# Patient Record
Sex: MALE | Race: WHITE | Employment: OTHER | URBAN - METROPOLITAN AREA
[De-identification: names, ages, dates, MRNs, and addresses within clinical notes are randomized per-mention and may not be internally consistent; named-entity substitution may affect disease eponyms.]

---

## 2017-01-16 ENCOUNTER — MED REC SCAN ONLY (OUTPATIENT)
Dept: INTERNAL MEDICINE CLINIC | Facility: CLINIC | Age: 72
End: 2017-01-16

## 2017-03-14 ENCOUNTER — MED REC SCAN ONLY (OUTPATIENT)
Dept: INTERNAL MEDICINE CLINIC | Facility: CLINIC | Age: 72
End: 2017-03-14

## 2017-03-14 ENCOUNTER — OFFICE VISIT (OUTPATIENT)
Dept: AUDIOLOGY | Facility: CLINIC | Age: 72
End: 2017-03-14

## 2017-03-14 DIAGNOSIS — H90.3 SENSORINEURAL HEARING LOSS, BILATERAL: Primary | ICD-10-CM

## 2017-03-14 PROCEDURE — 92593 HEARING AID CHECK, BOTH EARS: CPT | Performed by: AUDIOLOGIST

## 2017-03-14 NOTE — PROGRESS NOTES
HEARING AID FOLLOW-UP    Thalia Reich  8/11/1945  [id number]    Left cShell was broken - faceplate off shell and wire was loose. Sent both aids for warranty check/repair. Programmed clinic loaner aids. Call when repaired aids return.   Complete heari

## 2017-03-23 ENCOUNTER — TELEPHONE (OUTPATIENT)
Dept: AUDIOLOGY | Facility: CLINIC | Age: 72
End: 2017-03-23

## 2017-03-23 NOTE — TELEPHONE ENCOUNTER
Pt wanted to say thank you for offering appt on 3/24 but unfortunately he cannot come in tomorrow. Thank you.

## 2017-03-28 ENCOUNTER — OFFICE VISIT (OUTPATIENT)
Dept: AUDIOLOGY | Facility: CLINIC | Age: 72
End: 2017-03-28

## 2017-03-28 ENCOUNTER — OFFICE VISIT (OUTPATIENT)
Dept: OTOLARYNGOLOGY | Facility: CLINIC | Age: 72
End: 2017-03-28

## 2017-03-28 VITALS
HEIGHT: 69 IN | TEMPERATURE: 98 F | WEIGHT: 204 LBS | BODY MASS INDEX: 30.21 KG/M2 | SYSTOLIC BLOOD PRESSURE: 127 MMHG | DIASTOLIC BLOOD PRESSURE: 77 MMHG

## 2017-03-28 DIAGNOSIS — H90.3 SENSORINEURAL HEARING LOSS, BILATERAL: Primary | ICD-10-CM

## 2017-03-28 DIAGNOSIS — H61.23 BILATERAL IMPACTED CERUMEN: Primary | ICD-10-CM

## 2017-03-28 PROCEDURE — 92593 HEARING AID CHECK, BOTH EARS: CPT | Performed by: AUDIOLOGIST

## 2017-03-28 PROCEDURE — 69210 REMOVE IMPACTED EAR WAX UNI: CPT | Performed by: OTOLARYNGOLOGY

## 2017-03-28 PROCEDURE — 99212 OFFICE O/P EST SF 10 MIN: CPT | Performed by: OTOLARYNGOLOGY

## 2017-03-28 RX ORDER — CARVEDILOL 25 MG/1
50 TABLET ORAL 2 TIMES DAILY WITH MEALS
Status: ON HOLD | COMMUNITY
End: 2018-12-01

## 2017-03-28 NOTE — PROGRESS NOTES
Arvin Castellon is a 80year old male.   Patient presents with:  Ear Wax      HISTORY OF PRESENT ILLNESS    Patient presents for cerumen removal. No other complaints or concerns at this time    Social History    Marital Status:              Spouse Nam TM examined via otomicroscopy. Left TM examined via otomicroscopy. PROCEDURE:    Removal of cerumen impaction   The cerumen impaction was completely removed using microscopy. Removal was completed by using acurette and/or suction.    Comments: Ret

## 2017-03-28 NOTE — PROGRESS NOTES
HEARING AID FOLLOW-UP    Nacho Luna  8/11/1945  [id number]    Pt's hearing aids are back from in-warranty repair.    replaced the faceplates, completed a remake on each cShell, changed the receivers , tubes, electronics, and housin

## 2017-04-06 ENCOUNTER — OFFICE VISIT (OUTPATIENT)
Dept: AUDIOLOGY | Facility: CLINIC | Age: 72
End: 2017-04-06

## 2017-04-06 DIAGNOSIS — H90.3 SENSORINEURAL HEARING LOSS, BILATERAL: Primary | ICD-10-CM

## 2017-04-06 PROCEDURE — 92593 HEARING AID CHECK, BOTH EARS: CPT | Performed by: AUDIOLOGIST

## 2017-04-06 NOTE — PROGRESS NOTES
HEARING AID FOLLOW-UP    Martha Scotty  8/11/1945  [id number]    At the last visit, reprogramming could not be accomplished due to computer problems. Today, aids were reprogrammed to 100% target gain from 90%.   Real ear verification confirmed adequate

## 2017-04-24 ENCOUNTER — TELEPHONE (OUTPATIENT)
Dept: INTERNAL MEDICINE CLINIC | Facility: CLINIC | Age: 72
End: 2017-04-24

## 2017-04-24 RX ORDER — LANCETS
EACH MISCELLANEOUS
Refills: 6 | COMMUNITY
Start: 2017-02-24 | End: 2018-06-07

## 2017-04-24 RX ORDER — HYDRALAZINE HYDROCHLORIDE 50 MG/1
TABLET, FILM COATED ORAL
Refills: 3 | COMMUNITY
Start: 2017-04-03 | End: 2017-08-02 | Stop reason: ALTCHOICE

## 2017-04-25 DIAGNOSIS — E11.9 TYPE 2 DIABETES MELLITUS WITHOUT COMPLICATION, WITHOUT LONG-TERM CURRENT USE OF INSULIN (HCC): Primary | ICD-10-CM

## 2017-04-28 ENCOUNTER — TELEPHONE (OUTPATIENT)
Dept: INTERNAL MEDICINE CLINIC | Facility: CLINIC | Age: 72
End: 2017-04-28

## 2017-04-28 DIAGNOSIS — N18.30 CHRONIC KIDNEY DISEASE (CKD), STAGE III (MODERATE) (HCC): Primary | ICD-10-CM

## 2017-05-01 ENCOUNTER — TELEPHONE (OUTPATIENT)
Dept: INTERNAL MEDICINE CLINIC | Facility: CLINIC | Age: 72
End: 2017-05-01

## 2017-05-01 NOTE — TELEPHONE ENCOUNTER
CAlled and left message that the renal panel order is in [facility] and patient can have the labs drawn here.  Patient has appointment tomorrow 5/2/2017

## 2017-05-02 ENCOUNTER — OFFICE VISIT (OUTPATIENT)
Dept: INTERNAL MEDICINE CLINIC | Facility: CLINIC | Age: 72
End: 2017-05-02

## 2017-05-02 VITALS
OXYGEN SATURATION: 99 % | WEIGHT: 198.38 LBS | TEMPERATURE: 100 F | HEART RATE: 71 BPM | HEIGHT: 66.42 IN | DIASTOLIC BLOOD PRESSURE: 80 MMHG | SYSTOLIC BLOOD PRESSURE: 126 MMHG | BODY MASS INDEX: 31.5 KG/M2

## 2017-05-02 DIAGNOSIS — C90.01 MULTIPLE MYELOMA IN REMISSION (HCC): ICD-10-CM

## 2017-05-02 DIAGNOSIS — E11.9 TYPE 2 DIABETES MELLITUS WITHOUT COMPLICATION, WITHOUT LONG-TERM CURRENT USE OF INSULIN (HCC): ICD-10-CM

## 2017-05-02 DIAGNOSIS — E08.22 DIABETES MELLITUS DUE TO UNDERLYING CONDITION WITH STAGE 3 CHRONIC KIDNEY DISEASE, UNSPECIFIED LONG TERM INSULIN USE STATUS: ICD-10-CM

## 2017-05-02 DIAGNOSIS — I10 ESSENTIAL HYPERTENSION: Primary | ICD-10-CM

## 2017-05-02 DIAGNOSIS — N18.3 CKD (CHRONIC KIDNEY DISEASE), STAGE 3 (MODERATE): ICD-10-CM

## 2017-05-02 DIAGNOSIS — N18.3 DIABETES MELLITUS DUE TO UNDERLYING CONDITION WITH STAGE 3 CHRONIC KIDNEY DISEASE, UNSPECIFIED LONG TERM INSULIN USE STATUS: ICD-10-CM

## 2017-05-02 PROCEDURE — 82043 UR ALBUMIN QUANTITATIVE: CPT | Performed by: INTERNAL MEDICINE

## 2017-05-02 PROCEDURE — 82570 ASSAY OF URINE CREATININE: CPT | Performed by: INTERNAL MEDICINE

## 2017-05-02 PROCEDURE — 99214 OFFICE O/P EST MOD 30 MIN: CPT | Performed by: INTERNAL MEDICINE

## 2017-05-02 PROCEDURE — 80069 RENAL FUNCTION PANEL: CPT | Performed by: INTERNAL MEDICINE

## 2017-05-02 RX ORDER — CLOBETASOL PROPIONATE 0.5 MG/G
CREAM TOPICAL
Qty: 60 G | Refills: 1 | Status: ON HOLD | OUTPATIENT
Start: 2017-05-02 | End: 2018-11-28

## 2017-05-02 RX ORDER — LOSARTAN POTASSIUM 25 MG/1
25 TABLET ORAL DAILY
COMMUNITY
End: 2017-08-02 | Stop reason: DRUGHIGH

## 2017-05-02 NOTE — PROGRESS NOTES
HPI:    Patient ID: Hanna Canales is a 80year old male. HPIreestablish as a pt . Review of Systems         Current Outpatient Prescriptions:  Acetylcysteine 600 MG Oral Tab CR Take 600 mg by mouth 3 (three) times daily.  Disp:  Rfl:    Losartan Kristin oncologist at [address] [place] [address] Dr BANSAL  Ckd (chronic kidney disease), stage 3 (moderate) refer for a 2 opinion at [facility].   Diabetes mellitus due to underlying condition with stage 3 chronic kidney disease, unspecified long term insulin use status (hcc)  Start Januvia 100mg daily

## 2017-05-03 ENCOUNTER — TELEPHONE (OUTPATIENT)
Dept: INTERNAL MEDICINE CLINIC | Facility: CLINIC | Age: 72
End: 2017-05-03

## 2017-05-03 NOTE — TELEPHONE ENCOUNTER
Pt's  verified  Called Dr. Aquino [facility]'s office to ask for fax number was given [phone]- faxed lab results per Estefany West and Pt's request to Dr. Kip Calderon, Dr. Risa Parham, Dr. Karon Call, Dr. Amol Holley.

## 2017-05-09 ENCOUNTER — MED REC SCAN ONLY (OUTPATIENT)
Dept: INTERNAL MEDICINE CLINIC | Facility: CLINIC | Age: 72
End: 2017-05-09

## 2017-05-23 ENCOUNTER — OFFICE VISIT (OUTPATIENT)
Dept: INTERNAL MEDICINE CLINIC | Facility: CLINIC | Age: 72
End: 2017-05-23

## 2017-05-23 VITALS
HEART RATE: 69 BPM | OXYGEN SATURATION: 98 % | BODY MASS INDEX: 31 KG/M2 | TEMPERATURE: 98 F | DIASTOLIC BLOOD PRESSURE: 80 MMHG | WEIGHT: 197 LBS | SYSTOLIC BLOOD PRESSURE: 130 MMHG

## 2017-05-23 DIAGNOSIS — N18.30 CKD (CHRONIC KIDNEY DISEASE) STAGE 3, GFR 30-59 ML/MIN (HCC): ICD-10-CM

## 2017-05-23 DIAGNOSIS — E11.9 DIABETES MELLITUS TYPE 2 IN NONOBESE (HCC): ICD-10-CM

## 2017-05-23 DIAGNOSIS — I10 ESSENTIAL HYPERTENSION: ICD-10-CM

## 2017-05-23 DIAGNOSIS — C90.01 MULTIPLE MYELOMA IN REMISSION (HCC): Primary | ICD-10-CM

## 2017-05-23 PROBLEM — C90.00 MULTIPLE MYELOMA (HCC): Status: ACTIVE | Noted: 2017-05-23

## 2017-05-23 PROCEDURE — 99214 OFFICE O/P EST MOD 30 MIN: CPT | Performed by: INTERNAL MEDICINE

## 2017-05-23 NOTE — PROGRESS NOTES
HPI:    Patient ID: Debbi Charles is a 80year old male. HPIpt here for general fu on recent initiation of therapy on diabetes mellitus type 2 with Januvia.   Has been borderline or many years and diet controlled - worsening AIChave prompted to start [facility] 400 mg. Disp:  Rfl: 11     Allergies:No Known Allergies   PHYSICAL EXAM:   Physical Exam   Constitutional: He is oriented to person, place, and time. He appears well-developed and well-nourished.    Minimal cushingoid residual facies   HENT:   Head: [place]oce

## 2017-05-24 ENCOUNTER — TELEPHONE (OUTPATIENT)
Dept: INTERNAL MEDICINE CLINIC | Facility: CLINIC | Age: 72
End: 2017-05-24

## 2017-05-24 DIAGNOSIS — E11.9 DIABETES MELLITUS TYPE 2 IN NONOBESE (HCC): Primary | ICD-10-CM

## 2017-07-07 ENCOUNTER — TELEPHONE (OUTPATIENT)
Dept: INTERNAL MEDICINE CLINIC | Facility: CLINIC | Age: 72
End: 2017-07-07

## 2017-07-07 RX ORDER — HYDRALAZINE HYDROCHLORIDE 25 MG/1
TABLET, FILM COATED ORAL
COMMUNITY
Start: 2017-05-08 | End: 2017-08-02 | Stop reason: ALTCHOICE

## 2017-07-07 RX ORDER — CLOBETASOL PROPIONATE 0.5 MG/G
CREAM TOPICAL
COMMUNITY
Start: 2017-05-03 | End: 2018-01-17

## 2017-07-12 LAB — AMB EXT HGBA1C: 12.2 %

## 2017-07-14 ENCOUNTER — MED REC SCAN ONLY (OUTPATIENT)
Dept: INTERNAL MEDICINE CLINIC | Facility: CLINIC | Age: 72
End: 2017-07-14

## 2017-08-01 ENCOUNTER — TELEPHONE (OUTPATIENT)
Dept: INTERNAL MEDICINE CLINIC | Facility: CLINIC | Age: 72
End: 2017-08-01

## 2017-08-01 NOTE — TELEPHONE ENCOUNTER
Pt's  verified  Pt calling re: Januvia med changes recommended by Dr. Keely Lorenz- requesting a call back from Dr. Bishop Kaur

## 2017-08-02 ENCOUNTER — OFFICE VISIT (OUTPATIENT)
Dept: INTERNAL MEDICINE CLINIC | Facility: CLINIC | Age: 72
End: 2017-08-02

## 2017-08-02 ENCOUNTER — TELEPHONE (OUTPATIENT)
Dept: INTERNAL MEDICINE CLINIC | Facility: CLINIC | Age: 72
End: 2017-08-02

## 2017-08-02 VITALS
OXYGEN SATURATION: 97 % | RESPIRATION RATE: 18 BRPM | DIASTOLIC BLOOD PRESSURE: 76 MMHG | TEMPERATURE: 99 F | BODY MASS INDEX: 31.13 KG/M2 | WEIGHT: 196 LBS | HEART RATE: 82 BPM | HEIGHT: 66.42 IN | SYSTOLIC BLOOD PRESSURE: 124 MMHG

## 2017-08-02 DIAGNOSIS — IMO0001 TYPE 2 DM WITH CKD AND HYPERTENSION: Primary | ICD-10-CM

## 2017-08-02 PROCEDURE — 99213 OFFICE O/P EST LOW 20 MIN: CPT | Performed by: INTERNAL MEDICINE

## 2017-08-02 RX ORDER — LOSARTAN POTASSIUM 50 MG/1
50 TABLET ORAL DAILY
COMMUNITY
Start: 2017-07-19 | End: 2018-10-02

## 2017-08-02 NOTE — TELEPHONE ENCOUNTER
Pt's  verified  Called Pt per Dr. Raza Marian re: instructions on administering insulin through injection- requesting for Pt to come in at 2 today-Pt states he will be able to come today

## 2017-08-02 NOTE — PROGRESS NOTES
HPI:    Patient ID: Nacho Luna is a 80year old male. HPIpt here for fu on dmtype 2/ needs to learn use of insulin pen. Review of Systems   Endocrine:        Type 2   controlled   Psychiatric/Behavioral: Negative.                Current Outpatient Prescriptions Disp Refills    Insulin Pen Needle (COMFORT EZ PEN NEEDLES) 31G X 6 MM Does not apply Misc 100 each 6      Sig: To use daily with insulin pen           Imaging & Referrals:  None       QN#[id number]

## 2017-08-17 ENCOUNTER — TELEPHONE (OUTPATIENT)
Dept: INTERNAL MEDICINE CLINIC | Facility: CLINIC | Age: 72
End: 2017-08-17

## 2017-08-17 NOTE — TELEPHONE ENCOUNTER
Pt came in to ask if the Dr has made a decision on which inject he should be using. Pt was given:  Lantus Solostar 100 unit / Ins pharmacy said he can use:  Basaglar or Levemir. Angelique Freedman 150 will cover these.   Pt is running out of Curvesar by 8/26/2017

## 2017-08-17 NOTE — TELEPHONE ENCOUNTER
Per Dr. Prasanna Larios She would like Pt to use Basaglar same dosing units if Lantus is not covered

## 2017-08-23 ENCOUNTER — TELEPHONE (OUTPATIENT)
Dept: INTERNAL MEDICINE CLINIC | Facility: CLINIC | Age: 72
End: 2017-08-23

## 2017-08-23 NOTE — TELEPHONE ENCOUNTER
Basaglar pens were sent to the pharmacy on 8/17/2017 verified the pharmacy with the patient, they will call the pharmacy and     They were concerned that the less expensive insulin brand had not been prescribed

## 2017-08-24 ENCOUNTER — TELEPHONE (OUTPATIENT)
Dept: INTERNAL MEDICINE CLINIC | Facility: CLINIC | Age: 72
End: 2017-08-24

## 2017-08-24 NOTE — TELEPHONE ENCOUNTER
Pt's  verified  Called Pt back and n/a l/m informing him that Dr. Prasanna Larios authorized change and Dr. Sharlot Bernheim sent new order on  we received a request again today and Rx was sent again and received pharmacy confirmation- can call back with questions or co

## 2017-09-29 ENCOUNTER — OFFICE VISIT (OUTPATIENT)
Dept: INTERNAL MEDICINE CLINIC | Facility: CLINIC | Age: 72
End: 2017-09-29

## 2017-09-29 VITALS
WEIGHT: 198 LBS | OXYGEN SATURATION: 98 % | SYSTOLIC BLOOD PRESSURE: 122 MMHG | DIASTOLIC BLOOD PRESSURE: 78 MMHG | HEIGHT: 66.42 IN | BODY MASS INDEX: 31.44 KG/M2 | HEART RATE: 81 BPM | RESPIRATION RATE: 18 BRPM

## 2017-09-29 DIAGNOSIS — E11.9 DIABETES MELLITUS TYPE 2 IN NONOBESE (HCC): Primary | ICD-10-CM

## 2017-09-29 DIAGNOSIS — N18.30 STAGE 3 CHRONIC KIDNEY DISEASE (HCC): ICD-10-CM

## 2017-09-29 DIAGNOSIS — I10 ESSENTIAL HYPERTENSION: ICD-10-CM

## 2017-09-29 DIAGNOSIS — C90.01 MULTIPLE MYELOMA IN REMISSION (HCC): ICD-10-CM

## 2017-09-29 PROCEDURE — 99214 OFFICE O/P EST MOD 30 MIN: CPT | Performed by: INTERNAL MEDICINE

## 2017-09-29 PROCEDURE — 90653 IIV ADJUVANT VACCINE IM: CPT | Performed by: INTERNAL MEDICINE

## 2017-09-29 PROCEDURE — 90670 PCV13 VACCINE IM: CPT | Performed by: INTERNAL MEDICINE

## 2017-09-29 PROCEDURE — G0008 ADMIN INFLUENZA VIRUS VAC: HCPCS | Performed by: INTERNAL MEDICINE

## 2017-09-29 PROCEDURE — G0009 ADMIN PNEUMOCOCCAL VACCINE: HCPCS | Performed by: INTERNAL MEDICINE

## 2017-09-29 RX ORDER — PEN NEEDLE, DIABETIC 31 GX5/16"
NEEDLE, DISPOSABLE MISCELLANEOUS
COMMUNITY
Start: 2017-08-02 | End: 2020-11-03

## 2017-09-29 NOTE — PROGRESS NOTES
HPI:    Patient ID: Eileen Mendoza is a 71year old male. Pt here for a fu on Dm- has started on some basal insulin dosing. Is seeing his specialists. Has been seeing BS readings. Has no problems with insulin administration.   Has been excercising lincoln r Test twice daily Dx Diabetes E11.9 Disp: 100 each Rfl: 6   ONETOUCH ULTRASOFT LANCETS Does not apply Misc  Disp:  Rfl: 6   carvedilol 25 MG Oral Tab Take 25 mg by mouth 2 (two) times daily with meals.  Disp:  Rfl:    latanoprost (XALATAN) 0.005 % Ophthalmic IM  FLU VACCINE ADJUVANT IM         This is a 20 minute visit and greater than 50% of the time was spent counseling the patient and/or coordinating care.     Denver Lyman MD  9/29/2017

## 2017-10-16 RX ORDER — INSULIN GLARGINE 100 [IU]/ML
10 INJECTION, SOLUTION SUBCUTANEOUS NIGHTLY
Qty: 6 ML | Refills: 0 | Status: SHIPPED | OUTPATIENT
Start: 2017-10-16 | End: 2017-11-29

## 2017-11-02 ENCOUNTER — OFFICE VISIT (OUTPATIENT)
Dept: AUDIOLOGY | Facility: CLINIC | Age: 72
End: 2017-11-02

## 2017-11-02 ENCOUNTER — OFFICE VISIT (OUTPATIENT)
Dept: OTOLARYNGOLOGY | Facility: CLINIC | Age: 72
End: 2017-11-02

## 2017-11-02 VITALS
TEMPERATURE: 98 F | BODY MASS INDEX: 29.36 KG/M2 | SYSTOLIC BLOOD PRESSURE: 120 MMHG | WEIGHT: 196 LBS | HEIGHT: 68.5 IN | DIASTOLIC BLOOD PRESSURE: 86 MMHG

## 2017-11-02 DIAGNOSIS — H90.3 SENSORINEURAL HEARING LOSS, BILATERAL: Primary | ICD-10-CM

## 2017-11-02 DIAGNOSIS — H61.23 BILATERAL IMPACTED CERUMEN: Primary | ICD-10-CM

## 2017-11-02 PROCEDURE — 99212 OFFICE O/P EST SF 10 MIN: CPT | Performed by: OTOLARYNGOLOGY

## 2017-11-02 PROCEDURE — V5266 BATTERY FOR HEARING DEVICE: HCPCS | Performed by: AUDIOLOGIST

## 2017-11-02 PROCEDURE — 69210 REMOVE IMPACTED EAR WAX UNI: CPT | Performed by: OTOLARYNGOLOGY

## 2017-11-02 PROCEDURE — V5267 HEARING AID SUP/ACCESS/DEV: HCPCS | Performed by: AUDIOLOGIST

## 2017-11-02 PROCEDURE — 92593 HEARING AID CHECK, BOTH EARS: CPT | Performed by: AUDIOLOGIST

## 2017-11-02 NOTE — PROGRESS NOTES
Tobi Agarwal is a 71year old male.   Patient presents with:  Ear Wax: both ears      HISTORY OF PRESENT ILLNESS    Patient presents for cerumen removal. No other complaints or concerns at this time    Social History    Marital status:  reveals cerumen impaction,     Tympanic Membranes:  Right: Normal tympanic membrane. Left: Normal tympanic membrane. TM Visualized Method:   Right TM examined via otomicroscopy. Left TM examined via otomicroscopy.       PROCEDURE:    Removal of cer daily with meals. , Disp: , Rfl:   •  latanoprost (XALATAN) 0.005 % Ophthalmic Solution, , Disp: , Rfl: 6  •  FOLBIC 2.5-25-2 MG Oral Tab, , Disp: , Rfl: 10  •  acyclovir (ZOVIRAX) 400 MG Oral Tab, 400 mg., Disp: , Rfl: 11  ASSESSMENT AND PLAN    1.  Bilater

## 2017-11-02 NOTE — PROGRESS NOTES
HEARING AID FOLLOW-UP    Venkat Layton  8/11/1945  [id number]    Otoscopic Exam:  Build-up of cerumen in each ear. Saw Dr Jose A Aguilar as an add-on for an earcleaning. Pt was seen for a six months follow-up. Cleaned and suctioned aids and cShells.   Judith Morales

## 2017-11-02 NOTE — PROGRESS NOTES
HPI:    Patient ID: Tobi Agarwal is a 71year old male.     HPI    Review of Systems         Current Outpatient Prescriptions:  BASAGLAR KWIKPEN 100 UNIT/ML Subcutaneous Solution Pen-injector inject 10 units into the skin nightly Disp: 6 mL Rfl: 0   BD PE encounter    Imaging & Referrals:  None       #[id number]

## 2017-11-29 RX ORDER — INSULIN GLARGINE 100 [IU]/ML
10 INJECTION, SOLUTION SUBCUTANEOUS NIGHTLY
Qty: 6 ML | Refills: 0 | Status: SHIPPED | OUTPATIENT
Start: 2017-11-29 | End: 2018-01-15

## 2017-11-30 ENCOUNTER — TELEPHONE (OUTPATIENT)
Dept: INTERNAL MEDICINE CLINIC | Facility: CLINIC | Age: 72
End: 2017-11-30

## 2017-11-30 NOTE — TELEPHONE ENCOUNTER
Sandie from  [facility] office called wants recent labs from this month to be faxed to [phone]

## 2017-12-15 ENCOUNTER — TELEPHONE (OUTPATIENT)
Dept: INTERNAL MEDICINE CLINIC | Facility: CLINIC | Age: 72
End: 2017-12-15

## 2017-12-15 NOTE — TELEPHONE ENCOUNTER
Faxed results received from [facility] in 12/13/17 to Dr. Janelle Kimbrough office  Called Pt at both numbers on filed n/a lmtcb office to schedule a follow up for lab results [place] [facility] [place]. Avinash Sheppard (covering for Dr. Johnathan Lim) Also informed that t

## 2018-01-15 NOTE — TELEPHONE ENCOUNTER
From: Krishna Romero  Sent: 1/15/2018 4:58 PM CST  Subject: Medication Renewal Request    Krishna Romero would like a refill of the following medications:     BASAGLAR KWIKPEN 100 UNIT/ML Subcutaneous Solution Pen-injector Rigoberto Matute MD]    Preferred

## 2018-01-17 ENCOUNTER — OFFICE VISIT (OUTPATIENT)
Dept: INTERNAL MEDICINE CLINIC | Facility: CLINIC | Age: 73
End: 2018-01-17

## 2018-01-17 VITALS
HEART RATE: 77 BPM | TEMPERATURE: 98 F | WEIGHT: 204 LBS | HEIGHT: 66 IN | DIASTOLIC BLOOD PRESSURE: 88 MMHG | OXYGEN SATURATION: 99 % | SYSTOLIC BLOOD PRESSURE: 142 MMHG | BODY MASS INDEX: 32.78 KG/M2

## 2018-01-17 DIAGNOSIS — D69.6 THROMBOCYTOPENIA (HCC): ICD-10-CM

## 2018-01-17 DIAGNOSIS — G62.9 NEUROPATHY: ICD-10-CM

## 2018-01-17 DIAGNOSIS — I10 ESSENTIAL HYPERTENSION: ICD-10-CM

## 2018-01-17 DIAGNOSIS — C90.01 MULTIPLE MYELOMA IN REMISSION (HCC): ICD-10-CM

## 2018-01-17 DIAGNOSIS — D64.9 ANEMIA, UNSPECIFIED TYPE: ICD-10-CM

## 2018-01-17 DIAGNOSIS — N18.30 CKD (CHRONIC KIDNEY DISEASE) STAGE 3, GFR 30-59 ML/MIN (HCC): ICD-10-CM

## 2018-01-17 DIAGNOSIS — E11.9 DIABETES MELLITUS TYPE 2 IN NONOBESE (HCC): Primary | ICD-10-CM

## 2018-01-17 DIAGNOSIS — I42.7 CARDIOMYOPATHY SECONDARY TO DRUG (HCC): ICD-10-CM

## 2018-01-17 PROCEDURE — 99204 OFFICE O/P NEW MOD 45 MIN: CPT | Performed by: INTERNAL MEDICINE

## 2018-01-17 PROCEDURE — G0463 HOSPITAL OUTPT CLINIC VISIT: HCPCS | Performed by: INTERNAL MEDICINE
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
## 2018-01-18 ENCOUNTER — TELEPHONE (OUTPATIENT)
Dept: INTERNAL MEDICINE CLINIC | Facility: CLINIC | Age: 73
End: 2018-01-18

## 2018-01-28 PROBLEM — G62.9 NEUROPATHY: Status: ACTIVE | Noted: 2018-01-28

## 2018-01-28 PROBLEM — I42.7 CARDIOMYOPATHY SECONDARY TO DRUG (HCC): Status: ACTIVE | Noted: 2018-01-28

## 2018-01-28 PROBLEM — I10 ESSENTIAL HYPERTENSION: Status: ACTIVE | Noted: 2017-05-23

## 2018-01-28 PROBLEM — D64.9 ANEMIA: Status: ACTIVE | Noted: 2018-01-28

## 2018-01-28 PROBLEM — C90.01 MULTIPLE MYELOMA IN REMISSION (HCC): Status: ACTIVE | Noted: 2017-05-23

## 2018-01-28 PROBLEM — D69.6 THROMBOCYTOPENIA: Status: ACTIVE | Noted: 2018-01-28

## 2018-01-28 PROBLEM — D69.6 THROMBOCYTOPENIA (HCC): Status: ACTIVE | Noted: 2018-01-28

## 2018-01-28 PROBLEM — N18.30 CKD (CHRONIC KIDNEY DISEASE) STAGE 3, GFR 30-59 ML/MIN (HCC): Status: ACTIVE | Noted: 2018-01-28

## 2018-01-30 ENCOUNTER — TELEPHONE (OUTPATIENT)
Dept: INTERNAL MEDICINE CLINIC | Facility: CLINIC | Age: 73
End: 2018-01-30

## 2018-01-30 NOTE — TELEPHONE ENCOUNTER
F: [phone]    Willis Vicente from Dr. Felipe Flower office (hematology & oncology) needs matching Dx codes for lipid panel, and PSA, so Medicare will cover the tests.     Tasked to nursing

## 2018-01-31 NOTE — TELEPHONE ENCOUNTER
Left VM for Alexy Michelle relaying the message. Encouraged her to call back with any additional questions or concerns.

## 2018-01-31 NOTE — TELEPHONE ENCOUNTER
Dx should be for screening for prostate cancer for the PSA and screening for cardiovascular condition

## 2018-02-14 ENCOUNTER — OFFICE VISIT (OUTPATIENT)
Dept: INTERNAL MEDICINE CLINIC | Facility: CLINIC | Age: 73
End: 2018-02-14

## 2018-02-14 VITALS
TEMPERATURE: 98 F | WEIGHT: 206 LBS | HEART RATE: 77 BPM | OXYGEN SATURATION: 98 % | HEIGHT: 68 IN | DIASTOLIC BLOOD PRESSURE: 92 MMHG | SYSTOLIC BLOOD PRESSURE: 150 MMHG | BODY MASS INDEX: 31.22 KG/M2

## 2018-02-14 DIAGNOSIS — N18.30 CKD (CHRONIC KIDNEY DISEASE) STAGE 3, GFR 30-59 ML/MIN (HCC): ICD-10-CM

## 2018-02-14 DIAGNOSIS — E78.5 DYSLIPIDEMIA: ICD-10-CM

## 2018-02-14 DIAGNOSIS — C90.01 MULTIPLE MYELOMA IN REMISSION (HCC): ICD-10-CM

## 2018-02-14 DIAGNOSIS — I10 ESSENTIAL HYPERTENSION: ICD-10-CM

## 2018-02-14 DIAGNOSIS — E11.9 DIABETES MELLITUS TYPE 2 IN NONOBESE (HCC): Primary | ICD-10-CM

## 2018-02-14 PROCEDURE — G0463 HOSPITAL OUTPT CLINIC VISIT: HCPCS | Performed by: INTERNAL MEDICINE

## 2018-02-14 PROCEDURE — 99214 OFFICE O/P EST MOD 30 MIN: CPT | Performed by: INTERNAL MEDICINE

## 2018-02-14 RX ORDER — MULTIVITAMIN,THER AND MINERALS
TABLET ORAL
Status: ON HOLD | COMMUNITY
End: 2018-11-28

## 2018-02-14 NOTE — PROGRESS NOTES
Vi Hager is a 71year old malePatient presents with:   Follow - Up: 1 month visit - collins on multiple myeloma       HPI:     Vi Hager is a 71year old male who presents for a complete physical exam.   Prior PCP was Dr. Rachana Pedraza 210, , HDL 38, , A1c 6.5%, PSA 1.8          Wt Readings from Last 6 Encounters:  02/14/18 : 206 lb (93.4 kg)  01/17/18 : 204 lb (92.5 kg)  11/02/17 : 196 lb (88.9 kg)  09/29/17 : 198 lb (89.8 kg)  08/02/17 : 196 lb (88.9 kg)  05/23/17 : 197 lb Heart Disorder Brother      passed away with heart attack       Social History:  Smoking status: Never Smoker                                                              Smokeless tobacco: Never Used                      Alcohol use: Yes           0.0 oz/ 2   Last a1c 7.4%>6.5% recently from last 2/2018; lantus 10u daily  Has no hypoglycemic episodes, but discussed that he needs to watch this carefully as he is trying to lose weight and exercise more, so he can call me with low blood sugars and we can adjus

## 2018-02-16 RX ORDER — ATORVASTATIN CALCIUM 20 MG/1
20 TABLET, FILM COATED ORAL NIGHTLY
Qty: 90 TABLET | Refills: 1 | Status: SHIPPED | OUTPATIENT
Start: 2018-02-16 | End: 2018-08-02

## 2018-05-01 ENCOUNTER — TELEPHONE (OUTPATIENT)
Dept: INTERNAL MEDICINE CLINIC | Facility: CLINIC | Age: 73
End: 2018-05-01

## 2018-05-01 ENCOUNTER — OFFICE VISIT (OUTPATIENT)
Dept: INTERNAL MEDICINE CLINIC | Facility: CLINIC | Age: 73
End: 2018-05-01

## 2018-05-01 ENCOUNTER — HOSPITAL ENCOUNTER (OUTPATIENT)
Dept: GENERAL RADIOLOGY | Age: 73
Discharge: HOME | End: 2018-05-01
Attending: INTERNAL MEDICINE | Admitting: INTERNAL MEDICINE
Payer: MEDICARE

## 2018-05-01 VITALS
BODY MASS INDEX: 31.07 KG/M2 | WEIGHT: 205 LBS | OXYGEN SATURATION: 98 % | TEMPERATURE: 98 F | HEART RATE: 85 BPM | DIASTOLIC BLOOD PRESSURE: 90 MMHG | HEIGHT: 68 IN | SYSTOLIC BLOOD PRESSURE: 148 MMHG

## 2018-05-01 DIAGNOSIS — S49.91XA INJURY OF RIGHT SHOULDER, INITIAL ENCOUNTER: Primary | ICD-10-CM

## 2018-05-01 DIAGNOSIS — S49.91XA INJURY OF RIGHT SHOULDER, INITIAL ENCOUNTER: ICD-10-CM

## 2018-05-01 PROCEDURE — G0463 HOSPITAL OUTPT CLINIC VISIT: HCPCS | Performed by: INTERNAL MEDICINE

## 2018-05-01 PROCEDURE — 99213 OFFICE O/P EST LOW 20 MIN: CPT | Performed by: INTERNAL MEDICINE

## 2018-05-01 PROCEDURE — 73030 X-RAY EXAM OF SHOULDER: CPT | Performed by: INTERNAL MEDICINE

## 2018-05-01 RX ORDER — TRAMADOL HYDROCHLORIDE 50 MG/1
50 TABLET ORAL EVERY 6 HOURS PRN
Qty: 30 TABLET | Refills: 1 | Status: ON HOLD | OUTPATIENT
Start: 2018-05-01 | End: 2018-11-28

## 2018-05-01 RX ORDER — LIDOCAINE 50 MG/G
1 PATCH TOPICAL EVERY 24 HOURS
Qty: 30 PATCH | Refills: 0 | Status: ON HOLD | OUTPATIENT
Start: 2018-05-01 | End: 2018-11-28

## 2018-05-01 RX ORDER — VITS A,C,E/LUTEIN/MINERALS 300MCG-200
TABLET ORAL
COMMUNITY
Start: 2016-08-04

## 2018-05-01 NOTE — TELEPHONE ENCOUNTER
Notified patient that xray did not show fracture. Patient will try ROM exercises at home, but if he is not feeling better in 5-6 days, he is advised to start physical therapy (PT order placed) and to see Dr. Jesus Parikh.
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
## 2018-05-10 ENCOUNTER — PATIENT MESSAGE (OUTPATIENT)
Dept: INTERNAL MEDICINE CLINIC | Facility: CLINIC | Age: 73
End: 2018-05-10

## 2018-05-10 NOTE — TELEPHONE ENCOUNTER
From: Rossy Lopez  To: Radha Reyes DO  Sent: 5/10/2018 1:59 PM CDT  Subject: Test Results Question    Dr. Ilan Tan,    Did you receive the results of my most recent tests from my April visit with Dr. Patricia Ye.  I will be meeting with her  for blood tests and

## 2018-05-11 NOTE — TELEPHONE ENCOUNTER
Pt. Is calling he needs diagnostic codes for labs (Lipid, A1C, Cholesterol, & Liver) any other labs also that he will have done [place]. Richard Espinosa [place] [facility] [place] Has an appt. On Monday 05/14  Please advise Balwinder's Ph. # [phone].    Routed high to clini

## 2018-05-16 ENCOUNTER — PATIENT MESSAGE (OUTPATIENT)
Dept: INTERNAL MEDICINE CLINIC | Facility: CLINIC | Age: 73
End: 2018-05-16

## 2018-05-16 DIAGNOSIS — Z00.00 ANNUAL PHYSICAL EXAM: Primary | ICD-10-CM

## 2018-05-17 NOTE — TELEPHONE ENCOUNTER
From: Roseline Stroud  To: Eric Hargrove DO  Sent: 5/16/2018 10:35 PM CDT  Subject: Visit Follow-up Question    Dr Ailyn De Santiago,    I would prefer an order to do the cholesterol test at [place] to complete the process.  Do you do the blood draw  at your office and d

## 2018-05-17 NOTE — TELEPHONE ENCOUNTER
This is  f/u response to Samanta Shoes message sent 5/10/18. To DR. ROPER patient would prefer we enter in order for cholesterol to have done here. Order pended. Please advise.

## 2018-05-21 ENCOUNTER — APPOINTMENT (OUTPATIENT)
Dept: LAB | Age: 73
End: 2018-05-21
Attending: INTERNAL MEDICINE
Payer: MEDICARE

## 2018-05-21 DIAGNOSIS — Z00.00 ANNUAL PHYSICAL EXAM: ICD-10-CM

## 2018-05-21 PROCEDURE — 80061 LIPID PANEL: CPT

## 2018-05-21 PROCEDURE — 36415 COLL VENOUS BLD VENIPUNCTURE: CPT

## 2018-06-04 ENCOUNTER — OFFICE VISIT (OUTPATIENT)
Dept: PHYSICAL THERAPY | Age: 73
End: 2018-06-04
Attending: INTERNAL MEDICINE
Payer: MEDICARE

## 2018-06-04 DIAGNOSIS — S49.91XA INJURY OF RIGHT SHOULDER, INITIAL ENCOUNTER: ICD-10-CM

## 2018-06-04 PROCEDURE — 97112 NEUROMUSCULAR REEDUCATION: CPT | Performed by: PHYSICAL THERAPIST

## 2018-06-04 PROCEDURE — 97530 THERAPEUTIC ACTIVITIES: CPT | Performed by: PHYSICAL THERAPIST

## 2018-06-04 PROCEDURE — 97162 PT EVAL MOD COMPLEX 30 MIN: CPT | Performed by: PHYSICAL THERAPIST
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
## 2018-06-07 ENCOUNTER — OFFICE VISIT (OUTPATIENT)
Dept: OTOLARYNGOLOGY | Facility: CLINIC | Age: 73
End: 2018-06-07

## 2018-06-07 ENCOUNTER — OFFICE VISIT (OUTPATIENT)
Dept: AUDIOLOGY | Facility: CLINIC | Age: 73
End: 2018-06-07

## 2018-06-07 VITALS
WEIGHT: 195 LBS | BODY MASS INDEX: 29.55 KG/M2 | DIASTOLIC BLOOD PRESSURE: 98 MMHG | TEMPERATURE: 98 F | HEIGHT: 68 IN | SYSTOLIC BLOOD PRESSURE: 170 MMHG

## 2018-06-07 DIAGNOSIS — H61.23 BILATERAL IMPACTED CERUMEN: Primary | ICD-10-CM

## 2018-06-07 DIAGNOSIS — H90.3 SENSORINEURAL HEARING LOSS, BILATERAL: Primary | ICD-10-CM

## 2018-06-07 PROCEDURE — 69210 REMOVE IMPACTED EAR WAX UNI: CPT | Performed by: OTOLARYNGOLOGY

## 2018-06-07 PROCEDURE — 92593 HEARING AID CHECK, BOTH EARS: CPT | Performed by: AUDIOLOGIST

## 2018-06-07 PROCEDURE — 99213 OFFICE O/P EST LOW 20 MIN: CPT | Performed by: OTOLARYNGOLOGY

## 2018-06-07 RX ORDER — ATORVASTATIN CALCIUM 20 MG/1
TABLET, FILM COATED ORAL
COMMUNITY
Start: 2018-02-19 | End: 2018-06-07

## 2018-06-07 RX ORDER — LATANOPROST 50 UG/ML
SOLUTION/ DROPS OPHTHALMIC
COMMUNITY
Start: 2014-10-29 | End: 2018-06-07

## 2018-06-07 RX ORDER — LOSARTAN POTASSIUM 50 MG/1
TABLET ORAL
COMMUNITY
Start: 2018-04-30 | End: 2018-06-07

## 2018-06-07 NOTE — PROGRESS NOTES
Marika Burr is a 71year old male.   Patient presents with:  Ear Wax: bilateral ear cleaning       HISTORY OF PRESENT ILLNESS  11/2/17  Patient presents for cerumen removal. No other complaints or concerns at this time  6/7/2018  Has a lot of problems wi Cuff Size: adult)   Temp 97.7 °F (36.5 °C) (Oral)   Ht 5' 8\" (1.727 m)   Wt 195 lb (88.5 kg)   BMI 29.65 kg/m²        Constitutional Normal Overall appearance - Normal.        Neck Exam Normal Inspection - Normal. Palpation - Normal. Parotid gland - Nielson head island twice daily Dx Diabetes E11.9, Disp: 1 kit, Rfl: 0  •  ONETOUCH LANCETS Does not apply Misc, Test twice daily Dx Diabetes E11.9, Disp: 100 each, Rfl: 6  •  Glucose Blood (ONETOUCH TEST) In Vitro Strip, Test twice daily Dx Diabetes E11.9, Disp: 100 each, Rf

## 2018-06-08 ENCOUNTER — PATIENT MESSAGE (OUTPATIENT)
Dept: INTERNAL MEDICINE CLINIC | Facility: CLINIC | Age: 73
End: 2018-06-08

## 2018-06-08 NOTE — TELEPHONE ENCOUNTER
From: Victor Manuel Adair  To: Isaiah Jackson DO  Sent: 6/8/2018 1:58 PM CDT  Subject: Test Results Question    Dr. Suleiman Madrigal    I will be meeting with my cardiologist Dr Nuno Jackson on Monday morning the 11th at [facility] to review my cardiac status.

## 2018-06-11 NOTE — PROGRESS NOTES
HEARING AID FOLLOW-UP    [place]issa Rousseaukat  8/11/1945  [id number]    Patient is here for follow-up. Hearing test showed stable results compared to audio of 3/28/17. Aids were sent for in-warranty repair. Warranty expires on 10/6/18.   Dispensed [facility]

## 2018-06-12 ENCOUNTER — OFFICE VISIT (OUTPATIENT)
Dept: PHYSICAL THERAPY | Age: 73
End: 2018-06-12
Attending: INTERNAL MEDICINE
Payer: MEDICARE

## 2018-06-12 DIAGNOSIS — S49.91XA INJURY OF RIGHT SHOULDER, INITIAL ENCOUNTER: ICD-10-CM

## 2018-06-12 PROCEDURE — 97140 MANUAL THERAPY 1/> REGIONS: CPT | Performed by: PHYSICAL THERAPIST

## 2018-06-12 PROCEDURE — 97112 NEUROMUSCULAR REEDUCATION: CPT | Performed by: PHYSICAL THERAPIST

## 2018-06-12 PROCEDURE — 97110 THERAPEUTIC EXERCISES: CPT | Performed by: PHYSICAL THERAPIST

## 2018-06-12 NOTE — PROGRESS NOTES
Name: Krishna Romero  Date: 6/12/2018  Dx:  Injury of right shoulder, initial encounter (N90.25UG)           Authorized # of Visits:  10         Next MD visit: none scheduled  Fall Risk: standard         Precautions: n/a           Medication Changes since l care.  Plan: Continue interventions to restore joint and soft tissue mechanics, increase functional mobility and return patient to prior level of function.       Charges:  1 Man, 1 TE, 1 Neuro      Total Timed Treatment: 40 min  Total Treatment Time: 40 min

## 2018-06-13 ENCOUNTER — TELEPHONE (OUTPATIENT)
Dept: AUDIOLOGY | Facility: CLINIC | Age: 73
End: 2018-06-13

## 2018-06-18 ENCOUNTER — OFFICE VISIT (OUTPATIENT)
Dept: PHYSICAL THERAPY | Age: 73
End: 2018-06-18
Attending: INTERNAL MEDICINE
Payer: MEDICARE

## 2018-06-18 DIAGNOSIS — S49.91XA INJURY OF RIGHT SHOULDER, INITIAL ENCOUNTER: ICD-10-CM

## 2018-06-18 PROCEDURE — 97110 THERAPEUTIC EXERCISES: CPT | Performed by: PHYSICAL THERAPIST

## 2018-06-18 PROCEDURE — 97140 MANUAL THERAPY 1/> REGIONS: CPT | Performed by: PHYSICAL THERAPIST

## 2018-06-18 PROCEDURE — 97112 NEUROMUSCULAR REEDUCATION: CPT | Performed by: PHYSICAL THERAPIST

## 2018-06-18 NOTE — PROGRESS NOTES
Name: Nikhil West  Date: 6/12/2018  Dx:  Injury of right shoulder, initial encounter (U98.11HX)           Authorized # of Visits:  10         Next MD visit: none scheduled  Fall Risk: standard         Precautions: n/a           Medication Changes since l demonstrate increased shoulder ROM by 10-15 degrees in all deficient areas for ease with dressing, reaching to cabinets and driving. 5. Patient to improve FOTO outcomes score to less than or equal to 25% impairment, for functional improvement in ADLs.  Cur

## 2018-06-19 ENCOUNTER — PATIENT MESSAGE (OUTPATIENT)
Dept: INTERNAL MEDICINE CLINIC | Facility: CLINIC | Age: 73
End: 2018-06-19

## 2018-06-19 DIAGNOSIS — E11.9 TYPE 2 DIABETES MELLITUS WITHOUT COMPLICATION, WITHOUT LONG-TERM CURRENT USE OF INSULIN (HCC): ICD-10-CM

## 2018-06-19 NOTE — TELEPHONE ENCOUNTER
From: Selina Granados  To: Porter Ellis DO  Sent: 6/19/2018 9:27 AM CDT  Subject: Prescription Question    Dr Malinda Falk    I need a refill of the lamcets so I can test my blood sugar.  The prescription was formerly approved By Dr. Liss Schmidt, but she is no longer my

## 2018-06-21 ENCOUNTER — OFFICE VISIT (OUTPATIENT)
Dept: PHYSICAL THERAPY | Age: 73
End: 2018-06-21
Attending: INTERNAL MEDICINE
Payer: MEDICARE

## 2018-06-21 DIAGNOSIS — S49.91XA INJURY OF RIGHT SHOULDER, INITIAL ENCOUNTER: ICD-10-CM

## 2018-06-21 PROCEDURE — 97110 THERAPEUTIC EXERCISES: CPT | Performed by: PHYSICAL THERAPIST

## 2018-06-21 PROCEDURE — 97140 MANUAL THERAPY 1/> REGIONS: CPT | Performed by: PHYSICAL THERAPIST

## 2018-06-21 NOTE — PROGRESS NOTES
Name: Fara Delgado  Date: 6/18/2018  Dx:  Injury of right shoulder, initial encounter (Y12.77CT)           Authorized # of Visits:  10         Next MD visit: none scheduled  Fall Risk: standard         Precautions: n/a           Medication Changes since l Goals Timeframe (6 weeks, 10 visits over 90 days)  1.  Patient to be independent with progressive HEP during and upon discharge to aide with symptom management and return to previous level of function.    2. Patient will demonstrate appropriate posture and

## 2018-06-26 ENCOUNTER — OFFICE VISIT (OUTPATIENT)
Dept: PHYSICAL THERAPY | Age: 73
End: 2018-06-26
Attending: INTERNAL MEDICINE
Payer: MEDICARE

## 2018-06-26 DIAGNOSIS — S49.91XA INJURY OF RIGHT SHOULDER, INITIAL ENCOUNTER: ICD-10-CM

## 2018-06-26 PROCEDURE — 97140 MANUAL THERAPY 1/> REGIONS: CPT | Performed by: PHYSICAL THERAPIST

## 2018-06-26 PROCEDURE — 97112 NEUROMUSCULAR REEDUCATION: CPT | Performed by: PHYSICAL THERAPIST

## 2018-06-26 PROCEDURE — 97110 THERAPEUTIC EXERCISES: CPT | Performed by: PHYSICAL THERAPIST

## 2018-06-26 NOTE — PROGRESS NOTES
Name: Martha Fajardo  Date: 6/26/2018  Dx:  Injury of right shoulder, initial encounter (X48.86ZW)           Authorized # of Visits:  10         Next MD visit: none scheduled  Fall Risk: standard         Precautions: n/a           Medication Changes since l girdle in sidelying and followed with retraining to increase neuromuscular control. Added sidelying ER, noting moderate-marked difficulty for patient to achieve active ER beyond neutral (parallel to table).  Modified range to allow patient to successfully w

## 2018-06-29 ENCOUNTER — OFFICE VISIT (OUTPATIENT)
Dept: PHYSICAL THERAPY | Age: 73
End: 2018-06-29
Attending: INTERNAL MEDICINE
Payer: MEDICARE

## 2018-06-29 DIAGNOSIS — S49.91XA INJURY OF RIGHT SHOULDER, INITIAL ENCOUNTER: ICD-10-CM

## 2018-06-29 PROCEDURE — 97110 THERAPEUTIC EXERCISES: CPT | Performed by: PHYSICAL THERAPIST

## 2018-06-29 PROCEDURE — 97140 MANUAL THERAPY 1/> REGIONS: CPT | Performed by: PHYSICAL THERAPIST

## 2018-06-29 NOTE — PROGRESS NOTES
Name: Thalia Reich  Date: 6/29/2018  Dx:  Injury of right shoulder, initial encounter (M12.26HM)           Authorized # of Visits:  10         Next MD visit: none scheduled  Fall Risk: standard         Precautions: n/a           Medication Changes since l slides 2x10  - Posture training with exercises at wall - rhythmic stabilization for shoulder IR/ER in supine  - Posture training with exercises at wall and in standing  - Scapular clock at R with assist  - rhythmic stabilization for shoulder IR/ER in supin

## 2018-07-02 ENCOUNTER — OFFICE VISIT (OUTPATIENT)
Dept: PHYSICAL THERAPY | Age: 73
End: 2018-07-02
Attending: INTERNAL MEDICINE
Payer: MEDICARE

## 2018-07-02 DIAGNOSIS — S49.91XA INJURY OF RIGHT SHOULDER, INITIAL ENCOUNTER: ICD-10-CM

## 2018-07-02 PROCEDURE — 97140 MANUAL THERAPY 1/> REGIONS: CPT

## 2018-07-02 PROCEDURE — 97110 THERAPEUTIC EXERCISES: CPT

## 2018-07-02 NOTE — PROGRESS NOTES
Name: Fara Delgado  Date: 6/29/2018  Dx:  Injury of right shoulder, initial encounter (F53.32NM)           Authorized # of Visits:  10         Next MD visit: none scheduled  Fall Risk: standard         Precautions: n/a           Medication Changes since l pectorals - sidelying mobilization of shoulder girdle  - MFR pectorals at R -PROM R shoulder  -sidelying mobilization of shoulder girdle  -inferior glide of [address] joint     Neuro Re-ed - rhythmic stabilization for shoulder IR/ER in supine  - Posture training Continue interventions to restore joint and soft tissue mechanics, increase functional mobility and return patient to prior level of function.       Charges:  1 Man, 2 TE     Total Timed Treatment: 45 min  Total Treatment Time: 45 min

## 2018-07-03 ENCOUNTER — TELEPHONE (OUTPATIENT)
Dept: INTERNAL MEDICINE CLINIC | Facility: CLINIC | Age: 73
End: 2018-07-03

## 2018-07-03 DIAGNOSIS — E11.9 TYPE 2 DIABETES MELLITUS WITHOUT COMPLICATION, WITHOUT LONG-TERM CURRENT USE OF INSULIN (HCC): Primary | ICD-10-CM

## 2018-07-03 NOTE — TELEPHONE ENCOUNTER
Refill request is for a maintenance medication and has met the criteria specified in the Ambulatory Medication Refill Standing Order for eligibility, visits, laboratory, alerts and was sent to the requested pharmacy.   Spoke with pt - updated medication lis

## 2018-07-03 NOTE — TELEPHONE ENCOUNTER
M[facility]o calling for prescription refill of OneTouch test strips  Tests twice daily -   Please include ICD 10 & statement of insulin use if pt uses insulin

## 2018-07-06 ENCOUNTER — OFFICE VISIT (OUTPATIENT)
Dept: PHYSICAL THERAPY | Age: 73
End: 2018-07-06
Attending: INTERNAL MEDICINE
Payer: MEDICARE

## 2018-07-06 ENCOUNTER — PATIENT MESSAGE (OUTPATIENT)
Dept: INTERNAL MEDICINE CLINIC | Facility: CLINIC | Age: 73
End: 2018-07-06

## 2018-07-06 DIAGNOSIS — S49.91XA INJURY OF RIGHT SHOULDER, INITIAL ENCOUNTER: ICD-10-CM

## 2018-07-06 DIAGNOSIS — E11.9 TYPE 2 DIABETES MELLITUS WITHOUT COMPLICATION, WITHOUT LONG-TERM CURRENT USE OF INSULIN (HCC): ICD-10-CM

## 2018-07-06 PROCEDURE — 97110 THERAPEUTIC EXERCISES: CPT

## 2018-07-06 PROCEDURE — 97140 MANUAL THERAPY 1/> REGIONS: CPT

## 2018-07-06 NOTE — TELEPHONE ENCOUNTER
S/w Dr. Mel Siddiqui who gave verbal okay to refill patients glucose strips for the first time. Strips filled. Patient called and notified.

## 2018-07-06 NOTE — TELEPHONE ENCOUNTER
From: Brendan King  To: Bruce Augustin DO  Sent: 7/6/2018 6:52 AM CDT  Subject: Prescription Question    Dr Kwesi Slade    Early this week I tried to refill my tests strips for testing my blood sugar which had previously been filled by  my former GP Dr Fei Zapata

## 2018-07-06 NOTE — PROGRESS NOTES
Name: Arvin Castellon  Date: 7/6/2018  Dx:  Injury of right shoulder, initial encounter (R88.46EP)           Authorized # of Visits:  10         Next MD visit: none scheduled  Fall Risk: standard         Precautions: n/a           Medication Changes since la shoulder girdle     Neuro Re-ed - Scapular clock at R with assist  - rhythmic stabilization for shoulder IR/ER in supine - Scapular clock at R with assist  -scapular clock at R with assist  -rhythmic stabilization for IR/ER of shoulder in supine   Therapeu

## 2018-07-10 ENCOUNTER — OFFICE VISIT (OUTPATIENT)
Dept: PHYSICAL THERAPY | Age: 73
End: 2018-07-10
Attending: INTERNAL MEDICINE
Payer: MEDICARE

## 2018-07-10 PROCEDURE — 97140 MANUAL THERAPY 1/> REGIONS: CPT | Performed by: PHYSICAL THERAPIST

## 2018-07-10 PROCEDURE — 97110 THERAPEUTIC EXERCISES: CPT | Performed by: PHYSICAL THERAPIST

## 2018-07-10 NOTE — PROGRESS NOTES
Name: Floresita Love  Date: 7/6/2018  Dx:  Injury of right shoulder, initial encounter (T73.92PG)           Authorized # of Visits:  10         Next MD visit: none scheduled  Fall Risk: standard         Precautions: n/a           Medication Changes since la shoulder  -sidelying mobilization of shoulder girdle  -inferior glide of [address] joint   -PROM R shoulder  -Inferior glide at [address] joint  -sidelying mobilization of shoulder girdle   -PROM R shoulder  -sidelying mobilization of shoulder girdle   Neuro Re-ed - Sca Time: 45 min

## 2018-07-13 ENCOUNTER — OFFICE VISIT (OUTPATIENT)
Dept: PHYSICAL THERAPY | Age: 73
End: 2018-07-13
Attending: INTERNAL MEDICINE
Payer: MEDICARE

## 2018-07-13 PROCEDURE — 97110 THERAPEUTIC EXERCISES: CPT

## 2018-07-13 NOTE — PROGRESS NOTES
CERVICAL SPINE/UPPER EXTREMITY RE-EVALUATION/DISCHARGE REPORT  Name: Jeane Roberts  Date: 7/13/2018  Dx:  Injury of right shoulder, initial encounter (W41.49YW)           Authorized # of Visits:  10         Next MD visit: none scheduled  Fall Risk: standar ER with dowel   -reviewed HEP with patient   Therapeutic Exercise -sidelying ER from abdomen to neutral 2x10  -AAROM ER with dowel in supine  -ER walkouts w/ yellow TB x10  -IR walkouts w/ yellow TB x10 -AAROM flexion with dowel in supine x 10  -AAROM ER w patient's rehabilitation. Goals:   Long Term Goals Timeframe (6 weeks, 10 visits over 90 days)  1.  Patient to be independent with progressive HEP during and upon discharge to aide with symptom management and return to previous level of function. (MET)

## 2018-08-02 RX ORDER — ATORVASTATIN CALCIUM 20 MG/1
20 TABLET, FILM COATED ORAL NIGHTLY
Qty: 90 TABLET | Refills: 1 | Status: SHIPPED | OUTPATIENT
Start: 2018-08-02 | End: 2019-05-08

## 2018-08-09 RX ORDER — PEN NEEDLE, DIABETIC 31 GX5/16"
NEEDLE, DISPOSABLE MISCELLANEOUS
Qty: 90 EACH | Refills: 5 | Status: SHIPPED | OUTPATIENT
Start: 2018-08-09 | End: 2019-05-16

## 2018-08-10 RX ORDER — PEN NEEDLE, DIABETIC 31 GX5/16"
NEEDLE, DISPOSABLE MISCELLANEOUS
Refills: 0 | OUTPATIENT
Start: 2018-08-10

## 2018-08-11 ENCOUNTER — PATIENT MESSAGE (OUTPATIENT)
Dept: INTERNAL MEDICINE CLINIC | Facility: CLINIC | Age: 73
End: 2018-08-11

## 2018-08-11 NOTE — TELEPHONE ENCOUNTER
From: Ronen De Jesus  To: Alok Vann DO  Sent: 8/11/2018 1:49 PM CDT  Subject: Other    Dr. Macy Montoya    I am currently scheduled to see you at 9:am on 8/15/2018. I need to reschedule that appointment for the following week or as soon thereafter as possible.

## 2018-08-29 ENCOUNTER — OFFICE VISIT (OUTPATIENT)
Dept: INTERNAL MEDICINE CLINIC | Facility: CLINIC | Age: 73
End: 2018-08-29
Payer: MEDICARE

## 2018-08-29 VITALS
OXYGEN SATURATION: 99 % | BODY MASS INDEX: 30.77 KG/M2 | WEIGHT: 203 LBS | SYSTOLIC BLOOD PRESSURE: 140 MMHG | TEMPERATURE: 98 F | HEART RATE: 79 BPM | HEIGHT: 68 IN | DIASTOLIC BLOOD PRESSURE: 80 MMHG

## 2018-08-29 DIAGNOSIS — S49.91XD INJURY OF RIGHT SHOULDER, SUBSEQUENT ENCOUNTER: ICD-10-CM

## 2018-08-29 DIAGNOSIS — E78.5 DYSLIPIDEMIA: ICD-10-CM

## 2018-08-29 DIAGNOSIS — I10 ESSENTIAL HYPERTENSION: ICD-10-CM

## 2018-08-29 DIAGNOSIS — E11.9 TYPE 2 DIABETES MELLITUS WITHOUT COMPLICATION, WITHOUT LONG-TERM CURRENT USE OF INSULIN (HCC): Primary | ICD-10-CM

## 2018-08-29 DIAGNOSIS — N18.30 CKD (CHRONIC KIDNEY DISEASE) STAGE 3, GFR 30-59 ML/MIN (HCC): ICD-10-CM

## 2018-08-29 PROCEDURE — G0463 HOSPITAL OUTPT CLINIC VISIT: HCPCS | Performed by: INTERNAL MEDICINE

## 2018-08-29 PROCEDURE — 99214 OFFICE O/P EST MOD 30 MIN: CPT | Performed by: INTERNAL MEDICINE
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
## 2018-09-18 ENCOUNTER — TELEPHONE (OUTPATIENT)
Dept: INTERNAL MEDICINE CLINIC | Facility: CLINIC | Age: 73
End: 2018-09-18

## 2018-09-18 DIAGNOSIS — C90.00 MULTIPLE MYELOMA, REMISSION STATUS UNSPECIFIED (HCC): Primary | ICD-10-CM

## 2018-09-18 DIAGNOSIS — M89.9 DISORDER OF BONE: ICD-10-CM

## 2018-09-18 DIAGNOSIS — M85.80 DECREASED BONE DENSITY: ICD-10-CM

## 2018-09-18 NOTE — TELEPHONE ENCOUNTER
Regarding: Visit Follow-up Question  Contact: [phone]  ----- Message from Generic, Mychart sent at 9/18/2018  2:45 PM CDT -----    Dr Bellamy How    I met with my oncologist Dr. Susan Hurt for my monthly Multiple Myeloma maintenance treatment and blood tests.  Ole Barrett

## 2018-09-19 NOTE — TELEPHONE ENCOUNTER
Called patient , spoke with spouse and relayed DR. ROPER message - spouse states he was on numerous drugs and he doesn't need there is a need for DEXA . Patient will send a \[facility]\"" & HEALTH SERVICES message to DR. Armand Ch

## 2018-10-09 ENCOUNTER — HOSPITAL ENCOUNTER (OUTPATIENT)
Dept: MRI IMAGING | Facility: HOSPITAL | Age: 73
Discharge: HOME | End: 2018-10-09
Attending: ORTHOPAEDIC SURGERY
Payer: COMMERCIAL

## 2018-10-09 DIAGNOSIS — M75.101 ROTATOR CUFF TEAR, RIGHT: ICD-10-CM

## 2018-10-09 PROCEDURE — 73221 MRI JOINT UPR EXTREM W/O DYE: CPT | Performed by: ORTHOPAEDIC SURGERY

## 2018-10-16 ENCOUNTER — TELEPHONE (OUTPATIENT)
Dept: INTERNAL MEDICINE CLINIC | Facility: CLINIC | Age: 73
End: 2018-10-16

## 2018-10-29 ENCOUNTER — PATIENT MESSAGE (OUTPATIENT)
Dept: INTERNAL MEDICINE CLINIC | Facility: CLINIC | Age: 73
End: 2018-10-29

## 2018-10-29 NOTE — TELEPHONE ENCOUNTER
Sanam Dunlap requesting refill for:   Insulin Glargine Subcutaneous Solution Pen-Injector 100 unit/ML, Qty 9  Tasked to Connie Dumont

## 2018-10-29 NOTE — TELEPHONE ENCOUNTER
From: Jayleen Tinsley  To: Zonia Mendoza DO  Sent: 10/29/2018 12:59 PM CDT  Subject: Prescription Question    Dr Fermin Urena    I need a refill on my [employer] . My supply will run out in a couple of days.  My pharmacy should be contacting you, but it says

## 2018-10-31 ENCOUNTER — OFFICE VISIT (OUTPATIENT)
Dept: INTERNAL MEDICINE CLINIC | Facility: CLINIC | Age: 73
End: 2018-10-31
Payer: MEDICARE

## 2018-10-31 VITALS
WEIGHT: 210 LBS | OXYGEN SATURATION: 98 % | HEART RATE: 74 BPM | BODY MASS INDEX: 31.83 KG/M2 | HEIGHT: 68 IN | SYSTOLIC BLOOD PRESSURE: 154 MMHG | TEMPERATURE: 98 F | DIASTOLIC BLOOD PRESSURE: 86 MMHG

## 2018-10-31 DIAGNOSIS — C90.01 MULTIPLE MYELOMA IN REMISSION (HCC): ICD-10-CM

## 2018-10-31 DIAGNOSIS — I87.2 VENOUS INSUFFICIENCY: Primary | ICD-10-CM

## 2018-10-31 PROCEDURE — 99213 OFFICE O/P EST LOW 20 MIN: CPT | Performed by: INTERNAL MEDICINE

## 2018-10-31 PROCEDURE — G0463 HOSPITAL OUTPT CLINIC VISIT: HCPCS | Performed by: INTERNAL MEDICINE

## 2018-10-31 NOTE — PROGRESS NOTES
Jose Padilla is a 68year old male. Patient presents with:  Leg Swelling: His legs were swollen after change in medication. He had been doing 25 mg hydralazine TID and then 25 mg coreg BID. Hydralazine increased 10/15 to 50 mg TID.  Symptoms started abou Pain. Disp: 30 tablet Rfl: 1   lidocaine (LIDODERM) 5 % External Patch Place 1 patch onto the skin daily.  Disp: 30 patch Rfl: 0   Vitamins/Minerals Oral Tab 1 tab daily Disp:  Rfl:    Clobetasol Prop Emollient Base 0.05 % External Cream Topically bid Disp: (36.8 °C) (Oral)   Ht 5' 8\" (1.727 m)   Wt 210 lb (95.3 kg)   SpO2 98%   BMI 31.93 kg/m²     GENERAL: well developed, well nourished, in no apparent distress  HEENT: normal oropharynx without erythema or exudate, normal TM's, no sinus tenderness, nares pa

## 2018-11-12 ENCOUNTER — TELEPHONE (OUTPATIENT)
Dept: INTERNAL MEDICINE CLINIC | Facility: CLINIC | Age: 73
End: 2018-11-12

## 2018-11-28 ENCOUNTER — APPOINTMENT (OUTPATIENT)
Dept: GENERAL RADIOLOGY | Facility: HOSPITAL | Age: 73
DRG: 194 | End: 2018-11-28
Attending: EMERGENCY MEDICINE
Payer: MEDICARE

## 2018-11-28 ENCOUNTER — APPOINTMENT (OUTPATIENT)
Dept: CT IMAGING | Facility: HOSPITAL | Age: 73
DRG: 194 | End: 2018-11-28
Attending: EMERGENCY MEDICINE
Payer: MEDICARE

## 2018-11-28 ENCOUNTER — HOSPITAL ENCOUNTER (INPATIENT)
Facility: HOSPITAL | Age: 73
LOS: 3 days | Discharge: HOME | DRG: 194 | End: 2018-12-01
Attending: EMERGENCY MEDICINE | Admitting: INTERNAL MEDICINE
Payer: MEDICARE

## 2018-11-28 DIAGNOSIS — J18.9 COMMUNITY ACQUIRED PNEUMONIA, UNSPECIFIED LATERALITY: Primary | ICD-10-CM

## 2018-11-28 DIAGNOSIS — N18.9 CHRONIC RENAL IMPAIRMENT, UNSPECIFIED CKD STAGE: ICD-10-CM

## 2018-11-28 DIAGNOSIS — R47.81 SLURRED SPEECH: ICD-10-CM

## 2018-11-28 DIAGNOSIS — C90.00 MULTIPLE MYELOMA, REMISSION STATUS UNSPECIFIED (HCC): ICD-10-CM

## 2018-11-28 DIAGNOSIS — J98.01 BRONCHOSPASM: ICD-10-CM

## 2018-11-28 PROCEDURE — 70480 CT ORBIT/EAR/FOSSA W/O DYE: CPT | Performed by: EMERGENCY MEDICINE

## 2018-11-28 PROCEDURE — 71046 X-RAY EXAM CHEST 2 VIEWS: CPT | Performed by: EMERGENCY MEDICINE

## 2018-11-28 PROCEDURE — 70450 CT HEAD/BRAIN W/O DYE: CPT | Performed by: EMERGENCY MEDICINE

## 2018-11-28 RX ORDER — IPRATROPIUM BROMIDE AND ALBUTEROL SULFATE 2.5; .5 MG/3ML; MG/3ML
3 SOLUTION RESPIRATORY (INHALATION) ONCE
Status: COMPLETED | OUTPATIENT
Start: 2018-11-28 | End: 2018-11-28

## 2018-11-28 RX ORDER — DEXTROSE MONOHYDRATE 25 G/50ML
50 INJECTION, SOLUTION INTRAVENOUS AS NEEDED
Status: DISCONTINUED | OUTPATIENT
Start: 2018-11-28 | End: 2018-12-01

## 2018-11-28 RX ORDER — IPRATROPIUM BROMIDE AND ALBUTEROL SULFATE 2.5; .5 MG/3ML; MG/3ML
3 SOLUTION RESPIRATORY (INHALATION) EVERY 6 HOURS PRN
Status: DISCONTINUED | OUTPATIENT
Start: 2018-11-28 | End: 2018-12-01

## 2018-11-28 RX ORDER — SODIUM CHLORIDE 9 MG/ML
INJECTION, SOLUTION INTRAVENOUS CONTINUOUS
Status: DISCONTINUED | OUTPATIENT
Start: 2018-11-28 | End: 2018-11-29

## 2018-11-28 RX ORDER — HYDRALAZINE HYDROCHLORIDE 50 MG/1
50 TABLET, FILM COATED ORAL 3 TIMES DAILY
Status: DISCONTINUED | OUTPATIENT
Start: 2018-11-28 | End: 2018-11-30

## 2018-11-28 RX ORDER — ACYCLOVIR 400 MG/1
400 TABLET ORAL DAILY
Status: DISCONTINUED | OUTPATIENT
Start: 2018-11-28 | End: 2018-12-01

## 2018-11-28 RX ORDER — CARVEDILOL 25 MG/1
50 TABLET ORAL 2 TIMES DAILY WITH MEALS
Status: DISCONTINUED | OUTPATIENT
Start: 2018-11-28 | End: 2018-11-28

## 2018-11-28 RX ORDER — VITS A,C,E/LUTEIN/MINERALS 300MCG-200
1 TABLET ORAL
Status: DISCONTINUED | OUTPATIENT
Start: 2018-11-29 | End: 2018-12-01

## 2018-11-28 RX ORDER — CARVEDILOL 25 MG/1
25 TABLET ORAL 2 TIMES DAILY WITH MEALS
Status: DISCONTINUED | OUTPATIENT
Start: 2018-11-28 | End: 2018-12-01

## 2018-11-28 RX ORDER — ATORVASTATIN CALCIUM 10 MG/1
10 TABLET, FILM COATED ORAL NIGHTLY
Status: DISCONTINUED | OUTPATIENT
Start: 2018-11-28 | End: 2018-12-01

## 2018-11-28 NOTE — ED NOTES
Daughter states at approximately 4pm patient was on the phone and began to slur his speech then paused then was able to continue a normal conversation but again slurred and paused. Patient had a fall yesterday-noted bruising to left orbital area.

## 2018-11-28 NOTE — ED INITIAL ASSESSMENT (HPI)
Patient from home, was talking on the phone with his son, paused to gather his thoughts and son called EMS because he believed he was having a stroke.  Patient denies dizziness, pain, N/V.

## 2018-11-28 NOTE — ED PROVIDER NOTES
Patient Seen in: [facility] Emergency Department    History   Patient presents with:  TIA    Stated Complaint:     HPI    71-year-old male patient with previous history significant for multiple myeloma on chemotherapy presents by EMS for 2 episode Systems    Positive for stated complaint:   Other systems are as noted in HPI. Constitutional and vital signs reviewed. All other systems reviewed and negative except as noted above.     Physical Exam     ED Triage Vitals [11/28/18 5682]   BP (!) 213/ Abnormality         Status                     ---------                               -----------         ------                     CBC W/ DIFFERENTIAL[[id number]]          Abnormal            Final result                 Please view results for t unspecified ([facility] C90.00 11/28/2018     Slurred speech R47.81 11/28/2018

## 2018-11-29 ENCOUNTER — TELEPHONE (OUTPATIENT)
Dept: INTERNAL MEDICINE CLINIC | Facility: CLINIC | Age: 73
End: 2018-11-29

## 2018-11-29 ENCOUNTER — APPOINTMENT (OUTPATIENT)
Dept: MRI IMAGING | Facility: HOSPITAL | Age: 73
DRG: 194 | End: 2018-11-29
Attending: INTERNAL MEDICINE
Payer: MEDICARE

## 2018-11-29 ENCOUNTER — APPOINTMENT (OUTPATIENT)
Dept: ULTRASOUND IMAGING | Facility: HOSPITAL | Age: 73
DRG: 194 | End: 2018-11-29
Attending: INTERNAL MEDICINE
Payer: MEDICARE

## 2018-11-29 PROCEDURE — 70551 MRI BRAIN STEM W/O DYE: CPT | Performed by: INTERNAL MEDICINE

## 2018-11-29 PROCEDURE — 76770 US EXAM ABDO BACK WALL COMP: CPT | Performed by: INTERNAL MEDICINE

## 2018-11-29 PROCEDURE — 99222 1ST HOSP IP/OBS MODERATE 55: CPT | Performed by: INTERNAL MEDICINE

## 2018-11-29 RX ORDER — 0.9 % SODIUM CHLORIDE 0.9 %
VIAL (ML) INJECTION
Status: DISPENSED
Start: 2018-11-29 | End: 2018-11-30

## 2018-11-29 RX ORDER — GUAIFENESIN 100 MG/5ML
100 SOLUTION ORAL EVERY 4 HOURS PRN
Status: DISCONTINUED | OUTPATIENT
Start: 2018-11-29 | End: 2018-12-01

## 2018-11-29 RX ORDER — HEPARIN SODIUM 5000 [USP'U]/ML
5000 INJECTION, SOLUTION INTRAVENOUS; SUBCUTANEOUS EVERY 12 HOURS SCHEDULED
Status: DISCONTINUED | OUTPATIENT
Start: 2018-11-29 | End: 2018-12-01

## 2018-11-29 RX ORDER — 0.9 % SODIUM CHLORIDE 0.9 %
VIAL (ML) INJECTION
Status: COMPLETED
Start: 2018-11-29 | End: 2018-11-29

## 2018-11-29 NOTE — TELEPHONE ENCOUNTER
Nate Burger is calling she would like Dr. Casilda Koyanagi to update Dr. Deepthi Castle ph.  # [phone] at [facility]  on Balwinder's condition Noemi's ph. # [phone]   Routed to clinical normal...

## 2018-11-29 NOTE — PLAN OF CARE
RESPIRATORY - ADULT    • Achieves optimal ventilation and oxygenation Not Progressing          MUSCULOSKELETAL - ADULT    • Return mobility to safest level of function Progressing    • Maintain proper alignment of affected body part Progressing        PAIN

## 2018-11-29 NOTE — H&P
[facility]    History and Physical    Hamzah Vivienne Patient Status:  Inpatient    1945 MRN [id number]   Location [facility] 4W/SW/SE Attending Gilbert Huntley DO   Hosp Day # 1 PCP Addison Salas DO     Date:  2018  Date o check Ct for PE-but had elevated Cr and anemia. CT was negative for PE. Receives maintenance therapy with velcade and solumedrol, with 24 urine q3 months and labs q1 month.  Has noticed that he is dehydrated prior to his treatments and has been receiving IV Supports (MEDICAL COMPRESSION STOCKINGS) Does not apply Misc 2 Units by Does not apply route daily.  10-15mmHg knee high   BD PEN NEEDLE MINI U/F 31G X 5 MM Does not apply Misc USE 1 NEEDLE DAILY WITH INSULIN PEN   Glucose Blood (ONETOUCH TEST) In Vitro Str exhibits no distension. There is no tenderness. There is no rebound and no guarding. Lymphadenopathy:     He has no cervical adenopathy. Neurological: He is alert and oriented to person, place, and time.  No cranial nerve deficit, sensory deficit or mot evidence of acute orbital fracture. Possible mild periorbital/preseptal soft tissue swelling on the left. Otherwise, essentially unremarkable noncontrast CT appearance of the orbits.  2. Moderate to severe multifocal polypoid mucosal thickening throughout t was hospitalized for sob and was noted to have cardiomyopathy. Maintenance therapy q1 month with labs, velcade and solumedrol. Recent labs 1/2018 show elevated B2 Microglobulin, proteinuria. 1/2018 PET and MRI were negative for lesions.    Has post treatmen

## 2018-11-29 NOTE — CONSULTS
[facility]    Report of Consultation    [facility] Poster Patient Status:  Inpatient    1945 MRN [id number]   Location [facility] 4W/SW/SE Attending Eric Hargrove DO   Hosp Day # 1 PCP Donnell Fu DO     Date of Admission:   Units 5,000 Units Subcutaneous 2 times per day   guaiFENesin (ROBITUSSIN) 100 MG/5ML solution 100 mg 100 mg Oral Q4H PRN   benzocaine-menthol (CEPACOL (SUGAR-FREE)) 1 lozenge 1 lozenge Oral PRN   acyclovir (ZOVIRAX) tab 400 mg 400 mg Oral Daily   atorvasta daily Dx Diabetes E11.9 insulin dependent.    ONETOUCH LANCETS Does not apply Misc Test twice daily Dx Diabetes E11.9   BD PEN NEEDLE MINI U/F 31G X 5 MM Does not apply Misc    Blood Glucose Monitoring Suppl ([address] [place] [address]) w/Device Does not apply K 8, 2007     Dictated by (CST): Danuta Kirkpatrick MD on 11/28/2018 at 17:40     Approved by (CST): Danuta Kirkpatrick MD on 11/28/2018 at 17:43          Ct Brain Or Head (49036)    Result Date: 11/28/2018  CONCLUSION:  1. Age-related atrophy with mild chronic in the care of your patient.     Ceci Caballero  11/29/2018

## 2018-11-30 PROCEDURE — 99232 SBSQ HOSP IP/OBS MODERATE 35: CPT | Performed by: INTERNAL MEDICINE

## 2018-11-30 RX ORDER — HYDRALAZINE HYDROCHLORIDE 50 MG/1
50 TABLET, FILM COATED ORAL ONCE
Status: COMPLETED | OUTPATIENT
Start: 2018-11-30 | End: 2018-11-30

## 2018-11-30 RX ORDER — AMLODIPINE BESYLATE 5 MG/1
5 TABLET ORAL DAILY
Status: DISCONTINUED | OUTPATIENT
Start: 2018-11-30 | End: 2018-12-01

## 2018-11-30 RX ORDER — FUROSEMIDE 40 MG/1
40 TABLET ORAL DAILY
Status: DISCONTINUED | OUTPATIENT
Start: 2018-11-30 | End: 2018-12-01

## 2018-11-30 RX ORDER — PREDNISONE 20 MG/1
40 TABLET ORAL
Status: DISCONTINUED | OUTPATIENT
Start: 2018-11-30 | End: 2018-12-01

## 2018-11-30 RX ORDER — HYDRALAZINE HYDROCHLORIDE 100 MG/1
100 TABLET, FILM COATED ORAL 3 TIMES DAILY
Status: DISCONTINUED | OUTPATIENT
Start: 2018-11-30 | End: 2018-12-01

## 2018-11-30 NOTE — CM/SW NOTE
CTL rounds: Chang Garcia is BPCI eligible under . [employer] program including 90 day phone call follow up reviewed. Patient verbalizes understanding. [employer]ure provided.

## 2018-11-30 NOTE — PROGRESS NOTES
[facility]    Progress Note    Jose Padilla Patient Status:  Inpatient    1945 MRN [id number]   Location [facility] 4W/SW/SE Attending Zach Mtz DO   Hosp Day # 2 PCP Javier Chappell DO       Subjective:   Jose Padilla Pulmonary vascularity normal. 2. Patchy mixed interstitial alveolar opacities without large focal consolidation nor effusion. Findings consistent with pneumonia or pneumonitis. Followup studies are clear after treatment recommended.  Findings are new and co inflammation. There is also fluid within the right mastoid air cells. Correlate for right-sided otomastoiditis. 4.  There is a presumed cyst in the left parotid gland.   This is only partially included in the field of imaging and it has a maximal diameter

## 2018-11-30 NOTE — PROGRESS NOTES
[facility]    Progress Note    Jericho Vigil Patient Status:  Inpatient    1945 MRN [id number]   Location [facility] 4W/SW/SE Attending Nat Mccormick DO   Hosp Day # 2 PCP Estephania Navarrete DO       SUBJECTIVE:  States he is fe consolidation nor effusion. Findings consistent with pneumonia or pneumonitis. Followup studies are clear after treatment recommended.  Findings are new and compared to prior study of April 8, 2007     Dictated by (CST): Mikaela Perez MD on 11/28/2018 at otomastoiditis. 4.  There is a presumed cyst in the left parotid gland.   This is only partially included in the field of imaging and it has a maximal diameter of 7 mm on this exam.  Recommend nonemergent parotid ultrasound to determine if this is a simple weeks.     HTN  stopped losartan due to worsening renal function earlier this year; coreg 25mg bid; hydralazine increased to 100mg TID.        Dyslipidemia  lipitor 10mg qpm     Thrombocytopenia  Stable-Platelets usually 56-936Y     Chronic anemia  2/2 mul

## 2018-11-30 NOTE — PLAN OF CARE
Problem: Patient/Family Goals  Goal: Patient/Family Long Term Goal  Patient's Long Term Goal: heal from my pneumonia      Interventions:  - antibiotic  -rest  -walking  - See additional Care Plan goals for specific interventions   Outcome: Progressing appropriate  - Consider OT/PT consult to assist with strengthening/mobility  - Encourage toileting schedule  Outcome: Progressing      Problem: RESPIRATORY - ADULT  Goal: Achieves optimal ventilation and oxygenation  INTERVENTIONS:  - Assess for changes in

## 2018-12-01 VITALS
TEMPERATURE: 98 F | RESPIRATION RATE: 20 BRPM | DIASTOLIC BLOOD PRESSURE: 86 MMHG | HEART RATE: 80 BPM | WEIGHT: 205 LBS | OXYGEN SATURATION: 92 % | SYSTOLIC BLOOD PRESSURE: 165 MMHG | BODY MASS INDEX: 31.07 KG/M2 | HEIGHT: 68 IN

## 2018-12-01 PROCEDURE — 99239 HOSP IP/OBS DSCHRG MGMT >30: CPT | Performed by: INTERNAL MEDICINE

## 2018-12-01 RX ORDER — CARVEDILOL 25 MG/1
25 TABLET ORAL 2 TIMES DAILY WITH MEALS
Qty: 60 TABLET | Refills: 11 | Status: SHIPPED | OUTPATIENT
Start: 2018-12-01

## 2018-12-01 RX ORDER — PREDNISONE 10 MG/1
TABLET ORAL
Qty: 12 TABLET | Refills: 0 | Status: SHIPPED | OUTPATIENT
Start: 2018-12-01 | End: 2019-02-22

## 2018-12-01 RX ORDER — CEFUROXIME AXETIL 500 MG/1
500 TABLET ORAL EVERY EVENING
Qty: 4 TABLET | Refills: 0 | Status: SHIPPED | OUTPATIENT
Start: 2018-12-01 | End: 2018-12-05 | Stop reason: ALTCHOICE

## 2018-12-01 RX ORDER — FUROSEMIDE 40 MG/1
40 TABLET ORAL DAILY
Qty: 30 TABLET | Refills: 11 | Status: SHIPPED | OUTPATIENT
Start: 2018-12-02 | End: 2018-12-19

## 2018-12-01 RX ORDER — AMLODIPINE BESYLATE 5 MG/1
5 TABLET ORAL DAILY
Qty: 30 TABLET | Refills: 11 | Status: SHIPPED | OUTPATIENT
Start: 2018-12-02 | End: 2019-11-07 | Stop reason: DRUGHIGH

## 2018-12-01 RX ORDER — HYDRALAZINE HYDROCHLORIDE 100 MG/1
100 TABLET, FILM COATED ORAL 3 TIMES DAILY
Qty: 90 TABLET | Refills: 11 | Status: SHIPPED | OUTPATIENT
Start: 2018-12-01 | End: 2019-11-07

## 2018-12-01 RX ORDER — AZITHROMYCIN 250 MG/1
500 TABLET, FILM COATED ORAL
Status: DISCONTINUED | OUTPATIENT
Start: 2018-12-01 | End: 2018-12-01

## 2018-12-01 RX ORDER — AZITHROMYCIN 250 MG/1
250 TABLET, FILM COATED ORAL EVERY EVENING
Qty: 2 TABLET | Refills: 0 | Status: SHIPPED | OUTPATIENT
Start: 2018-12-01 | End: 2018-12-03

## 2018-12-01 NOTE — PROGRESS NOTES
[facility] Pharmacy Note: Route Optimization for Azithromycin [facility])    Patient is currently on Azithromycin (ZITHROMAX) 500 mg IV every 24 hours.    The patient meets the criteria to convert to the oral equivalent as established by the IV to Oral conversion

## 2018-12-01 NOTE — PROGRESS NOTES
[facility]    Progress Note    Latanya Emerson Patient Status:  Inpatient    1945 MRN [id number]   Location [facility] 4W/SW/SE Attending Anton Arias DO   Hosp Day # 3 PCP Mirtha John DO       Subjective:   Latanya Emerson acute or subacute infarct. 2. There are mild microvascular white matter ischemic changes, likely related to long-standing hypertension and/or diabetes. 3.  Chronic paranasal sinus inflammation. There is also fluid within the right mastoid air cells.   Yamilet Riding

## 2018-12-01 NOTE — PLAN OF CARE
Problem: Patient Centered Care  Goal: Patient preferences are identified and integrated in the patient's plan of care  Interventions:  - What would you like us to know as we care for you?  - Provide timely, complete, and accurate information to patient/fam ordered  - Implement neutropenic guidelines  Outcome: Progressing      Problem: SAFETY ADULT - FALL  Goal: Free from fall injury  INTERVENTIONS:  - Assess pt frequently for physical needs  - Identify cognitive and physical deficits and behaviors that affec patient/family  Outcome: Progressing    Goal: Maintain proper alignment of affected body part  INTERVENTIONS:  - Support and protect limb and body alignment per provider's orders  - Instruct and reinforce with patient and family use of appropriate assistiv

## 2018-12-01 NOTE — DISCHARGE SUMMARY
[facility]    Discharge Summary    Date of Admission:  11/28/2018  Date of Discharge: 12/1/2018    Discharge dx:  CAP 2/2 RSV  KIRTI on CKD  DM2, exacerbated by steroids  HTN  LE edema     History provided by:patient  HPI:   Patient present Receives maintenance therapy with velcade and solumedrol, with 24 urine q3 months and labs q1 month. Has noticed that he is dehydrated prior to his treatments and has been receiving IVF.  Per patient, most recent urine showed increased proteinuria (11g)    well developed, well nourished, in no apparent distress  LUNGS: clear to auscultation  CARDIO: RRR, normal S1S2, without murmur or gallop  GI: soft, NT, ND, NABS, no HSM  EXTREMITIES: 1-2+ BLE pitting edema    Data Review:     Labs:   Lab Results   Compone Sodium (Porcine) 5000 UNIT/ML injection 5,000 Units 5,000 Units Subcutaneous 2 times per day   guaiFENesin (ROBITUSSIN) 100 MG/5ML solution 100 mg 100 mg Oral Q4H PRN   benzocaine-menthol (CEPACOL (SUGAR-FREE)) 1 lozenge 1 lozenge Oral PRN   acyclovir (ZOV patient Cr 3.0 on admission, up to 3.29, now back down to 3.0.  biopsy 3/2017 showed chronic tubulointerstitial disease, focal glomerulosclerosis and hyalinosis     Multiple myeloma  S/p SC transplant in 2014.  Receives maintaince therapy with velcade and s

## 2018-12-03 ENCOUNTER — TELEPHONE (OUTPATIENT)
Dept: CARDIOLOGY UNIT | Facility: HOSPITAL | Age: 73
End: 2018-12-03

## 2018-12-03 ENCOUNTER — PATIENT OUTREACH (OUTPATIENT)
Dept: CASE MANAGEMENT | Age: 73
End: 2018-12-03

## 2018-12-03 DIAGNOSIS — Z02.9 ENCOUNTERS FOR ADMINISTRATIVE PURPOSE: ICD-10-CM

## 2018-12-04 NOTE — PROGRESS NOTES
Thomas Temple [phone] for post hospital follow up, [facility] contact information provided.

## 2018-12-05 ENCOUNTER — OFFICE VISIT (OUTPATIENT)
Dept: INTERNAL MEDICINE CLINIC | Facility: CLINIC | Age: 73
End: 2018-12-05
Payer: MEDICARE

## 2018-12-05 VITALS
SYSTOLIC BLOOD PRESSURE: 146 MMHG | WEIGHT: 219 LBS | BODY MASS INDEX: 33.19 KG/M2 | DIASTOLIC BLOOD PRESSURE: 70 MMHG | RESPIRATION RATE: 18 BRPM | HEART RATE: 86 BPM | HEIGHT: 68 IN | OXYGEN SATURATION: 98 % | TEMPERATURE: 98 F

## 2018-12-05 DIAGNOSIS — E11.9 TYPE 2 DIABETES MELLITUS WITHOUT COMPLICATION, WITHOUT LONG-TERM CURRENT USE OF INSULIN (HCC): ICD-10-CM

## 2018-12-05 DIAGNOSIS — N18.30 CKD (CHRONIC KIDNEY DISEASE) STAGE 3, GFR 30-59 ML/MIN (HCC): ICD-10-CM

## 2018-12-05 DIAGNOSIS — C90.01 MULTIPLE MYELOMA IN REMISSION (HCC): Primary | ICD-10-CM

## 2018-12-05 DIAGNOSIS — I10 ESSENTIAL HYPERTENSION: ICD-10-CM

## 2018-12-05 DIAGNOSIS — J40 BRONCHITIS: ICD-10-CM

## 2018-12-05 PROCEDURE — 1111F DSCHRG MED/CURRENT MED MERGE: CPT | Performed by: INTERNAL MEDICINE

## 2018-12-05 PROCEDURE — 99496 TRANSJ CARE MGMT HIGH F2F 7D: CPT | Performed by: INTERNAL MEDICINE

## 2018-12-05 NOTE — PROGRESS NOTES
Multiple attempts to reach pt and messages left with no return call. Pt went in for HFU appt today with PCP. Encounter closing.

## 2018-12-05 NOTE — PROGRESS NOTES
[address] Patient Status:  No patient class for patient encounter    1945 MRN [id number]   Location Marya Dez Canales Abdelrahman is a 68year old male who presents t 12/01/2018 05:40 AM    CO2 23 12/01/2018 05:40 AM     (H) 12/01/2018 05:40 AM    CA 8.0 (L) 12/01/2018 05:40 AM    ALB 2.4 (L) 11/30/2018 05:59 AM    INR 1.0 11/28/2018 04:39 PM    PTP 12.5 11/28/2018 04:39 PM    PHOS 3.9 11/30/2018 05:59 AM    PGLU Intracranial atherosclerosis. 5. Mild right mastoiditis.      Dictated by (CST): Eduardo Gamez MD on 11/28/2018 at 17:49     Approved by (CST): Eduardo Gamez MD on 11/28/2018 at 17:53           Ct Orbits (cpt=70480)     Result Date: 11/28/2018  CON[facility] [address]    HTN  -mildly elevated, likely due to steroids  -started on amlodipine 5 mg daily during hospitalization  -Continue coreg 25 mg bid, furosemide 40 mg qd   -Follow up with Cardiologist, Dr. Fady Schultz at [address]      Plan:  Continue current medications    U

## 2018-12-05 NOTE — PROGRESS NOTES
HPI:    Connor Ahuja is a 68year old male here today for hospital follow up.    He was discharged from Inpatient hospital, [facility]  to Home   Admission Date: 11/28/18   Discharge Date: 12/1/18  Hospital Discharge Diagnoses (since 11/5/2018) Fluticasone 50 MCG/BLIST Inhalation Aerosol Powder, Breath Activated Inhale 1 puff into the lungs 2 (two) times daily. furosemide 40 MG Oral Tab Take 1 tablet (40 mg total) by mouth daily.    predniSONE 10 MG Oral Tab 3 tabs/day for 2 days, then 2 tabs/ or double vision  HEENT: denies nasal congestion, sinus pain or ST  LUNGS: denies shortness of breath with exertion  CARDIOVASCULAR: denies chest pain on exertion or palpitations  GI: denies abdominal pain, denies heartburn, denies diarrhea  MUSCULOSKELETA week; keep track of blood pressures. 4. CKD  Discussed that I do feel his kidney function is overall worsening, would recommend closely following with his nephrologist  at [facility]    5. Bronchitis  Appears resolved.      No orders of the defined

## 2018-12-06 ENCOUNTER — OFFICE VISIT (OUTPATIENT)
Dept: CARDIOLOGY CLINIC | Facility: HOSPITAL | Age: 73
End: 2018-12-06
Attending: NURSE PRACTITIONER
Payer: MEDICARE

## 2018-12-06 ENCOUNTER — TELEPHONE (OUTPATIENT)
Dept: INTERNAL MEDICINE CLINIC | Facility: CLINIC | Age: 73
End: 2018-12-06

## 2018-12-06 VITALS
SYSTOLIC BLOOD PRESSURE: 141 MMHG | TEMPERATURE: 97 F | OXYGEN SATURATION: 97 % | HEART RATE: 82 BPM | WEIGHT: 218.63 LBS | DIASTOLIC BLOOD PRESSURE: 76 MMHG | BODY MASS INDEX: 33 KG/M2

## 2018-12-06 DIAGNOSIS — J18.9 PNA (PNEUMONIA): ICD-10-CM

## 2018-12-06 DIAGNOSIS — N18.30 CKD (CHRONIC KIDNEY DISEASE) STAGE 3, GFR 30-59 ML/MIN (HCC): ICD-10-CM

## 2018-12-06 DIAGNOSIS — I10 ESSENTIAL HYPERTENSION: ICD-10-CM

## 2018-12-06 DIAGNOSIS — J18.9 COMMUNITY ACQUIRED PNEUMONIA, UNSPECIFIED LATERALITY: Primary | ICD-10-CM

## 2018-12-06 DIAGNOSIS — N18.4 STAGE 4 CHRONIC KIDNEY DISEASE (HCC): Primary | ICD-10-CM

## 2018-12-06 PROCEDURE — 85025 COMPLETE CBC W/AUTO DIFF WBC: CPT | Performed by: NURSE PRACTITIONER

## 2018-12-06 PROCEDURE — 80048 BASIC METABOLIC PNL TOTAL CA: CPT | Performed by: NURSE PRACTITIONER

## 2018-12-06 PROCEDURE — 36415 COLL VENOUS BLD VENIPUNCTURE: CPT | Performed by: NURSE PRACTITIONER

## 2018-12-06 PROCEDURE — 99214 OFFICE O/P EST MOD 30 MIN: CPT | Performed by: NURSE PRACTITIONER

## 2018-12-06 PROCEDURE — 99212 OFFICE O/P EST SF 10 MIN: CPT | Performed by: NURSE PRACTITIONER

## 2018-12-06 NOTE — PATIENT INSTRUCTIONS
Continue current medications    Use your incentive spirometer 4 sets of 10 daily     Have pneumoccocal vaccine if appropriate    Follow up with Dr. Reid Melendrez' Nurse Practitioner  12/12/18     Continue all your same medications     Call if having any dizziness

## 2018-12-06 NOTE — TELEPHONE ENCOUNTER
Spouse called back hipaa - relayed DR. ROPER message - verbalized understanding and will relay message to her

## 2018-12-06 NOTE — TELEPHONE ENCOUNTER
Please notify patient that I reviewed his blood tests done. His Cr is up a little bit. I would like to recheck this on Monday. I placed an order for this.

## 2018-12-10 ENCOUNTER — APPOINTMENT (OUTPATIENT)
Dept: LAB | Age: 73
End: 2018-12-10
Attending: INTERNAL MEDICINE
Payer: MEDICARE

## 2018-12-10 DIAGNOSIS — N18.4 STAGE 4 CHRONIC KIDNEY DISEASE (HCC): ICD-10-CM

## 2018-12-10 PROCEDURE — 36415 COLL VENOUS BLD VENIPUNCTURE: CPT

## 2018-12-10 PROCEDURE — 80048 BASIC METABOLIC PNL TOTAL CA: CPT

## 2018-12-11 ENCOUNTER — PATIENT MESSAGE (OUTPATIENT)
Dept: INTERNAL MEDICINE CLINIC | Facility: CLINIC | Age: 73
End: 2018-12-11

## 2018-12-11 ENCOUNTER — TELEPHONE (OUTPATIENT)
Dept: INTERNAL MEDICINE CLINIC | Facility: CLINIC | Age: 73
End: 2018-12-11

## 2018-12-11 NOTE — TELEPHONE ENCOUNTER
Please notify patient that creatinine is still 3.2. Would continue the lasix for now. Please fax these results to nephrologist at [facility] Dr. Shona Chen and mention that patient needs follow up post hospitalization for his kidney disease.

## 2018-12-11 NOTE — TELEPHONE ENCOUNTER
To DR. Bellamy How - your message relayed to pt who verbalized understanding. Pt states he did see Dr. Magnolia MEEK yesterday - pt sent you [employer] message this AM with report.   12/6/18, 12/10/18 lab results faxed to Dr. Smiley Lovett: [phone], P) [phone]

## 2018-12-19 ENCOUNTER — PATIENT MESSAGE (OUTPATIENT)
Dept: INTERNAL MEDICINE CLINIC | Facility: CLINIC | Age: 73
End: 2018-12-19

## 2018-12-19 RX ORDER — FUROSEMIDE 40 MG/1
40 TABLET ORAL 2 TIMES DAILY
Qty: 60 TABLET | Refills: 3 | Status: SHIPPED | OUTPATIENT
Start: 2018-12-19 | End: 2019-07-19

## 2018-12-19 NOTE — TELEPHONE ENCOUNTER
From: Jose Padilla  To: Zach Mtz DO  Sent: 12/19/2018 4:27 PM CST  Subject: Prescription Question    Dr Amarilys Sheppard    When I was in hospital at [facility] for Viral Pneumonia in early December Dr Ligia Mccoy taking your place prescribed 40 mgs of La

## 2018-12-21 NOTE — PROGRESS NOTES
Appears Rx was sent 12/19/18:  Disp Refills Start End     furosemide 40 MG Oral Tab 60 tablet 3 12/19/2018     Sig - Route:  Take 1 tablet (40 mg total) by mouth 2 (two) times daily. - Oral    Sent to pharmacy as: Furosemide 40 MG Oral Tablet    E-Prescribi

## 2018-12-21 NOTE — TELEPHONE ENCOUNTER
Pt. Is calling to check status of Rx for Lasix pt. States he is out of meds his dosage was changed from 1 tablet a day to 2 tablets pt.  Needs a new Rx ph. # [address]  Routed

## 2018-12-28 RX ORDER — FLUTICASONE PROPIONATE 50 MCG
BLISTER, WITH INHALATION DEVICE INHALATION
Refills: 0 | OUTPATIENT
Start: 2018-12-28

## 2018-12-28 NOTE — TELEPHONE ENCOUNTER
Refill request has failed the Ambulatory Medication Refill Standing Order  Requested Prescriptions     Refused Prescriptions Disp Refills   • FLOVENT DISKUS 50 MCG/BLIST Inhalation Aerosol Powder, Breath Activated [Pharmacy Med Name: Flovent Diskus Inhalat

## 2019-01-08 ENCOUNTER — PATIENT MESSAGE (OUTPATIENT)
Dept: INTERNAL MEDICINE CLINIC | Facility: CLINIC | Age: 74
End: 2019-01-08

## 2019-01-08 NOTE — TELEPHONE ENCOUNTER
From: Beverely Fraction  To: Araceli Rincon DO  Sent: 1/8/2019 1:31 PM CST  Subject: Non-Urgent Medical Question    Dr. Frances Jiménez     An Update. I meet with Sue Dodd at [facility] for my monthly visit , blood draw and maintenance treatment on January 14th.  I wa

## 2019-02-22 ENCOUNTER — OFFICE VISIT (OUTPATIENT)
Dept: INTERNAL MEDICINE CLINIC | Facility: CLINIC | Age: 74
End: 2019-02-22
Payer: MEDICARE

## 2019-02-22 VITALS
BODY MASS INDEX: 30.98 KG/M2 | OXYGEN SATURATION: 99 % | DIASTOLIC BLOOD PRESSURE: 60 MMHG | RESPIRATION RATE: 18 BRPM | WEIGHT: 206.81 LBS | HEART RATE: 77 BPM | TEMPERATURE: 98 F | SYSTOLIC BLOOD PRESSURE: 122 MMHG | HEIGHT: 68.5 IN

## 2019-02-22 DIAGNOSIS — R05.9 COUGH: Primary | ICD-10-CM

## 2019-02-22 PROCEDURE — G0463 HOSPITAL OUTPT CLINIC VISIT: HCPCS | Performed by: INTERNAL MEDICINE

## 2019-02-22 PROCEDURE — 99213 OFFICE O/P EST LOW 20 MIN: CPT | Performed by: INTERNAL MEDICINE

## 2019-02-22 RX ORDER — MOMETASONE 50 UG/1
1 SPRAY, METERED NASAL DAILY
Qty: 1 BOTTLE | Refills: 1 | Status: SHIPPED | OUTPATIENT
Start: 2019-02-22

## 2019-02-22 NOTE — PROGRESS NOTES
Tyler Cabrales is a 68year old male. Patient presents with:  Cough: C/O productive cough x 1 week. Denies fever/SOB. States feels better now but admitted to [address] [place] [address] in [address] [place] [address] for PNA and worried about cough.        HPI:   Tyler Cabrales is a 68year old MG Oral Tab take 1 tablet (20 mg total) by mouth nightly (Patient taking differently: Take 10 mg by mouth nightly.  ) Disp: 90 tablet Rfl: 1   Glucose Blood (ONETOUCH TEST) In Vitro Strip Test twice daily Dx Diabetes E11.9 insulin dependent.  Disp: 200 each (36.5 °C) (Oral)   Resp 18   Ht 5' 8.5\" (1.74 m)   Wt 206 lb 12.8 oz (93.8 kg)   SpO2 99%   BMI 30.99 kg/m²     GENERAL: well developed, well nourished, in no apparent distress  HEENT: normal oropharynx without erythema or exudate, normal TM's, no sinus t

## 2019-05-02 ENCOUNTER — OFFICE VISIT (OUTPATIENT)
Dept: INTERNAL MEDICINE CLINIC | Facility: CLINIC | Age: 74
End: 2019-05-02
Payer: MEDICARE

## 2019-05-02 VITALS
OXYGEN SATURATION: 98 % | TEMPERATURE: 98 F | HEIGHT: 68.5 IN | HEART RATE: 69 BPM | WEIGHT: 208 LBS | DIASTOLIC BLOOD PRESSURE: 74 MMHG | BODY MASS INDEX: 31.16 KG/M2 | SYSTOLIC BLOOD PRESSURE: 112 MMHG

## 2019-05-02 DIAGNOSIS — I10 ESSENTIAL HYPERTENSION: ICD-10-CM

## 2019-05-02 DIAGNOSIS — D69.6 THROMBOCYTOPENIA (HCC): ICD-10-CM

## 2019-05-02 DIAGNOSIS — D64.9 ANEMIA, UNSPECIFIED TYPE: ICD-10-CM

## 2019-05-02 DIAGNOSIS — C90.01 MULTIPLE MYELOMA IN REMISSION (HCC): ICD-10-CM

## 2019-05-02 DIAGNOSIS — E78.5 DYSLIPIDEMIA: ICD-10-CM

## 2019-05-02 DIAGNOSIS — E11.9 TYPE 2 DIABETES MELLITUS WITHOUT COMPLICATION, WITHOUT LONG-TERM CURRENT USE OF INSULIN (HCC): Primary | ICD-10-CM

## 2019-05-02 DIAGNOSIS — N18.4 STAGE 4 CHRONIC KIDNEY DISEASE (HCC): ICD-10-CM

## 2019-05-02 PROBLEM — R47.81 SLURRED SPEECH: Status: RESOLVED | Noted: 2018-11-28 | Resolved: 2019-05-02

## 2019-05-02 PROCEDURE — 99214 OFFICE O/P EST MOD 30 MIN: CPT | Performed by: INTERNAL MEDICINE

## 2019-05-02 PROCEDURE — G0463 HOSPITAL OUTPT CLINIC VISIT: HCPCS | Performed by: INTERNAL MEDICINE

## 2019-05-02 NOTE — PROGRESS NOTES
Liza Benitez is a 68year old malePatient presents with:  Checkup: 6 month       HPI:     Liza Benitez is a 68year old male who presents for a 6 month follow up    Dr. Raul Weiss (oncologist)  Dr. Fitz Marte (ophthalmologist) Medications:  DM-guaiFENesin ER  MG Oral Tablet 12 Hr Take 1 tablet by mouth every 12 (twelve) hours. Disp:  Rfl:    Mometasone Furoate 50 MCG/ACT Nasal Suspension 1 spray by Nasal route daily.  Disp: 1 Bottle Rfl: 1   furosemide 40 MG Oral Tab Take 1 multiple myloma   • Diabetes ([facility] [address] 2016   • Essential hypertension 1986   • Multiple myeloma [facility])       Past Surgical History:   Procedure Laterality Date   • COLONOSCOPY  2004   • OTHER SURGICAL HISTORY  1994   • OTHER SURGICAL HISTORY      stem cell tra noted to have cardiomyopathy. Maintenance therapy q1 month with labs, velcade and solumedrol. Recent labs 1/2018 show elevated B2 Microglobulin, proteinuria. 1/2018 PET and MRI were negative for lesions. Has post treatment BLE neuropathy in feet only.

## 2019-05-08 ENCOUNTER — TELEPHONE (OUTPATIENT)
Dept: INTERNAL MEDICINE CLINIC | Facility: CLINIC | Age: 74
End: 2019-05-08

## 2019-05-08 RX ORDER — ATORVASTATIN CALCIUM 20 MG/1
10 TABLET, FILM COATED ORAL NIGHTLY
Qty: 45 TABLET | Refills: 3 | Status: SHIPPED | OUTPATIENT
Start: 2019-05-08 | End: 2020-08-28

## 2019-05-08 NOTE — TELEPHONE ENCOUNTER
Please advise on refill - patient did not have liver function test within 1 year thanks - to DR. Benedicto Miranda

## 2019-05-16 ENCOUNTER — TELEPHONE (OUTPATIENT)
Dept: INTERNAL MEDICINE CLINIC | Facility: CLINIC | Age: 74
End: 2019-05-16

## 2019-05-16 NOTE — TELEPHONE ENCOUNTER
Ryan Stoner requesting RX for:  5mm Droplet Pen Needles, box of 100  Form placed in blue folder  Tasked to [employer]

## 2019-05-16 NOTE — TELEPHONE ENCOUNTER
Form completed and faxed back to [place], conformation received.  New orders sent via [address] to [facility].

## 2019-06-24 ENCOUNTER — OFFICE VISIT (OUTPATIENT)
Dept: AUDIOLOGY | Facility: CLINIC | Age: 74
End: 2019-06-24
Payer: MEDICARE

## 2019-06-24 DIAGNOSIS — H90.3 SENSORINEURAL HEARING LOSS, BILATERAL: Primary | ICD-10-CM

## 2019-06-24 PROCEDURE — 92593 HEARING AID CHECK, BOTH EARS: CPT | Performed by: AUDIOLOGIST

## 2019-06-24 NOTE — PROGRESS NOTES
HEARING AID FOLLOW-UP    Latanya Emerson  8/11/1945  [id number]    Patient is here for annual hearing aid check.     Hearing Aid Information:   Brigitte Chakraborty [id number]  Right #[id number]  Left #[id number]  Coupled to custom c-shells  Dispensing date: 7-16-15

## 2019-07-12 RX ORDER — INSULIN GLARGINE 100 [IU]/ML
INJECTION, SOLUTION SUBCUTANEOUS
Qty: 9 ML | Refills: 3 | Status: SHIPPED | OUTPATIENT
Start: 2019-07-12 | End: 2020-03-31

## 2019-07-19 RX ORDER — FUROSEMIDE 40 MG/1
40 TABLET ORAL DAILY
Qty: 90 TABLET | Refills: 3 | Status: SHIPPED | OUTPATIENT
Start: 2019-07-19 | End: 2020-08-28

## 2019-07-19 NOTE — TELEPHONE ENCOUNTER
Winona called to check status of Rx pt. Told Winona he takes 1 tab by mouth daily for Furosemide ph.  # [phone]  Routed to Rx

## 2019-07-19 NOTE — TELEPHONE ENCOUNTER
Patient called back - he is taking 40 mg once daily   Refill request is for a maintenance medication and has met the criteria specified in the Ambulatory Medication Refill Standing Order for eligibility, visits, laboratory, alerts and was sent to the reque

## 2019-07-19 NOTE — TELEPHONE ENCOUNTER
[employer] message sent to patient clarifying the directions. How much Furosemide is he taking? \" L[facility] Pac Mr. Demarcus Hughes has sent us a refill request for your Furosemide (Lasix) and messae indicates that the directions have changed since it was last r

## 2019-07-27 ENCOUNTER — PATIENT MESSAGE (OUTPATIENT)
Dept: INTERNAL MEDICINE CLINIC | Facility: CLINIC | Age: 74
End: 2019-07-27

## 2019-07-27 DIAGNOSIS — E83.52 HYPERCALCEMIA: Primary | ICD-10-CM

## 2019-07-28 NOTE — TELEPHONE ENCOUNTER
From: Roxanna Lange  To: Marc Nuñez DO  Sent: 7/27/2019 12:13 PM CDT  Subject: Test Results Question    Dr Michael Vigren    I had my monthly treatment and blood draw and met with [facility] [place] nurse practioner Jb Barry.  When my final results came my Calcium levels wer

## 2019-07-29 NOTE — TELEPHONE ENCOUNTER
[facility] SERVICES message sent to patient

## 2019-07-30 ENCOUNTER — APPOINTMENT (OUTPATIENT)
Dept: LAB | Age: 74
End: 2019-07-30
Attending: INTERNAL MEDICINE
Payer: MEDICARE

## 2019-07-30 DIAGNOSIS — E83.52 HYPERCALCEMIA: ICD-10-CM

## 2019-07-30 LAB
ALBUMIN SERPL-MCNC: 3.4 G/DL (ref 3.4–5)
ALBUMIN/GLOB SERPL: 1.5 {RATIO} (ref 1–2)
ALP LIVER SERPL-CCNC: 40 U/L (ref 45–117)
ALT SERPL-CCNC: 30 U/L (ref 16–61)
ANION GAP SERPL CALC-SCNC: 9 MMOL/L (ref 0–18)
AST SERPL-CCNC: 13 U/L (ref 15–37)
BILIRUB SERPL-MCNC: 0.4 MG/DL (ref 0.1–2)
BUN BLD-MCNC: 89 MG/DL (ref 7–18)
BUN/CREAT SERPL: 23.2 (ref 10–20)
CALCIUM BLD-MCNC: 10.6 MG/DL (ref 8.5–10.1)
CHLORIDE SERPL-SCNC: 112 MMOL/L (ref 98–112)
CO2 SERPL-SCNC: 22 MMOL/L (ref 21–32)
CREAT BLD-MCNC: 3.84 MG/DL (ref 0.7–1.3)
GLOBULIN PLAS-MCNC: 2.2 G/DL (ref 2.8–4.4)
GLUCOSE BLD-MCNC: 192 MG/DL (ref 70–99)
M PROTEIN MFR SERPL ELPH: 5.6 G/DL (ref 6.4–8.2)
OSMOLALITY SERPL CALC.SUM OF ELEC: 328 MOSM/KG (ref 275–295)
PATIENT FASTING: NO
PHOSPHATE SERPL-MCNC: 4 MG/DL (ref 2.5–4.9)
POTASSIUM SERPL-SCNC: 4.9 MMOL/L (ref 3.5–5.1)
SODIUM SERPL-SCNC: 143 MMOL/L (ref 136–145)

## 2019-07-30 PROCEDURE — 84100 ASSAY OF PHOSPHORUS: CPT

## 2019-07-30 PROCEDURE — 36415 COLL VENOUS BLD VENIPUNCTURE: CPT

## 2019-07-30 PROCEDURE — 82330 ASSAY OF CALCIUM: CPT

## 2019-07-30 PROCEDURE — 80053 COMPREHEN METABOLIC PANEL: CPT

## 2019-07-31 ENCOUNTER — TELEPHONE (OUTPATIENT)
Dept: INTERNAL MEDICINE CLINIC | Facility: CLINIC | Age: 74
End: 2019-07-31

## 2019-07-31 DIAGNOSIS — E83.52 HYPERCALCEMIA: Primary | ICD-10-CM

## 2019-07-31 LAB
CALCIUM IONIZED PH 7.4: 1.59 MMOL/L
CALCIUM IONIZED, SERUM: 1.61 MMOL/L

## 2019-07-31 NOTE — TELEPHONE ENCOUNTER
Pt returning nurse call (not available) please call home [phone] after 4 pm.   Tasked to nursing

## 2019-07-31 NOTE — TELEPHONE ENCOUNTER
Noted. Please notify patient that his calcium is elevated; I would ensure that he is trying to keep up with hydration; would avoid any additional calcium supplementation at this time. I have left a message for Dr. Guero Mathur and faxed results there as well.  He

## 2019-07-31 NOTE — TELEPHONE ENCOUNTER
I spoke with patient and relayed Dr. Clarence Christian message. He verbalized understanding. Patient says Dr. Andrew Daniel, his nephrologist, is aware of increase in calcium levels. They are concerned and says he is not sure if they consulted with Dr. Kwesi Slade.  Dr. Sunil Choe

## 2019-08-01 NOTE — TELEPHONE ENCOUNTER
Spoke with patient. I relayed Dr Clarence Christian message to him. Pt states he has the order in hand. He also is stating that they faxed the order to Dr Kwesi Slade. I advised patient to call the phone number for central scheduling at [facility] to schedule the U/S.  He zuleika

## 2019-08-01 NOTE — TELEPHONE ENCOUNTER
Would recommend that he discuss this with Dr. Yovana Alvarez should be able to fax the order to [facility].

## 2019-08-01 NOTE — TELEPHONE ENCOUNTER
To Dr. Cherie Garcia - pt asking you to order US neck - would prefer to have at [address] [facility] due to convenience. LM on home/cell when scheduled.

## 2019-08-02 NOTE — TELEPHONE ENCOUNTER
Pt quite frustrated - he was hoping to have this scheduled by now and is concerned about the delay. Can we touch base with Dr. Prescott Ship office to verify the order and then can Dr. Hla Burk order?  This RN informed him that this will be reviewed with Dr. Hal Burk

## 2019-08-02 NOTE — TELEPHONE ENCOUNTER
Please call pt, was faxed rec'd from [facility]?   Should have been rec'd yesterday  Tasked to nursing

## 2019-08-02 NOTE — TELEPHONE ENCOUNTER
I have not gotten any fax, but if he has the order in hand, he can just call the [address] [place] [address] scheduling number to schedule the test.

## 2019-08-02 NOTE — TELEPHONE ENCOUNTER
Pt returned call  States he does not have the order in hand  Needs help from Dr Kwesi Slade with this order so he can schedule    Please call pt to advise [phone]

## 2019-08-06 NOTE — TELEPHONE ENCOUNTER
Please notify patient that order is in the system; he can call to schedule. For future recommendations from the other physicians-I recommend he get handwritten orders from them so that he can schedule them here without delay.

## 2019-08-23 ENCOUNTER — PATIENT MESSAGE (OUTPATIENT)
Dept: INTERNAL MEDICINE CLINIC | Facility: CLINIC | Age: 74
End: 2019-08-23

## 2019-08-23 NOTE — TELEPHONE ENCOUNTER
From: John Villaseñor  To: Salma Anna DO  Sent: 8/23/2019 10:40 AM CDT  Subject: Test Results Question    Dr Vladislav Brice,    I have recently received the results of an ultra sound of my neck, soft tissue, which may indicate a parathyroid  adenoma.  Dr Rosy Soria

## 2019-09-03 ENCOUNTER — OFFICE VISIT (OUTPATIENT)
Dept: INTERNAL MEDICINE CLINIC | Facility: CLINIC | Age: 74
End: 2019-09-03
Payer: MEDICARE

## 2019-09-03 VITALS
HEART RATE: 74 BPM | SYSTOLIC BLOOD PRESSURE: 108 MMHG | TEMPERATURE: 98 F | OXYGEN SATURATION: 98 % | BODY MASS INDEX: 30 KG/M2 | DIASTOLIC BLOOD PRESSURE: 60 MMHG | WEIGHT: 201 LBS | RESPIRATION RATE: 16 BRPM

## 2019-09-03 DIAGNOSIS — I10 ESSENTIAL HYPERTENSION: Primary | ICD-10-CM

## 2019-09-03 DIAGNOSIS — E83.52 HYPERCALCEMIA: ICD-10-CM

## 2019-09-03 DIAGNOSIS — N18.4 STAGE 4 CHRONIC KIDNEY DISEASE (HCC): ICD-10-CM

## 2019-09-03 PROBLEM — J18.9 COMMUNITY ACQUIRED PNEUMONIA, UNSPECIFIED LATERALITY: Status: RESOLVED | Noted: 2018-11-28 | Resolved: 2019-09-03

## 2019-09-03 PROBLEM — J18.9 COMMUNITY ACQUIRED PNEUMONIA: Status: RESOLVED | Noted: 2018-11-28 | Resolved: 2019-09-03

## 2019-09-03 PROBLEM — J98.01 BRONCHOSPASM: Status: RESOLVED | Noted: 2018-11-28 | Resolved: 2019-09-03

## 2019-09-03 PROCEDURE — 99213 OFFICE O/P EST LOW 20 MIN: CPT | Performed by: INTERNAL MEDICINE

## 2019-09-03 PROCEDURE — G0463 HOSPITAL OUTPT CLINIC VISIT: HCPCS | Performed by: INTERNAL MEDICINE

## 2019-09-03 NOTE — PROGRESS NOTES
Alexy Devries is a 69year old male. Patient presents with: Follow - Up: Pt would like to touch base with MD regarding his treatments for MM at [place]. States he was advised he has a possible parathyroid issue as his calcium was elevated.  Pt would Take 1 tablet (5 mg total) by mouth daily. Disp: 30 tablet Rfl: 11   Glucose Blood (ONETOUCH TEST) In Vitro Strip Test twice daily Dx Diabetes E11.9 insulin dependent.  Disp: 200 each Rfl: 3   ONETOUCH LANCETS Does not apply Misc Test twice daily Dx Hai Buchanan 201 lb (91.2 kg)   SpO2 98%   BMI 30.12 kg/m²     GENERAL: well developed, well nourished, in no apparent distress      ASSESSMENT AND PLAN:     1. Dyslipidemia  lipitor 10mg daily; repeat lipid panel    2. Type 2 DM  Check a1c    3.  Hypercalcemia  Elevate

## 2019-09-10 ENCOUNTER — TELEPHONE (OUTPATIENT)
Dept: INTERNAL MEDICINE CLINIC | Facility: CLINIC | Age: 74
End: 2019-09-10

## 2019-09-10 NOTE — TELEPHONE ENCOUNTER
Patient dropped off medical clearance form for exercise program at [facility]  Please send patient My Chart message when he can  the completed form    - placed in Dr Santi Martínez mail slot

## 2019-09-11 ENCOUNTER — TELEPHONE (OUTPATIENT)
Dept: INTERNAL MEDICINE CLINIC | Facility: CLINIC | Age: 74
End: 2019-09-11

## 2019-09-13 ENCOUNTER — LAB ENCOUNTER (OUTPATIENT)
Dept: LAB | Age: 74
End: 2019-09-13
Attending: INTERNAL MEDICINE
Payer: MEDICARE

## 2019-09-13 ENCOUNTER — TELEPHONE (OUTPATIENT)
Dept: INTERNAL MEDICINE CLINIC | Facility: CLINIC | Age: 74
End: 2019-09-13

## 2019-09-13 ENCOUNTER — PATIENT MESSAGE (OUTPATIENT)
Dept: INTERNAL MEDICINE CLINIC | Facility: CLINIC | Age: 74
End: 2019-09-13

## 2019-09-13 DIAGNOSIS — E78.5 DYSLIPIDEMIA: Primary | ICD-10-CM

## 2019-09-13 DIAGNOSIS — E11.9 DIABETES MELLITUS (HCC): ICD-10-CM

## 2019-09-13 LAB
CHOLEST SMN-MCNC: 115 MG/DL (ref ?–200)
EST. AVERAGE GLUCOSE BLD GHB EST-MCNC: 134 MG/DL (ref 68–126)
HBA1C MFR BLD HPLC: 6.3 % (ref ?–5.7)
HDLC SERPL-MCNC: 32 MG/DL (ref 40–59)
LDLC SERPL CALC-MCNC: 58 MG/DL (ref ?–100)
NONHDLC SERPL-MCNC: 83 MG/DL (ref ?–130)
PATIENT FASTING: YES
TRIGL SERPL-MCNC: 126 MG/DL (ref 30–149)
VLDLC SERPL CALC-MCNC: 25 MG/DL (ref 0–30)

## 2019-09-13 PROCEDURE — 36415 COLL VENOUS BLD VENIPUNCTURE: CPT

## 2019-09-13 PROCEDURE — 80061 LIPID PANEL: CPT

## 2019-09-13 PROCEDURE — 83036 HEMOGLOBIN GLYCOSYLATED A1C: CPT

## 2019-09-13 NOTE — TELEPHONE ENCOUNTER
From: Vitaliy Lewis  To: Sarita Doll DO  Sent: 9/13/2019 4:57 PM CDT  Subject: Visit Follow-up Question    Dr Jourdan Flores  On a walk on the morning of the 10th, I began to feel slightly week.  When I got home took my blood pressure in the morning and afternoon

## 2019-09-13 NOTE — TELEPHONE ENCOUNTER
Please see [employer] message below. Third number on readings is heart rate. Patient reports he felt very weak on his walk so he went and checked his BP at the [facility] and later at his home.  He is taking 1/2 of a 40mg lasix tablet for a total of 20mg daily in th

## 2019-09-18 ENCOUNTER — TELEPHONE (OUTPATIENT)
Dept: INTERNAL MEDICINE CLINIC | Facility: CLINIC | Age: 74
End: 2019-09-18

## 2019-09-18 ENCOUNTER — PATIENT MESSAGE (OUTPATIENT)
Dept: INTERNAL MEDICINE CLINIC | Facility: CLINIC | Age: 74
End: 2019-09-18

## 2019-09-18 NOTE — TELEPHONE ENCOUNTER
From: Hamzah Palafox  To: Gilbert Huntley DO  Sent: 9/18/2019 12:13 PM CDT  Subject: Test Results Question    Dr Eli Aparicio,    I saw the results of the blood tests. I have not received a call from Dr Eulalio Arcos yet but I have a call into her.  Does she have the resul

## 2019-09-21 ENCOUNTER — PATIENT MESSAGE (OUTPATIENT)
Dept: INTERNAL MEDICINE CLINIC | Facility: CLINIC | Age: 74
End: 2019-09-21

## 2019-09-23 NOTE — TELEPHONE ENCOUNTER
From: Kenya Rubio  To: Alis Young DO  Sent: 9/21/2019 8:44 AM CDT  Subject: Visit Follow-up Question    Dr. Bree Casanova,    I spoke to Dr Gurpreet Leonard Nurse yesterday and they concur in your recommendation so I will begin taking 2.5 mg on  reduced from 5 mg on

## 2019-09-24 RX ORDER — AMLODIPINE BESYLATE 2.5 MG/1
2.5 TABLET ORAL DAILY
Qty: 90 TABLET | Refills: 3 | Status: SHIPPED | OUTPATIENT
Start: 2019-09-24 | End: 2020-09-17

## 2019-09-24 NOTE — TELEPHONE ENCOUNTER
Called Lewis spoke with [facility] who states patient is now on 2.5 mg Amlodipine and needs new RX- pending   Called patient who still has 5mg tablets and splits them in half - to DR. Ana Maria Sampson

## 2019-09-24 NOTE — TELEPHONE ENCOUNTER
[facility] calling, [facility] needs a new script for the medication. With the new dosage of 2.5 MG instead of 5 MG.

## 2019-10-14 DIAGNOSIS — E11.9 TYPE 2 DIABETES MELLITUS WITHOUT COMPLICATION, WITHOUT LONG-TERM CURRENT USE OF INSULIN (HCC): ICD-10-CM

## 2019-10-14 RX ORDER — LANCETS 33 GAUGE
EACH MISCELLANEOUS
Qty: 200 EACH | Refills: 3 | Status: SHIPPED | OUTPATIENT
Start: 2019-10-14 | End: 2020-10-30

## 2019-10-23 ENCOUNTER — TELEPHONE (OUTPATIENT)
Dept: INTERNAL MEDICINE CLINIC | Facility: CLINIC | Age: 74
End: 2019-10-23

## 2019-10-23 NOTE — TELEPHONE ENCOUNTER
JOSE To Dr. Jm Phillip--    LM with Dr. Tim Galloway office for [employer] to fax in pre-op clearance paperwork

## 2019-10-23 NOTE — TELEPHONE ENCOUNTER
Dr.Sturgens offices calling pt is scheduled for surgery on 11/12. FYI to  he have apt for 11/7 with you I changed it to Pre-op.  Any question call Les Juarez at [phone]

## 2019-10-23 NOTE — TELEPHONE ENCOUNTER
I spoke with Dr. Lisandro Navarrete office and patient is scheduled for a parathyroidectomy at [facility]. Patient will be seen at [facility]'s pre-op clinic tomorrow. They do not need anything from Dr. Bellamy How but wanted to let her know.

## 2019-10-26 ENCOUNTER — LAB ENCOUNTER (OUTPATIENT)
Dept: LAB | Age: 74
End: 2019-10-26
Attending: SURGERY
Payer: MEDICARE

## 2019-10-26 DIAGNOSIS — Z00.00 ROUTINE CHECK-UP: Primary | ICD-10-CM

## 2019-10-26 PROCEDURE — 82728 ASSAY OF FERRITIN: CPT

## 2019-10-26 PROCEDURE — 84466 ASSAY OF TRANSFERRIN: CPT

## 2019-10-26 PROCEDURE — 36415 COLL VENOUS BLD VENIPUNCTURE: CPT

## 2019-10-26 PROCEDURE — 83540 ASSAY OF IRON: CPT

## 2019-11-07 ENCOUNTER — OFFICE VISIT (OUTPATIENT)
Dept: INTERNAL MEDICINE CLINIC | Facility: CLINIC | Age: 74
End: 2019-11-07
Payer: MEDICARE

## 2019-11-07 VITALS
OXYGEN SATURATION: 99 % | BODY MASS INDEX: 30 KG/M2 | TEMPERATURE: 98 F | SYSTOLIC BLOOD PRESSURE: 122 MMHG | HEART RATE: 72 BPM | DIASTOLIC BLOOD PRESSURE: 68 MMHG | WEIGHT: 202.63 LBS

## 2019-11-07 DIAGNOSIS — E11.9 TYPE 2 DIABETES MELLITUS WITHOUT COMPLICATION, WITHOUT LONG-TERM CURRENT USE OF INSULIN (HCC): ICD-10-CM

## 2019-11-07 DIAGNOSIS — C90.01 MULTIPLE MYELOMA IN REMISSION (HCC): ICD-10-CM

## 2019-11-07 DIAGNOSIS — Z13.6 SCREENING FOR CARDIOVASCULAR CONDITION: ICD-10-CM

## 2019-11-07 DIAGNOSIS — D64.9 ANEMIA, UNSPECIFIED TYPE: ICD-10-CM

## 2019-11-07 DIAGNOSIS — Z01.818 PREOPERATIVE EXAMINATION: Primary | ICD-10-CM

## 2019-11-07 DIAGNOSIS — I10 ESSENTIAL HYPERTENSION: ICD-10-CM

## 2019-11-07 DIAGNOSIS — E78.5 DYSLIPIDEMIA: ICD-10-CM

## 2019-11-07 DIAGNOSIS — N18.4 STAGE 4 CHRONIC KIDNEY DISEASE (HCC): ICD-10-CM

## 2019-11-07 PROCEDURE — G0463 HOSPITAL OUTPT CLINIC VISIT: HCPCS | Performed by: INTERNAL MEDICINE

## 2019-11-07 PROCEDURE — 99214 OFFICE O/P EST MOD 30 MIN: CPT | Performed by: INTERNAL MEDICINE

## 2019-11-07 PROCEDURE — 93010 ELECTROCARDIOGRAM REPORT: CPT | Performed by: INTERNAL MEDICINE

## 2019-11-07 PROCEDURE — 93005 ELECTROCARDIOGRAM TRACING: CPT | Performed by: INTERNAL MEDICINE

## 2019-11-07 RX ORDER — LOSARTAN POTASSIUM 50 MG/1
1 TABLET ORAL DAILY
COMMUNITY
Start: 2019-10-03 | End: 2020-10-29

## 2019-11-07 RX ORDER — HYDRALAZINE HYDROCHLORIDE 25 MG/1
50 TABLET, FILM COATED ORAL 3 TIMES DAILY
COMMUNITY
Start: 2019-10-21

## 2019-11-07 NOTE — PROGRESS NOTES
Matthew Penaloza is a 69year old malePatient presents with:  Pre-Op Exam: Parathyroidectomy on 11/12 with Dr Yennifer Toscano. Per patient, Dr Ismael Amaral told him he did not need to come in for cardiac clearance appt. She had him do an Echo.       HPI:     Daun Guardian Per oz (99.2 kg)  12/05/18 : 219 lb (99.3 kg)    Body mass index is 30.36 kg/m². Current Outpatient Medications   Medication Sig Dispense Refill   • losartan Potassium 50 MG Oral Tab Take 1 tablet by mouth daily.      • hydrALAzine HCl 25 MG Oral Tab Take Multiple myeloma [facility])       Past Surgical History:   Procedure Laterality Date   • COLONOSCOPY  2004   • OTHER SURGICAL HISTORY  1994   • OTHER SURGICAL HISTORY      stem cell transplant       Family History   Problem Relation Age of Onset   • Cancer Fath Cash. 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   Acyclovir for viral prophylaxis    Chemotherapy induced cardiomyopathy  Now resolved  2d echo 10/21/2019: LVEF 55%, no WMA, grade 1 diastolic dysfunction    C

## 2019-11-11 ENCOUNTER — PATIENT MESSAGE (OUTPATIENT)
Dept: INTERNAL MEDICINE CLINIC | Facility: CLINIC | Age: 74
End: 2019-11-11

## 2019-11-12 NOTE — TELEPHONE ENCOUNTER
From: Kenya Rubio  To: Kae Tidwell DO  Sent: 11/11/2019 7:45 PM CST  Subject: Visit Follow-up Question    Dr Bree Casanova    I will have surgery on Tuesday morning November 12th at 7:30a a parathyroidectimy by Dr Arielle Trevino.     Caleb Tran

## 2019-11-13 ENCOUNTER — PATIENT MESSAGE (OUTPATIENT)
Dept: INTERNAL MEDICINE CLINIC | Facility: CLINIC | Age: 74
End: 2019-11-13

## 2019-11-13 NOTE — TELEPHONE ENCOUNTER
From: Jericho Vigil  To: Estephania Navarrete DO  Sent: 11/13/2019 7:39 AM CST  Subject: Non-Urgent Medical Question    Dr Jesus Alejo    Had my surgery at [facility] on the the 12th. First one that day at 7:30A.  One parathyroid removed ,hormone secretion now normal,

## 2019-11-25 DIAGNOSIS — E11.9 TYPE 2 DIABETES MELLITUS WITHOUT COMPLICATION, WITHOUT LONG-TERM CURRENT USE OF INSULIN (HCC): ICD-10-CM

## 2020-01-08 ENCOUNTER — OFFICE VISIT (OUTPATIENT)
Dept: AUDIOLOGY | Facility: CLINIC | Age: 75
End: 2020-01-08
Payer: MEDICARE

## 2020-01-08 DIAGNOSIS — H90.3 SENSORINEURAL HEARING LOSS, BILATERAL: Primary | ICD-10-CM

## 2020-01-08 PROCEDURE — 92593 HEARING AID CHECK, BOTH EARS: CPT | Performed by: AUDIOLOGIST

## 2020-01-10 NOTE — PROGRESS NOTES
HEARING AID FOLLOW-UP    Lexx Su  8/11/1945  [id number]    Patient is here for hearing aid problem.     The patient has the following concerns:   Left c-shell has broken wire    In office the following actions were taken:  Discussed options of ju

## 2020-01-17 ENCOUNTER — OFFICE VISIT (OUTPATIENT)
Dept: INTERNAL MEDICINE CLINIC | Facility: CLINIC | Age: 75
End: 2020-01-17
Payer: MEDICARE

## 2020-01-17 VITALS
BODY MASS INDEX: 31.31 KG/M2 | WEIGHT: 209 LBS | TEMPERATURE: 99 F | HEART RATE: 83 BPM | SYSTOLIC BLOOD PRESSURE: 128 MMHG | DIASTOLIC BLOOD PRESSURE: 66 MMHG | HEIGHT: 68.5 IN | OXYGEN SATURATION: 99 % | RESPIRATION RATE: 16 BRPM

## 2020-01-17 DIAGNOSIS — J18.9 COMMUNITY ACQUIRED PNEUMONIA, UNSPECIFIED LATERALITY: Primary | ICD-10-CM

## 2020-01-17 PROCEDURE — 99213 OFFICE O/P EST LOW 20 MIN: CPT | Performed by: INTERNAL MEDICINE

## 2020-01-17 PROCEDURE — G0463 HOSPITAL OUTPT CLINIC VISIT: HCPCS | Performed by: INTERNAL MEDICINE

## 2020-01-17 RX ORDER — CALCIUM CARBONATE 200(500)MG
1 TABLET,CHEWABLE ORAL 3 TIMES DAILY
COMMUNITY
End: 2021-08-23

## 2020-01-17 RX ORDER — VITAMIN B COMPLEX
TABLET ORAL DAILY
COMMUNITY

## 2020-01-17 RX ORDER — AZITHROMYCIN 250 MG/1
TABLET, FILM COATED ORAL
Qty: 5 TABLET | Refills: 0 | Status: SHIPPED | OUTPATIENT
Start: 2020-01-17 | End: 2020-03-30

## 2020-01-17 RX ORDER — CEFUROXIME AXETIL 500 MG/1
500 TABLET ORAL DAILY
Qty: 7 TABLET | Refills: 0 | Status: SHIPPED | OUTPATIENT
Start: 2020-01-17 | End: 2020-03-30

## 2020-01-17 NOTE — PROGRESS NOTES
Wicho Whitt is a 69year old male. Patient presents with:  Cough: Patient c/o cough and runny nose since since Wednesday. Denies pain.        HPI:   Wicho Whitt is a 69year old male who presents for: cough, runny nose    [facility] SKIN NIGHTLY. 9 mL 3   • atorvastatin 20 MG Oral Tab Take 0.5 tablets (10 mg total) by mouth nightly. 45 tablet 3   • Mometasone Furoate 50 MCG/ACT Nasal Suspension 1 spray by Nasal route daily.  1 Bottle 1   • carvedilol 25 MG Oral Tab Take 1 tablet (25 mg Resp 16   Ht 5' 8.5\" (1.74 m)   Wt 209 lb (94.8 kg)   SpO2 99%   BMI 31.32 kg/m²     GENERAL: well developed, well nourished, in no apparent distress  HEENT: normal oropharynx without erythema or exudate, normal TM's, no sinus tenderness, nares patent  NE

## 2020-01-20 ENCOUNTER — TELEPHONE (OUTPATIENT)
Dept: INTERNAL MEDICINE CLINIC | Facility: CLINIC | Age: 75
End: 2020-01-20

## 2020-01-20 NOTE — TELEPHONE ENCOUNTER
Please ask patient if he is feeling better--any fever? Any wheezing? How are his energy levels? I would ask him to still take it easy this week in order to avoid any worsening symptoms.

## 2020-01-20 NOTE — TELEPHONE ENCOUNTER
Pt reports feeling much better today, denies coughing or wheezing, fevers, body aches or chills, having regular BMs. States his energy levels are getting better.  Dr. Malinda Falk made aware via verbal communication of the above information, verbalized understandin

## 2020-01-20 NOTE — TELEPHONE ENCOUNTER
Called to let Dr Felipe Kumari he is feeling quite a bit better & is requesting a call back from Dr Felipe Kumari today on his cell# [phone]

## 2020-03-16 ENCOUNTER — PATIENT MESSAGE (OUTPATIENT)
Dept: INTERNAL MEDICINE CLINIC | Facility: CLINIC | Age: 75
End: 2020-03-16

## 2020-03-16 NOTE — TELEPHONE ENCOUNTER
From: Daksha Valentin  To: Alissa Freitas DO  Sent: 3/16/2020 10:36 AM CDT  Subject: Other    Dr Oneal Jurist    I have scheduled an appointment to see you for 3/18/2020 at 10:am as I am experiencing ear wax blockage in my right ear and need it looked at and flushed

## 2020-03-30 ENCOUNTER — OFFICE VISIT (OUTPATIENT)
Dept: INTERNAL MEDICINE CLINIC | Facility: CLINIC | Age: 75
End: 2020-03-30
Payer: MEDICARE

## 2020-03-30 ENCOUNTER — TELEPHONE (OUTPATIENT)
Dept: AUDIOLOGY | Facility: CLINIC | Age: 75
End: 2020-03-30

## 2020-03-30 VITALS
HEART RATE: 82 BPM | HEIGHT: 68.5 IN | OXYGEN SATURATION: 99 % | WEIGHT: 203 LBS | DIASTOLIC BLOOD PRESSURE: 80 MMHG | TEMPERATURE: 98 F | BODY MASS INDEX: 30.41 KG/M2 | SYSTOLIC BLOOD PRESSURE: 130 MMHG

## 2020-03-30 DIAGNOSIS — H91.91 HEARING LOSS OF RIGHT EAR, UNSPECIFIED HEARING LOSS TYPE: Primary | ICD-10-CM

## 2020-03-30 PROCEDURE — 99213 OFFICE O/P EST LOW 20 MIN: CPT | Performed by: INTERNAL MEDICINE

## 2020-03-30 PROCEDURE — G0463 HOSPITAL OUTPT CLINIC VISIT: HCPCS | Performed by: INTERNAL MEDICINE

## 2020-03-30 NOTE — TELEPHONE ENCOUNTER
Pt is having problem with his right hearing aid, requesting call back to discuss this. Please call thank you.

## 2020-03-30 NOTE — PROGRESS NOTES
Jericho Vigil is a 69year old male. Patient presents with:  Ear Wax: R ear       HPI:   Jericho Vigil is a 69year old male who presents for: decreased hearing R ear    Reports several weeks of decreased hearing in the R ear.      Wt Readings from [place] twice daily Dx Diabetes E11.9 1 kit 0   • latanoprost (XALATAN) 0.005 % Ophthalmic Solution Place 1 drop into both eyes nightly. 6   • FOLBIC 2.5-25-2 MG Oral Tab Take 1 tablet by mouth daily.     10   • acyclovir (ZOVIRAX) 400 MG Oral Tab Take 400 mg by tolerated procedure well. ASSESSMENT AND PLAN:         1. Hearing loss  Advised auditory evaluation.        Addison Salas DO  3/30/2020  10:12 AM

## 2020-03-31 NOTE — TELEPHONE ENCOUNTER
Spoke with patient. He's getting very little feedback on right aid and can't hear beeps from pushing button. He has tried changing wax guards without improvement. His wife will come in tomorrow between 15 and 1pm to drop off aid.    He may want sumit

## 2020-04-01 NOTE — TELEPHONE ENCOUNTER
Wife came in with hearing aids. Right aid is weak and will send in for repair. Aid is not under warranty so will be $300 charge with one year warranty. Gave right loaner fit to large closed dome with spare dome of different sizes.  Will call for fron

## 2020-04-02 NOTE — TELEPHONE ENCOUNTER
[place] called, pt has used all refills  No refills remaining  Pt is out of medication after tonight  Tasked to [employer]

## 2020-04-03 ENCOUNTER — TELEPHONE (OUTPATIENT)
Dept: INTERNAL MEDICINE CLINIC | Facility: CLINIC | Age: 75
End: 2020-04-03

## 2020-04-03 RX ORDER — INSULIN GLARGINE 100 [IU]/ML
INJECTION, SOLUTION SUBCUTANEOUS
Qty: 9 ML | Refills: 0 | OUTPATIENT
Start: 2020-04-03

## 2020-04-03 RX ORDER — AZITHROMYCIN 250 MG/1
TABLET, FILM COATED ORAL
Qty: 6 TABLET | Refills: 0 | Status: SHIPPED | OUTPATIENT
Start: 2020-04-03 | End: 2020-08-28 | Stop reason: ALTCHOICE

## 2020-04-03 NOTE — TELEPHONE ENCOUNTER
Respiratory infection triage:    Fever:  [x]  No fever  []  Fever>100.4    Cough:  [] Tight cough  [] Cough with exertion  [x] Wet cough  [x] Sputum production, Color: clear    Breathing:  [] Mild shortness of breath interfering with activity  [] Wheezing

## 2020-04-03 NOTE — TELEPHONE ENCOUNTER
Current refill request refused due to refill is either a duplicate request or has active refills at the pharmacy. Check previous templates.     Requested Prescriptions     Refused Prescriptions Disp Refills   • BASAGLAR KWIKPEN 100 UNIT/ML Subcutaneous Sol

## 2020-04-03 NOTE — TELEPHONE ENCOUNTER
Pt asked to speak to Dr Della Esqueda  Pt is getting a cold, no fever, slight cough, post nasal drip  Pt took Mucinex, this has helped post nasal drip bit  Pt is concerned that it might progress like his last cold that went into bronchitis  Pt wants to be sure he i

## 2020-04-03 NOTE — TELEPHONE ENCOUNTER
This could be related to allergies---I would try taking a zyrtec daily, and ok to continue mucinex. If this does not improve in the next 2 days, then I have sent in a prescription for a zpak--he can start this.    Call if any fever or shortness of breath

## 2020-04-10 ENCOUNTER — TELEPHONE (OUTPATIENT)
Dept: AUDIOLOGY | Facility: CLINIC | Age: 75
End: 2020-04-10

## 2020-04-10 NOTE — TELEPHONE ENCOUNTER
Per Jenniffer aid is still in process. Working on c-shell at present. Called patient to let him know aid has not shipped yet. Will call as soon as it arrives.

## 2020-04-14 ENCOUNTER — TELEPHONE (OUTPATIENT)
Dept: AUDIOLOGY | Facility: CLINIC | Age: 75
End: 2020-04-14

## 2020-04-14 NOTE — TELEPHONE ENCOUNTER
Per pt wanting to speak to Dr. Scott Cowan in regards to hearing aid replacements he just received.  Please advise

## 2020-04-14 NOTE — TELEPHONE ENCOUNTER
Called patient and LMVM that his right hearing aid is back from repair.       Hearing Aid Information       Right    Left   Make: Phonak Make: Phonak   Model: [id number] 312T Model: [id number] 312T   Serial Number: [id number] Serial Number: [id number]   Date of P

## 2020-04-15 NOTE — TELEPHONE ENCOUNTER
Spoke with patient. His repaired hearing aid has an extended  tube instead of cerustop like he's had in the past.  (Not sure why they changed that?). Reassured patient that it's ok to wear as is and we can further address at his next visit.    C

## 2020-05-06 ENCOUNTER — OFFICE VISIT (OUTPATIENT)
Dept: AUDIOLOGY | Facility: CLINIC | Age: 75
End: 2020-05-06
Payer: MEDICARE

## 2020-05-06 DIAGNOSIS — H90.71 MIXED CONDUCTIVE AND SENSORINEURAL HEARING LOSS OF RIGHT EAR WITH UNRESTRICTED HEARING OF LEFT EAR: Primary | ICD-10-CM

## 2020-05-06 PROCEDURE — V5267 HEARING AID SUP/ACCESS/DEV: HCPCS | Performed by: AUDIOLOGIST

## 2020-05-06 PROCEDURE — 92593 HEARING AID CHECK, BOTH EARS: CPT | Performed by: AUDIOLOGIST

## 2020-05-06 NOTE — PROGRESS NOTES
HEARING AID FOLLOW-UP    Tod Do  8/11/1945  [id number]    Patient is here for problem hearing. He and wife both suspect decreased hearing in recent weeks/months, especially in right ear.    Patient noted he does not hear the hearing aid start

## 2020-05-21 ENCOUNTER — AUDIOLOGY DOCUMENTATION (OUTPATIENT)
Dept: AUDIOLOGY | Facility: CLINIC | Age: 75
End: 2020-05-21

## 2020-05-21 NOTE — PROGRESS NOTES
Spoke with patient to let him know we have re-make of right c-shell now that has cerustop and we can exchange it for him here in office. He had not seen note from Dr. Thomas Garrido suggesting he see Dr. Toy Sheth, so he will follow up with that.    States hearing is b

## 2020-05-22 ENCOUNTER — TELEPHONE (OUTPATIENT)
Dept: OTOLARYNGOLOGY | Facility: CLINIC | Age: 75
End: 2020-05-22

## 2020-06-02 ENCOUNTER — OFFICE VISIT (OUTPATIENT)
Dept: AUDIOLOGY | Facility: CLINIC | Age: 75
End: 2020-06-02
Payer: MEDICARE

## 2020-06-02 ENCOUNTER — OFFICE VISIT (OUTPATIENT)
Dept: OTOLARYNGOLOGY | Facility: CLINIC | Age: 75
End: 2020-06-02
Payer: MEDICARE

## 2020-06-02 VITALS
BODY MASS INDEX: 30.41 KG/M2 | RESPIRATION RATE: 18 BRPM | TEMPERATURE: 98 F | HEIGHT: 68.5 IN | SYSTOLIC BLOOD PRESSURE: 132 MMHG | DIASTOLIC BLOOD PRESSURE: 80 MMHG | HEART RATE: 82 BPM | WEIGHT: 203 LBS

## 2020-06-02 DIAGNOSIS — H90.3 SENSORY HEARING LOSS, BILATERAL: Primary | ICD-10-CM

## 2020-06-02 DIAGNOSIS — H65.01 NON-RECURRENT ACUTE SEROUS OTITIS MEDIA OF RIGHT EAR: Primary | ICD-10-CM

## 2020-06-02 DIAGNOSIS — H90.A31 MIXED CONDUCTIVE AND SENSORINEURAL HEARING LOSS OF RIGHT EAR WITH RESTRICTED HEARING OF LEFT EAR: ICD-10-CM

## 2020-06-02 PROCEDURE — 69420 INCISION OF EARDRUM: CPT | Performed by: OTOLARYNGOLOGY

## 2020-06-02 PROCEDURE — 92593 HEARING AID CHECK, BOTH EARS: CPT | Performed by: AUDIOLOGIST

## 2020-06-02 PROCEDURE — 99213 OFFICE O/P EST LOW 20 MIN: CPT | Performed by: OTOLARYNGOLOGY

## 2020-06-02 PROCEDURE — G0463 HOSPITAL OUTPT CLINIC VISIT: HCPCS | Performed by: OTOLARYNGOLOGY

## 2020-06-02 NOTE — PROGRESS NOTES
Kadi Galloway is a 69year old male. Patient presents with:  Hearing Loss: c/o of right ear hearing loss x 1 month possible fluid in the ear. HPI:   He did have a bad cold and congestion last few months.   Overall he seems to be doing somewhat better with meals.  60 tablet 11   • OCUVITE-LUTEIN Oral Tab 1 tab daily     • BD PEN NEEDLE MINI U/F 31G X 5 MM Does not apply Misc      • Blood Glucose Monitoring Suppl ([address] [place] [address]) w/Device Does not apply Kit Test twice daily Dx Diabetes E11.9 1 kit Inspection - Normal. Palpation - Normal. Parotid gland - Normal. Thyroid gland - Normal.   Psychiatric Normal Orientation - Oriented to time, place, person & situation. Appropriate mood and affect. Lymph Detail Normal Submental. Submandibular.  Anterior c

## 2020-06-03 NOTE — PROGRESS NOTES
HEARING AID FOLLOW-UP    Kadi Galloway  8/11/1945  [id number]    Patient is here for  of c-shell for right ear.     HEARING AID INFORMATIONBen Nory [id number]  Right #[id number]  Left #[id number]  Coupled to custom c-shells:  Right [id number]

## 2020-06-19 ENCOUNTER — TELEPHONE (OUTPATIENT)
Dept: INTERNAL MEDICINE CLINIC | Facility: CLINIC | Age: 75
End: 2020-06-19

## 2020-06-19 NOTE — TELEPHONE ENCOUNTER
Patient calling would like to talk to Dr. Eli Aparicio. Had cancer treatment at [facility] today. Discuss a medication they have put him on.     Best number to contact patient at [phone] or [phone]

## 2020-06-19 NOTE — TELEPHONE ENCOUNTER
Pt had hgb of 8. 8. was started on darbopoetin.   (previous hgb was 9.3)    He is concerned about the lasix--is on lasix 10mg currently. Having some cramps in legs. Advised to discuss with Dr. Marianne Beauchamp.        Advised labs for lipid and a1c in August if poss

## 2020-07-09 ENCOUNTER — HOSPITAL ENCOUNTER (OUTPATIENT)
Age: 75
Discharge: HOME | End: 2020-07-09
Attending: EMERGENCY MEDICINE
Payer: MEDICARE

## 2020-07-09 ENCOUNTER — TELEPHONE (OUTPATIENT)
Dept: INTERNAL MEDICINE CLINIC | Facility: CLINIC | Age: 75
End: 2020-07-09

## 2020-07-09 VITALS
SYSTOLIC BLOOD PRESSURE: 152 MMHG | RESPIRATION RATE: 18 BRPM | OXYGEN SATURATION: 99 % | HEART RATE: 64 BPM | DIASTOLIC BLOOD PRESSURE: 71 MMHG | TEMPERATURE: 99 F

## 2020-07-09 DIAGNOSIS — Z20.822 CLOSE EXPOSURE TO COVID-19 VIRUS: Primary | ICD-10-CM

## 2020-07-09 PROCEDURE — 99213 OFFICE O/P EST LOW 20 MIN: CPT

## 2020-07-09 NOTE — TELEPHONE ENCOUNTER
Pt and his spouse was around someone that got tested positive for Cov19. They like to get tested no symptoms on both sides.

## 2020-07-09 NOTE — TELEPHONE ENCOUNTER
Dr. Heaven Champagne asymptomatic, requesting COVID19 testing after being in contact with known +COVID19 exposure, please advise.

## 2020-07-10 LAB — SARS-COV-2 RNA RESP QL NAA+PROBE: NOT DETECTED

## 2020-07-10 NOTE — TELEPHONE ENCOUNTER
Spoke to pt and advised on MD message below; pt verbalized understanding. Patient reports he has already been tested yesterday and awaiting results.

## 2020-07-10 NOTE — ED PROVIDER NOTES
Patient Seen in: [address] [place] [address]      History   Patient presents with:  Testing    Stated Complaint: testing     HPI    59 yo male was exposed to covid at a family gathering on Saturday. He has no complaints.      Past Medical Rate and Rhythm: Normal rate and regular rhythm. Pulmonary:      Effort: Pulmonary effort is normal. No respiratory distress. Skin:     General: Skin is warm and dry. Capillary Refill: Capillary refill takes less than 2 seconds.    Neurological:

## 2020-07-10 NOTE — TELEPHONE ENCOUNTER
At this time-we are not testing those that are asymtpomatic. I would recommend that both of you (jennifer and his wife Lazara Mathews my patient) quarantine for 2 weeks and monitor for symptoms.      I would ask that they take daily temperatures (additional if th

## 2020-08-28 ENCOUNTER — OFFICE VISIT (OUTPATIENT)
Dept: INTERNAL MEDICINE CLINIC | Facility: CLINIC | Age: 75
End: 2020-08-28
Payer: MEDICARE

## 2020-08-28 VITALS
DIASTOLIC BLOOD PRESSURE: 88 MMHG | SYSTOLIC BLOOD PRESSURE: 168 MMHG | OXYGEN SATURATION: 99 % | WEIGHT: 203.38 LBS | BODY MASS INDEX: 30.47 KG/M2 | HEART RATE: 77 BPM | HEIGHT: 68.5 IN | TEMPERATURE: 100 F

## 2020-08-28 DIAGNOSIS — N18.4 STAGE 4 CHRONIC KIDNEY DISEASE (HCC): ICD-10-CM

## 2020-08-28 DIAGNOSIS — D64.9 ANEMIA, UNSPECIFIED TYPE: ICD-10-CM

## 2020-08-28 DIAGNOSIS — E11.9 TYPE 2 DIABETES MELLITUS WITHOUT COMPLICATION, WITHOUT LONG-TERM CURRENT USE OF INSULIN (HCC): Primary | ICD-10-CM

## 2020-08-28 DIAGNOSIS — C90.01 MULTIPLE MYELOMA IN REMISSION (HCC): ICD-10-CM

## 2020-08-28 PROCEDURE — 99215 OFFICE O/P EST HI 40 MIN: CPT | Performed by: INTERNAL MEDICINE

## 2020-08-28 PROCEDURE — G0463 HOSPITAL OUTPT CLINIC VISIT: HCPCS | Performed by: INTERNAL MEDICINE

## 2020-08-28 NOTE — PROGRESS NOTES
Hamzah Palafox is a 77year old male. HPI:   Patient presents with:  Checkup     Patient is here to establish care. Patient has a history of multiple myeloma. This was diagnosed around August 2013.   He is been taken care of by oncology at [facility] had a pneumonia vaccine and he will research if this was a Pneumovax 23. Noted in his record is that he had Prevnar September 29, 2017. He has had both Shingrix vaccines. Currently feels well. He has no chest pain. No shortness of breath.   No GI or w/Device Does not apply Kit Test twice daily Dx Diabetes E11.9 1 kit 0   • latanoprost (XALATAN) 0.005 % Ophthalmic Solution Place 1 drop into both eyes nightly. 6   • FOLBIC 2.5-25-2 MG Oral Tab Take 1 tablet by mouth daily.     10   • acyclovir (ZOVIRA without complication, without long-term current use of insulin ([address]  Diabetes mellitus type 2 on insulin. Controlled. 2. Stage 4 chronic kidney disease ([address]  Advanced chronic kidney disease. Patient close to dialysis.   He is in contact with his neph

## 2020-08-31 ENCOUNTER — MED REC SCAN ONLY (OUTPATIENT)
Dept: INTERNAL MEDICINE CLINIC | Facility: CLINIC | Age: 75
End: 2020-08-31

## 2020-09-03 ENCOUNTER — OFFICE VISIT (OUTPATIENT)
Dept: AUDIOLOGY | Facility: CLINIC | Age: 75
End: 2020-09-03
Payer: MEDICARE

## 2020-09-03 DIAGNOSIS — H90.A31 MIXED CONDUCTIVE AND SENSORINEURAL HEARING LOSS OF RIGHT EAR WITH RESTRICTED HEARING OF LEFT EAR: Primary | ICD-10-CM

## 2020-09-03 PROCEDURE — 92593 HEARING AID CHECK, BOTH EARS: CPT | Performed by: AUDIOLOGIST

## 2020-09-03 NOTE — PROGRESS NOTES
HEARING AID FOLLOW-UP    Lexx Su  8/11/1945  [id number]    Patient is here for an annual.    HEARING AID INFORMATION: Jeimy Abarca [id number]  Right #[id number]  Left #[id number]  Coupled to custom c-shells:  Right [id number] and Left [id number]  Marty

## 2020-09-04 ENCOUNTER — OFFICE VISIT (OUTPATIENT)
Dept: OTOLARYNGOLOGY | Facility: CLINIC | Age: 75
End: 2020-09-04
Payer: MEDICARE

## 2020-09-04 VITALS
HEIGHT: 68.5 IN | BODY MASS INDEX: 29.66 KG/M2 | HEART RATE: 82 BPM | TEMPERATURE: 97 F | DIASTOLIC BLOOD PRESSURE: 87 MMHG | SYSTOLIC BLOOD PRESSURE: 159 MMHG | WEIGHT: 198 LBS

## 2020-09-04 DIAGNOSIS — H65.01 NON-RECURRENT ACUTE SEROUS OTITIS MEDIA OF RIGHT EAR: Primary | ICD-10-CM

## 2020-09-04 PROCEDURE — G0463 HOSPITAL OUTPT CLINIC VISIT: HCPCS | Performed by: OTOLARYNGOLOGY

## 2020-09-04 PROCEDURE — 99213 OFFICE O/P EST LOW 20 MIN: CPT | Performed by: OTOLARYNGOLOGY

## 2020-09-04 PROCEDURE — 69433 CREATE EARDRUM OPENING: CPT | Performed by: OTOLARYNGOLOGY

## 2020-09-04 NOTE — PROGRESS NOTES
Roxanna Lange is a 77year old male. Patient presents with:  Ear Problem: Patint Presents with Clogged Right Ear, per pt loss hearing in right ear     HPI:   His right ear was doing well for a time.   And then it blocked again we had a myringotomy perfor ha   • Diabetes ([facility] 2016   • Essential hypertension 1986   • Multiple myeloma ([facility]       Social History:  Social History    Tobacco Use      Smoking status: Never Smoker      Smokeless tobacco: Never Used    Alcohol use: Yes      Frequency: 2-4 time placed into the myringotomy. Patient tolerated the procedure well. ASSESSMENT AND PLAN:   1. Non-recurrent acute serous otitis media of right ear  Recurrent serous otitis media. I placed a PE tube today with great results.   He noticed immediate improv

## 2020-09-17 RX ORDER — AMLODIPINE BESYLATE 2.5 MG/1
2.5 TABLET ORAL DAILY
Qty: 90 TABLET | Refills: 0 | Status: SHIPPED | OUTPATIENT
Start: 2020-09-17 | End: 2020-12-19

## 2020-09-17 NOTE — TELEPHONE ENCOUNTER
Patient called for refill of Amlodipine  He has seen Dr Sanket Guzmán since Dr Malinda Falk is out of the office  Has 2 tablets left and will need a refill before the weekend

## 2020-10-29 ENCOUNTER — TELEPHONE (OUTPATIENT)
Dept: INTERNAL MEDICINE CLINIC | Facility: CLINIC | Age: 75
End: 2020-10-29

## 2020-10-29 NOTE — TELEPHONE ENCOUNTER
Pt requesting refill for:  Losartan  Pt is low on medication  [facility]  Tasked to [employer]

## 2020-10-30 DIAGNOSIS — E11.9 TYPE 2 DIABETES MELLITUS WITHOUT COMPLICATION, WITHOUT LONG-TERM CURRENT USE OF INSULIN (HCC): ICD-10-CM

## 2020-10-30 RX ORDER — LOSARTAN POTASSIUM 50 MG/1
50 TABLET ORAL DAILY
Qty: 90 TABLET | Refills: 3 | Status: SHIPPED | OUTPATIENT
Start: 2020-10-30 | End: 2021-11-26

## 2020-10-30 RX ORDER — LANCETS 33 GAUGE
EACH MISCELLANEOUS
Qty: 200 EACH | Refills: 3 | Status: SHIPPED | OUTPATIENT
Start: 2020-10-30

## 2020-10-30 NOTE — TELEPHONE ENCOUNTER
TO  - PATIENT CALLED back and he states his nephrologist still wants him on Losartan and he also has upcoming appointment with kidney MD

## 2020-10-30 NOTE — TELEPHONE ENCOUNTER
Refill request noted. Please call patient on my behalf and just ask him that I was just checking to make sure that the kidney doctor still wanted him on losartan as it can have some kidney effects. If so, I am happy to refill for him.

## 2020-11-02 NOTE — TELEPHONE ENCOUNTER
The following prescription was reviewed, authorized, and electronically sent to the pharmacy.     Requested Prescriptions     Signed Prescriptions Disp Refills   • losartan Potassium 50 MG Oral Tab 90 tablet 3     Sig: Take 1 tablet (50 mg total) by mouth d

## 2020-11-03 RX ORDER — PEN NEEDLE, DIABETIC 31 GX5/16"
NEEDLE, DISPOSABLE MISCELLANEOUS
Qty: 100 EACH | Refills: 3 | Status: SHIPPED | OUTPATIENT
Start: 2020-11-03 | End: 2021-12-16

## 2020-12-04 ENCOUNTER — OFFICE VISIT (OUTPATIENT)
Dept: INTERNAL MEDICINE CLINIC | Facility: CLINIC | Age: 75
End: 2020-12-04
Payer: MEDICARE

## 2020-12-04 VITALS
TEMPERATURE: 98 F | HEART RATE: 76 BPM | BODY MASS INDEX: 31.11 KG/M2 | HEIGHT: 68.5 IN | WEIGHT: 207.63 LBS | SYSTOLIC BLOOD PRESSURE: 140 MMHG | DIASTOLIC BLOOD PRESSURE: 76 MMHG | OXYGEN SATURATION: 100 %

## 2020-12-04 DIAGNOSIS — N18.6 STAGE 5 CHRONIC KIDNEY DISEASE ON CHRONIC DIALYSIS (HCC): Primary | ICD-10-CM

## 2020-12-04 DIAGNOSIS — C90.01 MULTIPLE MYELOMA IN REMISSION (HCC): ICD-10-CM

## 2020-12-04 DIAGNOSIS — Z99.2 STAGE 5 CHRONIC KIDNEY DISEASE ON CHRONIC DIALYSIS (HCC): Primary | ICD-10-CM

## 2020-12-04 DIAGNOSIS — E11.9 TYPE 2 DIABETES MELLITUS WITHOUT COMPLICATION, WITHOUT LONG-TERM CURRENT USE OF INSULIN (HCC): ICD-10-CM

## 2020-12-04 DIAGNOSIS — Z01.818 PRE-OP EVALUATION: ICD-10-CM

## 2020-12-04 PROCEDURE — 99214 OFFICE O/P EST MOD 30 MIN: CPT | Performed by: INTERNAL MEDICINE

## 2020-12-04 PROCEDURE — G0463 HOSPITAL OUTPT CLINIC VISIT: HCPCS | Performed by: INTERNAL MEDICINE

## 2020-12-04 PROCEDURE — 93010 ELECTROCARDIOGRAM REPORT: CPT | Performed by: INTERNAL MEDICINE

## 2020-12-04 PROCEDURE — 93005 ELECTROCARDIOGRAM TRACING: CPT | Performed by: INTERNAL MEDICINE

## 2020-12-04 RX ORDER — TORSEMIDE 20 MG/1
40 TABLET ORAL 2 TIMES DAILY
COMMUNITY
Start: 2020-11-03

## 2020-12-04 NOTE — PROGRESS NOTES
Mushtaq Mcfarlane is a 77year old male. HPI:   Patient presents with:  Checkup: discussing dialysis     Teetee Shavon comes in to talk about dialysis. He has stage V kidney disease. [place] [facility] [place].   He recently saw Jenny Hernadez 31G X 5 MM Does not apply Misc USE 1 NEEDLE DAILY WITH INSULIN PEN DX E 11.9 insulin dependent 100 each 3   • losartan Potassium 50 MG Oral Tab Take 1 tablet (50 mg total) by mouth daily.  90 tablet 3   • OneTouch Delica Lancets 89M Does not apply Misc USE Drug use: No       REVIEW OF SYSTEMS:   GENERAL HEALTH:  feels well otherwise  RESPIRATORY:  Voices no shortness of breath with exertion or cough  CARDIOVASCULAR:  Voices no chest pain on exertion or shortness of breath  GI:   Voices no abdominal pain or c his diabetes. He indicated he would get me her direct line and I will call next week. I will also inquire if he had the Pneumovax 23 at [facility] sometime. The patient indicates understanding of these issues and agrees to the plan.     Anup Lenz,

## 2020-12-21 RX ORDER — AMLODIPINE BESYLATE 2.5 MG/1
2.5 TABLET ORAL DAILY
Qty: 90 TABLET | Refills: 3 | Status: SHIPPED | OUTPATIENT
Start: 2020-12-21 | End: 2021-08-23

## 2021-01-21 ENCOUNTER — PATIENT MESSAGE (OUTPATIENT)
Dept: INTERNAL MEDICINE CLINIC | Facility: CLINIC | Age: 76
End: 2021-01-21

## 2021-01-21 ENCOUNTER — TELEPHONE (OUTPATIENT)
Dept: INTERNAL MEDICINE CLINIC | Facility: CLINIC | Age: 76
End: 2021-01-21

## 2021-01-22 NOTE — TELEPHONE ENCOUNTER
Spoke with Andrew Elliott who is agreeable to this. Routed TE to [address] to help with setting this up.  Thanks

## 2021-01-22 NOTE — TELEPHONE ENCOUNTER
Message noted. Please asked patient if he is willing to set up by telemedicine visit. This would be by telephone.   Medicare is allowing physicians to place a charge on telephone consultations that require reviewing records, history taking and recommendat

## 2021-01-22 NOTE — TELEPHONE ENCOUNTER
Routed to [address] - Please assist Mckenzie Mock in setting up a Telemedicine Visit. He will be expecting a call from [facility] tomorrow and is aware that this is the visit recommended by Dr. Madai Little.  Thanks

## 2021-01-25 DIAGNOSIS — Z23 NEED FOR VACCINATION: ICD-10-CM

## 2021-02-10 ENCOUNTER — IMMUNIZATION (OUTPATIENT)
Dept: LAB | Facility: HOSPITAL | Age: 76
End: 2021-02-10
Attending: HOSPITALIST
Payer: MEDICARE

## 2021-02-10 DIAGNOSIS — Z23 NEED FOR VACCINATION: Primary | ICD-10-CM

## 2021-02-10 PROCEDURE — 0011A SARSCOV2 VAC 100MCG/0.5ML IM: CPT

## 2021-02-22 ENCOUNTER — OFFICE VISIT (OUTPATIENT)
Dept: ENDOCRINOLOGY CLINIC | Facility: CLINIC | Age: 76
End: 2021-02-22
Payer: MEDICARE

## 2021-02-22 VITALS
OXYGEN SATURATION: 99 % | RESPIRATION RATE: 18 BRPM | SYSTOLIC BLOOD PRESSURE: 160 MMHG | BODY MASS INDEX: 31.01 KG/M2 | HEART RATE: 75 BPM | WEIGHT: 207 LBS | DIASTOLIC BLOOD PRESSURE: 78 MMHG | HEIGHT: 68.5 IN

## 2021-02-22 DIAGNOSIS — E11.65 TYPE 2 DIABETES MELLITUS WITH HYPERGLYCEMIA, WITH LONG-TERM CURRENT USE OF INSULIN (HCC): Primary | ICD-10-CM

## 2021-02-22 DIAGNOSIS — Z79.4 TYPE 2 DIABETES MELLITUS WITH HYPERGLYCEMIA, WITH LONG-TERM CURRENT USE OF INSULIN (HCC): Primary | ICD-10-CM

## 2021-02-22 DIAGNOSIS — N18.9 CHRONIC RENAL IMPAIRMENT, UNSPECIFIED CKD STAGE: ICD-10-CM

## 2021-02-22 LAB
GLUCOSE BLOOD: 244
TEST STRIP LOT #: NORMAL NUMERIC

## 2021-02-22 PROCEDURE — 82947 ASSAY GLUCOSE BLOOD QUANT: CPT | Performed by: INTERNAL MEDICINE

## 2021-02-22 PROCEDURE — 36416 COLLJ CAPILLARY BLOOD SPEC: CPT | Performed by: INTERNAL MEDICINE

## 2021-02-22 PROCEDURE — 99205 OFFICE O/P NEW HI 60 MIN: CPT | Performed by: INTERNAL MEDICINE

## 2021-02-22 RX ORDER — ACETAMINOPHEN 325 MG/1
325 TABLET ORAL EVERY 6 HOURS PRN
COMMUNITY
End: 2021-03-02

## 2021-02-22 NOTE — H&P
Name: Kenya Ramiro  Date: 2/22/2021    Referring Physician: Dr. Uriel Gamez is a 77year old male who presents for management of T2D. He was diagnosed with multiple myeloma in August of 2013.  Underwent stem-c •  Insulin Pen Needle (BD PEN NEEDLE MINI U/F) 31G X 5 MM Does not apply Misc, USE 1 NEEDLE DAILY WITH INSULIN PEN DX E 11.9 insulin dependent, Disp: 100 each, Rfl: 3  •  losartan Potassium 50 MG Oral Tab, Take 1 tablet (50 mg total) by mouth daily. , Dis Alcohol use: Yes      Frequency: 2-4 times a month      Comment: social    Drug use: No      Medical History:   Past Medical History:   Diagnosis Date   • Calculus of kidney 1994   • Cancer ([facility]     multiple myloma   • Diabetes ([facility] 2016   • Essential hy 77year-old man here for evaluation and management of uncontrolled type 2 diabetes. We discussed the ABCs of DM. Shortly, he will be fitted with the peritoneal dialysis catheter.  I have asked him to take half of the dose of lantus the night before the proc and coordnation of care. Patient concerns were answered to the best of my knowledge. 2/22/2021  Shanon Morales MD

## 2021-02-23 ENCOUNTER — LAB ENCOUNTER (OUTPATIENT)
Dept: LAB | Age: 76
End: 2021-02-23
Attending: INTERNAL MEDICINE
Payer: MEDICARE

## 2021-02-23 DIAGNOSIS — N18.5 CKD (CHRONIC KIDNEY DISEASE), STAGE V (HCC): ICD-10-CM

## 2021-02-23 DIAGNOSIS — Z49.02 ENCOUNTER FOR PERITONEAL DIALYSIS CATHETER INSERTION (HCC): ICD-10-CM

## 2021-02-24 ENCOUNTER — TELEPHONE (OUTPATIENT)
Dept: INTERNAL MEDICINE CLINIC | Facility: CLINIC | Age: 76
End: 2021-02-24

## 2021-02-24 LAB — SARS-COV-2 RNA RESP QL NAA+PROBE: NOT DETECTED

## 2021-02-25 NOTE — TELEPHONE ENCOUNTER
Patient called and relayed Dr Rafal Little message. Patient verbalized understanding with no further questions noted.

## 2021-03-02 ENCOUNTER — OFFICE VISIT (OUTPATIENT)
Dept: INTERNAL MEDICINE CLINIC | Facility: CLINIC | Age: 76
End: 2021-03-02
Payer: MEDICARE

## 2021-03-02 VITALS
DIASTOLIC BLOOD PRESSURE: 78 MMHG | SYSTOLIC BLOOD PRESSURE: 160 MMHG | HEIGHT: 68.5 IN | WEIGHT: 207.5 LBS | HEART RATE: 87 BPM | BODY MASS INDEX: 31.09 KG/M2 | OXYGEN SATURATION: 99 %

## 2021-03-02 DIAGNOSIS — C90.01 MULTIPLE MYELOMA IN REMISSION (HCC): ICD-10-CM

## 2021-03-02 DIAGNOSIS — N18.6 END STAGE RENAL DISEASE (HCC): Primary | ICD-10-CM

## 2021-03-02 DIAGNOSIS — E11.9 TYPE 2 DIABETES MELLITUS WITHOUT COMPLICATION, WITHOUT LONG-TERM CURRENT USE OF INSULIN (HCC): ICD-10-CM

## 2021-03-02 PROCEDURE — 99214 OFFICE O/P EST MOD 30 MIN: CPT | Performed by: INTERNAL MEDICINE

## 2021-03-02 NOTE — PROGRESS NOTES
Alexy Devries is a 77year old male. HPI:   Patient presents with:  Checkup: 6 month      Micheal Cramer comes in after having a peritoneal dialysis catheter inserted. He is doing well. He has a follow-up this Thursday for follow-up.     He is thinking about c (VITAMIN D) 25 MCG (1000 UT) Oral Tab Take by mouth daily. • Glucose Blood (ONETOUCH ULTRA BLUE) In Vitro Strip TEST TWICE DAILY 200 strip 3   • hydrALAzine HCl 25 MG Oral Tab Take 1 tablet by mouth 3 (three) times daily.      • Mometasone Furoate 50 MC exudate. EYES:  PERRL. Sclera anicteric. NECK:  Supple,  no adenopathy,  thyroid normal  LUNGS:  clear to auscultation. Effort normal  CARDIO:  RRR without murmur. S1 and S2 normal  GI:  good BS's,  no masses,   HSM or tenderness.   Bandage over perito

## 2021-03-09 ENCOUNTER — OFFICE VISIT (OUTPATIENT)
Dept: OTOLARYNGOLOGY | Facility: CLINIC | Age: 76
End: 2021-03-09
Payer: MEDICARE

## 2021-03-09 VITALS
TEMPERATURE: 98 F | WEIGHT: 207.5 LBS | BODY MASS INDEX: 31.09 KG/M2 | SYSTOLIC BLOOD PRESSURE: 160 MMHG | DIASTOLIC BLOOD PRESSURE: 80 MMHG | HEIGHT: 68.5 IN

## 2021-03-09 DIAGNOSIS — H65.01 NON-RECURRENT ACUTE SEROUS OTITIS MEDIA OF RIGHT EAR: Primary | ICD-10-CM

## 2021-03-09 DIAGNOSIS — H61.23 BILATERAL IMPACTED CERUMEN: ICD-10-CM

## 2021-03-09 PROCEDURE — 99213 OFFICE O/P EST LOW 20 MIN: CPT | Performed by: OTOLARYNGOLOGY

## 2021-03-09 PROCEDURE — 92504 EAR MICROSCOPY EXAMINATION: CPT | Performed by: OTOLARYNGOLOGY

## 2021-03-09 NOTE — PROGRESS NOTES
Reynold Villatoro is a 77year old male. Patient presents with:   Follow - Up: 6 month follow up- non-recurrent acute serous otitis media of right ear- per pt no episodes of ear infections noted    HPI:   He is in follow-up of right-sided serous otitis media apply Kit Test twice daily Dx Diabetes E11.9 1 kit 0   • latanoprost (XALATAN) 0.005 % Ophthalmic Solution Place 1 drop into both eyes nightly. 6   • FOLBIC 2.5-25-2 MG Oral Tab Take 1 tablet by mouth daily.     10   • acyclovir (ZOVIRAX) 400 MG Oral Tab Left: Normal.    Ears Normal Inspection - Right: Normal, Left: Normal. Canal - Left: Normal. TM - Right: Normal, Left: Normal.  Large amount of wax and debris cleared from the ear canals bilaterally. Right tympanic membrane is clear.   PE tubes in place bu

## 2021-03-10 ENCOUNTER — IMMUNIZATION (OUTPATIENT)
Dept: LAB | Facility: HOSPITAL | Age: 76
End: 2021-03-10
Attending: EMERGENCY MEDICINE
Payer: MEDICARE

## 2021-03-10 DIAGNOSIS — Z23 NEED FOR VACCINATION: Primary | ICD-10-CM

## 2021-03-10 PROCEDURE — 0012A SARSCOV2 VAC 100MCG/0.5ML IM: CPT

## 2021-03-15 DIAGNOSIS — E11.9 TYPE 2 DIABETES MELLITUS WITHOUT COMPLICATION, WITHOUT LONG-TERM CURRENT USE OF INSULIN (HCC): ICD-10-CM

## 2021-03-15 RX ORDER — BLOOD SUGAR DIAGNOSTIC
STRIP MISCELLANEOUS
Qty: 200 STRIP | Refills: 3 | Status: SHIPPED | OUTPATIENT
Start: 2021-03-15

## 2021-03-18 ENCOUNTER — LAB ENCOUNTER (OUTPATIENT)
Dept: LAB | Age: 76
End: 2021-03-18
Attending: INTERNAL MEDICINE
Payer: MEDICARE

## 2021-03-18 DIAGNOSIS — N18.5 CHRONIC KIDNEY DISEASE, STAGE V (HCC): ICD-10-CM

## 2021-03-18 LAB
HBV CORE AB SERPL QL IA: NONREACTIVE
HBV SURFACE AB SER QL: NONREACTIVE
HBV SURFACE AB SER QL: NONREACTIVE
HBV SURFACE AB SERPL IA-ACNC: <3.1 MIU/ML
HBV SURFACE AB SERPL IA-ACNC: <3.1 MIU/ML
HBV SURFACE AG SER-ACNC: <0.1 [IU]/L
HBV SURFACE AG SER-ACNC: <0.1 [IU]/L
HBV SURFACE AG SERPL QL IA: NONREACTIVE
HBV SURFACE AG SERPL QL IA: NONREACTIVE

## 2021-03-18 PROCEDURE — 87340 HEPATITIS B SURFACE AG IA: CPT

## 2021-03-18 PROCEDURE — 36415 COLL VENOUS BLD VENIPUNCTURE: CPT

## 2021-03-18 PROCEDURE — 80500 HEPATITIS B PROFILE: CPT

## 2021-03-18 PROCEDURE — 86706 HEP B SURFACE ANTIBODY: CPT

## 2021-03-18 PROCEDURE — 86704 HEP B CORE ANTIBODY TOTAL: CPT

## 2021-04-07 ENCOUNTER — OFFICE VISIT (OUTPATIENT)
Dept: AUDIOLOGY | Facility: CLINIC | Age: 76
End: 2021-04-07
Payer: MEDICARE

## 2021-04-07 DIAGNOSIS — H90.A31 MIXED CONDUCTIVE AND SENSORINEURAL HEARING LOSS OF RIGHT EAR WITH RESTRICTED HEARING OF LEFT EAR: Primary | ICD-10-CM

## 2021-04-07 PROCEDURE — 92557 COMPREHENSIVE HEARING TEST: CPT | Performed by: AUDIOLOGIST

## 2021-04-07 PROCEDURE — 92567 TYMPANOMETRY: CPT | Performed by: AUDIOLOGIST

## 2021-04-07 NOTE — PROGRESS NOTES
AUDIOLOGY REPORT      Tod Do is a 77year old male     Referring Provider: No ref.  provider found   YOB: 1945  Medical Record: [id number]      Patient Hearing History:  Patient self-referred for hearing testing today with concern f Diego Abarca.   Audiologist

## 2021-04-08 ENCOUNTER — MED REC SCAN ONLY (OUTPATIENT)
Dept: INTERNAL MEDICINE CLINIC | Facility: CLINIC | Age: 76
End: 2021-04-08

## 2021-04-13 ENCOUNTER — TELEPHONE (OUTPATIENT)
Dept: ENDOCRINOLOGY CLINIC | Facility: CLINIC | Age: 76
End: 2021-04-13

## 2021-04-13 NOTE — TELEPHONE ENCOUNTER
Hi!  I have reviewed Mr. Yareli Pang blood tests. I had seen him last on 2/22/21. It looks like he may have started his peritoneal dialysis. Could we call him and find out if he is checking his blood sugars regularly?  If so, could we find out what they are?

## 2021-04-15 RX ORDER — INSULIN GLARGINE 100 [IU]/ML
INJECTION, SOLUTION SUBCUTANEOUS
Qty: 15 ML | Refills: 1 | Status: SHIPPED | OUTPATIENT
Start: 2021-04-15 | End: 2021-06-30

## 2021-04-16 ENCOUNTER — OFFICE VISIT (OUTPATIENT)
Dept: OTOLARYNGOLOGY | Facility: CLINIC | Age: 76
End: 2021-04-16
Payer: MEDICARE

## 2021-04-16 VITALS
HEIGHT: 68.5 IN | SYSTOLIC BLOOD PRESSURE: 148 MMHG | BODY MASS INDEX: 30.56 KG/M2 | WEIGHT: 204 LBS | RESPIRATION RATE: 17 BRPM | HEART RATE: 75 BPM | DIASTOLIC BLOOD PRESSURE: 83 MMHG | TEMPERATURE: 98 F

## 2021-04-16 DIAGNOSIS — H65.21 RIGHT CHRONIC SEROUS OTITIS MEDIA: Primary | ICD-10-CM

## 2021-04-16 PROCEDURE — 99213 OFFICE O/P EST LOW 20 MIN: CPT | Performed by: OTOLARYNGOLOGY

## 2021-04-16 PROCEDURE — 69433 CREATE EARDRUM OPENING: CPT | Performed by: OTOLARYNGOLOGY

## 2021-04-16 NOTE — PROGRESS NOTES
Kadi Galloway is a 77year old male. Patient presents with:  Ear Problem: tube in right ear, decreased hearing. HPI:   He has been experiencing a blockage in his right ear and his hearing aid has not been working as well.   Hearing test shows poor mov MG Oral Tab Take 400 mg by mouth daily.     11      Past Medical History:   Diagnosis Date   • Calculus of kidney 1994   • Cancer [facility] [address]     multiple myloma   • Diabetes ([facility] [address] 2016   • Essential hypertension 1986   [facility]       Social Histo consent obtained, phenol was used to anesthetize the posterior inferior quadrant of the right tympanic membrane. Myringotomy was made and a T-tube was placed. Since large amount of fluid was encountered and suction.   Patient noted immediate improvement i

## 2021-04-19 ENCOUNTER — OFFICE VISIT (OUTPATIENT)
Dept: ENDOCRINOLOGY CLINIC | Facility: CLINIC | Age: 76
End: 2021-04-19
Payer: MEDICARE

## 2021-04-19 ENCOUNTER — TELEPHONE (OUTPATIENT)
Dept: ENDOCRINOLOGY CLINIC | Facility: CLINIC | Age: 76
End: 2021-04-19

## 2021-04-19 VITALS
HEIGHT: 68.5 IN | RESPIRATION RATE: 18 BRPM | BODY MASS INDEX: 30.56 KG/M2 | HEART RATE: 74 BPM | DIASTOLIC BLOOD PRESSURE: 78 MMHG | WEIGHT: 204 LBS | SYSTOLIC BLOOD PRESSURE: 134 MMHG

## 2021-04-19 DIAGNOSIS — E11.65 TYPE 2 DIABETES MELLITUS WITH HYPERGLYCEMIA, WITH LONG-TERM CURRENT USE OF INSULIN (HCC): Primary | ICD-10-CM

## 2021-04-19 DIAGNOSIS — Z79.4 TYPE 2 DIABETES MELLITUS WITH HYPERGLYCEMIA, WITH LONG-TERM CURRENT USE OF INSULIN (HCC): Primary | ICD-10-CM

## 2021-04-19 PROCEDURE — 36416 COLLJ CAPILLARY BLOOD SPEC: CPT | Performed by: INTERNAL MEDICINE

## 2021-04-19 PROCEDURE — 99214 OFFICE O/P EST MOD 30 MIN: CPT | Performed by: INTERNAL MEDICINE

## 2021-04-19 PROCEDURE — 82947 ASSAY GLUCOSE BLOOD QUANT: CPT | Performed by: INTERNAL MEDICINE

## 2021-04-19 NOTE — TELEPHONE ENCOUNTER
Dr. Mikhail Bingham, Zeynep Huynh)    Spoke to [facility] ([phone]) - she will fax office note from visit on 4/21/21 to [phone]    Spoke to Dr. Nguyen Fresh office ([phone]) requesting copy of office note from apt on May 10, 21 faxed t

## 2021-04-19 NOTE — PROGRESS NOTES
Name: More Russ  Date: 4/19/21    Referring Physician: Dr. Jefferson Kinney VISIT:  Smithmonique Poster is a 77year old male who presented for management of T2D. He was diagnosed with multiple myeloma in August of 2013.  Underwent stem-cell transplant none    Medications:     Current Outpatient Medications:   •  BASAGLAR KWIKPEN 100 UNIT/ML Subcutaneous Solution Pen-injector, Inject 10 Units into the skin daily.  (Patient taking differently: 8 Units.  ), Disp: 15 mL, Rfl: 1  •  ONETOBooking Angel ULTRA In [facility] acyclovir (ZOVIRAX) 400 MG Oral Tab, Take 400 mg by mouth daily.   , Disp: , Rfl: 11     Allergies:   No Known Allergies    Social History:   Social History    Tobacco Use      Smoking status: Never Smoker      Smokeless tobacco: Never Used    Vaping Use 07/30/2019    GFRNAA 15 (L) 07/30/2019    GFRAA 17 (L) 07/30/2019    CA 10.6 (H) 07/30/2019    OSMOCALC 328 (H) 07/30/2019    ALKPHO 40 (L) 07/30/2019    AST 13 (L) 07/30/2019    ALT 30 07/30/2019    BILT 0.4 07/30/2019    TP 5.6 (L) 07/30/2019    ALB 3.4 vegetables at every meal, with lots whole grains, and protein from either plant-based sources, or poultry and fish.       -Return to clinic in 3 months    Prior to this encounter, I spent over 10 minutes with preparing for the visit, including reviewing doc

## 2021-04-30 ENCOUNTER — TELEPHONE (OUTPATIENT)
Dept: ENDOCRINOLOGY CLINIC | Facility: CLINIC | Age: 76
End: 2021-04-30

## 2021-04-30 NOTE — TELEPHONE ENCOUNTER
Routing to Dr. Tiffany Louis as Elliott Nunn. Per 4/19/21 telephone encounter, provider is looking for dose change/update of solumedrol.

## 2021-04-30 NOTE — TELEPHONE ENCOUNTER
Tania Ritchie from Dr. Mansi Camacho office calling to provide her medications for steroids patient gets monthly, amount 1000 MG of Solumedrol. Any questions please call at [phone][facility]

## 2021-05-03 ENCOUNTER — TELEPHONE (OUTPATIENT)
Dept: AUDIOLOGY | Facility: CLINIC | Age: 76
End: 2021-05-03

## 2021-05-03 ENCOUNTER — AUDIOLOGY DOCUMENTATION (OUTPATIENT)
Dept: AUDIOLOGY | Facility: CLINIC | Age: 76
End: 2021-05-03

## 2021-05-03 NOTE — PROGRESS NOTES
Hearing Aid Information       Right    Left   Make: Phonak Make: Phonak   Model: [id number] 312T Model: [id number] 312T   Serial Number: [id number] Serial Number: [id number]   Date of Purchase: 07/16/15 Date of Purchase: 07/16/15   Repair Warranty Exp: 04/13/2

## 2021-05-03 NOTE — TELEPHONE ENCOUNTER
JIAN that we are in training today and might be able to get him a loaner by this afternoon, but need to know which ear it will be for.

## 2021-05-05 ENCOUNTER — TELEPHONE (OUTPATIENT)
Dept: AUDIOLOGY | Facility: CLINIC | Age: 76
End: 2021-05-05

## 2021-05-05 NOTE — TELEPHONE ENCOUNTER
Pt called stating pt has not received a call back. Pt wanted to set up an appointment. Pt is scheduled on 5-25-21 at 2:00 pm for a hearing aid check.

## 2021-05-10 ENCOUNTER — TELEPHONE (OUTPATIENT)
Dept: AUDIOLOGY | Facility: CLINIC | Age: 76
End: 2021-05-10

## 2021-05-10 NOTE — TELEPHONE ENCOUNTER
Per pt's spouse, pt has lost loaner and is having trouble hearing.  Requesting to be seen this morning for another loaner before pt's cancer treatment in [facility] at 12pm, please advise

## 2021-05-10 NOTE — TELEPHONE ENCOUNTER
Spoke to patient's wife. Advised I do not have another loaner, but can get a PocketTalker ready for  right now. Briefly discussed how to use it and they can  ASAP. She noted they will  tomorrow. She wished to get a sooner appt than 5/25 for her . Looked through schedule, no openings until then.  Advised to call phone room and get on wait list

## 2021-05-10 NOTE — TELEPHONE ENCOUNTER
Hi!  Please call patient and let him know that he should give himself 20 units of lantus every night from today for one week. He should send us his blood sugars in three days. Thank you.

## 2021-05-11 ENCOUNTER — TELEPHONE (OUTPATIENT)
Dept: ENDOCRINOLOGY CLINIC | Facility: CLINIC | Age: 76
End: 2021-05-11

## 2021-05-11 ENCOUNTER — OFFICE VISIT (OUTPATIENT)
Dept: AUDIOLOGY | Facility: CLINIC | Age: 76
End: 2021-05-11
Payer: MEDICARE

## 2021-05-11 DIAGNOSIS — H90.3 SENSORINEURAL HEARING LOSS, BILATERAL: Primary | ICD-10-CM

## 2021-05-11 PROCEDURE — 92593 HEARING AID CHECK, BOTH EARS: CPT | Performed by: AUDIOLOGIST

## 2021-05-11 NOTE — PROGRESS NOTES
HEARING AID FOLLOW-UP    Daksha Homero  8/11/1945  [id number]    [facility]       HEARING AID INFORMATION: Deidre Leonard [id number]  Right #[id number]  Left #[id number]  Coupled to custom c-shells:  Right [id number] and Lef

## 2021-05-11 NOTE — TELEPHONE ENCOUNTER
Spoke to Dr. Luis Ahuja and wanted to know the blood sugar readings lately. Per wife, they have been good (116, 120, 129.119. 101 fasting). Wife states even with PD, BG have been excellent. However, today at 1000 it was 196 and at 1500 360. Patient had treatment yesterday. Dr. Luis Ahuja gave the following orders and were discussed with wife. - Basaglar 20 units tonight 5/11/21  - Basaglar 20 units tomorrow 5/12/21  - Basaglar 15 units 5/13/21  - Basaglar 10 units 5/14-5/16 -- Call on Monday 5/17/21 with BG update and will give recommendation accordingly.     -To check BG 2-3x/day fasting and 2 hours after any meal    Wife doesn't know how much solumedrol patient is exactly getting but confirmed he's getting it monthly (treatments: 5/10/21, 06/14/21, 07/12/21). Routing to Dr. Luis Ahuja for review.

## 2021-05-11 NOTE — TELEPHONE ENCOUNTER
Anna Pope requesting to speak with RN regarding Basaglar doses. Patient had cancer treatment yesterday and wanted to know directions for insulin. Please call. Thank you.

## 2021-05-18 ENCOUNTER — OFFICE VISIT (OUTPATIENT)
Dept: OTOLARYNGOLOGY | Facility: CLINIC | Age: 76
End: 2021-05-18
Payer: MEDICARE

## 2021-05-18 VITALS
BODY MASS INDEX: 30.56 KG/M2 | TEMPERATURE: 99 F | HEIGHT: 68.5 IN | WEIGHT: 204 LBS | SYSTOLIC BLOOD PRESSURE: 134 MMHG | DIASTOLIC BLOOD PRESSURE: 77 MMHG

## 2021-05-18 DIAGNOSIS — H65.01 NON-RECURRENT ACUTE SEROUS OTITIS MEDIA OF RIGHT EAR: Primary | ICD-10-CM

## 2021-05-18 PROCEDURE — 99213 OFFICE O/P EST LOW 20 MIN: CPT | Performed by: OTOLARYNGOLOGY

## 2021-05-18 RX ORDER — OFLOXACIN 3 MG/ML
4 SOLUTION AURICULAR (OTIC) 2 TIMES DAILY
Qty: 1 BOTTLE | Refills: 0 | Status: SHIPPED | OUTPATIENT
Start: 2021-05-18 | End: 2021-05-28

## 2021-05-18 NOTE — PROGRESS NOTES
Giacomo Penn is a 77year old male. Patient presents with: Follow - Up: 1 month follow up- right chronic serous otitis media- per pt he got better and got worse again    HPI:   He had a PE tube placed in his right ear about 4 weeks ago.   He has been h Blood Glucose Monitoring Suppl ([address] [place] [address]) w/Device Does not apply Kit Test twice daily Dx Diabetes E11.9 1 kit 0   • latanoprost (XALATAN) 0.005 % Ophthalmic Solution Place 1 drop into both eyes nightly.     6   • FOLBIC 2.5-25-2 MG Oral Tab Ta Conjunctiva - Right: Normal, Left: Normal. Pupil - Right: Normal, Left: Normal.    Ears Normal Inspection - Right: Normal, Left: Normal. Canal - Left: Normal. TM - Right: Normal, Left: Normal.  T-tube in place with purulent drainage cleared from the right

## 2021-05-20 ENCOUNTER — MED REC SCAN ONLY (OUTPATIENT)
Dept: INTERNAL MEDICINE CLINIC | Facility: CLINIC | Age: 76
End: 2021-05-20

## 2021-05-25 ENCOUNTER — OFFICE VISIT (OUTPATIENT)
Dept: AUDIOLOGY | Facility: CLINIC | Age: 76
End: 2021-05-25
Payer: MEDICARE

## 2021-05-25 DIAGNOSIS — H90.6 MIXED HEARING LOSS, BILATERAL: Primary | ICD-10-CM

## 2021-05-25 PROCEDURE — V5014 HEARING AID REPAIR/MODIFYING: HCPCS | Performed by: AUDIOLOGIST

## 2021-05-25 PROCEDURE — V5261 HEARING AID, DIGIT, BIN, BTE: HCPCS | Performed by: AUDIOLOGIST

## 2021-05-26 NOTE — PROGRESS NOTES
Hearing Aid [address] purchased two hearing aids. The hearing aid fitting was completed by Diego Castillo. Hearing Aid Information  [employer] P90-R  Right: #[id number]  Left #[id number]  Coupled to custom c-shells.    Tam Kruger understanding to all discussed topics. Follow-up scheduled for 2 weeks.       05/26/21  Diego Abarca

## 2021-06-07 ENCOUNTER — MED REC SCAN ONLY (OUTPATIENT)
Dept: INTERNAL MEDICINE CLINIC | Facility: CLINIC | Age: 76
End: 2021-06-07

## 2021-06-09 ENCOUNTER — OFFICE VISIT (OUTPATIENT)
Dept: AUDIOLOGY | Facility: CLINIC | Age: 76
End: 2021-06-09
Payer: MEDICARE

## 2021-06-09 ENCOUNTER — TELEPHONE (OUTPATIENT)
Dept: ENDOCRINOLOGY CLINIC | Facility: CLINIC | Age: 76
End: 2021-06-09

## 2021-06-09 DIAGNOSIS — H90.6 MIXED HEARING LOSS, BILATERAL: Primary | ICD-10-CM

## 2021-06-09 PROCEDURE — 92593 HEARING AID CHECK, BOTH EARS: CPT | Performed by: AUDIOLOGIST

## 2021-06-10 NOTE — PROGRESS NOTES
HEARING AID FOLLOW-UP    Anita Nicholson  8/11/1945  [id number]    Patient is here for first follow up visit after hearing aid fitting two weeks ago.      Hearing Aid Information  Juan [id number]  Right: #[id number]  Left #[id number]  Coupled to custom

## 2021-06-18 ENCOUNTER — OFFICE VISIT (OUTPATIENT)
Dept: OTOLARYNGOLOGY | Facility: CLINIC | Age: 76
End: 2021-06-18
Payer: MEDICARE

## 2021-06-18 VITALS
BODY MASS INDEX: 30.56 KG/M2 | TEMPERATURE: 98 F | HEIGHT: 68.5 IN | WEIGHT: 204 LBS | DIASTOLIC BLOOD PRESSURE: 70 MMHG | SYSTOLIC BLOOD PRESSURE: 130 MMHG

## 2021-06-18 DIAGNOSIS — H65.01 NON-RECURRENT ACUTE SEROUS OTITIS MEDIA OF RIGHT EAR: Primary | ICD-10-CM

## 2021-06-18 PROCEDURE — 99213 OFFICE O/P EST LOW 20 MIN: CPT | Performed by: OTOLARYNGOLOGY

## 2021-06-19 NOTE — PROGRESS NOTES
Mushtaq Mcfarlane is a 77year old male. Patient presents with: Follow - Up: Pt here to recheck right earr  tube.  Pt feeling better    HPI:   Follow-up of right PE tube not been having any more drainage or pain  Current Outpatient Medications   Medication acyclovir (ZOVIRAX) 400 MG Oral Tab Take 400 mg by mouth daily.     11      Past Medical History:   Diagnosis Date   • Calculus of kidney 1994   • Cancer ([facility] [address]     multiple myloma   • Diabetes ([facility] [address] 2016   • Essential hypertension 1986   • Multiple myeloma PLAN:   1. Non-recurrent acute serous otitis media of right ear  Drainage has resolved. PE tube in place and patent.   Continue dry ear precautions and follow-up for any problems      The patient indicates understanding of these issues and agrees to the pl

## 2021-06-21 ENCOUNTER — OFFICE VISIT (OUTPATIENT)
Dept: ENDOCRINOLOGY CLINIC | Facility: CLINIC | Age: 76
End: 2021-06-21
Payer: MEDICARE

## 2021-06-21 VITALS
RESPIRATION RATE: 14 BRPM | HEIGHT: 68.5 IN | BODY MASS INDEX: 31.16 KG/M2 | DIASTOLIC BLOOD PRESSURE: 59 MMHG | WEIGHT: 208 LBS | HEART RATE: 81 BPM | SYSTOLIC BLOOD PRESSURE: 108 MMHG

## 2021-06-21 DIAGNOSIS — Z79.4 TYPE 2 DIABETES MELLITUS WITH HYPERGLYCEMIA, WITH LONG-TERM CURRENT USE OF INSULIN (HCC): Primary | ICD-10-CM

## 2021-06-21 DIAGNOSIS — I10 ESSENTIAL HYPERTENSION: ICD-10-CM

## 2021-06-21 DIAGNOSIS — C90.00 MULTIPLE MYELOMA, REMISSION STATUS UNSPECIFIED (HCC): ICD-10-CM

## 2021-06-21 DIAGNOSIS — E11.65 TYPE 2 DIABETES MELLITUS WITH HYPERGLYCEMIA, WITH LONG-TERM CURRENT USE OF INSULIN (HCC): Primary | ICD-10-CM

## 2021-06-21 PROCEDURE — 99214 OFFICE O/P EST MOD 30 MIN: CPT | Performed by: INTERNAL MEDICINE

## 2021-06-21 PROCEDURE — 82947 ASSAY GLUCOSE BLOOD QUANT: CPT | Performed by: INTERNAL MEDICINE

## 2021-06-21 PROCEDURE — 83036 HEMOGLOBIN GLYCOSYLATED A1C: CPT | Performed by: INTERNAL MEDICINE

## 2021-06-21 PROCEDURE — 36416 COLLJ CAPILLARY BLOOD SPEC: CPT | Performed by: INTERNAL MEDICINE

## 2021-06-21 NOTE — PATIENT INSTRUCTIONS
Mr. Rosa M Newby, you should start taking Basaglar 12 units every night.     Basaglar 24 units the night of 7/11/21  - Basaglar 24 units the night of 7/12/21  - Basaglar 22 units the night of 7/13/21  - Basaglar 22 units the night of 7/14/21  - Basaglar 17 unit

## 2021-06-21 NOTE — PROGRESS NOTES
Name: Mushtaq Mcfarlane  Date: 6/21/21    Referring Physician: Dr. Ernesto Sullivan VISIT:  Mushtaq Mcfarlane is a 77year old male who presented for management of T2D. He was diagnosed with multiple myeloma in August of 2013.  Underwent stem-cell transplant Pen-injector, Inject 10 Units into the skin daily.  (Patient taking differently: 8 Units.  ), Disp: 15 mL, Rfl: 1  •  ONETOUCH ULTRA In Vitro Strip, TEST TWICE DAILY, Disp: 200 strip, Rfl: 3  •  amLODIPine Besylate 2.5 MG Oral Tab, Take 1 tablet (2.5 mg tot Allergies    Social History:   Social History    Tobacco Use      Smoking status: Never Smoker      Smokeless tobacco: Never Used    Vaping Use      Vaping Use: Never used    Alcohol use: Yes      Comment: social    Drug use: No      Medical History:   Pas Date    MALBP 356.0 (H) 05/02/2017    CREUR 86.6 05/02/2017         ASSESSMENT/PLAN:    This is a 77year-old man here for evaluation and management of uncontrolled type 2 diabetes. We discussed the ABCs of DM.  He has been fitted with peritoneal dialysis c and going over test results. During the face to face encounter, I spent an additional 15 minutes which were determined for follow-up. Greater than 50% of the time was spent in counseling, anticipatory guidance, and coordination of care.  Patient concerns we

## 2021-06-22 RX ORDER — INSULIN GLARGINE 100 [IU]/ML
INJECTION, SOLUTION SUBCUTANEOUS
Qty: 12 ML | Refills: 0 | Status: SHIPPED | OUTPATIENT
Start: 2021-06-22

## 2021-06-29 ENCOUNTER — TELEPHONE (OUTPATIENT)
Dept: ENDOCRINOLOGY CLINIC | Facility: CLINIC | Age: 76
End: 2021-06-29

## 2021-06-29 NOTE — TELEPHONE ENCOUNTER
Hi!  Can we find out this patient's nephrologist, Dr. Hyatt Outlaw contact information, please? Thank you!

## 2021-06-29 NOTE — TELEPHONE ENCOUNTER
Current Outpatient Medications   Medication Sig Dispense Refill   • insulin glargine (BASAGLAR KWIKPEN) 100 UNIT/ML Subcutaneous Solution Pen-injector Basaglar 24 units the night before Solumedrol dose; Basaglar 24 units the night of solumedrol dose; Sahilag

## 2021-06-30 RX ORDER — INSULIN GLARGINE 100 [IU]/ML
12 INJECTION, SOLUTION SUBCUTANEOUS DAILY
Qty: 12 ML | Refills: 0 | Status: SHIPPED | OUTPATIENT
Start: 2021-06-30 | End: 2021-08-23

## 2021-06-30 NOTE — TELEPHONE ENCOUNTER
Read pharmacys note. Please advise if okay for patient to switch to alternative Rx or if you would like to PA for Basaglar.

## 2021-07-01 ENCOUNTER — PATIENT MESSAGE (OUTPATIENT)
Dept: ENDOCRINOLOGY CLINIC | Facility: CLINIC | Age: 76
End: 2021-07-01

## 2021-07-01 NOTE — TELEPHONE ENCOUNTER
Dr. Adriana Santiago,  Patient's nephrologist is:  Dr. Mardene Jeans  [facility]   [phone]

## 2021-07-02 NOTE — TELEPHONE ENCOUNTER
From: Jericho Vigil  To: Shanon Gaspar MD  Sent: 7/1/2021 10:30 AM CDT  Subject: Other    Dr Yamilex Gaspar    The following blood sugar readings done in AM are being submitted per your request 6/23 - BS88,6/24-, 6/25-, 6/26-, 6/27-

## 2021-07-02 NOTE — TELEPHONE ENCOUNTER
Dr. Antoine Rico,  Fasting BG readings:  6/23 - BS88,  6/24-,   6/25-,   6/26-,   6/27-,  6/28-,  6/29- ,  6/30- ,  7/1-    DM regimen: Basaglar 12 units nightly    Please advise -thanks

## 2021-07-06 NOTE — TELEPHONE ENCOUNTER
Hi!  Please let the patient know that these blood sugars are acceptable. I would like like to check a few blood sugars two hours after meals if he can. Thank you!

## 2021-07-06 NOTE — TELEPHONE ENCOUNTER
Dr. Jesus Johnson, please see notes below. Also see 7/1 mychart encounter with most recent blood sugars.

## 2021-07-07 RX ORDER — INSULIN DETEMIR 100 [IU]/ML
12 INJECTION, SOLUTION SUBCUTANEOUS NIGHTLY
Qty: 11 ML | Refills: 0 | Status: SHIPPED | OUTPATIENT
Start: 2021-07-07 | End: 2021-08-23

## 2021-07-13 NOTE — TELEPHONE ENCOUNTER
Medication PA Requested:insulin detemir (LEVEMIR FLEXTOUCH) 100 UNIT/ML Subcutaneous Solution Pen-injector Quanity: 11 mL Rfls: 0                                                          CoverMyMeds Used:  Yes  Key: [id number]  Sig: Inject 12 Units into the s

## 2021-07-13 NOTE — TELEPHONE ENCOUNTER
Medication PA Requested:insulin detemir (LEVEMIR FLEXTOUCH) 100 UNIT/ML Subcutaneous Solution Pen-injector Quanity: 11 mL Rfls: 0                                                          CoverMyMeds Used:  Yes  Key: [id number]  Sig: Inject 12 Units into the s

## 2021-07-14 NOTE — TELEPHONE ENCOUNTER
Contacted [place] and was informed that Levemir went through for $40 for 90 day supply. No PA needed.

## 2021-07-15 ENCOUNTER — MED REC SCAN ONLY (OUTPATIENT)
Dept: INTERNAL MEDICINE CLINIC | Facility: CLINIC | Age: 76
End: 2021-07-15

## 2021-08-23 ENCOUNTER — OFFICE VISIT (OUTPATIENT)
Dept: INTERNAL MEDICINE CLINIC | Facility: CLINIC | Age: 76
End: 2021-08-23
Payer: MEDICARE

## 2021-08-23 VITALS
HEIGHT: 68.5 IN | HEART RATE: 78 BPM | OXYGEN SATURATION: 99 % | WEIGHT: 210 LBS | SYSTOLIC BLOOD PRESSURE: 122 MMHG | DIASTOLIC BLOOD PRESSURE: 70 MMHG | TEMPERATURE: 98 F | BODY MASS INDEX: 31.46 KG/M2

## 2021-08-23 DIAGNOSIS — Z23 NEED FOR VACCINATION AGAINST STREPTOCOCCUS PNEUMONIAE: ICD-10-CM

## 2021-08-23 DIAGNOSIS — Z00.00 ROUTINE HEALTH MAINTENANCE: ICD-10-CM

## 2021-08-23 DIAGNOSIS — Z00.00 MEDICARE ANNUAL WELLNESS VISIT, INITIAL: Primary | ICD-10-CM

## 2021-08-23 DIAGNOSIS — E11.9 TYPE 2 DIABETES MELLITUS WITHOUT COMPLICATION, WITHOUT LONG-TERM CURRENT USE OF INSULIN (HCC): ICD-10-CM

## 2021-08-23 DIAGNOSIS — C90.01 MULTIPLE MYELOMA IN REMISSION (HCC): ICD-10-CM

## 2021-08-23 DIAGNOSIS — N18.6 ESRD ON PERITONEAL DIALYSIS (HCC): ICD-10-CM

## 2021-08-23 DIAGNOSIS — Z99.2 ESRD ON PERITONEAL DIALYSIS (HCC): ICD-10-CM

## 2021-08-23 PROCEDURE — 90732 PPSV23 VACC 2 YRS+ SUBQ/IM: CPT | Performed by: INTERNAL MEDICINE

## 2021-08-23 PROCEDURE — G0439 PPPS, SUBSEQ VISIT: HCPCS | Performed by: INTERNAL MEDICINE

## 2021-08-23 PROCEDURE — 99214 OFFICE O/P EST MOD 30 MIN: CPT | Performed by: INTERNAL MEDICINE

## 2021-08-23 PROCEDURE — G0009 ADMIN PNEUMOCOCCAL VACCINE: HCPCS | Performed by: INTERNAL MEDICINE

## 2021-08-23 RX ORDER — LANTHANUM CARBONATE 500 MG/1
500 TABLET, CHEWABLE ORAL 3 TIMES DAILY
COMMUNITY
Start: 2021-08-12 | End: 2021-09-14

## 2021-08-23 RX ORDER — CALCIUM CARBONATE 200(500)MG
1 TABLET,CHEWABLE ORAL 2 TIMES DAILY
Refills: 0 | COMMUNITY
Start: 2021-08-23 | End: 2021-09-14

## 2021-08-23 NOTE — PROGRESS NOTES
Jayleen Tinsley is a 77year old male. HPI:   Patient presents with:  Sang Murry comes in for Medicare annual wellness visit. He has diabetes mellitus. He is taking very good control of this. His blood sugars range from 99 up to 119.   He fo daily. 90 tablet 3   • OneTouch Delica Lancets 58Q Does not apply Misc USE TO TEST BLOOD GLUCOSE TWICE DAILY 200 each 3   • Calcium Carbonate Antacid 500 MG Oral Chew Tab Chew 1 tablet by mouth 3 (three) times daily.      • hydrALAzine HCl 25 MG Oral Tab Ta GENERAL:  well developed, well nourished, in no apparent distress  SKIN:  no rashes , no suspicious lesions  HEENT: atraumatic. Pharynx normal without exudate. EYES:  PERRL. Sclera anicteric.   NECK:  Supple,  no adenopathy,   LUNGS:  clear to auscu have difficulty seeing?: 0-No    Do you have any difficulty walking or getting up?: 0-No    Do you have any tripping hazards?: 1-Yes    Are you on multiple medications?: 1-Yes    Does pain affect your day to day activities?: 1-Yes     Have you had any henry counseling and review of past data. Problem list as in electronic health record noted below. Stable. Will be followed.        Sensorineural hearing loss, asymmetrical  Essential hypertension  Multiple myeloma in remission ([facility] [address]  Diabetes mellitus type

## 2021-09-16 ENCOUNTER — MED REC SCAN ONLY (OUTPATIENT)
Dept: INTERNAL MEDICINE CLINIC | Facility: CLINIC | Age: 76
End: 2021-09-16

## 2021-09-20 ENCOUNTER — PATIENT MESSAGE (OUTPATIENT)
Dept: INTERNAL MEDICINE CLINIC | Facility: CLINIC | Age: 76
End: 2021-09-20

## 2021-09-20 NOTE — TELEPHONE ENCOUNTER
From: Julia Esteves  To: Donna Reyes MD  Sent: 9/20/2021 4:41 PM CDT  Subject: Taking recomended supplements    Dr Pérez School,    My Dialysis team is recommending through their dietitian that I begin taking an off the counter supplement called Liquacel wh

## 2021-10-08 ENCOUNTER — OFFICE VISIT (OUTPATIENT)
Dept: INTERNAL MEDICINE CLINIC | Facility: CLINIC | Age: 76
End: 2021-10-08
Payer: MEDICARE

## 2021-10-08 VITALS
HEART RATE: 95 BPM | HEIGHT: 68.5 IN | OXYGEN SATURATION: 97 % | DIASTOLIC BLOOD PRESSURE: 80 MMHG | BODY MASS INDEX: 31 KG/M2 | SYSTOLIC BLOOD PRESSURE: 130 MMHG | TEMPERATURE: 98 F

## 2021-10-08 DIAGNOSIS — Z99.2 ESRD ON PERITONEAL DIALYSIS (HCC): ICD-10-CM

## 2021-10-08 DIAGNOSIS — Z00.00 ROUTINE HEALTH MAINTENANCE: ICD-10-CM

## 2021-10-08 DIAGNOSIS — D22.9 NUMEROUS MOLES: ICD-10-CM

## 2021-10-08 DIAGNOSIS — N18.6 ESRD ON PERITONEAL DIALYSIS (HCC): ICD-10-CM

## 2021-10-08 DIAGNOSIS — Z23 NEEDS FLU SHOT: ICD-10-CM

## 2021-10-08 DIAGNOSIS — L82.1 SEBORRHEIC KERATOSIS: Primary | ICD-10-CM

## 2021-10-08 DIAGNOSIS — E11.9 TYPE 2 DIABETES MELLITUS WITHOUT COMPLICATION, WITHOUT LONG-TERM CURRENT USE OF INSULIN (HCC): ICD-10-CM

## 2021-10-08 PROCEDURE — G0008 ADMIN INFLUENZA VIRUS VAC: HCPCS | Performed by: INTERNAL MEDICINE

## 2021-10-08 PROCEDURE — 90662 IIV NO PRSV INCREASED AG IM: CPT | Performed by: INTERNAL MEDICINE

## 2021-10-08 PROCEDURE — 99213 OFFICE O/P EST LOW 20 MIN: CPT | Performed by: INTERNAL MEDICINE

## 2021-10-08 RX ORDER — MAGNESIUM OXIDE 400 MG (241.3 MG MAGNESIUM) TABLET
1 TABLET 2 TIMES DAILY
COMMUNITY
Start: 2021-05-21

## 2021-10-08 RX ORDER — SODIUM BICARBONATE 650 MG/1
650 TABLET ORAL 2 TIMES DAILY
COMMUNITY
Start: 2021-05-11

## 2021-10-08 NOTE — PROGRESS NOTES
Julia Esteves is a 77year old male. HPI:   Patient presents with:  Skin: pt c/o mole on back that seems concerning noticed that it was getting larger and darker then it was before.       Kiara Gonsalez comes in for evaluation of a skin spot that has grown in si Tab Take 50 mg by mouth 3 (three) times daily. • Mometasone Furoate 50 MCG/ACT Nasal Suspension 1 spray by Nasal route daily.  (Patient taking differently: 1 spray by Nasal route as needed.) 1 Bottle 1   • carvedilol 25 MG Oral Tab Take 1 tablet (25 m normal  GI:  good BS's,  no masses,   HSM or tenderness  EXTREMITIES : no cyanosis, clubbing or edema    ASSESSMENT AND PLAN:     1. Type 2 diabetes mellitus without complication, without long-term current use of insulin (HCC)  Controlled. On therapy.     2

## 2021-10-21 ENCOUNTER — APPOINTMENT (OUTPATIENT)
Dept: URBAN - METROPOLITAN AREA CLINIC 321 | Age: 76
Setting detail: DERMATOLOGY
End: 2021-10-21

## 2021-10-21 ENCOUNTER — TELEPHONE (OUTPATIENT)
Dept: INTERNAL MEDICINE CLINIC | Facility: CLINIC | Age: 76
End: 2021-10-21

## 2021-10-21 DIAGNOSIS — L82.1 OTHER SEBORRHEIC KERATOSIS: ICD-10-CM

## 2021-10-21 DIAGNOSIS — L81.4 OTHER MELANIN HYPERPIGMENTATION: ICD-10-CM

## 2021-10-21 DIAGNOSIS — D22 MELANOCYTIC NEVI: ICD-10-CM

## 2021-10-21 PROBLEM — D22.5 MELANOCYTIC NEVI OF TRUNK: Status: ACTIVE | Noted: 2021-10-21

## 2021-10-21 PROCEDURE — OTHER COUNSELING: OTHER

## 2021-10-21 PROCEDURE — 99203 OFFICE O/P NEW LOW 30 MIN: CPT

## 2021-10-21 ASSESSMENT — LOCATION DETAILED DESCRIPTION DERM
LOCATION DETAILED: MIDDLE STERNUM
LOCATION DETAILED: UPPER STERNUM
LOCATION DETAILED: RIGHT INFERIOR UPPER BACK

## 2021-10-21 ASSESSMENT — LOCATION SIMPLE DESCRIPTION DERM
LOCATION SIMPLE: RIGHT UPPER BACK
LOCATION SIMPLE: CHEST

## 2021-10-21 ASSESSMENT — LOCATION ZONE DERM: LOCATION ZONE: TRUNK

## 2021-10-21 NOTE — TELEPHONE ENCOUNTER
[facility] requesting  copy of pt flu vaccine  Please fax to FAX#:  [phone]  Tasked to nursing

## 2021-10-22 ENCOUNTER — TELEPHONE (OUTPATIENT)
Dept: ENDOCRINOLOGY CLINIC | Facility: CLINIC | Age: 76
End: 2021-10-22

## 2021-10-28 ENCOUNTER — TELEPHONE (OUTPATIENT)
Dept: INTERNAL MEDICINE CLINIC | Facility: CLINIC | Age: 76
End: 2021-10-28

## 2021-10-28 DIAGNOSIS — Z01.84 IMMUNITY STATUS TESTING: Primary | ICD-10-CM

## 2021-10-28 NOTE — TELEPHONE ENCOUNTER
[facility], Lucian left message on voicemail   Requesting copy of flu vaccine   Please fax to: FAX#:  [phone]  Tasked to nursing

## 2021-10-28 NOTE — TELEPHONE ENCOUNTER
Spoke with patient who confirms he uses [facility] for his home dialysis. Flu vaccine record faxed to number below, confirmation received.      Patient would like to pass on to Dr. Umair Philippe that his oncologist recommended he get the full dose booster of Modern

## 2021-10-28 NOTE — TELEPHONE ENCOUNTER
Message noted.   We can let patient know that I placed the Covid antibody orders but I was under the impression that the Covid antibodies that are tested in the blood and the antibodies if we had a recent infection and NOT the antibodies produced from the v

## 2021-11-25 ENCOUNTER — TELEPHONE (OUTPATIENT)
Dept: INTERNAL MEDICINE CLINIC | Facility: CLINIC | Age: 76
End: 2021-11-25

## 2021-11-29 ENCOUNTER — LAB ENCOUNTER (OUTPATIENT)
Dept: LAB | Age: 76
End: 2021-11-29
Attending: INTERNAL MEDICINE
Payer: MEDICARE

## 2021-11-29 ENCOUNTER — TELEPHONE (OUTPATIENT)
Dept: INTERNAL MEDICINE CLINIC | Facility: CLINIC | Age: 76
End: 2021-11-29

## 2021-11-29 ENCOUNTER — PATIENT OUTREACH (OUTPATIENT)
Dept: CASE MANAGEMENT | Age: 76
End: 2021-11-29

## 2021-11-29 DIAGNOSIS — Z01.84 IMMUNITY STATUS TESTING: ICD-10-CM

## 2021-11-29 PROCEDURE — 86769 SARS-COV-2 COVID-19 ANTIBODY: CPT

## 2021-11-29 PROCEDURE — 36415 COLL VENOUS BLD VENIPUNCTURE: CPT

## 2021-11-29 NOTE — TELEPHONE ENCOUNTER
Called patient and relayed DR. ARCE message - verbalized understanding .  Copy of results mailed to patients home

## 2021-11-29 NOTE — TELEPHONE ENCOUNTER
Please let patient know that his Covid antibody came out reactive that suggests that he was exposed or had an infection with Covid in the past.    It is noted in the report that it is unknown for how long the antibodies persist following infection.   And al

## 2021-11-30 ENCOUNTER — PATIENT MESSAGE (OUTPATIENT)
Dept: INTERNAL MEDICINE CLINIC | Facility: CLINIC | Age: 76
End: 2021-11-30

## 2021-11-30 ENCOUNTER — TELEPHONE (OUTPATIENT)
Dept: INTERNAL MEDICINE CLINIC | Facility: CLINIC | Age: 76
End: 2021-11-30

## 2021-11-30 RX ORDER — LOSARTAN POTASSIUM 50 MG/1
50 TABLET ORAL DAILY
Qty: 90 TABLET | Refills: 3 | Status: SHIPPED | OUTPATIENT
Start: 2021-11-30

## 2021-11-30 RX ORDER — LOSARTAN POTASSIUM 50 MG/1
50 TABLET ORAL DAILY
Qty: 90 TABLET | Refills: 3 | OUTPATIENT
Start: 2021-11-30

## 2021-11-30 NOTE — TELEPHONE ENCOUNTER
Current refill request refused due to refill is either a duplicate request or has active refills at the pharmacy. Check previous templates.     Requested Prescriptions     Refused Prescriptions Disp Refills   • losartan 50 MG Oral Tab 90 tablet 3     Sig:

## 2021-11-30 NOTE — TELEPHONE ENCOUNTER
From: Connor Ahuja  To: Spencer Goodpasture, MD  Sent: 11/30/2021 10:06 AM CST  Subject: Dr Mio Tomlinson    I got a call from your staff concerning the blood test you requested for me concerning the level of antibodies I had subsequent to the Covid booster shot I r

## 2021-11-30 NOTE — TELEPHONE ENCOUNTER
To DR.K Rajani DILL pt with hyperkalemia;   Mentioned in last  note pt with hyperkalemia in setting of ARB use, monitor closely;  pls review refill for losartan

## 2021-12-06 ENCOUNTER — TELEPHONE (OUTPATIENT)
Dept: INTERNAL MEDICINE CLINIC | Facility: CLINIC | Age: 76
End: 2021-12-06

## 2021-12-06 NOTE — TELEPHONE ENCOUNTER
Received fax from [facility] with 12/1/2021 labs. Placed report in Dr. Nicholas Bullard.

## 2021-12-16 RX ORDER — PEN NEEDLE, DIABETIC 31 GX5/16"
NEEDLE, DISPOSABLE MISCELLANEOUS
Qty: 100 EACH | Refills: 3 | Status: SHIPPED | OUTPATIENT
Start: 2021-12-16

## 2021-12-18 NOTE — TELEPHONE ENCOUNTER
Maria Dolores Silva is calling she has questions regarding Balwinder's orders for lipid panel and A1C she would like to speak with Dr. Iqra Pina today please advise ph. #[phone]   Routed to clinical There are no Wet Read(s) to document.

## 2021-12-26 ENCOUNTER — TELEPHONE (OUTPATIENT)
Dept: ENDOCRINOLOGY CLINIC | Facility: CLINIC | Age: 76
End: 2021-12-26

## 2021-12-27 RX ORDER — INSULIN DETEMIR 100 [IU]/ML
12 INJECTION, SOLUTION SUBCUTANEOUS NIGHTLY
Qty: 12 ML | Refills: 0 | Status: SHIPPED | OUTPATIENT
Start: 2021-12-27

## 2021-12-29 ENCOUNTER — TELEPHONE (OUTPATIENT)
Dept: ENDOCRINOLOGY CLINIC | Facility: CLINIC | Age: 76
End: 2021-12-29

## 2021-12-29 NOTE — TELEPHONE ENCOUNTER
Per TE 6/29/21. No PA needed for Levemir insulin. Called patient who stated he picked up prescription yesterday.

## 2021-12-29 NOTE — TELEPHONE ENCOUNTER
•  LEVEMIR FLEXTOUCH 100 UNIT/ML Subcutaneous Solution Pen-injector, INJECT 12 UNITS INTO THE SKIN NIGHTLY., Disp: 12 mL, Rfl: 0    KEY: [id number]

## 2022-01-04 ENCOUNTER — TELEPHONE (OUTPATIENT)
Dept: INTERNAL MEDICINE CLINIC | Facility: CLINIC | Age: 77
End: 2022-01-04

## 2022-01-04 NOTE — TELEPHONE ENCOUNTER
Pt is calling and states that he has lost is vaccine card for his covid shots. He would like a new one. How can he get a new vaccine card?     Please call and advise

## 2022-01-05 ENCOUNTER — PATIENT MESSAGE (OUTPATIENT)
Dept: INTERNAL MEDICINE CLINIC | Facility: CLINIC | Age: 77
End: 2022-01-05

## 2022-01-05 NOTE — TELEPHONE ENCOUNTER
To FD----    Are you please able to assist (RN working remotely)? covid card can be printed from chart review tab, then click on \"misc reports. \" at the bottom of this tab is \"covid-19 vaccine detail, click into this and print for pt please.      Please n

## 2022-01-05 NOTE — TELEPHONE ENCOUNTER
Dr. Caitlin Archer -- please advise if you still want staff to contact patient for BG update. Patient has an appointment with you next week. Thank you.     Future Appointments   Date Time Provider Jones Prince   1/11/2022 11:00 AM Analia Ryan MD [address] [place] [address]

## 2022-01-05 NOTE — TELEPHONE ENCOUNTER
From: Melo Freeman  Sent: 1/5/2022 11:33 AM CST  To: [facility] Clinical Staff  Subject: vaccine card    Sailaja Del Rosario,  I am having trouble with my printer. If you could print the information I need than I will pick it up.  I am only two blocks from your offi

## 2022-01-11 ENCOUNTER — OFFICE VISIT (OUTPATIENT)
Dept: ENDOCRINOLOGY CLINIC | Facility: CLINIC | Age: 77
End: 2022-01-11
Payer: MEDICARE

## 2022-01-11 VITALS
DIASTOLIC BLOOD PRESSURE: 87 MMHG | WEIGHT: 206 LBS | RESPIRATION RATE: 18 BRPM | HEIGHT: 68.5 IN | SYSTOLIC BLOOD PRESSURE: 152 MMHG | BODY MASS INDEX: 30.86 KG/M2 | HEART RATE: 82 BPM

## 2022-01-11 DIAGNOSIS — E11.65 TYPE 2 DIABETES MELLITUS WITH HYPERGLYCEMIA, WITH LONG-TERM CURRENT USE OF INSULIN (HCC): Primary | ICD-10-CM

## 2022-01-11 DIAGNOSIS — Z79.4 TYPE 2 DIABETES MELLITUS WITH HYPERGLYCEMIA, WITH LONG-TERM CURRENT USE OF INSULIN (HCC): Primary | ICD-10-CM

## 2022-01-11 LAB
CARTRIDGE LOT#: ABNORMAL NUMERIC
GLUCOSE BLOOD: 163
HEMOGLOBIN A1C: 6.1 % (ref 4.3–5.6)
TEST STRIP LOT #: NORMAL NUMERIC

## 2022-01-11 PROCEDURE — 82947 ASSAY GLUCOSE BLOOD QUANT: CPT | Performed by: INTERNAL MEDICINE

## 2022-01-11 PROCEDURE — 99214 OFFICE O/P EST MOD 30 MIN: CPT | Performed by: INTERNAL MEDICINE

## 2022-01-11 PROCEDURE — 36416 COLLJ CAPILLARY BLOOD SPEC: CPT | Performed by: INTERNAL MEDICINE

## 2022-01-11 PROCEDURE — 83036 HEMOGLOBIN GLYCOSYLATED A1C: CPT | Performed by: INTERNAL MEDICINE

## 2022-01-11 NOTE — PROGRESS NOTES
Name: Piyush Gates  Date: 1/11/22    Referring Physician: Dr. Sydni Su VISIT:  Piyush Gates is a 77year old male who presented for management of T2D.  He was diagnosed with multiple myeloma in August of 2013 and underwent stem-cell transpl apply Misc, USE 1 NEEDLE DAILY WITH INSULIN PEN, Disp: 100 each, Rfl: 3  •  LOSARTAN 50 MG Oral Tab, Take 1 tablet (50 mg total) by mouth daily. , Disp: 90 tablet, Rfl: 3  •  Calcium Carbonate 1250 (500 Ca) MG Oral Chew Tab, Chew 1 tablet by mouth 2 (two) t by mouth daily. , Disp: , Rfl: 10  •  acyclovir (ZOVIRAX) 400 MG Oral Tab, Take 400 mg by mouth daily.   , Disp: , Rfl: 11  •  LEVEMIR FLEXTOUCH 100 UNIT/ML Subcutaneous Solution Pen-injector, INJECT 12 UNITS INTO THE SKIN NIGHTLY. (Patient not taking: Rep GLOBULIN 2.2 (L) 07/30/2019     07/30/2019    K 4.9 07/30/2019     07/30/2019    CO2 22.0 07/30/2019     Lab Results   Component Value Date    CHOLEST 115 09/13/2019    TRIG 126 09/13/2019    HDL 32 (L) 09/13/2019    LDL 58 09/13/2019    VLDL 2 documents from other specialties as well as from PCP and going over test results. During the face to face encounter, I spent an additional 15 minutes which were determined for follow-up.  Greater than 50% of the time was spent in counseling, anticipatory gu

## 2022-01-15 ENCOUNTER — TELEPHONE (OUTPATIENT)
Dept: ENDOCRINOLOGY CLINIC | Facility: CLINIC | Age: 77
End: 2022-01-15

## 2022-01-15 NOTE — TELEPHONE ENCOUNTER
Received lab results from [address] [place] [address] collected on 1/6/22. Placed on provider' desk for review.

## 2022-02-04 ENCOUNTER — AUDIOLOGY DOCUMENTATION (OUTPATIENT)
Dept: AUDIOLOGY | Facility: CLINIC | Age: 77
End: 2022-02-04

## 2022-02-04 NOTE — PROGRESS NOTES
Patient walked into office to purchase 4 packages of Cerustop wax filters.   Paid in full $28.      Margot Vega

## 2022-02-16 ENCOUNTER — TELEPHONE (OUTPATIENT)
Dept: ENDOCRINOLOGY CLINIC | Facility: CLINIC | Age: 77
End: 2022-02-16

## 2022-02-22 ENCOUNTER — TELEPHONE (OUTPATIENT)
Dept: INTERNAL MEDICINE CLINIC | Facility: CLINIC | Age: 77
End: 2022-02-22

## 2022-02-22 NOTE — TELEPHONE ENCOUNTER
Yesterday I called patient's home phone and cell phone twice during the day each number and let him know in preparation of office visit this week and noted PET scan showing some uptake in his lungs that could be suggestive of infection. I indicated that I wanted to communicate with him in regards to any pulmonary issues. PET scan I believe ordered by his hematology oncology doctors. I also asked that he can get back to me in [address]     So far no response this morning at 6:30 AM.

## 2022-02-22 NOTE — TELEPHONE ENCOUNTER
Discussed with patient. Discussed abnormal PET scan of lungs. He did indicate that he spoke to his hematologist oncologist Sary Negron who recently saw him and examined his lungs. They were clear. He also indicates he feels fine. No shortness of breath. No temperature. He worked out without any lung problems. I told him it would be wise if we get a follow-up chest x-ray and we can talk about that when I see him Thursday. He verbalized understanding.

## 2022-02-24 ENCOUNTER — OFFICE VISIT (OUTPATIENT)
Dept: INTERNAL MEDICINE CLINIC | Facility: CLINIC | Age: 77
End: 2022-02-24
Payer: MEDICARE

## 2022-02-24 VITALS
OXYGEN SATURATION: 97 % | WEIGHT: 210 LBS | SYSTOLIC BLOOD PRESSURE: 130 MMHG | HEIGHT: 68.5 IN | BODY MASS INDEX: 31.46 KG/M2 | HEART RATE: 87 BPM | TEMPERATURE: 98 F | DIASTOLIC BLOOD PRESSURE: 70 MMHG

## 2022-02-24 DIAGNOSIS — C90.01 MULTIPLE MYELOMA IN REMISSION (HCC): ICD-10-CM

## 2022-02-24 DIAGNOSIS — R94.2 ABNORMAL PET OF RIGHT LUNG: ICD-10-CM

## 2022-02-24 DIAGNOSIS — N18.6 ESRD ON PERITONEAL DIALYSIS (HCC): ICD-10-CM

## 2022-02-24 DIAGNOSIS — E11.9 TYPE 2 DIABETES MELLITUS WITHOUT COMPLICATION, WITHOUT LONG-TERM CURRENT USE OF INSULIN (HCC): Primary | ICD-10-CM

## 2022-02-24 DIAGNOSIS — Z00.00 ROUTINE HEALTH MAINTENANCE: ICD-10-CM

## 2022-02-24 DIAGNOSIS — Z12.5 PROSTATE CANCER SCREENING: ICD-10-CM

## 2022-02-24 DIAGNOSIS — Z12.11 COLON CANCER SCREENING: ICD-10-CM

## 2022-02-24 DIAGNOSIS — Z99.2 ESRD ON PERITONEAL DIALYSIS (HCC): ICD-10-CM

## 2022-02-24 PROBLEM — E11.40 TYPE 2 DIABETES MELLITUS WITH DIABETIC NEUROPATHY (HCC): Status: ACTIVE | Noted: 2022-02-24

## 2022-02-24 PROBLEM — E21.0 PRIMARY HYPERPARATHYROIDISM (HCC): Status: ACTIVE | Noted: 2022-02-24

## 2022-02-24 PROBLEM — E11.22 TYPE 2 DIABETES MELLITUS WITH DIABETIC CHRONIC KIDNEY DISEASE (HCC): Status: ACTIVE | Noted: 2022-02-24

## 2022-02-24 PROCEDURE — 99214 OFFICE O/P EST MOD 30 MIN: CPT | Performed by: INTERNAL MEDICINE

## 2022-02-28 ENCOUNTER — TELEPHONE (OUTPATIENT)
Dept: INTERNAL MEDICINE CLINIC | Facility: CLINIC | Age: 77
End: 2022-02-28

## 2022-02-28 ENCOUNTER — HOSPITAL ENCOUNTER (OUTPATIENT)
Dept: GENERAL RADIOLOGY | Age: 77
Discharge: HOME | End: 2022-02-28
Attending: INTERNAL MEDICINE
Payer: MEDICARE

## 2022-02-28 DIAGNOSIS — R94.2 ABNORMAL PET OF RIGHT LUNG: ICD-10-CM

## 2022-02-28 PROCEDURE — 71046 X-RAY EXAM CHEST 2 VIEWS: CPT | Performed by: INTERNAL MEDICINE

## 2022-02-28 NOTE — TELEPHONE ENCOUNTER
Spoke with patient. Relayed MD message. Pt verbalized understanding. Pt received pulmonologist recommendations from hematology team.     Pt to complete CXR this week.

## 2022-02-28 NOTE — TELEPHONE ENCOUNTER
Please let patient know that I called 's office Friday and got a return phone call from [facility] who I believe is her nurse. We went over the PET scan. She indicated that she was going to [employer] with some pulmonologists for him to see in regards to the abnormal PET scan of the lungs. He should go for his chest x-ray that we spoke about last week. This will give additional information. If he has not gotten any information about the pulmonologists that they wanted to give him please ask him to reach out to his hematology team once again. ( note to self: I did go over PET scan with [facility]. She indicated that she will send information about pulmonology evaluation in regards to his abnormal PET scan of the lungs. See above also for patient to get chest x-ray. In addition she indicated that the scattered lytic lesions throughout the axial skeleton is due to MGUS.   Other findings were also noted. )

## 2022-03-01 ENCOUNTER — TELEPHONE (OUTPATIENT)
Dept: INTERNAL MEDICINE CLINIC | Facility: CLINIC | Age: 77
End: 2022-03-01

## 2022-03-01 RX ORDER — AZITHROMYCIN 250 MG/1
TABLET, FILM COATED ORAL
Qty: 6 TABLET | Refills: 0 | Status: SHIPPED | OUTPATIENT
Start: 2022-03-01 | End: 2022-03-06

## 2022-03-01 NOTE — TELEPHONE ENCOUNTER
Discussed with Tania Sandoval today. Outlined plan for course of Zithromax Z-Rajesh with follow-up chest x-ray in 2 weeks. He does understand that I do not have a firm diagnosis for his pulmonary opacity. He is immunocompromised with his multiple myeloma but he has Apsley no symptoms of fever chills pulmonary symptoms. I would suspect if he had some opportunistic infection in the lung that he would have symptoms. He verbalized understanding and is agreeable to this conservative approach. He wishes not to see pulmonologist at this time. If opacities continue I will then recommend him to see a pulmonologist.  He wishes not to see a pulmonologist now.

## 2022-03-01 NOTE — TELEPHONE ENCOUNTER
Discussed with patient yesterday about mild opacity right midlung possibly mild pneumonia. Also discussed with him his positive PET scan from February 10 showing patchy pulmonary infiltrates of the right upper lobe and lingula favoring an infectious or inflammatory process. Patient has no symptoms however. No coughing no fever or chills. No wheezing. No shortness of breath. I spoke to Dr. Lo Standard his nephrologist because he is on peritoneal dialysis and Zithromax Z-TRACEY is acceptable. I spoke to Shanell who works with patient's hematologist Dr. Toy Rob and explained the situation. I have called in a Zithromax Z-TRACEY for him with the plan to repeat chest x-ray in about 2 weeks. He was agreeable to this yesterday. Yesterday he wishes not to follow-up with any pulmonologist for now. Hopefully infiltrate will resolve. He does have MGUS. We would have to be concerned about some atypical infections but again patient is totally asymptomatic. If infiltrate remains after 2 weeks and a course of Zithromax Z-Tracey I will have him follow-up with pulmonology.

## 2022-03-10 ENCOUNTER — LAB ENCOUNTER (OUTPATIENT)
Dept: LAB | Age: 77
End: 2022-03-10
Attending: INTERNAL MEDICINE
Payer: MEDICARE

## 2022-03-10 ENCOUNTER — TELEPHONE (OUTPATIENT)
Dept: INTERNAL MEDICINE CLINIC | Facility: CLINIC | Age: 77
End: 2022-03-10

## 2022-03-10 DIAGNOSIS — Z12.5 PROSTATE CANCER SCREENING: ICD-10-CM

## 2022-03-10 DIAGNOSIS — E11.9 TYPE 2 DIABETES MELLITUS WITHOUT COMPLICATION, WITHOUT LONG-TERM CURRENT USE OF INSULIN (HCC): ICD-10-CM

## 2022-03-10 LAB
CHOLEST SERPL-MCNC: 171 MG/DL (ref ?–200)
COMPLEXED PSA SERPL-MCNC: 39.3 NG/ML (ref ?–4)
FASTING PATIENT LIPID ANSWER: YES
HDLC SERPL-MCNC: 33 MG/DL (ref 40–59)
LDLC SERPL CALC-MCNC: 115 MG/DL (ref ?–100)
NONHDLC SERPL-MCNC: 138 MG/DL (ref ?–130)
TRIGL SERPL-MCNC: 125 MG/DL (ref 30–149)
VLDLC SERPL CALC-MCNC: 22 MG/DL (ref 0–30)

## 2022-03-10 PROCEDURE — 36415 COLL VENOUS BLD VENIPUNCTURE: CPT

## 2022-03-10 PROCEDURE — 80061 LIPID PANEL: CPT

## 2022-03-10 NOTE — TELEPHONE ENCOUNTER
Discussed with Didier Hawkins. PSA 39. Discussed I would like him to see urology. He is going to [facility] regarding results his oncologist for multiple myeloma. We will communicate with him tomorrow regarding next steps. I did let him know that the PSA being elevated requires us to at least think of the possibility of prostate cancer. He voices understanding. Note that he did have a recent PET scan left scattered lytic lesions in the axial skeleton.

## 2022-03-11 NOTE — TELEPHONE ENCOUNTER
I did speak to Stella Diaz who works with Dr. Yasir Foster. Jossy Rice will see the doctor Monday. I also found that he had a PSA of  2.11 May 14,2019 and 1.82 February 9, 2018. He does have nocturia x2 every night. His stream is fairly good. No other  symptoms. I did explain all this to Jossy Dwayne.

## 2022-03-13 ENCOUNTER — PATIENT MESSAGE (OUTPATIENT)
Dept: ENDOCRINOLOGY CLINIC | Facility: CLINIC | Age: 77
End: 2022-03-13

## 2022-03-14 ENCOUNTER — TELEPHONE (OUTPATIENT)
Dept: ENDOCRINOLOGY CLINIC | Facility: CLINIC | Age: 77
End: 2022-03-14

## 2022-03-14 ENCOUNTER — TELEPHONE (OUTPATIENT)
Dept: INTERNAL MEDICINE CLINIC | Facility: CLINIC | Age: 77
End: 2022-03-14

## 2022-03-14 RX ORDER — INSULIN DETEMIR 100 [IU]/ML
12 INJECTION, SOLUTION SUBCUTANEOUS NIGHTLY
Qty: 15 ML | Refills: 0 | Status: SHIPPED | OUTPATIENT
Start: 2022-03-14

## 2022-03-14 RX ORDER — LANCETS 33 GAUGE
EACH MISCELLANEOUS
Qty: 200 EACH | Refills: 3 | Status: SHIPPED | OUTPATIENT
Start: 2022-03-14

## 2022-03-14 NOTE — TELEPHONE ENCOUNTER
Discussed with patient. He will be going for his follow-up chest x-ray in a week or so. We did talk about his PSA of 39. He did relate that at [facility] they indicated that it was quite a jump from May 2019 when he was 2.11. He indicated that they discussed that they did not think it was cancer since it was such a big jump however he will get another PSA April 18 at [facility] when he goes for his labs for his multiple myeloma. For now he wishes not to pursue urology but wait for his repeat PSA April 18. He indicated that he was told at [facility] this may be an anomaly. He is comfortable with the plan of waiting and repeating his PSA April 18. He is asymptomatic.

## 2022-03-15 RX ORDER — TORSEMIDE 20 MG/1
TABLET ORAL
Qty: 120 TABLET | Refills: 5 | Status: SHIPPED | OUTPATIENT
Start: 2022-03-15

## 2022-03-15 NOTE — TELEPHONE ENCOUNTER
Please check with patient to make sure that he is on this medication before we refill it. If he is,  okay to refill with refills.

## 2022-03-18 ENCOUNTER — TELEPHONE (OUTPATIENT)
Dept: INTERNAL MEDICINE CLINIC | Facility: CLINIC | Age: 77
End: 2022-03-18

## 2022-03-18 NOTE — TELEPHONE ENCOUNTER
Discussed with Mrs. Muniz. They do have an appointment coming up in a couple weeks with Dr. Tita Oates. I did let wife know his prior PSAs from 2018 and 2019 obtained from care everywhere. Hi Dr. Tita Oates. Vanessa Goodrich had a PSA of 39.3 recently. He is on peritoneal dialysis. He is 77years of age. He does have multiple myeloma and is on monthly Velcade and steroids. Note that May 14,[place] [facility] [place] PSA was 2.11. On February 9 2018 his PSA was 1.82. He has no urinary symptoms. He did have a PET scan in May 2021 that showed \"lytic lesions\" in his spine but these were felt due to his multiple myeloma. Thank you in advance for seeing Vanessa Goodrich. Kamala Ortega.

## 2022-03-21 ENCOUNTER — HOSPITAL ENCOUNTER (OUTPATIENT)
Dept: GENERAL RADIOLOGY | Age: 77
Discharge: HOME | End: 2022-03-21
Attending: INTERNAL MEDICINE
Payer: MEDICARE

## 2022-03-21 ENCOUNTER — TELEPHONE (OUTPATIENT)
Dept: INTERNAL MEDICINE CLINIC | Facility: CLINIC | Age: 77
End: 2022-03-21

## 2022-03-21 ENCOUNTER — TELEPHONE (OUTPATIENT)
Dept: ENDOCRINOLOGY CLINIC | Facility: CLINIC | Age: 77
End: 2022-03-21

## 2022-03-21 DIAGNOSIS — J18.9 PNEUMONITIS: ICD-10-CM

## 2022-03-21 PROCEDURE — 71046 X-RAY EXAM CHEST 2 VIEWS: CPT | Performed by: INTERNAL MEDICINE

## 2022-03-21 RX ORDER — TORSEMIDE 20 MG/1
40 TABLET ORAL 2 TIMES DAILY
Qty: 120 TABLET | Refills: 5 | OUTPATIENT
Start: 2022-03-21

## 2022-03-21 NOTE — TELEPHONE ENCOUNTER
Received fax from [facility]. Attached is pt most recent lab results. Placed on provider desk for review.

## 2022-03-22 ENCOUNTER — TELEPHONE (OUTPATIENT)
Dept: INTERNAL MEDICINE CLINIC | Facility: CLINIC | Age: 77
End: 2022-03-22

## 2022-03-22 ENCOUNTER — TELEPHONE (OUTPATIENT)
Dept: ENDOCRINOLOGY CLINIC | Facility: CLINIC | Age: 77
End: 2022-03-22

## 2022-03-22 NOTE — TELEPHONE ENCOUNTER
Discussed with Vanessa Goodrich. Chest x-ray still shows some infiltrates. He remains asymptomatic without any fever chills coughing wheezing or shortness of breath. I did give him a 5-day treatment of Zithromax Z-TRACEY. Of note he did have a PET scan CT scan dated February 10, 2022 showing hypermetabolism corresponding to a new patchy pulmonary infiltrates of the right upper lobe and lingula favoring an infectious/inflammatory process. Also noted is scattered lytic lesions throughout the axial skeleton similar to prior though without associated hypermetabolism. This PET scan report is located in [facility] in the [facility] section under documents dated 3/14/2022. Because of the abnormal PET/CT scan and the persistent infiltrates on chest x-ray I have asked patient to see pulmonary. I will MyChart him Dr. Marely Mar and Dr. Avanell Schirmer contact information. He does get Velcade and steroids once a month for multiple myeloma. He is cared for by [facility] hematology/oncology. In addition he just had a PSA of 39 and will be seeing Dr. Tita Oates in the next week or so from urology. Note that he had a normal PSA in 2018 2019. He is asymptomatic. ( Kwan Medina and [place]. I have sent patient both remains in regards to pulmonary infiltrates. He does have multiple myeloma. He is asymptomatic. He did have a PET CT scan as noted above. This is available in care everywhere as noted. Have his elevated PSA evaluated by urology. In addition to his multiple myeloma he has diabetes mellitus type 2. He is on peritoneal dialysis. Thank you in advance for seeing him. Kamala Ortega.   He will call for an appointment. )

## 2022-03-30 ENCOUNTER — TELEPHONE (OUTPATIENT)
Dept: INTERNAL MEDICINE CLINIC | Facility: CLINIC | Age: 77
End: 2022-03-30

## 2022-03-30 NOTE — TELEPHONE ENCOUNTER
Aftab Thomas, DO Blanche Galeana MD  Hi Dr. Joao Blanco,   I saw Mr. Jim De Leon today. Nice gentleman. Urine did look infected today. So we will send for culture and treat if positive. This may be the reason for his PSA bump. We will repeat a PSA in 3-4 weeks. Thanks!      Jesu Lange

## 2022-03-30 NOTE — TELEPHONE ENCOUNTER
Discussed with patient. I also sent Dr. Dileep Borden a message to let me know if antibiotics are prescribed so I can coordinate with him and nephrologist due to patient's being on peritoneal dialysis.

## 2022-04-05 ENCOUNTER — OFFICE VISIT (OUTPATIENT)
Dept: AUDIOLOGY | Facility: CLINIC | Age: 77
End: 2022-04-05
Payer: MEDICARE

## 2022-04-05 DIAGNOSIS — H90.3 SENSORINEURAL HEARING LOSS, BILATERAL: Primary | ICD-10-CM

## 2022-04-05 PROCEDURE — 92593 HEARING AID CHECK, BOTH EARS: CPT | Performed by: AUDIOLOGIST

## 2022-04-05 NOTE — TELEPHONE ENCOUNTER
Discussed with patient. I did discuss with Dr. Lea Hobbs office. I spoke to nurse. Urine culture showed Enterococcus. Patient was prescribed ampicillin 500 mg 4 times a day for 7 days. I did speak to Dr. Susan Borden because of patient's peritoneal dialysis. She recommended just twice a day. I did call Dr. Lea Hobbs office back to let them know this and nurse indicated she will let Dr. Evangelista Pulido know. I then spoke to patient about this. He will just take the ampicillin twice a day. He knows them to call Dr. Evangelista Pulido back for directions on when next PSA should be done. He is asymptomatic urinary wise. I am awaiting fax for Dr. Lea Hobbs notes and urine culture.

## 2022-04-05 NOTE — PROGRESS NOTES
HEARING AID FOLLOW-UP    Angie Jones  8/11/1945  [id number]    Patient is here for hearing aid check. Hearing Aid Information  Gwen Casiano P90-R  Right: #[id number]  Left #[id number]  Coupled to custom c-shells. Dispensing date: 5-25-21  Warranty: 3 years  Battery: Rechargeable  Wax Guard: Andreina Russell    The patient has the following concerns:   Has questions about use of VC. He did not realize that adjustment on either side affects both hearing aids. He may have turned aids up twice, when he adjusted both sides. (Noted that sometimes he hears TV better than wife does). He has been changing wax guards about once a week. Otoscopic shows significant wax on left. Right ear has PE tube present with some possible drainage noted from tube. Advised he schedule for ear cleaning. Does not have aids paired to phone and is okay with that. Has not had any recurrence of the loud sounds that were occurring randomly in the past.     Patient is to follow up in 1 year or sooner as needed.      4/5/2022  Diego Andrew

## 2022-04-05 NOTE — TELEPHONE ENCOUNTER
Patient is calling he saw Dr Melo Jiménez  He does have a UTI, Dr Melo Jiménez prescribed an antibiotic on 4/4/22  Patient will pick the medication up today    Patient is requesting Dr Mary Alice Trejo contact Dr Ilah Dakin to discuss patient having a UTI    Any questions please call patient [phone]

## 2022-04-07 ENCOUNTER — OFFICE VISIT (OUTPATIENT)
Dept: OTOLARYNGOLOGY | Facility: CLINIC | Age: 77
End: 2022-04-07
Payer: MEDICARE

## 2022-04-07 VITALS — BODY MASS INDEX: 30.26 KG/M2 | HEIGHT: 68.5 IN | WEIGHT: 202 LBS

## 2022-04-07 DIAGNOSIS — H61.23 BILATERAL IMPACTED CERUMEN: Primary | ICD-10-CM

## 2022-04-07 PROCEDURE — 99213 OFFICE O/P EST LOW 20 MIN: CPT | Performed by: OTOLARYNGOLOGY

## 2022-04-07 RX ORDER — HYDRALAZINE HYDROCHLORIDE 50 MG/1
50 TABLET, FILM COATED ORAL 3 TIMES DAILY
COMMUNITY
Start: 2022-02-27

## 2022-04-07 RX ORDER — LANTHANUM CARBONATE 1000 MG/1
TABLET, CHEWABLE ORAL
COMMUNITY
Start: 2022-02-27

## 2022-04-07 RX ORDER — AMPICILLIN 500 MG/1
500 CAPSULE ORAL 2 TIMES DAILY
COMMUNITY
Start: 2022-04-06

## 2022-04-21 ENCOUNTER — APPOINTMENT (OUTPATIENT)
Dept: URBAN - METROPOLITAN AREA CLINIC 244 | Age: 77
Setting detail: DERMATOLOGY
End: 2022-04-21

## 2022-04-21 DIAGNOSIS — D22 MELANOCYTIC NEVI: ICD-10-CM

## 2022-04-21 DIAGNOSIS — L82.1 OTHER SEBORRHEIC KERATOSIS: ICD-10-CM

## 2022-04-21 DIAGNOSIS — L81.4 OTHER MELANIN HYPERPIGMENTATION: ICD-10-CM

## 2022-04-21 PROBLEM — D22.5 MELANOCYTIC NEVI OF TRUNK: Status: ACTIVE | Noted: 2022-04-21

## 2022-04-21 PROCEDURE — 99213 OFFICE O/P EST LOW 20 MIN: CPT

## 2022-04-21 PROCEDURE — OTHER COUNSELING: OTHER

## 2022-04-21 ASSESSMENT — LOCATION DETAILED DESCRIPTION DERM
LOCATION DETAILED: MIDDLE STERNUM
LOCATION DETAILED: RIGHT INFERIOR UPPER BACK
LOCATION DETAILED: UPPER STERNUM

## 2022-04-21 ASSESSMENT — LOCATION SIMPLE DESCRIPTION DERM
LOCATION SIMPLE: CHEST
LOCATION SIMPLE: RIGHT UPPER BACK

## 2022-04-21 ASSESSMENT — LOCATION ZONE DERM: LOCATION ZONE: TRUNK

## 2022-04-21 NOTE — HPI: SKIN CHECK
What Is The Reason For Today's Visit?: the development of new lesions
Additional History: Pt states that lesions are stable

## 2022-04-25 ENCOUNTER — LAB ENCOUNTER (OUTPATIENT)
Dept: LAB | Age: 77
End: 2022-04-25
Attending: UROLOGY
Payer: MEDICARE

## 2022-04-25 DIAGNOSIS — R97.20 ELEVATED PSA: Primary | ICD-10-CM

## 2022-04-25 LAB — PSA SERPL-MCNC: 4.8 NG/ML (ref ?–4)

## 2022-04-25 PROCEDURE — 36415 COLL VENOUS BLD VENIPUNCTURE: CPT

## 2022-04-25 PROCEDURE — 84153 ASSAY OF PSA TOTAL: CPT

## 2022-05-03 RX ORDER — INSULIN DETEMIR 100 [IU]/ML
12 INJECTION, SOLUTION SUBCUTANEOUS NIGHTLY
Qty: 15 ML | Refills: 0 | Status: SHIPPED | OUTPATIENT
Start: 2022-05-03

## 2022-05-04 ENCOUNTER — TELEPHONE (OUTPATIENT)
Dept: ENDOCRINOLOGY CLINIC | Facility: CLINIC | Age: 77
End: 2022-05-04

## 2022-05-05 ENCOUNTER — TELEPHONE (OUTPATIENT)
Dept: ENDOCRINOLOGY CLINIC | Facility: CLINIC | Age: 77
End: 2022-05-05

## 2022-05-05 NOTE — TELEPHONE ENCOUNTER
lov 1/11/22  Fu 7/12/22    Per lov  I have asked him to continue his Basaglar 12 units and the regimen that he has been following for his solumedrol dosing  Please advise if ok to write rx for levemir for amount requested

## 2022-05-05 NOTE — TELEPHONE ENCOUNTER
Pharm Rep requesting to get a new Rx for Levemir with updated instructions that the pt injections 12-24 units nightly.

## 2022-05-06 NOTE — TELEPHONE ENCOUNTER
Pharmacy called to speak to RN about new RX that pt needs due to dosage change for Levemir. Please call.

## 2022-05-11 RX ORDER — INSULIN DETEMIR 100 [IU]/ML
INJECTION, SOLUTION SUBCUTANEOUS
Qty: 30 ML | Refills: 0 | Status: SHIPPED | OUTPATIENT
Start: 2022-05-11

## 2022-05-12 ENCOUNTER — TELEPHONE (OUTPATIENT)
Dept: INTERNAL MEDICINE CLINIC | Facility: CLINIC | Age: 77
End: 2022-05-12

## 2022-05-17 ENCOUNTER — TELEPHONE (OUTPATIENT)
Dept: INTERNAL MEDICINE CLINIC | Facility: CLINIC | Age: 77
End: 2022-05-17

## 2022-05-17 NOTE — TELEPHONE ENCOUNTER
As FYI to DR. ARCE - called DR. Quinones Wykoff office , spoke with Luann Spencer - relayed DR. ARCE message and she will fax over last consult note - fax number given

## 2022-05-22 ENCOUNTER — PATIENT MESSAGE (OUTPATIENT)
Dept: INTERNAL MEDICINE CLINIC | Facility: CLINIC | Age: 77
End: 2022-05-22

## 2022-05-22 DIAGNOSIS — E11.9 TYPE 2 DIABETES MELLITUS WITHOUT COMPLICATION, WITHOUT LONG-TERM CURRENT USE OF INSULIN (HCC): ICD-10-CM

## 2022-05-22 DIAGNOSIS — R26.89 BALANCE PROBLEM: Primary | ICD-10-CM

## 2022-05-22 DIAGNOSIS — R53.81 PHYSICAL DECONDITIONING: ICD-10-CM

## 2022-05-22 DIAGNOSIS — N18.5 CKD (CHRONIC KIDNEY DISEASE), STAGE V (HCC): ICD-10-CM

## 2022-05-22 DIAGNOSIS — C90.01 MULTIPLE MYELOMA IN REMISSION (HCC): ICD-10-CM

## 2022-05-23 NOTE — TELEPHONE ENCOUNTER
From: Shahram Schmitt  To: Josette Beckford MD  Sent: 5/22/2022 1:59 PM CDT  Subject: Order for Physical Therapy    Dr Bucky Gusman    I am looking to get an order for Physical Therapy to address a decline that I am noticing in my balance/endurance for walking and managing stairs. Recently I caught myself on a curb and tripped and fell. I didn't injure myself but I enjoy walking and I need to focus on stride and balance. I am wondering if PT would help in a short term program. Thank you.     Donald Ochoa

## 2022-06-10 ENCOUNTER — MED REC SCAN ONLY (OUTPATIENT)
Dept: INTERNAL MEDICINE CLINIC | Facility: CLINIC | Age: 77
End: 2022-06-10

## 2022-06-10 NOTE — PROGRESS NOTES
Lab results from [facility] reviewed and signed by MD. Copies sent to scanning and one week hold.

## 2022-06-13 ENCOUNTER — PATIENT MESSAGE (OUTPATIENT)
Dept: INTERNAL MEDICINE CLINIC | Facility: CLINIC | Age: 77
End: 2022-06-13

## 2022-06-15 ENCOUNTER — TELEPHONE (OUTPATIENT)
Dept: ENDOCRINOLOGY CLINIC | Facility: CLINIC | Age: 77
End: 2022-06-15

## 2022-06-23 ENCOUNTER — TELEPHONE (OUTPATIENT)
Dept: PHYSICAL THERAPY | Age: 77
End: 2022-06-23

## 2022-07-06 ENCOUNTER — LAB ENCOUNTER (OUTPATIENT)
Dept: LAB | Age: 77
End: 2022-07-06
Attending: INTERNAL MEDICINE
Payer: MEDICARE

## 2022-07-06 ENCOUNTER — TELEPHONE (OUTPATIENT)
Dept: INTERNAL MEDICINE CLINIC | Facility: CLINIC | Age: 77
End: 2022-07-06

## 2022-07-06 DIAGNOSIS — Z20.822 EXPOSURE TO CONFIRMED CASE OF COVID-19: ICD-10-CM

## 2022-07-06 DIAGNOSIS — Z20.822 EXPOSURE TO CONFIRMED CASE OF COVID-19: Primary | ICD-10-CM

## 2022-07-06 NOTE — TELEPHONE ENCOUNTER
Received voicemail message from patient's wife, [facility], stating pt has the chills since 11:30 am today  He was exposed to Covid on Sunday after having dinner with his niece  Pt tested negative today with a home test    [facility] requests call back at their home#    [phone]

## 2022-07-06 NOTE — TELEPHONE ENCOUNTER
To Dr. Kamlesh Lehman with pt's wife (OK per Michael). Pt had exposure to confirmed case of COVID-19 on Sunday. Started feeling tired/achey this afternoon (skipped physical therapy, at home napping now). At-home covid test was negative. PCR test ordered and they will complete today. Savita Wilson says patient has very mild 'flu-like' symptoms. Afebrile (temp 98.2F). No n/v/d. Had good appetite today (ate lunch and drinking fluids). No cough. No SOB, chest pain/tightness. Reviewed ER precautions with Savita Wilson. Savita Wilson verbalized understanding. COVID triage:     Fever:  []  No fever  []  Temperature:   [x]  Chills  []  Night sweats    Cough:  [] Productive cough  [] Cough with exertion  [] Dry cough    Breathing:  [] Wheezing  [] Pain with deep breathing  [] SOB with exertion  [] SOB at rest  [] Heavy breathing  [] Chest discomfort with deep breathing or coughing    GI Symptoms:  [] Diarrhea  [] Nausea  [] Vomiting  [] Abdominal pain  [] Lack of appetite    Other symptoms:  [] Sore throat  [] Difficulty swallowing  [] Nasal drainage  [] Nasal congestion  [] PND  [] Sinus pressure  [] Chest congestion  [] Head congestion  [] Facial pain   [] Ear pain  [x] Body aches  [] Loss of sense of smell   [] Loss of sense of taste  []Conjunctivitis  [] Headache  [x] Fatigue  [x] Weakness    []OTC Medications:      [] Any recent travel? [x] Any sick contacts? [] Are you a healthcare worker?     Vaccinated: Yes  [x]   No []  Booster:  Yes  [x]  No []

## 2022-07-07 ENCOUNTER — TELEPHONE (OUTPATIENT)
Dept: INTERNAL MEDICINE CLINIC | Facility: CLINIC | Age: 77
End: 2022-07-07

## 2022-07-07 LAB — SARS-COV-2 RNA RESP QL NAA+PROBE: NOT DETECTED

## 2022-07-07 NOTE — TELEPHONE ENCOUNTER
Discussed with Mrs. Muniz. Balwinder's COVID was nondetected. His temperature is normal.  98.7. Oxygen saturation 97%. Pulse 83. It was felt that there was some sort of malfunction with his peritoneal dialysis where he was retaining fluid. He drained himself today and he had \"a lot of fluid\". He feels much better. He does feel fatigued. He has a cancer treatment coming up July 18. For now we agreed to monitor Vanessa Goodrich and I suspect that he will start to feel better now that his dialysis will be done properly.   She verbalized her agreement and will let me know if there is any setbacks for

## 2022-08-03 ENCOUNTER — APPOINTMENT (OUTPATIENT)
Dept: PHYSICAL THERAPY | Facility: HOSPITAL | Age: 77
End: 2022-08-03
Attending: INTERNAL MEDICINE
Payer: COMMERCIAL

## 2022-08-05 ENCOUNTER — APPOINTMENT (OUTPATIENT)
Dept: PHYSICAL THERAPY | Facility: HOSPITAL | Age: 77
End: 2022-08-05
Attending: INTERNAL MEDICINE
Payer: COMMERCIAL

## 2022-08-06 DIAGNOSIS — E11.9 TYPE 2 DIABETES MELLITUS WITHOUT COMPLICATION, WITHOUT LONG-TERM CURRENT USE OF INSULIN (HCC): ICD-10-CM

## 2022-08-08 RX ORDER — BLOOD SUGAR DIAGNOSTIC
STRIP MISCELLANEOUS
Qty: 200 STRIP | Refills: 3 | Status: SHIPPED | OUTPATIENT
Start: 2022-08-08

## 2022-08-09 ENCOUNTER — APPOINTMENT (OUTPATIENT)
Dept: PHYSICAL THERAPY | Facility: HOSPITAL | Age: 77
End: 2022-08-09
Attending: INTERNAL MEDICINE
Payer: COMMERCIAL

## 2022-08-11 ENCOUNTER — TELEPHONE (OUTPATIENT)
Dept: AUDIOLOGY | Facility: CLINIC | Age: 77
End: 2022-08-11

## 2022-08-11 NOTE — TELEPHONE ENCOUNTER
Per spouse pt hearing aid is not working currently states will drop off Monday asking for a loaner iff available please advise

## 2022-08-11 NOTE — TELEPHONE ENCOUNTER
Spoke with patient's wife. Sounds like one aid did not charge properly overnight, but after putting back into  it did start to charge and he left with aids this morning. Discussed that sometimes a glitch can happen with the , not sure if this is a one-time thing or not. Will go ahead and get loaners ready and he will plan on dropping aids off on Monday. Advised that I will be out of office early next week, but my colleagues can look at aids. Also advised that loaners may not be same make/model as his and will not have custom mold so he may be better off using older back-up hearing aids if he still has them.

## 2022-08-15 ENCOUNTER — TELEPHONE (OUTPATIENT)
Dept: INTERNAL MEDICINE CLINIC | Facility: CLINIC | Age: 77
End: 2022-08-15

## 2022-08-15 NOTE — TELEPHONE ENCOUNTER
Patient's wife Diego Doyle is calling to speak to Dr Connor Ryan  She needs guidance regarding patient's dialysis     Please call [phone]

## 2022-08-15 NOTE — TELEPHONE ENCOUNTER
Discussed with [facility]. Qing Traore had an episode where his genitalia swelled up he was on peritoneal dialysis. He will be seeing Dr. Ariana Contreras this Wednesday. He did have contact with Dr. Susan Borden his nephrologist.  There is no pain. He is urinating well. There seems to possibly be a catheter issue to cause this. I will be seeing him about 10 days from now. [facility] appreciated the phone call.   It appears Qing Traore is taking care now by the appropriate consultants

## 2022-08-16 ENCOUNTER — AUDIOLOGY DOCUMENTATION (OUTPATIENT)
Dept: AUDIOLOGY | Facility: CLINIC | Age: 77
End: 2022-08-16

## 2022-08-16 ENCOUNTER — TELEPHONE (OUTPATIENT)
Dept: ENDOCRINOLOGY CLINIC | Facility: CLINIC | Age: 77
End: 2022-08-16

## 2022-08-16 NOTE — PROGRESS NOTES
Hearing Aid Information       Right    Left   Make: Phonak Make: Phonak   Model: ObletS63 312T Model: [id number] 312T   Serial Number: [id number] Serial Number: [id number]   Date of Purchase: 07/16/15 Date of Purchase: 07/16/15   Repair Warranty Exp: 04/13/21 Repair Warranty Exp: 10/06/18   L&D Warranty Exp: 10/06/18 L&D Warranty Exp: 10/06/18   Color:  Sameul Boys) Color:  Sameul Boys)   Battery: 312 Battery: 312     Both aids were dropped off at the . Loaners were provided. Right aid wasn't charging  Tried charging in clinic  and there was no problem    Action taken  Cleaned aids  Cleaned Cshells  Suctioned frantz/rec/ports  Changed wax guards     Listening check: good. Called patient and spoke to patient's wife. Advised problem is most likely with patient's      Advised that aids are ready for  at   with new  unit. No charges were entered as the aids are under warranty    Aids were picked up today and loaners were returned.

## 2022-08-17 ENCOUNTER — HOSPITAL ENCOUNTER (OUTPATIENT)
Dept: CT IMAGING | Age: 77
Discharge: HOME | End: 2022-08-17
Attending: SURGERY
Payer: MEDICARE

## 2022-08-17 ENCOUNTER — TELEPHONE (OUTPATIENT)
Dept: INTERNAL MEDICINE CLINIC | Facility: CLINIC | Age: 77
End: 2022-08-17

## 2022-08-17 ENCOUNTER — LAB ENCOUNTER (OUTPATIENT)
Dept: LAB | Facility: HOSPITAL | Age: 77
End: 2022-08-17
Attending: SURGERY
Payer: MEDICARE

## 2022-08-17 ENCOUNTER — MOBILE ENCOUNTER (OUTPATIENT)
Dept: SURGERY | Facility: HOSPITAL | Age: 77
End: 2022-08-17

## 2022-08-17 ENCOUNTER — MED REC SCAN ONLY (OUTPATIENT)
Dept: INTERNAL MEDICINE CLINIC | Facility: CLINIC | Age: 77
End: 2022-08-17

## 2022-08-17 DIAGNOSIS — T85.611A PERITONEAL DIALYSIS CATHETER DYSFUNCTION, INITIAL ENCOUNTER (HCC): ICD-10-CM

## 2022-08-17 DIAGNOSIS — Z01.818 PRE-OP TESTING: ICD-10-CM

## 2022-08-17 DIAGNOSIS — Z79.4 TYPE 2 DIABETES MELLITUS WITH DIABETIC NEUROPATHY, WITH LONG-TERM CURRENT USE OF INSULIN (HCC): ICD-10-CM

## 2022-08-17 DIAGNOSIS — E11.40 TYPE 2 DIABETES MELLITUS WITH DIABETIC NEUROPATHY, WITH LONG-TERM CURRENT USE OF INSULIN (HCC): ICD-10-CM

## 2022-08-17 LAB — SARS-COV-2 RNA RESP QL NAA+PROBE: NOT DETECTED

## 2022-08-17 PROCEDURE — 74176 CT ABD & PELVIS W/O CONTRAST: CPT | Performed by: SURGERY

## 2022-08-17 RX ORDER — SODIUM CHLORIDE, SODIUM LACTATE, POTASSIUM CHLORIDE, CALCIUM CHLORIDE 600; 310; 30; 20 MG/100ML; MG/100ML; MG/100ML; MG/100ML
INJECTION, SOLUTION INTRAVENOUS CONTINUOUS
Status: CANCELLED | OUTPATIENT
Start: 2022-08-17

## 2022-08-17 NOTE — TELEPHONE ENCOUNTER
Spoke with Bessy who reports patient's wife requested she call Dr. Georgi Leone while her and patient drove to [facility] for CT. They are requesting a COVID test be ordered for the [facility] location prior to patient's CT at 1:30pm. Per Bessy, patient's peritoneal dialysis catheter is leaking and the fluid leaking caused reaction/swelling in patient's groin. Spoke with CT at [facility] who reports they are not aware of patient needing a COVID test prior to CT. Spoke with clinical at Dr. Cesario Mendes office who reports patient does not need a rapid COVID test from our office. They had instructed patient to go have STAT CT done and then [facility] will call him for his rapid COVID test prior to his procedure tomorrow. Attempted to call patient's wife, Jennifer Ko, no answer and unable to leave voicemail. Attempted to call patient's cell, no answer and voicemail box not set up. TO FD: if patient calls back, please just let him know that, per Dr. Cesario Mendes office, [facility] will be calling him to set up [place] test prior to procedure.      FYI to Dr. Georgi Leone

## 2022-08-17 NOTE — TELEPHONE ENCOUNTER
Pt scheduled for CT scan in 20 minutes  Needs Covid test ordered before it can be done  Pt asked Yaminiius to call on his behalf     Transferred call to [facility]

## 2022-08-17 NOTE — TELEPHONE ENCOUNTER
Karyn Velázquez from [place] [address] stating that wife is telling them patient needs order for rapid COVID test. Explained I can give verbal OK for rapid but we cannot order rapid COVID tests as outpatient PCP. Discussed that I spoke with Dr. Brooks Him office who advised they informed patient [address] will call patient regarding rapid COVID test prior to procedure tomorrow. She will pass on to the patient.

## 2022-08-18 ENCOUNTER — APPOINTMENT (OUTPATIENT)
Dept: INTERVENTIONAL RADIOLOGY/VASCULAR | Facility: HOSPITAL | Age: 77
End: 2022-08-18
Attending: INTERNAL MEDICINE
Payer: MEDICARE

## 2022-08-18 ENCOUNTER — APPOINTMENT (OUTPATIENT)
Dept: GENERAL RADIOLOGY | Facility: HOSPITAL | Age: 77
End: 2022-08-18
Attending: SURGERY
Payer: MEDICARE

## 2022-08-18 ENCOUNTER — ANESTHESIA (OUTPATIENT)
Dept: SURGERY | Facility: HOSPITAL | Age: 77
End: 2022-08-18
Payer: MEDICARE

## 2022-08-18 ENCOUNTER — HOSPITAL ENCOUNTER (OUTPATIENT)
Facility: HOSPITAL | Age: 77
Discharge: HOME | End: 2022-08-19
Attending: SURGERY | Admitting: HOSPITALIST
Payer: MEDICARE

## 2022-08-18 ENCOUNTER — ANESTHESIA EVENT (OUTPATIENT)
Dept: SURGERY | Facility: HOSPITAL | Age: 77
End: 2022-08-18
Payer: MEDICARE

## 2022-08-18 ENCOUNTER — HOSPITAL ENCOUNTER (OUTPATIENT)
Facility: HOSPITAL | Age: 77
LOS: 1 days | Discharge: HOME | End: 2022-08-19
Attending: SURGERY | Admitting: HOSPITALIST
Payer: MEDICARE

## 2022-08-18 DIAGNOSIS — Z01.818 PRE-OP TESTING: Primary | ICD-10-CM

## 2022-08-18 PROBLEM — T85.611A PD CATHETER DYSFUNCTION (HCC): Status: ACTIVE | Noted: 2022-08-18

## 2022-08-18 PROBLEM — T85.611A PD CATHETER DYSFUNCTION: Status: ACTIVE | Noted: 2022-08-18

## 2022-08-18 LAB
ANION GAP SERPL CALC-SCNC: 9 MMOL/L (ref 0–18)
BUN BLD-MCNC: 94 MG/DL (ref 7–18)
BUN/CREAT SERPL: 6.6 (ref 10–20)
CALCIUM BLD-MCNC: 8.3 MG/DL (ref 8.5–10.1)
CHLORIDE SERPL-SCNC: 96 MMOL/L (ref 98–112)
CO2 SERPL-SCNC: 27 MMOL/L (ref 21–32)
CREAT BLD-MCNC: 14.3 MG/DL
EST. AVERAGE GLUCOSE BLD GHB EST-MCNC: 128 MG/DL (ref 68–126)
GFR SERPLBLD BASED ON 1.73 SQ M-ARVRAT: 3 ML/MIN/1.73M2 (ref 60–?)
GLUCOSE BLD-MCNC: 70 MG/DL (ref 70–99)
GLUCOSE BLDC GLUCOMTR-MCNC: 100 MG/DL (ref 70–99)
GLUCOSE BLDC GLUCOMTR-MCNC: 115 MG/DL (ref 70–99)
GLUCOSE BLDC GLUCOMTR-MCNC: 183 MG/DL (ref 70–99)
GLUCOSE BLDC GLUCOMTR-MCNC: 205 MG/DL (ref 70–99)
GLUCOSE BLDC GLUCOMTR-MCNC: 82 MG/DL (ref 70–99)
HBA1C MFR BLD: 6.1 % (ref ?–5.7)
HBV CORE AB SERPL QL IA: NONREACTIVE
HBV SURFACE AB SER QL: NONREACTIVE
HBV SURFACE AB SERPL IA-ACNC: <3.1 MIU/ML
HBV SURFACE AG SER-ACNC: <0.1 [IU]/L
HBV SURFACE AG SERPL QL IA: NONREACTIVE
OSMOLALITY SERPL CALC.SUM OF ELEC: 301 MOSM/KG (ref 275–295)
POTASSIUM SERPL-SCNC: 5.2 MMOL/L (ref 3.5–5.1)
POTASSIUM SERPL-SCNC: 5.2 MMOL/L (ref 3.5–5.1)
SODIUM SERPL-SCNC: 132 MMOL/L (ref 136–145)

## 2022-08-18 PROCEDURE — 99232 SBSQ HOSP IP/OBS MODERATE 35: CPT | Performed by: HOSPITALIST

## 2022-08-18 PROCEDURE — 0DRU4JZ REPLACEMENT OF OMENTUM WITH SYNTHETIC SUBSTITUTE, PERCUTANEOUS ENDOSCOPIC APPROACH: ICD-10-PCS | Performed by: SURGERY

## 2022-08-18 PROCEDURE — 76000 FLUOROSCOPY <1 HR PHYS/QHP: CPT | Performed by: SURGERY

## 2022-08-18 RX ORDER — ACETAMINOPHEN 500 MG
1000 TABLET ORAL ONCE
Status: COMPLETED | OUTPATIENT
Start: 2022-08-18 | End: 2022-08-18

## 2022-08-18 RX ORDER — NICOTINE POLACRILEX 4 MG
30 LOZENGE BUCCAL
Status: DISCONTINUED | OUTPATIENT
Start: 2022-08-18 | End: 2022-08-18 | Stop reason: HOSPADM

## 2022-08-18 RX ORDER — BUPIVACAINE HYDROCHLORIDE AND EPINEPHRINE 2.5; 5 MG/ML; UG/ML
INJECTION, SOLUTION INFILTRATION; PERINEURAL AS NEEDED
Status: DISCONTINUED | OUTPATIENT
Start: 2022-08-18 | End: 2022-08-18 | Stop reason: HOSPADM

## 2022-08-18 RX ORDER — MIDAZOLAM HYDROCHLORIDE 1 MG/ML
INJECTION INTRAMUSCULAR; INTRAVENOUS
Status: COMPLETED
Start: 2022-08-18 | End: 2022-08-18

## 2022-08-18 RX ORDER — HEPARIN SODIUM 1000 [USP'U]/ML
INJECTION, SOLUTION INTRAVENOUS; SUBCUTANEOUS
Status: COMPLETED
Start: 2022-08-18 | End: 2022-08-18

## 2022-08-18 RX ORDER — CEFAZOLIN SODIUM/WATER 2 G/20 ML
2 SYRINGE (ML) INTRAVENOUS ONCE
Status: COMPLETED | OUTPATIENT
Start: 2022-08-18 | End: 2022-08-18

## 2022-08-18 RX ORDER — DEXTROSE MONOHYDRATE 25 G/50ML
50 INJECTION, SOLUTION INTRAVENOUS
Status: DISCONTINUED | OUTPATIENT
Start: 2022-08-18 | End: 2022-08-18 | Stop reason: HOSPADM

## 2022-08-18 RX ORDER — SODIUM CHLORIDE 9 MG/ML
INJECTION, SOLUTION INTRAVENOUS CONTINUOUS
Status: DISCONTINUED | OUTPATIENT
Start: 2022-08-18 | End: 2022-08-19

## 2022-08-18 RX ORDER — LIDOCAINE HYDROCHLORIDE 10 MG/ML
INJECTION, SOLUTION EPIDURAL; INFILTRATION; INTRACAUDAL; PERINEURAL AS NEEDED
Status: DISCONTINUED | OUTPATIENT
Start: 2022-08-18 | End: 2022-08-18 | Stop reason: SURG

## 2022-08-18 RX ORDER — MORPHINE SULFATE 4 MG/ML
2 INJECTION, SOLUTION INTRAMUSCULAR; INTRAVENOUS EVERY 10 MIN PRN
Status: DISCONTINUED | OUTPATIENT
Start: 2022-08-18 | End: 2022-08-18 | Stop reason: HOSPADM

## 2022-08-18 RX ORDER — DOCUSATE SODIUM 100 MG/1
100 CAPSULE, LIQUID FILLED ORAL 2 TIMES DAILY
Qty: 14 CAPSULE | Refills: 1 | Status: SHIPPED | OUTPATIENT
Start: 2022-08-18 | End: 2022-08-25

## 2022-08-18 RX ORDER — HYDROMORPHONE HYDROCHLORIDE 1 MG/ML
0.2 INJECTION, SOLUTION INTRAMUSCULAR; INTRAVENOUS; SUBCUTANEOUS EVERY 5 MIN PRN
Status: DISCONTINUED | OUTPATIENT
Start: 2022-08-18 | End: 2022-08-18 | Stop reason: HOSPADM

## 2022-08-18 RX ORDER — HYDROMORPHONE HYDROCHLORIDE 1 MG/ML
0.4 INJECTION, SOLUTION INTRAMUSCULAR; INTRAVENOUS; SUBCUTANEOUS EVERY 5 MIN PRN
Status: DISCONTINUED | OUTPATIENT
Start: 2022-08-18 | End: 2022-08-18 | Stop reason: HOSPADM

## 2022-08-18 RX ORDER — HYDROMORPHONE HYDROCHLORIDE 1 MG/ML
0.6 INJECTION, SOLUTION INTRAMUSCULAR; INTRAVENOUS; SUBCUTANEOUS EVERY 5 MIN PRN
Status: DISCONTINUED | OUTPATIENT
Start: 2022-08-18 | End: 2022-08-18 | Stop reason: HOSPADM

## 2022-08-18 RX ORDER — LIDOCAINE HYDROCHLORIDE 20 MG/ML
INJECTION, SOLUTION EPIDURAL; INFILTRATION; INTRACAUDAL; PERINEURAL
Status: COMPLETED
Start: 2022-08-18 | End: 2022-08-18

## 2022-08-18 RX ORDER — ROCURONIUM BROMIDE 10 MG/ML
INJECTION, SOLUTION INTRAVENOUS AS NEEDED
Status: DISCONTINUED | OUTPATIENT
Start: 2022-08-18 | End: 2022-08-18 | Stop reason: SURG

## 2022-08-18 RX ORDER — LOSARTAN POTASSIUM 50 MG/1
50 TABLET ORAL DAILY
Status: DISCONTINUED | OUTPATIENT
Start: 2022-08-19 | End: 2022-08-19

## 2022-08-18 RX ORDER — NICOTINE POLACRILEX 4 MG
15 LOZENGE BUCCAL
Status: DISCONTINUED | OUTPATIENT
Start: 2022-08-18 | End: 2022-08-18 | Stop reason: HOSPADM

## 2022-08-18 RX ORDER — NALOXONE HYDROCHLORIDE 0.4 MG/ML
80 INJECTION, SOLUTION INTRAMUSCULAR; INTRAVENOUS; SUBCUTANEOUS AS NEEDED
Status: DISCONTINUED | OUTPATIENT
Start: 2022-08-18 | End: 2022-08-18 | Stop reason: HOSPADM

## 2022-08-18 RX ORDER — CALCIUM CHLORIDE 100 MG/ML
INJECTION INTRAVENOUS; INTRAVENTRICULAR AS NEEDED
Status: DISCONTINUED | OUTPATIENT
Start: 2022-08-18 | End: 2022-08-18 | Stop reason: SURG

## 2022-08-18 RX ORDER — CIPROFLOXACIN HYDROCHLORIDE 3.5 MG/ML
1 SOLUTION/ DROPS TOPICAL
Status: DISCONTINUED | OUTPATIENT
Start: 2022-08-18 | End: 2022-08-19

## 2022-08-18 RX ORDER — CEFAZOLIN SODIUM/WATER 2 G/20 ML
SYRINGE (ML) INTRAVENOUS
Status: COMPLETED
Start: 2022-08-18 | End: 2022-08-18

## 2022-08-18 RX ORDER — SODIUM CHLORIDE, SODIUM LACTATE, POTASSIUM CHLORIDE, CALCIUM CHLORIDE 600; 310; 30; 20 MG/100ML; MG/100ML; MG/100ML; MG/100ML
INJECTION, SOLUTION INTRAVENOUS CONTINUOUS
Status: DISCONTINUED | OUTPATIENT
Start: 2022-08-18 | End: 2022-08-18 | Stop reason: HOSPADM

## 2022-08-18 RX ORDER — HEPARIN SODIUM 1000 [USP'U]/ML
1.5 INJECTION, SOLUTION INTRAVENOUS; SUBCUTANEOUS ONCE
Status: DISCONTINUED | OUTPATIENT
Start: 2022-08-18 | End: 2022-08-18

## 2022-08-18 RX ORDER — SODIUM BICARBONATE 650 MG/1
650 TABLET ORAL 2 TIMES DAILY
Status: DISCONTINUED | OUTPATIENT
Start: 2022-08-18 | End: 2022-08-19

## 2022-08-18 RX ORDER — METOCLOPRAMIDE 10 MG/1
10 TABLET ORAL ONCE
Status: COMPLETED | OUTPATIENT
Start: 2022-08-18 | End: 2022-08-18

## 2022-08-18 RX ORDER — DEXTROSE MONOHYDRATE 25 G/50ML
50 INJECTION, SOLUTION INTRAVENOUS
Status: DISCONTINUED | OUTPATIENT
Start: 2022-08-18 | End: 2022-08-19

## 2022-08-18 RX ORDER — CARVEDILOL 25 MG/1
25 TABLET ORAL 2 TIMES DAILY WITH MEALS
Status: DISCONTINUED | OUTPATIENT
Start: 2022-08-18 | End: 2022-08-19

## 2022-08-18 RX ORDER — TORSEMIDE 20 MG/1
40 TABLET ORAL 2 TIMES DAILY
Status: DISCONTINUED | OUTPATIENT
Start: 2022-08-18 | End: 2022-08-19

## 2022-08-18 RX ORDER — LATANOPROST 50 UG/ML
1 SOLUTION/ DROPS OPHTHALMIC NIGHTLY
Status: DISCONTINUED | OUTPATIENT
Start: 2022-08-18 | End: 2022-08-19

## 2022-08-18 RX ORDER — MORPHINE SULFATE 4 MG/ML
4 INJECTION, SOLUTION INTRAMUSCULAR; INTRAVENOUS EVERY 2 HOUR PRN
Status: DISCONTINUED | OUTPATIENT
Start: 2022-08-18 | End: 2022-08-19

## 2022-08-18 RX ORDER — EPHEDRINE SULFATE 50 MG/ML
INJECTION, SOLUTION INTRAVENOUS AS NEEDED
Status: DISCONTINUED | OUTPATIENT
Start: 2022-08-18 | End: 2022-08-18 | Stop reason: SURG

## 2022-08-18 RX ORDER — PHENYLEPHRINE HCL 10 MG/ML
VIAL (ML) INJECTION AS NEEDED
Status: DISCONTINUED | OUTPATIENT
Start: 2022-08-18 | End: 2022-08-18 | Stop reason: SURG

## 2022-08-18 RX ORDER — HYDRALAZINE HYDROCHLORIDE 25 MG/1
50 TABLET, FILM COATED ORAL 3 TIMES DAILY
Status: DISCONTINUED | OUTPATIENT
Start: 2022-08-18 | End: 2022-08-19

## 2022-08-18 RX ORDER — MORPHINE SULFATE 4 MG/ML
4 INJECTION, SOLUTION INTRAMUSCULAR; INTRAVENOUS EVERY 10 MIN PRN
Status: DISCONTINUED | OUTPATIENT
Start: 2022-08-18 | End: 2022-08-18 | Stop reason: HOSPADM

## 2022-08-18 RX ORDER — LANTHANUM CARBONATE 500 MG/1
1000 TABLET, CHEWABLE ORAL
Status: DISCONTINUED | OUTPATIENT
Start: 2022-08-18 | End: 2022-08-19

## 2022-08-18 RX ORDER — NICOTINE POLACRILEX 4 MG
30 LOZENGE BUCCAL
Status: DISCONTINUED | OUTPATIENT
Start: 2022-08-18 | End: 2022-08-19

## 2022-08-18 RX ORDER — CALCIUM CARBONATE 200(500)MG
500 TABLET,CHEWABLE ORAL 2 TIMES DAILY
Status: DISCONTINUED | OUTPATIENT
Start: 2022-08-18 | End: 2022-08-19

## 2022-08-18 RX ORDER — HEPARIN SODIUM 1000 [USP'U]/ML
1.5 INJECTION, SOLUTION INTRAVENOUS; SUBCUTANEOUS ONCE
Status: COMPLETED | OUTPATIENT
Start: 2022-08-18 | End: 2022-08-18

## 2022-08-18 RX ORDER — FAMOTIDINE 20 MG/1
20 TABLET, FILM COATED ORAL ONCE
Status: COMPLETED | OUTPATIENT
Start: 2022-08-18 | End: 2022-08-18

## 2022-08-18 RX ORDER — TRAMADOL HYDROCHLORIDE 50 MG/1
50 TABLET ORAL EVERY 12 HOURS PRN
Status: DISCONTINUED | OUTPATIENT
Start: 2022-08-18 | End: 2022-08-19

## 2022-08-18 RX ORDER — NICOTINE POLACRILEX 4 MG
15 LOZENGE BUCCAL
Status: DISCONTINUED | OUTPATIENT
Start: 2022-08-18 | End: 2022-08-19

## 2022-08-18 RX ORDER — METOPROLOL TARTRATE 5 MG/5ML
2.5 INJECTION INTRAVENOUS ONCE
Status: DISCONTINUED | OUTPATIENT
Start: 2022-08-18 | End: 2022-08-18 | Stop reason: HOSPADM

## 2022-08-18 RX ORDER — MORPHINE SULFATE 4 MG/ML
2 INJECTION, SOLUTION INTRAMUSCULAR; INTRAVENOUS EVERY 2 HOUR PRN
Status: DISCONTINUED | OUTPATIENT
Start: 2022-08-18 | End: 2022-08-19

## 2022-08-18 RX ORDER — ONDANSETRON 2 MG/ML
INJECTION INTRAMUSCULAR; INTRAVENOUS AS NEEDED
Status: DISCONTINUED | OUTPATIENT
Start: 2022-08-18 | End: 2022-08-18 | Stop reason: SURG

## 2022-08-18 RX ORDER — ACETAMINOPHEN 500 MG
1000 TABLET ORAL EVERY 8 HOURS
Status: DISCONTINUED | OUTPATIENT
Start: 2022-08-18 | End: 2022-08-19

## 2022-08-18 RX ORDER — CEFAZOLIN SODIUM/WATER 2 G/20 ML
2 SYRINGE (ML) INTRAVENOUS EVERY 12 HOURS
Status: COMPLETED | OUTPATIENT
Start: 2022-08-18 | End: 2022-08-18

## 2022-08-18 RX ORDER — ONDANSETRON 2 MG/ML
4 INJECTION INTRAMUSCULAR; INTRAVENOUS EVERY 6 HOURS PRN
Status: DISCONTINUED | OUTPATIENT
Start: 2022-08-18 | End: 2022-08-19

## 2022-08-18 RX ORDER — MORPHINE SULFATE 10 MG/ML
6 INJECTION, SOLUTION INTRAMUSCULAR; INTRAVENOUS EVERY 10 MIN PRN
Status: DISCONTINUED | OUTPATIENT
Start: 2022-08-18 | End: 2022-08-18 | Stop reason: HOSPADM

## 2022-08-18 RX ORDER — TRAMADOL HYDROCHLORIDE 50 MG/1
50 TABLET ORAL EVERY 6 HOURS PRN
Qty: 10 TABLET | Refills: 0 | Status: SHIPPED | OUTPATIENT
Start: 2022-08-18

## 2022-08-18 RX ORDER — DEXAMETHASONE SODIUM PHOSPHATE 4 MG/ML
VIAL (ML) INJECTION AS NEEDED
Status: DISCONTINUED | OUTPATIENT
Start: 2022-08-18 | End: 2022-08-18 | Stop reason: SURG

## 2022-08-18 RX ADMIN — DEXAMETHASONE SODIUM PHOSPHATE 4 MG: 4 MG/ML VIAL (ML) INJECTION at 07:48:00

## 2022-08-18 RX ADMIN — EPHEDRINE SULFATE 10 MG: 50 INJECTION, SOLUTION INTRAVENOUS at 08:26:00

## 2022-08-18 RX ADMIN — PHENYLEPHRINE HCL 100 MCG: 10 MG/ML VIAL (ML) INJECTION at 08:08:00

## 2022-08-18 RX ADMIN — CALCIUM CHLORIDE 1 G: 100 INJECTION INTRAVENOUS; INTRAVENTRICULAR at 07:46:00

## 2022-08-18 RX ADMIN — PHENYLEPHRINE HCL 100 MCG: 10 MG/ML VIAL (ML) INJECTION at 08:02:00

## 2022-08-18 RX ADMIN — PHENYLEPHRINE HCL 100 MCG: 10 MG/ML VIAL (ML) INJECTION at 08:05:00

## 2022-08-18 RX ADMIN — CEFAZOLIN SODIUM/WATER 2 G: 2 G/20 ML SYRINGE (ML) INTRAVENOUS at 07:46:00

## 2022-08-18 RX ADMIN — LIDOCAINE HYDROCHLORIDE 50 MG: 10 INJECTION, SOLUTION EPIDURAL; INFILTRATION; INTRACAUDAL; PERINEURAL at 07:39:00

## 2022-08-18 RX ADMIN — PHENYLEPHRINE HCL 100 MCG: 10 MG/ML VIAL (ML) INJECTION at 07:47:00

## 2022-08-18 RX ADMIN — Medication 50 ML: at 08:05:00

## 2022-08-18 RX ADMIN — PHENYLEPHRINE HCL 100 MCG: 10 MG/ML VIAL (ML) INJECTION at 07:52:00

## 2022-08-18 RX ADMIN — SODIUM CHLORIDE: 9 INJECTION, SOLUTION INTRAVENOUS at 08:47:00

## 2022-08-18 RX ADMIN — ROCURONIUM BROMIDE 50 MG: 10 INJECTION, SOLUTION INTRAVENOUS at 07:39:00

## 2022-08-18 RX ADMIN — ONDANSETRON 4 MG: 2 INJECTION INTRAMUSCULAR; INTRAVENOUS at 07:48:00

## 2022-08-18 RX ADMIN — ROCURONIUM BROMIDE 10 MG: 10 INJECTION, SOLUTION INTRAVENOUS at 08:13:00

## 2022-08-18 NOTE — OPERATIVE REPORT
[facility] REPORT    PATIENT NAME: Shahram Schmitt  : 1945   MRN: [id number]  [facility]:   [facility]    PREOPERATIVE DIAGNOSIS:  end-stage chronic kidney disease nonfunctional peritoneal dialysis catheter     POSTOPERATIVE DIAGNOSIS:   end-stage chronic kidney disease   With nonfunctional peritoneal dialysis catheter, Bilateral inguinal hernia    PROCEDURE PERFORMED: Laparoscopic revision of peritoneal dialysis catheter with laparoscopic omentopexy. SURGEON:  Anival Hair MD    ASST: JERALD Yanes (Assistant helped position patient and helped with positioning, retraction, suturing, closure, dressings etc.)        ANESTHESIA: General endotracheal tube    ESTIMATED BLOOD LOSS:   1 ml    COMPLICATIONS: none    INDICATIONS:   This is a 69year old male who presents with a history of end-stage chronic kidney disease. Patient had laparoscopic peritoneal dialysis cath inserted 1 and half years ago. Patient acutely developed scrotal and penile swelling. Etiology of this is unclear. Patient had a CT scan of the abdomen pelvis yesterday which revealed small fat containing bilateral inguinal hernias. And concern for possible dysfunction of the catheter. I discussed options of diagnostic laparoscopy with possible revision of catheter possible replacement of catheter in detail. If there is indeed bilateral inguinal hernias these would not be fixed at this time due to the fact the patient has massive scrotal and penile swelling. Patient will most likely require admission postoperatively for hemodialysis catheter insertion. Patient will have hemodialysis for several weeks to allow the swelling of the scrotum and penis to resolve and then have inguinal hernia repairs. Patient will continue on hemodialysis until approximately 2 months after surgery to allow complete healing of the hernias and then resume peritoneal dialysis.   I discussed all this extensive detail with the patient as well as his wife as well as her daughter by phone. I also discussed in detail with nephrology. Risk of procedure including bleeding, infection, postoperative hematoma, wound infection, perforated hollow viscus, vascular injury, need to convert to an open procedure, infected catheter, nonfunctional catheter, need for revision of catheter, need for lifelong hemodialysis, as well as risk with anesthesia including myocardial infarction, respiratory failure, renal failure, pulmonary embolus, DVT, CVA, and even death were all discussed in detail with the patient and his wife and daughter by phone who understands consents and wishes to proceed with the operation. OPERATIVE TECHNIQUE: The Patient was brought to the operating room, and placed on the operating table in supine position. General anesthetic was administered via endotracheal tube by anesthesia. The patient's stomach was decompressed with an orogastric tube, and the bladder was decompressed with a Haque catheter. The abdomen was prepped and draped in a sterile fashion. A fistulogram through the peritoneal dialysis catheter was performed using Hypaque and fluoroscopy to confirm that there was no cracks or leaks of the catheter in the subcutaneous tissue or entering into the abdominal cavity. This was confirmed by fluoroscopy. At this time it was felt that laparoscopy would be performed. Pneumoperitoneum was established approximately 15 mmHg via the peritoneal dialysis catheter. Local anesthetic was anesthetized in the left upper quadrant and a  small incision was made and a 5 mm Visiport inserted under [place] direct visualization without difficulty. Exploration of all 4 quadrants revealed no intra bowel injury or vascular Gigea present. Next a 5 mm disposable trochars were placed in the right upper quadrant under physician without difficulty. Catheter. Being good position.   The catheter was in the pelvis. There was a large right inguinal hernia present. I presume that this was the source of the penile swelling and scrotal swelling. There was no bowel trapped within the hernia however laparoscopy and pneumoperitoneum was performed reducing this. There was a smaller left inguinal hernia present with bowel adherent overlying this area. It was felt that the hernia was a source of the patient's problems. The catheter was visualized and there was adequate length of the catheter outside the peritoneum into the abdominal cavity to allow irrigation to flow. There was no swelling of the subcutaneous tissue with flushing of the catheter. At this time it was decided that laparoscopic revision of peritoneal dialysis catheter to be performed. The suprapubic region and Endo Close was used to pexied the catheter with 0 Prolene simple interrupted sutures. The catheter could not flip up into the upper abdominal portion at this time. Next laparoscopic omentopexy was performed. The laparoscopic omentopexy was used to prevent the omentum twisted into the catheter. There is a redundant amount of omentum present. The omentum was pexied up to the anterior abdominal wall in several locations in the epigastric region using 0 Prolene simple interrupted suture. At this point visualization abdominal cavity revealed no other abnormalities present. The pneumoperitoneum was removed. Next, the remaining pneumoperitoneum was removed and the dialysate solution was irrigated, 2 L in and 2 L out, without any difficulty. There was excellent flow present. The trocars were removed. The wounds were closed with 4-0 Vicryl subcuticular suture. The catheter entrance site was closed with 2-0 Vicryl simple interrupted suture as well as 3-0 Vicryl running subcuticular suture. Mastisol and Steri-Strips were applied to the wound. The catheter was attached to the locking device and flushed with heparin/saline solution.   Sterile dressings were applied to the wound. Patient was transferred off the operative table to recovery room, extubated, in stable condition. All counts were correct at the end of the case.         [facility]  8/18/2022  9:06 AM  Cristina Marin MD , Dr. Thibodeaux Pals

## 2022-08-18 NOTE — BRIEF PROCEDURE NOTE
[facility] HOSP - [facility]  Procedure Note    Karine Kieran Patient Status:  Inpatient    1945 MRN [id number]   Location [facility] 4W/SW/SE Attending Logan Coon MD   Hosp Day # 0 PCP Nati Lopez MD     Procedure: Left internal jugular tunneled permacath    Pre-Procedure Diagnosis: End-stage renal disease requiring catheter access for hemodialysis    Post-Procedure Diagnosis: Same    Anesthesia:  Sedation    Findings: Tunneled permacath placed via left internal jugular, tip at SVC/RA    Specimens:0    Blood Loss:  5cc      Complications:  None      Cherie Ac MD  2022

## 2022-08-18 NOTE — BRIEF OP NOTE
Pre-Operative Diagnosis: disfunction peritoneal dialysis catheter     Post-Operative Diagnosis: disfunctional peritoneal dialysis catheter, bilateral inguinall hernias      Procedure Performed:   laparoscopic revision of peritoneal dialysis catheter, lalparscopic omentopexy    Surgeon(s) and Role:     Precious Clark MD - Primary    Assistant(s):   Francisco Burger CSA     Surgical Findings: Bilateral inguinal hernia     Specimen: None     Estimated Blood Loss: 1 mL    Dictation Number:      Quintin Esteves MD  8/18/2022  9:05 AM

## 2022-08-18 NOTE — CM/SW NOTE
08/18/22 1400   CM/SW Referral Data   Referral Source Physician   Reason for Referral Discharge planning   Informant Patient;Spouse/Significant Other   Pertinent Medical Hx   Does patient have an established PCP? Yes   Patient Info   Advanced directives? Yes   Patient's Current Mental Status at Time of Assessment Alert;Oriented   Patient's [address]   Number of Levels in Home 2   Number of Stair in Home 14   Patient lives with Spouse/Significant other   Patient Status Prior to Admission   Independent with ADLs and Mobility Yes   Discharge Needs   Anticipated D/C needs Outpatient dialysis; Outpatient therapy       MDO outpatient HD. Patient is current with PD, but will transition to outpatient HD. SW met with patient and his wife, Juancarlos Greenberg at bedside, introduced self and role. Patient lives with his spouse at (address correct on face sheet). Patient is independent at home at baseline, does not use DME. Patient does not have home O2. At this time, patient and wife do not want home health care services, they prefer to have patient go to outpatient PT. Patient was current w outpatient PT at [facility] prior to admission. Patient and wife are aware of need for HD placement, they prefer to go to [facility]. SW notified of HD placement process, notified that SW will discuss available chair time once verified by [facility]. Wife, Juancarlos Greenberg states that she will be driving patient to his HD appointments. SW faxed referral to [facility], requesting call back. Will need Hep B panel and HD flow sheets. PLAN: Placement for HD pending to [facility]. Referral faxed, need HEP B Panel and HD flow sheets. Declines home health, would like to continue outpatient PT at [facility], will need outpatient order.      DAVID Gilbert, [facility]    [id number]

## 2022-08-18 NOTE — ANESTHESIA PROCEDURE NOTES
Airway  Date/Time: 8/18/2022 7:40 AM  Urgency: Elective      General Information and Staff    Patient location during procedure: OR  Anesthesiologist: Delfino Faustin MD  Resident/CRNA: Elsie Tanner CRNA  Performed: CRNA     Indications and Patient Condition  Indications for airway management: anesthesia  Sedation level: deep  Preoxygenated: yes  Patient position: sniffing  Mask difficulty assessment: 2 - vent by mask + OA or adjuvant +/- NMBA    Final Airway Details  Final airway type: endotracheal airway      Successful airway: ETT  Cuffed: yes   Successful intubation technique: Video laryngoscopy  Facilitating devices/methods: intubating stylet  Endotracheal tube insertion site: oral  Blade: GlideScope  Blade size: #3  ETT size (mm): 7.5    Cormack-Lehane Classification: grade I - full view of glottis  Placement verified by: chest auscultation and capnometry   Measured from: lips  ETT to lips (cm): 23  Number of attempts at approach: 1  Number of other approaches attempted: 0    Additional Comments  Atraumatic. Lips, tongue and all teeth in preop condition.

## 2022-08-18 NOTE — PLAN OF CARE
Admitted to room 457 from PACU. Oriented to room and safety precautions. Abdominal incisions with gauze/tegaderm, old drainage on dressing traced, peritoneal dialysis catheter clamped. Left jugular tunneled catheter placed in IR. Hemodialysis at bedside this evening. Pt tolerating renal diet. Denies pain. Scrotal edema, scrotum elevated on towel. Wife at bedside and updated on plan. Bed in lowest position, call light in reach, fall precautions in place.

## 2022-08-18 NOTE — PROGRESS NOTES
[facility]acy Note:  Renal Dose Adjustment for Tramadol Albino Larisa)    Kristin Kee has been prescribed Tramadol (ULTRAM) 50 mg orally every 6 hours as needed for pain. Estimated Creatinine Clearance: 4.3 mL/min (A) (based on SCr of 14.3 mg/dL (H)). His calculated creatinine clearance is < 30 ml/min, therefore, the dose of Tramadol (ULTRAM) has been changed to 50 mg every 12 hours as needed for pain per P&T approved protocol. Pharmacy will continue to follow, and if renal function improves, will resume the original order.      Thank you,  Makenna Morales, PharmD  8/18/2022 10:43 AM

## 2022-08-18 NOTE — CONSULTS
Quinton Durham MD - 08/17/2022 10:30 AM CDT  Formatting of this note is different from the original.  William Lopez is a 69year old male. Patient presents with:  New Patient: Swollen Penis. HPI:   The etiology of the patient's renal failure is multiple myeloma. Patient had peritoneal dialysis catheter placed at [facility] on 4/2021   Catheter has been working well. Patient developed scrotal swelling and penis swelling 4 days ago    they have not had prior hemodialysis in the past.   the patient's nephrologist is Dr. Rodger Villavicencio . The patient does make urine. The patient's GFR is 4  Laboratory data on 7/18/2022    Allergies:  No Known Allergies   Current Meds:  Current Outpatient Medications   Medication Sig Dispense Refill   Lanthanum Carbonate 1000 MG Oral Chew Tab CHEW AND SWALLOW ONE TABLET BY MOUTH THREE TIMES DAILY WITH MEALS   hydrALAZINE 50 MG Oral Tab Take 50 mg by mouth 3 (three) times daily. TORSEMIDE 20 MG Oral Tab TAKE TWO TABLETS BY MOUTH TWICE DAILY 120 tablet 5   OneTouch Delica Lancets 14M Does not apply Misc USE TO TEST BLOOD GLUCOSE TWICE DAILY 200 each 3   Insulin Pen Needle (BD PEN NEEDLE MINI U/F) 31G X 5 MM Does not apply Misc USE 1 NEEDLE DAILY WITH INSULIN  each 3   LOSARTAN 50 MG Oral Tab Take 1 tablet (50 mg total) by mouth daily. 90 tablet 3   Calcium Carbonate 1250 (500 Ca) MG Oral Chew Tab Chew 1 tablet by mouth 2 (two) times a day. sodium bicarbonate 650 MG Oral Tab Take 650 mg by mouth 2 (two) times daily.    insulin glargine (BASAGLAR KWIKPEN) 100 UNIT/ML Subcutaneous Solution Pen-injector Basaglar 24 units the night before Solumedrol dose; Basaglar 24 units the night of solumedrol dose; Basaglar 22 units the night 2; Basaglar 22 units the night 3; Basaglar 17 units the night 4; Basaglar 12 units thereafter; (Patient taking differently: 12 Units at bedtime) 12 mL 0   ONETOUCH ULTRA In Vitro Strip TEST TWICE DAILY 200 strip 3   Cholecalciferol (VITAMIN D) 25 MCG (1000 UT) Oral Tab Take by mouth daily. Mometasone Furoate 50 MCG/ACT Nasal Suspension 1 spray by Nasal route daily. (Patient taking differently: 1 spray by Nasal route as needed.) 1 Bottle 1   carvedilol 25 MG Oral Tab Take 1 tablet (25 mg total) by mouth 2 (two) times daily with meals. 60 tablet 11   OCUVITE-LUTEIN Oral Tab 1 tab daily   Blood Glucose Monitoring Suppl ([facility] w/Device Does not apply Kit Test twice daily Dx Diabetes E11.9 1 kit 0   latanoprost (XALATAN) 0.005 % Ophthalmic Solution Place 1 drop into both eyes nightly. 6   FOLBIC 2.5-25-2 MG Oral Tab Take 1 tablet by mouth daily. 10   acyclovir (ZOVIRAX) 400 MG Oral Tab Take 400 mg by mouth daily. 11   ampicillin 500 MG Oral Cap Take 500 mg by mouth 2 (two) times daily.  (Patient not taking: Reported on 8/17/2022)       HISTORY:  Past Medical History:   Diagnosis Date   Calculus of kidney 1994   Cancer ([facility] [address]   multiple myloma   CKD (chronic kidney disease) stage V requiring chronic dialysis ([facility] [address]   Diabetes ([facility] [address] 2016   Essential hypertension 1986   Multiple myeloma ([facility] [address]     Past Surgical History:   Procedure Laterality Date   COLONOSCOPY 2004   OTHER SURGICAL HISTORY 1994   knee   OTHER SURGICAL HISTORY   stem cell transplant   OTHER SURGICAL HISTORY 11/12/2019   removal parathyroid adenoma   PD CATHETER ANCHOR BELT   PD catheter placement     Family History   Problem Relation Age of Onset   Cancer Father   lung cancer   Heart Disorder Maternal Grandfather   Heart Disorder Brother   passed away with heart attack     Social History  Tobacco Use  Smoking status: Never  Smokeless tobacco: Never  Vaping Use  Vaping Use: Never used  Alcohol use: Yes  Comment: social  Drug use: No      ROS:     GENERAL HEALTH: otherwise feels well; denies weight loss  SKIN: denies any unusual skin lesions or rashes  EYES: no visual complaints or deficits  HEENT: denies nasal congestion, sinus pain or sore throat; hearing loss negative  RESPIRATORY: denies shortness of breath, wheezing or cough   CARDIOVASCULAR: denies chest pain or WANG; no palpitations   GI: denies rectal bleeding; narrow caliber bowel movements -    MUSCULOSKELETAL: no joint complaints upper or lower extremities, no back or rib pain  NEURO: no sensory or motor complaint  PSYCHE: no symptoms of depression or anxiety  HEMATOLOGY: denies hx anemia; denies bruising or excessive bleeding  ENDOCRINE: denies excessive thirst or urination; denies unexpected wt gain or wt loss    PHYSICAL EXAM:   GENERAL: well developed, well nourished male, in no apparent distress  SKIN: anicteric  EYES: PERRLA, EOMI, sclera anicteric  HEENT: normocephalic; normal nose, there is no supraclavicular adenopathy present  NECK: supple, no JVD,   RESPIRATORY: clear to percussion and auscultation, equal chest wall excursions  CARDIOVASCULAR: RRR, good peripheral perfusion  ABDOMEN: normal active BS, soft, non distended, nontender; there is no hepatosplenomegaly present, no palpable discrete masses present  Marked edema of the penis and scrotum. No large inguinal hernia present  LYMPHATIC: no lymphadenopathy  EXTREMITIES: no cyanosis, clubbing or edema    ASSESSMENT/ PLAN:   This is a 69year old male presents with chronic renal failure with a peritoneal dialysis catheter that was placed 1/2 years ago. Patient now with acute onset of the edema and swelling of the penis and scrotum. An extensive discussion with the patient as well as wife as etiology of the above. Discussed concerns for possible catheter dysfunction such as crack catheter versus dislodged catheter versus possible inguinal hernia versus other source causing problems. Also discussed concerns that patient may have fluid overload however there is no other symptoms of fluid overload at this time. I recommended a stat CT scan of the abdomen pelvis with oral contrast to further evaluate for inguinal hernia.  If there is indeed an inguinal hernia present patient will require hemodialysis catheter insertion and time to allow the swelling to resolve prior to repair of inguinal hernia. If there is no evidence of inguinal hernia present we will plan diagnostic laparoscopy possible laparoscopic revision of peritoneal dialysis catheter possible replacement tomorrow in the operating room. If there is no evidence of catheter dysfunction or catheter mechanical problem patient may need to be admitted for hemodialysis postoperatively. I had a length discussion with the patient as to the need for hemodialysis catheter placement. Risks of the procedure including bleeding, infection, non functional port, infected port, need for revision of the port, hemothorax, pneumothorax, subclavian or jugular vein thrombosis; as well as the risks with anesthesia including but not limited to myocardial infarction, respiratory failure, renal failure, pulmonary embolism, DVT, CVA, and even death were all discussed in detail with the patient, who understands, consents, and wishes to proceed with port placement. I reviewed patient education material with the patient. Will plan diagnostic laparoscopy with possible laparoscopic revision of peritoneal dialysis catheter possible replacement peritoneal dialysis catheter possible open under general anesthetic at [facility] tomorrow. I discussed in detail with the patient as well as his wife. Also discussed in detail with Dr. Tamara Ron . Total time spent in direct patient contact and decision-making And coordinating the patient's care including greater than 50% face-to-face was 60 minutes.     Aubrie Marrufo MD, Dr. Tamara Ron

## 2022-08-18 NOTE — INTERVAL H&P NOTE
Pre-op Diagnosis: disfunction peritoneal dialysis catheter    The above referenced H&P was reviewed by Jarred Costello MD on 8/18/2022, the patient was examined and no significant changes have occurred in the patient's condition since the H&P was performed. I discussed with the patient and/or legal representative the potential benefits, risks and side effects of this procedure; the likelihood of the patient achieving goals; and potential problems that might occur during recuperation. I discussed reasonable alternatives to the procedure, including risks, benefits and side effects related to the alternatives and risks related to not receiving this procedure. We will proceed with procedure as planned.

## 2022-08-18 NOTE — H&P (VIEW-ONLY)
Milagros Conrad MD - 08/17/2022 10:30 AM CDT  Formatting of this note is different from the original.  Piyush Gates is a 69year old male. Patient presents with:  New Patient: Swollen Penis. HPI:   The etiology of the patient's renal failure is multiple myeloma. Patient had peritoneal dialysis catheter placed at [facility] on 4/2021   Catheter has been working well. Patient developed scrotal swelling and penis swelling 4 days ago    they have not had prior hemodialysis in the past.   the patient's nephrologist is Dr. Kirstin Ahuja . The patient does make urine. The patient's GFR is 4  Laboratory data on 7/18/2022    Allergies:  No Known Allergies   Current Meds:  Current Outpatient Medications   Medication Sig Dispense Refill   Lanthanum Carbonate 1000 MG Oral Chew Tab CHEW AND SWALLOW ONE TABLET BY MOUTH THREE TIMES DAILY WITH MEALS   hydrALAZINE 50 MG Oral Tab Take 50 mg by mouth 3 (three) times daily. TORSEMIDE 20 MG Oral Tab TAKE TWO TABLETS BY MOUTH TWICE DAILY 120 tablet 5   OneTouch Delica Lancets 29A Does not apply Misc USE TO TEST BLOOD GLUCOSE TWICE DAILY 200 each 3   Insulin Pen Needle (BD PEN NEEDLE MINI U/F) 31G X 5 MM Does not apply Misc USE 1 NEEDLE DAILY WITH INSULIN  each 3   LOSARTAN 50 MG Oral Tab Take 1 tablet (50 mg total) by mouth daily. 90 tablet 3   Calcium Carbonate 1250 (500 Ca) MG Oral Chew Tab Chew 1 tablet by mouth 2 (two) times a day. sodium bicarbonate 650 MG Oral Tab Take 650 mg by mouth 2 (two) times daily.    insulin glargine (BASAGLAR KWIKPEN) 100 UNIT/ML Subcutaneous Solution Pen-injector Basaglar 24 units the night before Solumedrol dose; Basaglar 24 units the night of solumedrol dose; Basaglar 22 units the night 2; Basaglar 22 units the night 3; Basaglar 17 units the night 4; Basaglar 12 units thereafter; (Patient taking differently: 12 Units at bedtime) 12 mL 0   ONETOUCH ULTRA In Vitro Strip TEST TWICE DAILY 200 strip 3   Cholecalciferol (VITAMIN D) 25 MCG (1000 UT) Oral Tab Take by mouth daily. Mometasone Furoate 50 MCG/ACT Nasal Suspension 1 spray by Nasal route daily. (Patient taking differently: 1 spray by Nasal route as needed.) 1 Bottle 1   carvedilol 25 MG Oral Tab Take 1 tablet (25 mg total) by mouth 2 (two) times daily with meals. 60 tablet 11   OCUVITE-LUTEIN Oral Tab 1 tab daily   Blood Glucose Monitoring Suppl ([facility] w/Device Does not apply Kit Test twice daily Dx Diabetes E11.9 1 kit 0   latanoprost (XALATAN) 0.005 % Ophthalmic Solution Place 1 drop into both eyes nightly. 6   FOLBIC 2.5-25-2 MG Oral Tab Take 1 tablet by mouth daily. 10   acyclovir (ZOVIRAX) 400 MG Oral Tab Take 400 mg by mouth daily. 11   ampicillin 500 MG Oral Cap Take 500 mg by mouth 2 (two) times daily.  (Patient not taking: Reported on 8/17/2022)       HISTORY:  Past Medical History:   Diagnosis Date   Calculus of kidney 1994   Cancer ([facility] [address]   multiple myloma   CKD (chronic kidney disease) stage V requiring chronic dialysis ([facility] [address]   Diabetes ([facility] [address] 2016   Essential hypertension 1986   Multiple myeloma ([facility] [address]     Past Surgical History:   Procedure Laterality Date   COLONOSCOPY 2004   OTHER SURGICAL HISTORY 1994   knee   OTHER SURGICAL HISTORY   stem cell transplant   OTHER SURGICAL HISTORY 11/12/2019   removal parathyroid adenoma   PD CATHETER ANCHOR BELT   PD catheter placement     Family History   Problem Relation Age of Onset   Cancer Father   lung cancer   Heart Disorder Maternal Grandfather   Heart Disorder Brother   passed away with heart attack     Social History  Tobacco Use  Smoking status: Never  Smokeless tobacco: Never  Vaping Use  Vaping Use: Never used  Alcohol use: Yes  Comment: social  Drug use: No      ROS:     GENERAL HEALTH: otherwise feels well; denies weight loss  SKIN: denies any unusual skin lesions or rashes  EYES: no visual complaints or deficits  HEENT: denies nasal congestion, sinus pain or sore throat; hearing loss negative  RESPIRATORY: denies shortness of breath, wheezing or cough   CARDIOVASCULAR: denies chest pain or WANG; no palpitations   GI: denies rectal bleeding; narrow caliber bowel movements -    MUSCULOSKELETAL: no joint complaints upper or lower extremities, no back or rib pain  NEURO: no sensory or motor complaint  PSYCHE: no symptoms of depression or anxiety  HEMATOLOGY: denies hx anemia; denies bruising or excessive bleeding  ENDOCRINE: denies excessive thirst or urination; denies unexpected wt gain or wt loss    PHYSICAL EXAM:   GENERAL: well developed, well nourished male, in no apparent distress  SKIN: anicteric  EYES: PERRLA, EOMI, sclera anicteric  HEENT: normocephalic; normal nose, there is no supraclavicular adenopathy present  NECK: supple, no JVD,   RESPIRATORY: clear to percussion and auscultation, equal chest wall excursions  CARDIOVASCULAR: RRR, good peripheral perfusion  ABDOMEN: normal active BS, soft, non distended, nontender; there is no hepatosplenomegaly present, no palpable discrete masses present  Marked edema of the penis and scrotum. No large inguinal hernia present  LYMPHATIC: no lymphadenopathy  EXTREMITIES: no cyanosis, clubbing or edema    ASSESSMENT/ PLAN:   This is a 69year old male presents with chronic renal failure with a peritoneal dialysis catheter that was placed 1/2 years ago. Patient now with acute onset of the edema and swelling of the penis and scrotum. An extensive discussion with the patient as well as wife as etiology of the above. Discussed concerns for possible catheter dysfunction such as crack catheter versus dislodged catheter versus possible inguinal hernia versus other source causing problems. Also discussed concerns that patient may have fluid overload however there is no other symptoms of fluid overload at this time. I recommended a stat CT scan of the abdomen pelvis with oral contrast to further evaluate for inguinal hernia.  If there is indeed an inguinal hernia present patient will require hemodialysis catheter insertion and time to allow the swelling to resolve prior to repair of inguinal hernia. If there is no evidence of inguinal hernia present we will plan diagnostic laparoscopy possible laparoscopic revision of peritoneal dialysis catheter possible replacement tomorrow in the operating room. If there is no evidence of catheter dysfunction or catheter mechanical problem patient may need to be admitted for hemodialysis postoperatively. I had a length discussion with the patient as to the need for hemodialysis catheter placement. Risks of the procedure including bleeding, infection, non functional port, infected port, need for revision of the port, hemothorax, pneumothorax, subclavian or jugular vein thrombosis; as well as the risks with anesthesia including but not limited to myocardial infarction, respiratory failure, renal failure, pulmonary embolism, DVT, CVA, and even death were all discussed in detail with the patient, who understands, consents, and wishes to proceed with port placement. I reviewed patient education material with the patient. Will plan diagnostic laparoscopy with possible laparoscopic revision of peritoneal dialysis catheter possible replacement peritoneal dialysis catheter possible open under general anesthetic at [facility] tomorrow. I discussed in detail with the patient as well as his wife. Also discussed in detail with Dr. Marla Noe . Total time spent in direct patient contact and decision-making And coordinating the patient's care including greater than 50% face-to-face was 60 minutes.     Teetee Franco MD, Dr. Marla Noe

## 2022-08-19 ENCOUNTER — TELEPHONE (OUTPATIENT)
Dept: ENDOCRINOLOGY CLINIC | Facility: CLINIC | Age: 77
End: 2022-08-19

## 2022-08-19 VITALS
HEIGHT: 68 IN | TEMPERATURE: 98 F | DIASTOLIC BLOOD PRESSURE: 81 MMHG | WEIGHT: 225.5 LBS | OXYGEN SATURATION: 97 % | HEART RATE: 94 BPM | BODY MASS INDEX: 34.17 KG/M2 | RESPIRATION RATE: 20 BRPM | SYSTOLIC BLOOD PRESSURE: 141 MMHG

## 2022-08-19 LAB
ANION GAP SERPL CALC-SCNC: 8 MMOL/L (ref 0–18)
BASOPHILS # BLD AUTO: 0.01 X10(3) UL (ref 0–0.2)
BASOPHILS NFR BLD AUTO: 0.1 %
BUN BLD-MCNC: 65 MG/DL (ref 7–18)
BUN/CREAT SERPL: 6 (ref 10–20)
CALCIUM BLD-MCNC: 8.4 MG/DL (ref 8.5–10.1)
CHLORIDE SERPL-SCNC: 101 MMOL/L (ref 98–112)
CO2 SERPL-SCNC: 27 MMOL/L (ref 21–32)
CREAT BLD-MCNC: 10.9 MG/DL
DEPRECATED RDW RBC AUTO: 53.1 FL (ref 35.1–46.3)
EOSINOPHIL # BLD AUTO: 0 X10(3) UL (ref 0–0.7)
EOSINOPHIL NFR BLD AUTO: 0 %
ERYTHROCYTE [DISTWIDTH] IN BLOOD BY AUTOMATED COUNT: 14.6 % (ref 11–15)
EST. AVERAGE GLUCOSE BLD GHB EST-MCNC: 123 MG/DL (ref 68–126)
GFR SERPLBLD BASED ON 1.73 SQ M-ARVRAT: 4 ML/MIN/1.73M2 (ref 60–?)
GLUCOSE BLD-MCNC: 112 MG/DL (ref 70–99)
GLUCOSE BLDC GLUCOMTR-MCNC: 91 MG/DL (ref 70–99)
HBA1C MFR BLD: 5.9 % (ref ?–5.7)
HCT VFR BLD AUTO: 23.1 %
HGB BLD-MCNC: 7.5 G/DL
IMM GRANULOCYTES # BLD AUTO: 0.07 X10(3) UL (ref 0–1)
IMM GRANULOCYTES NFR BLD: 0.9 %
LYMPHOCYTES # BLD AUTO: 0.33 X10(3) UL (ref 1–4)
LYMPHOCYTES NFR BLD AUTO: 4.3 %
MAGNESIUM SERPL-MCNC: 2.4 MG/DL (ref 1.6–2.6)
MCH RBC QN AUTO: 32.5 PG (ref 26–34)
MCHC RBC AUTO-ENTMCNC: 32.5 G/DL (ref 31–37)
MCV RBC AUTO: 100 FL
MONOCYTES # BLD AUTO: 0.56 X10(3) UL (ref 0.1–1)
MONOCYTES NFR BLD AUTO: 7.2 %
NEUTROPHILS # BLD AUTO: 6.77 X10 (3) UL (ref 1.5–7.7)
NEUTROPHILS # BLD AUTO: 6.77 X10(3) UL (ref 1.5–7.7)
NEUTROPHILS NFR BLD AUTO: 87.5 %
OSMOLALITY SERPL CALC.SUM OF ELEC: 301 MOSM/KG (ref 275–295)
PHOSPHATE SERPL-MCNC: 3.7 MG/DL (ref 2.5–4.9)
PLATELET # BLD AUTO: 112 10(3)UL (ref 150–450)
POTASSIUM SERPL-SCNC: 4.8 MMOL/L (ref 3.5–5.1)
RBC # BLD AUTO: 2.31 X10(6)UL
SODIUM SERPL-SCNC: 136 MMOL/L (ref 136–145)
WBC # BLD AUTO: 7.7 X10(3) UL (ref 4–11)

## 2022-08-19 PROCEDURE — 99215 OFFICE O/P EST HI 40 MIN: CPT | Performed by: HOSPITALIST

## 2022-08-19 RX ORDER — CIPROFLOXACIN HYDROCHLORIDE 3.5 MG/ML
1 SOLUTION/ DROPS TOPICAL
Qty: 1 EACH | Refills: 0 | Status: SHIPPED | OUTPATIENT
Start: 2022-08-19 | End: 2022-08-19

## 2022-08-19 RX ORDER — HEPARIN SODIUM 5000 [USP'U]/ML
5000 INJECTION, SOLUTION INTRAVENOUS; SUBCUTANEOUS EVERY 12 HOURS
Status: DISCONTINUED | OUTPATIENT
Start: 2022-08-19 | End: 2022-08-19

## 2022-08-19 RX ORDER — ACETAMINOPHEN 500 MG
1000 TABLET ORAL EVERY 8 HOURS
Qty: 1 TABLET | Refills: 0 | Status: SHIPPED | COMMUNITY
Start: 2022-08-19

## 2022-08-19 RX ORDER — CIPROFLOXACIN HYDROCHLORIDE 3.5 MG/ML
1 SOLUTION/ DROPS TOPICAL
Qty: 1 EACH | Refills: 0 | Status: SHIPPED | OUTPATIENT
Start: 2022-08-19

## 2022-08-19 NOTE — TELEPHONE ENCOUNTER
Called wife back and states patient is currently admitted due to swelling on genitalia and was found to have hernia (first thought it was leakage from PD site). He is now switched from PD to HD treatment that started yesterday and last HD was today at 1100. The anticipated to be discharged today and wife is unsure as to what the insulin regimen would be. Pt is also getting treatment at cancer center at [facility] for multiple myeloma that contains solumedrol and regimen of basaglar are as follows (night after treatment dose is increased -- usual dose is 12 units at night). Monday: 24 units   Tuesday: 22 units  Wednesday: 22 units  Thursday: 17 units  Friday: back to usual dose of 12 units    Per wife the above regimen has been working. BG are mostly in the upper 90s-110s, only one episode of 168 BG. HD treatment schedule will be M-W-F until December 2022. Next HD is scheduled Monday of next week and had questions what regimen they should follow since pt is now on HD. Discussed with Dr. Seymour Styles in person and states to administer 12 units of basaglar at home. Wife was notified of this change and denied further questions. RN informed patient that Dr. Vicenta Castelan would be updated upon her return and will reach out to them next week if she has any additional recommendation/instruction/follow up visits. Wife was also instructed to call office if there were to have any issues with BG after discharge. FYI to Dr. Seymour Styles and Dr. Vicenta Castelan.

## 2022-08-19 NOTE — TELEPHONE ENCOUNTER
Patient's wife is calling because the patient is inpatient currently and she needs directions for his possibly insulin changes. Please call .

## 2022-08-19 NOTE — DISCHARGE SUMMARY
Dc summary#[id number]  > 45 min spent on [facility] Discharge Diagnoses: dialysis catheter dysfunction    Lace+ Score: 52  59-90 High Risk  29-58 Medium Risk  0-28   Low Risk. TCM Follow-Up Recommendation:  LACE > 58:  High Risk of readmission after discharge from the hospital.tcm fu recommended Patient Management Risk Assessment: High Additional Notes: Patient counselled on personal risk factors related to their melanoma, signs of and risk for recurrence or metastasis, and management options. Reviewed need for close surveillance by patient at home with self-exams and how to palpate own lymph nodes, as well as regular surveillance with MD. Any relevant lymph nodes were palpated and unremarkable unless noted above. Detail Level: Simple Render Risk Assessment In Note?: no

## 2022-08-19 NOTE — PLAN OF CARE
Patient is alert & oriented x4. On room air. Vitals stable. Left HD cath intact. Pain being managed with scheduled tylenol. Tolerating renal diet. AC/HS. Abdominal lap sites dry & intact. LLQ peritoneal catheter in place. Voiding with urinal. Scrotal/penile swelling noted and elevated with towel. Up x2 & SNP to RC. PT/OT ordered for AM. Will continue to monitor.        Problem: Patient Centered Care  Goal: Patient preferences are identified and integrated in the patient's plan of care  Description: Interventions:  - What would you like us to know as we care for you  - Provide timely, complete, and accurate information to patient/family  - Incorporate patient and family knowledge, values, beliefs, and cultural backgrounds into the planning and delivery of care  - Encourage patient/family to participate in care and decision-making at the level they choose  - Honor patient and family perspectives and choices  Outcome: Progressing     Problem: Diabetes/Glucose Control  Goal: Glucose maintained within prescribed range  Description: INTERVENTIONS:  - Monitor Blood Glucose as ordered  - Assess for signs and symptoms of hyperglycemia and hypoglycemia  - Administer ordered medications to maintain glucose within target range  - Assess barriers to adequate nutritional intake and initiate nutrition consult as needed  - Instruct patient on self management of diabetes  Outcome: Progressing     Problem: Patient/Family Goals  Goal: Patient/Family Long Term Goal  Description: Patient's Long Term Goal: Go home upon discharge     Monitor pain  Monitor VS  Monitor labs  Administer medications   Patient updated on plan of care      Outcome: Progressing  Goal: Patient/Family Short Term Goal  Description: Patient's Short Term Goal: Recover from procedure, resolve swelling  -monitor labs  -monitor vitals  -administer medications    Outcome: Progressing     Problem: PAIN - ADULT  Goal: Verbalizes/displays adequate comfort level or patient's stated pain goal  Description: INTERVENTIONS:  - Encourage pt to monitor pain and request assistance  - Assess pain using appropriate pain scale  - Administer analgesics based on type and severity of pain and evaluate response  - Implement non-pharmacological measures as appropriate and evaluate response  - Consider cultural and social influences on pain and pain management  - Manage/alleviate anxiety  - Utilize distraction and/or relaxation techniques  - Monitor for opioid side effects  - Notify MD/LIP if interventions unsuccessful or patient reports new pain  - Anticipate increased pain with activity and pre-medicate as appropriate  Outcome: Progressing     Problem: RISK FOR INFECTION - ADULT  Goal: Absence of fever/infection during anticipated neutropenic period  Description: INTERVENTIONS  - Monitor WBC  - Administer growth factors as ordered  - Implement neutropenic guidelines  Outcome: Progressing     Problem: SAFETY ADULT - FALL  Goal: Free from fall injury  Description: INTERVENTIONS:  - Assess pt frequently for physical needs  - Identify cognitive and physical deficits and behaviors that affect risk of falls.   - [place] fall precautions as indicated by assessment.  - Educate pt/family on patient safety including physical limitations  - Instruct pt to call for assistance with activity based on assessment  - Modify environment to reduce risk of injury  - Provide assistive devices as appropriate  - Consider OT/PT consult to assist with strengthening/mobility  - Encourage toileting schedule  Outcome: Progressing     Problem: DISCHARGE PLANNING  Goal: Discharge to home or other facility with appropriate resources  Description: INTERVENTIONS:  - Identify barriers to discharge w/pt and caregiver  - Include patient/family/discharge partner in discharge planning  - Arrange for needed discharge resources and transportation as appropriate  - Identify discharge learning needs (meds, wound care, etc)  - Arrange for interpreters to assist at discharge as needed  - Consider post-discharge preferences of patient/family/discharge partner  - Complete POLST form as appropriate  - Assess patient's ability to be responsible for managing their own health  - Refer to Case Management Department for coordinating discharge planning if the patient needs post-hospital services based on physician/LIP order or complex needs related to functional status, cognitive ability or social support system  Outcome: Progressing

## 2022-08-19 NOTE — PLAN OF CARE
Problem: Patient Centered Care  Goal: Patient preferences are identified and integrated in the patient's plan of care  Description: Interventions:  - What would you like us to know as we care for you?  - Provide timely, complete, and accurate information to patient/family  - Incorporate patient and family knowledge, values, beliefs, and cultural backgrounds into the planning and delivery of care  - Encourage patient/family to participate in care and decision-making at the level they choose  - Honor patient and family perspectives and choices  Outcome: Adequate for Discharge     Problem: Diabetes/Glucose Control  Goal: Glucose maintained within prescribed range  Description: INTERVENTIONS:  - Monitor Blood Glucose as ordered  - Assess for signs and symptoms of hyperglycemia and hypoglycemia  - Administer ordered medications to maintain glucose within target range  - Assess barriers to adequate nutritional intake and initiate nutrition consult as needed  - Instruct patient on self management of diabetes  Outcome: Adequate for Discharge     Problem: Patient/Family Goals  Goal: Patient/Family Long Term Goal  Description: Patient's Long Term Goal: Go home upon discharge     Monitor pain  Monitor VS  Monitor labs  Administer medications   Patient updated on plan of care      Outcome: Adequate for Discharge  Goal: Patient/Family Short Term Goal  Description: Patient's Short Term Goal: Recover from procedure, resolve swelling  -monitor labs  -monitor vitals  -administer medications    Outcome: Adequate for Discharge     Problem: PAIN - ADULT  Goal: Verbalizes/displays adequate comfort level or patient's stated pain goal  Description: INTERVENTIONS:  - Encourage pt to monitor pain and request assistance  - Assess pain using appropriate pain scale  - Administer analgesics based on type and severity of pain and evaluate response  - Implement non-pharmacological measures as appropriate and evaluate response  - Consider cultural and social influences on pain and pain management  - Manage/alleviate anxiety  - Utilize distraction and/or relaxation techniques  - Monitor for opioid side effects  - Notify MD/LIP if interventions unsuccessful or patient reports new pain  - Anticipate increased pain with activity and pre-medicate as appropriate  Outcome: Adequate for Discharge     Problem: RISK FOR INFECTION - ADULT  Goal: Absence of fever/infection during anticipated neutropenic period  Description: INTERVENTIONS  - Monitor WBC  - Administer growth factors as ordered  - Implement neutropenic guidelines  Outcome: Adequate for Discharge     Problem: SAFETY ADULT - FALL  Goal: Free from fall injury  Description: INTERVENTIONS:  - Assess pt frequently for physical needs  - Identify cognitive and physical deficits and behaviors that affect risk of falls.   - [place] fall precautions as indicated by assessment.  - Educate pt/family on patient safety including physical limitations  - Instruct pt to call for assistance with activity based on assessment  - Modify environment to reduce risk of injury  - Provide assistive devices as appropriate  - Consider OT/PT consult to assist with strengthening/mobility  - Encourage toileting schedule  Outcome: Adequate for Discharge     Problem: DISCHARGE PLANNING  Goal: Discharge to home or other facility with appropriate resources  Description: INTERVENTIONS:  - Identify barriers to discharge w/pt and caregiver  - Include patient/family/discharge partner in discharge planning  - Arrange for needed discharge resources and transportation as appropriate  - Identify discharge learning needs (meds, wound care, etc)  - Arrange for interpreters to assist at discharge as needed  - Consider post-discharge preferences of patient/family/discharge partner  - Complete POLST form as appropriate  - Assess patient's ability to be responsible for managing their own health  - Refer to Case Management Department for coordinating discharge planning if the patient needs post-hospital services based on physician/LIP order or complex needs related to functional status, cognitive ability or social support system  Outcome: Adequate for Discharge     Dialysis today, 2L removed per dialysis RN. Medically/surgically stable for discharge. Will go home with rolling walker and outpatient PT. Patient education on how to care for dressings and permacath. Will discharge via wife's vehicle.

## 2022-08-19 NOTE — CM/SW NOTE
Called and spoke with Bear Avelar at [employer] [phone] requesting status of referral. Bear Avelar states that someone from [facility] (PD) is already working on referral and was not able to provide further information. Bear Avelar provided [facility] phone [phone]. DERRICK called and spoke with rep at [facility], rep was not able to give further information about referral because the  working on it was not in today. SW called [address] [place] [address] directly [phone], they requested to call back later. 1052am  Spoke with Carin at [address] [place] [address], patient chair time is confirmed at [address] [place] [address] MWF 315pm. Called and spoke with spouse, Mejia Shashank to notify. Mejia Encarnacion aware of PT rec for home health, however patient is still insisting on outpatient PT at [address]: [address] [place] [address] MWF 315pm. Declining home health-Need outpatient PT rx.      Chasidy Dukes MSW, [facility]    [id number]

## 2022-08-19 NOTE — CM/SW NOTE
Patient failed inpatient criteria. Second level of review completed and supports OPIB status . UR committee in agreement. Discussed with Dr. Attila Henson who approves OPIB status. MOON  notice explained and  provided  to the patient and wife . Jensen Moreno requested that his wife sign . Copy placed in chart  Order for observation in place.     Subhash BOLDEN, Utilization Review   Ext [phone]

## 2022-08-20 NOTE — DISCHARGE SUMMARY
[facility]    PATIENT'S NAME: Laura ALBARADO   ATTENDING PHYSICIAN: Jennifer Perry MD   PATIENT ACCOUNT#:   [id number]    LOCATION:  [address] [place] [address] RECORD #:   [id number]       YOB: 1945  ADMISSION DATE:       08/18/2022      DISCHARGE DATE:  08/19/2022    DISCHARGE SUMMARY 45 min spent on dc      DISCHARGE DIAGNOSIS:  Peritoneal dialysis catheter dysfunction. HISTORY AND HOSPITAL COURSE:  This is a very pleasant 73-year-old white male who presents with a history of having endstage chronic kidney disease and came in and had a revision of a peritoneal dialysis catheter with a laparoscopic omentopexy by Dr. Patricio Fernandez. He also had a hemodialysis catheter placed as well by Dr. Dariela Perez and was admitted to the hospital to be watched overnight and have dialysis today. He felt well. Discussed with Dr. Teresita Mason and with the dialysis nurse, and the patient's first dialysis here went well. He was deemed stable to be discharged home by Dr. Teersita Mason, and I concurred. PHYSICAL EXAMINATION:    VITAL SIGNS:  Temperature 97.7, pulse 94, respiratory rate 20, blood pressure 141/81, 97%. LUNGS:  Crackles in both bases. HEART:  Normal S1 and S2. No S3.   ABDOMEN:  Soft. EXTREMITIES:  Without edema. NEUROLOGICAL:  The patient's wife states that he is forgetful, but to me he seems okay, friendly and cooperative, with no focal deficits. LABORATORY STUDIES:  Please see chart. ASSESSMENT AND PLAN:    1. Nonfunctioning peritoneal dialysis catheter, now been revised. We will flush as per Dr. Patricio Fernandez. Hemodialysis as per Dr. Teresita Mason. We will see how he does. 2.   History of multiple myeloma. Treatment on August 29 scheduled or to be adjusted by oncologist.  3.   Obesity. BMI 34.29. May need obstructive sleep apnea workup. 4.   Essential hypertension. Continue medications. 5.   Type 2 diabetes with hyperglycemia. Continue insulin.   Patient states that he follows diet at home, and he will eat meals regularly. 6.   End-stage renal disease, as per Dr. Francisco Khan. 7.   Hernia. Repair with Dr. Kamille Trejo as an outpatient. 8.   Conjunctivitis. Dr. Lena Rushing started Cipro eye drops. I recommended that they continue. Family may be unwilling to continue, but script was given. CONDITION ON DISCHARGE:  Stable. CODE STATUS:  Full Code. DIET:  A 2000 calorie ADA, 70 g protein, low-salt cardiac. ACTIVITY:  As tolerated. FOLLOWUP:  With Dr. Francisco Khan in 1 week for dialysis. Follow up with Dr. Kamille Trejo postoperatively for hernia repair. Follow up with Dr. Arielle Benito on Monday for blood pressure, followup advice, etc.  Resume multiple myeloma treatments, etc., as scheduled. I believe that their next treatment will be August 29. DISCHARGE MEDICATIONS:    1. Acyclovir 400 mg daily. 2.   Vitamin D 1000 units daily. 3.   Mometasone 1 spray in each nostril daily. 4.   Ocuvite 1 tablet daily. 5.   Coreg 25 mg twice a day. 6.   Folbic 2.5/25/2 mg daily. 7.   Hydralazine 50 mg 3 times a day. 8.   Xalatan 0.005% one drop both eyes nightly. 9.   Losartan 50 mg daily. 10.   Sodium bicarbonate 650 mg twice a day. 11.   Torsemide 40 mg twice a day. 12.   Calcium carbonate 1250 mg twice a day. 13.   Lantus 12 units at bedtime. 14.   Lanthanum 1000 mg 3 times a day. 15.   Tramadol 50 mg q.6 h. as needed. Use sparingly as needed for pain. 16.   Tylenol 1000 mg q.8 h. as needed. Watch total daily Tylenol, limit to 3 g. 17.   Ciprofloxacin 0.3% one drop in both eyes q.2-4 h. for 7 days while awake. 18.   Miconazole powder twice a day as needed. 19.   Colace 100 mg p.o. b.i.d. p.r.n. constipation. RISK OF READMISSION:  High. TCM followup recommended. Dictated By Rylie Suarez.  MD Vicky  d: 08/19/2022 17:18:34  t: 08/19/2022 22:26:33  Job [id number]  URA/

## 2022-08-21 LAB — GLUCOSE BLDC GLUCOMTR-MCNC: 106 MG/DL (ref 70–99)

## 2022-08-23 ENCOUNTER — TELEPHONE (OUTPATIENT)
Dept: INTERNAL MEDICINE CLINIC | Facility: CLINIC | Age: 77
End: 2022-08-23

## 2022-08-23 ENCOUNTER — APPOINTMENT (OUTPATIENT)
Dept: PHYSICAL THERAPY | Facility: HOSPITAL | Age: 77
End: 2022-08-23
Attending: INTERNAL MEDICINE
Payer: COMMERCIAL

## 2022-08-23 NOTE — TELEPHONE ENCOUNTER
Called DR. Agrawal office , spoke with Vandana Tinsley - requested to speak with PA or NP - Vandana Tinsley will send them a message to call back as soon as possible

## 2022-08-23 NOTE — TELEPHONE ENCOUNTER
Please call patient's oncologist Dr. Leiad Martino or her physicians assistant or nurse practitioner with the following    1. This let them know that Ryan Mendez had his peritoneal dialysis catheter changed because of malfunction. 2.  The reason for the call is to alert them that his hemoglobin is 7.5. This was on August 19.    3.  He is being taken care of by Dr. Neel Kaplan from nephrology. There was an message in the past that he should not get any erythrocyte stimulating products or iron. 4.  Were calling to alert them of the hemoglobin of 7.5 in case they wish us to do anything in regards to it. There is no obvious evidence of any GI bleeding.     5.  His electrolytes show BUN 65 along with creatinine 10.9.    6.  He now is getting hemodialysis rather than peritoneal dialysis for the time being

## 2022-08-23 NOTE — TELEPHONE ENCOUNTER
Bonny Hurst from DR. Agrawal called back - relayed DR. YAZMIN mcginnis and she will forward it  to DR. Agrawal who is out of the office today - also phone number for DR. Eri Holman given to Bonny Hurst

## 2022-08-25 ENCOUNTER — OFFICE VISIT (OUTPATIENT)
Dept: INTERNAL MEDICINE CLINIC | Facility: CLINIC | Age: 77
End: 2022-08-25
Payer: MEDICARE

## 2022-08-25 VITALS
WEIGHT: 218.63 LBS | DIASTOLIC BLOOD PRESSURE: 58 MMHG | OXYGEN SATURATION: 97 % | BODY MASS INDEX: 33.14 KG/M2 | HEIGHT: 68 IN | HEART RATE: 77 BPM | SYSTOLIC BLOOD PRESSURE: 110 MMHG

## 2022-08-25 DIAGNOSIS — N18.6 ESRD ON HEMODIALYSIS (HCC): ICD-10-CM

## 2022-08-25 DIAGNOSIS — E11.9 TYPE 2 DIABETES MELLITUS WITHOUT COMPLICATION, WITHOUT LONG-TERM CURRENT USE OF INSULIN (HCC): ICD-10-CM

## 2022-08-25 DIAGNOSIS — M80.00XA AGE-RELATED OSTEOPOROSIS WITH CURRENT PATHOLOGICAL FRACTURE, INITIAL ENCOUNTER: ICD-10-CM

## 2022-08-25 DIAGNOSIS — Z99.2 ESRD ON HEMODIALYSIS (HCC): ICD-10-CM

## 2022-08-25 DIAGNOSIS — D64.9 ANEMIA, UNSPECIFIED TYPE: ICD-10-CM

## 2022-08-25 DIAGNOSIS — C90.01 MULTIPLE MYELOMA IN REMISSION (HCC): ICD-10-CM

## 2022-08-25 DIAGNOSIS — I10 PRIMARY HYPERTENSION: ICD-10-CM

## 2022-08-25 DIAGNOSIS — S32.010A CLOSED COMPRESSION FRACTURE OF BODY OF L1 VERTEBRA (HCC): ICD-10-CM

## 2022-08-25 DIAGNOSIS — N18.5 CKD (CHRONIC KIDNEY DISEASE), STAGE V (HCC): ICD-10-CM

## 2022-08-25 DIAGNOSIS — T85.611A PERITONEAL DIALYSIS CATHETER DYSFUNCTION, INITIAL ENCOUNTER (HCC): Primary | ICD-10-CM

## 2022-08-26 ENCOUNTER — TELEPHONE (OUTPATIENT)
Dept: INTERNAL MEDICINE CLINIC | Facility: CLINIC | Age: 77
End: 2022-08-26

## 2022-08-26 ENCOUNTER — APPOINTMENT (OUTPATIENT)
Dept: PHYSICAL THERAPY | Facility: HOSPITAL | Age: 77
End: 2022-08-26
Attending: INTERNAL MEDICINE
Payer: COMMERCIAL

## 2022-08-26 NOTE — TELEPHONE ENCOUNTER
Robert Haney from Dr Jinny Sanches is calling back to speak with a nurse. Robert Haney informed  that the patient was scheduled for an in-person appointment with Dr Jinny Sanches but it was changed to a phone visit due to the patient being on dialysis. Robert Haney states Dr Jinny Sanches is requesting that our office arrange for the patient to receive one unit of paxil in the meantime.     Robert Haney can be reached at [phone]

## 2022-08-26 NOTE — TELEPHONE ENCOUNTER
Called DR Mimi Hernandez office ,spoke with Mk Rainey who sent message to Estefany Barlow to give our office a call - her message is unclear -please clarify what she wants  to do

## 2022-08-26 NOTE — TELEPHONE ENCOUNTER
Please let patient's wife Charles Gamez know that I was able to connect with Dirk Williamson today who is the APN with [facility] regarding Balwinder's hemoglobin is 7.5. She indicated that Sukhjinder Henok is able to get any bone marrow stimulant product such as Epogen get Dr. Mickey Bhagat would deem beneficial to help his blood count or even a blood transfusion is necessary. I did relay this to Dr. Mickey Bhagat. I just wanted her to know that I was able to connect with.   We will see what Dr. Mickey Bhagat recommends

## 2022-08-29 NOTE — TELEPHONE ENCOUNTER
Bayron Smith called back and relayed Dr Snowden Certain message. Bayron Smith verbalized understanding with no further questions noted.

## 2022-08-30 ENCOUNTER — TELEPHONE (OUTPATIENT)
Dept: AUDIOLOGY | Facility: CLINIC | Age: 77
End: 2022-08-30

## 2022-08-30 NOTE — TELEPHONE ENCOUNTER
Hi!  Please let patient know that blood sugars are at goal, but to check them once in a while (like twice a week) 2 hours after dinner. Thank you!

## 2022-08-30 NOTE — TELEPHONE ENCOUNTER
rn called patient wife Chelle Weiss   Provided blood sugar readings  8/30 A 154  8/29 143  8/28 144  8/27 154  8/26 ---  8/25 124  8/24 137  8/23 126  Pt usual dose of Basaglar is 12 units nightly  So now that patient is on dialysis he is eating dinner later at night around 8 pm so  Thinking it might be affecting am numbers  On Night of chemo next time (9/10), patient using 24 units of basaglar  Next 2 days 22 units, then, 17 units on day 4 and the back down to usual 12 units   Asking if should continue same or any changes. Call Noemi's # to  Inform.

## 2022-08-30 NOTE — TELEPHONE ENCOUNTER
Wife updated that pt blood sugar levels are at 154, she wanted to speak to Rn now but Rn is not available please call [phone]

## 2022-08-30 NOTE — TELEPHONE ENCOUNTER
Dr. Barber Scarce to patient's wife -she states patient is doing fairly well  Fasting BG readings have been between 130-145  Patient has hemodialysis M-W-F from 3:30 - 7pm - per wife, he comes home hungry and has light dinner around 7:30/8pm - is this causing fasting BG to be elevated?   Patient currently taking 12 units basaglar nightly    Patient scheduled for next cancer treatment on 9/10/22  Patient has f/u with you on 9/8/22  Please advise -thanks

## 2022-08-30 NOTE — TELEPHONE ENCOUNTER
Patients spouse requesting call back from doctor. States new conditions have come up and would like to speak to doctor.  Please advise

## 2022-08-30 NOTE — TELEPHONE ENCOUNTER
Spoke to patient's wife Chelle Aliment regarding Dr. Heri Pressley notes below. Patient verbalized understanding.

## 2022-08-31 ENCOUNTER — OFFICE VISIT (OUTPATIENT)
Dept: AUDIOLOGY | Facility: CLINIC | Age: 77
End: 2022-08-31
Payer: MEDICARE

## 2022-08-31 DIAGNOSIS — H90.6 MIXED HEARING LOSS, BILATERAL: Primary | ICD-10-CM

## 2022-09-02 ENCOUNTER — TELEPHONE (OUTPATIENT)
Dept: ENDOCRINOLOGY CLINIC | Facility: CLINIC | Age: 77
End: 2022-09-02

## 2022-09-02 NOTE — TELEPHONE ENCOUNTER
Received pt's recent blood glucose logs dated 7/16/2022 to 8/24/2022. Placed on providers desk for review.

## 2022-09-06 ENCOUNTER — APPOINTMENT (OUTPATIENT)
Dept: PHYSICAL THERAPY | Facility: HOSPITAL | Age: 77
End: 2022-09-06
Attending: INTERNAL MEDICINE
Payer: COMMERCIAL

## 2022-09-08 ENCOUNTER — OFFICE VISIT (OUTPATIENT)
Dept: ENDOCRINOLOGY CLINIC | Facility: CLINIC | Age: 77
End: 2022-09-08
Payer: MEDICARE

## 2022-09-08 VITALS
SYSTOLIC BLOOD PRESSURE: 124 MMHG | WEIGHT: 209 LBS | HEIGHT: 68 IN | RESPIRATION RATE: 16 BRPM | DIASTOLIC BLOOD PRESSURE: 57 MMHG | BODY MASS INDEX: 31.67 KG/M2 | HEART RATE: 83 BPM

## 2022-09-08 DIAGNOSIS — I10 ESSENTIAL HYPERTENSION: ICD-10-CM

## 2022-09-08 DIAGNOSIS — E11.65 TYPE 2 DIABETES MELLITUS WITH HYPERGLYCEMIA, WITH LONG-TERM CURRENT USE OF INSULIN (HCC): Primary | ICD-10-CM

## 2022-09-08 DIAGNOSIS — Z79.4 TYPE 2 DIABETES MELLITUS WITH HYPERGLYCEMIA, WITH LONG-TERM CURRENT USE OF INSULIN (HCC): Primary | ICD-10-CM

## 2022-09-08 LAB
GLUCOSE BLOOD: 180
TEST STRIP LOT #: NORMAL NUMERIC

## 2022-09-08 PROCEDURE — 99214 OFFICE O/P EST MOD 30 MIN: CPT | Performed by: INTERNAL MEDICINE

## 2022-09-08 PROCEDURE — 1111F DSCHRG MED/CURRENT MED MERGE: CPT | Performed by: INTERNAL MEDICINE

## 2022-09-08 PROCEDURE — 82947 ASSAY GLUCOSE BLOOD QUANT: CPT | Performed by: INTERNAL MEDICINE

## 2022-09-09 ENCOUNTER — APPOINTMENT (OUTPATIENT)
Dept: PHYSICAL THERAPY | Facility: HOSPITAL | Age: 77
End: 2022-09-09
Attending: INTERNAL MEDICINE
Payer: COMMERCIAL

## 2022-09-12 ENCOUNTER — TELEPHONE (OUTPATIENT)
Dept: INTERNAL MEDICINE CLINIC | Facility: CLINIC | Age: 77
End: 2022-09-12

## 2022-09-13 ENCOUNTER — APPOINTMENT (OUTPATIENT)
Dept: PHYSICAL THERAPY | Facility: HOSPITAL | Age: 77
End: 2022-09-13
Attending: INTERNAL MEDICINE
Payer: COMMERCIAL

## 2022-09-13 ENCOUNTER — TELEPHONE (OUTPATIENT)
Dept: INTERNAL MEDICINE CLINIC | Facility: CLINIC | Age: 77
End: 2022-09-13

## 2022-09-13 RX ORDER — TORSEMIDE 20 MG/1
TABLET ORAL
Qty: 120 TABLET | Refills: 11 | Status: SHIPPED | OUTPATIENT
Start: 2022-09-13 | End: 2023-09-06

## 2022-09-13 NOTE — TELEPHONE ENCOUNTER
Patient's wife had an appointment today with another physician, while in the office she dropped off a copy of the patient's most recent office notes from [address] [place] [address]. Paperwork was placed in Dr April Mosher with barcode for his review before the patient's appointment.

## 2022-09-15 ENCOUNTER — APPOINTMENT (OUTPATIENT)
Dept: PHYSICAL THERAPY | Facility: HOSPITAL | Age: 77
End: 2022-09-15
Attending: INTERNAL MEDICINE
Payer: COMMERCIAL

## 2022-09-20 ENCOUNTER — APPOINTMENT (OUTPATIENT)
Dept: PHYSICAL THERAPY | Facility: HOSPITAL | Age: 77
End: 2022-09-20
Attending: INTERNAL MEDICINE
Payer: COMMERCIAL

## 2022-09-20 ENCOUNTER — OFFICE VISIT (OUTPATIENT)
Dept: INTERNAL MEDICINE CLINIC | Facility: CLINIC | Age: 77
End: 2022-09-20

## 2022-09-20 VITALS
DIASTOLIC BLOOD PRESSURE: 72 MMHG | TEMPERATURE: 99 F | SYSTOLIC BLOOD PRESSURE: 132 MMHG | BODY MASS INDEX: 32.04 KG/M2 | HEIGHT: 68 IN | HEART RATE: 71 BPM | WEIGHT: 211.38 LBS | OXYGEN SATURATION: 98 %

## 2022-09-20 DIAGNOSIS — N18.5 CKD (CHRONIC KIDNEY DISEASE), STAGE V (HCC): Primary | ICD-10-CM

## 2022-09-20 DIAGNOSIS — E11.9 TYPE 2 DIABETES MELLITUS WITHOUT COMPLICATION, WITHOUT LONG-TERM CURRENT USE OF INSULIN (HCC): ICD-10-CM

## 2022-09-20 DIAGNOSIS — Z99.2 ESRD ON HEMODIALYSIS (HCC): ICD-10-CM

## 2022-09-20 DIAGNOSIS — R97.20 ELEVATED PSA: ICD-10-CM

## 2022-09-20 DIAGNOSIS — C90.01 MULTIPLE MYELOMA IN REMISSION (HCC): ICD-10-CM

## 2022-09-20 DIAGNOSIS — D64.9 ANEMIA, UNSPECIFIED TYPE: ICD-10-CM

## 2022-09-20 DIAGNOSIS — N18.6 ESRD ON HEMODIALYSIS (HCC): ICD-10-CM

## 2022-09-20 PROCEDURE — 99215 OFFICE O/P EST HI 40 MIN: CPT | Performed by: INTERNAL MEDICINE

## 2022-09-20 RX ORDER — GENTAMICIN SULFATE 1 MG/G
CREAM TOPICAL AS NEEDED
COMMUNITY

## 2022-09-20 RX ORDER — LOSARTAN POTASSIUM 50 MG/1
50 TABLET ORAL DAILY
Qty: 90 TABLET | Refills: 3 | Status: SHIPPED | OUTPATIENT
Start: 2022-09-20

## 2022-09-26 DIAGNOSIS — Z79.4 TYPE 2 DIABETES MELLITUS WITH HYPERGLYCEMIA, WITH LONG-TERM CURRENT USE OF INSULIN (HCC): ICD-10-CM

## 2022-09-26 DIAGNOSIS — E11.65 TYPE 2 DIABETES MELLITUS WITH HYPERGLYCEMIA, WITH LONG-TERM CURRENT USE OF INSULIN (HCC): ICD-10-CM

## 2022-10-01 ENCOUNTER — TELEPHONE (OUTPATIENT)
Dept: INTERNAL MEDICINE CLINIC | Facility: CLINIC | Age: 77
End: 2022-10-01

## 2022-10-01 NOTE — TELEPHONE ENCOUNTER
Tye Thomas, I think I was supposed to send tp pharmacy but could not find them in pic list. Patient's pen needles. They are the \"BD pen needle mini\" 30 1G x5. Quantity #100. He uses with his pen device. Thank you. Can you send in for him?

## 2022-10-03 RX ORDER — PEN NEEDLE, DIABETIC 31 G X1/4"
NEEDLE, DISPOSABLE MISCELLANEOUS
Qty: 100 EACH | Refills: 3 | Status: SHIPPED | OUTPATIENT
Start: 2022-10-03

## 2022-10-03 NOTE — TELEPHONE ENCOUNTER
Dr. Georgia Watson, patient needs Levemir refill. His wife reports BG are very good. Taking 12 units nightly, 13 units on MWF due to dialysis. Also takes higher dose on chemo days. Noted that he used to be on Basaglar, but it was switched to Levemir when he was d/s'd from the hospital likely. Levemir refill pended.

## 2022-10-04 ENCOUNTER — TELEPHONE (OUTPATIENT)
Dept: ENDOCRINOLOGY CLINIC | Facility: CLINIC | Age: 77
End: 2022-10-04

## 2022-10-06 RX ORDER — INSULIN DETEMIR 100 [IU]/ML
INJECTION, SOLUTION SUBCUTANEOUS
Qty: 30 ML | Refills: 2 | Status: SHIPPED | OUTPATIENT
Start: 2022-10-06 | End: 2022-10-26

## 2022-10-17 ENCOUNTER — TELEPHONE (OUTPATIENT)
Dept: INTERNAL MEDICINE CLINIC | Facility: CLINIC | Age: 77
End: 2022-10-17

## 2022-10-17 NOTE — TELEPHONE ENCOUNTER
Please call Jason Camera back Tuesday. I see the ones from October 8. Are these the ones she is talking about? Espinoza Caicedo     Does  have any concerns regarding the results/

## 2022-10-17 NOTE — TELEPHONE ENCOUNTER
Robert Haney called from Dr Jinny Sanches office on behalf of patient  Pt asked that Dr Joao Blanco review his recent lab results  Dr Jinny Sanches office also on Epic  Tasked to nursing

## 2022-10-17 NOTE — TELEPHONE ENCOUNTER
Patients family member dropped off Rx for [place]. Rx needs to be cleared with Dr Coco Jones before patient can take it. Patient waiting for clearance. Please contact when  clears [employer] .      Rx placed in [facility]

## 2022-10-17 NOTE — TELEPHONE ENCOUNTER
Prescription is for Zithromax Z-TRACEY    1. I did look up in reference and there does not seem to be any contraindication in using it in dialysis patients. It appears to be okay for Jossy Rice to take the please ask them to check with their nephrologist Dr. Kera Bustos before starting it.     2.  Please ask why Dr. Freddie Calderon prescribed this

## 2022-10-18 NOTE — TELEPHONE ENCOUNTER
Relayed MD message to pts EC. She will check with . Pt was prescribed this for swelling under his L eye.

## 2022-10-24 ENCOUNTER — LAB ENCOUNTER (OUTPATIENT)
Dept: LAB | Facility: HOSPITAL | Age: 77
End: 2022-10-24
Attending: SURGERY
Payer: MEDICARE

## 2022-10-24 DIAGNOSIS — Z01.818 PREOPERATIVE TESTING: ICD-10-CM

## 2022-10-24 NOTE — TELEPHONE ENCOUNTER
It appears per written note on the bottom of the fax that labs will be reviewed during the video visit with Miah Stage November 4. I faxed results to Dr. Martha Bell with a letter for her to review. Fax completed. For now no further action needs to be taken.

## 2022-10-25 LAB — SARS-COV-2 RNA RESP QL NAA+PROBE: NOT DETECTED

## 2022-10-26 RX ORDER — SODIUM CHLORIDE, SODIUM LACTATE, POTASSIUM CHLORIDE, CALCIUM CHLORIDE 600; 310; 30; 20 MG/100ML; MG/100ML; MG/100ML; MG/100ML
INJECTION, SOLUTION INTRAVENOUS CONTINUOUS
Status: DISCONTINUED | OUTPATIENT
Start: 2022-10-26 | End: 2022-10-26

## 2022-10-27 ENCOUNTER — HOSPITAL ENCOUNTER (OUTPATIENT)
Facility: HOSPITAL | Age: 77
Setting detail: HOSPITAL OUTPATIENT SURGERY
Discharge: HOME | End: 2022-10-27
Attending: SURGERY | Admitting: SURGERY
Payer: MEDICARE

## 2022-10-27 ENCOUNTER — ANESTHESIA EVENT (OUTPATIENT)
Dept: SURGERY | Facility: HOSPITAL | Age: 77
End: 2022-10-27
Payer: MEDICARE

## 2022-10-27 ENCOUNTER — ANESTHESIA (OUTPATIENT)
Dept: SURGERY | Facility: HOSPITAL | Age: 77
End: 2022-10-27
Payer: MEDICARE

## 2022-10-27 VITALS
WEIGHT: 206 LBS | HEART RATE: 64 BPM | HEIGHT: 68 IN | DIASTOLIC BLOOD PRESSURE: 67 MMHG | TEMPERATURE: 98 F | OXYGEN SATURATION: 95 % | RESPIRATION RATE: 18 BRPM | SYSTOLIC BLOOD PRESSURE: 137 MMHG | BODY MASS INDEX: 31.22 KG/M2

## 2022-10-27 DIAGNOSIS — Z01.818 PREOPERATIVE TESTING: Primary | ICD-10-CM

## 2022-10-27 LAB
GLUCOSE BLDC GLUCOMTR-MCNC: 121 MG/DL (ref 70–99)
GLUCOSE BLDC GLUCOMTR-MCNC: 122 MG/DL (ref 70–99)
POTASSIUM SERPL-SCNC: 4.2 MMOL/L (ref 3.5–5.1)

## 2022-10-27 PROCEDURE — 84132 ASSAY OF SERUM POTASSIUM: CPT | Performed by: SURGERY

## 2022-10-27 PROCEDURE — 0WPG33Z REMOVAL OF INFUSION DEVICE FROM PERITONEAL CAVITY, PERCUTANEOUS APPROACH: ICD-10-PCS | Performed by: SURGERY

## 2022-10-27 PROCEDURE — 87070 CULTURE OTHR SPECIMN AEROBIC: CPT | Performed by: SURGERY

## 2022-10-27 PROCEDURE — 82962 GLUCOSE BLOOD TEST: CPT

## 2022-10-27 RX ORDER — MORPHINE SULFATE 4 MG/ML
4 INJECTION, SOLUTION INTRAMUSCULAR; INTRAVENOUS EVERY 10 MIN PRN
Status: DISCONTINUED | OUTPATIENT
Start: 2022-10-27 | End: 2022-10-27

## 2022-10-27 RX ORDER — CEFAZOLIN SODIUM/WATER 2 G/20 ML
2 SYRINGE (ML) INTRAVENOUS ONCE
Status: COMPLETED | OUTPATIENT
Start: 2022-10-27 | End: 2022-10-27

## 2022-10-27 RX ORDER — HYDROMORPHONE HYDROCHLORIDE 1 MG/ML
0.4 INJECTION, SOLUTION INTRAMUSCULAR; INTRAVENOUS; SUBCUTANEOUS EVERY 5 MIN PRN
Status: DISCONTINUED | OUTPATIENT
Start: 2022-10-27 | End: 2022-10-27

## 2022-10-27 RX ORDER — HYDROMORPHONE HYDROCHLORIDE 1 MG/ML
0.2 INJECTION, SOLUTION INTRAMUSCULAR; INTRAVENOUS; SUBCUTANEOUS EVERY 5 MIN PRN
Status: DISCONTINUED | OUTPATIENT
Start: 2022-10-27 | End: 2022-10-27

## 2022-10-27 RX ORDER — ACETAMINOPHEN 500 MG
1000 TABLET ORAL ONCE
Status: COMPLETED | OUTPATIENT
Start: 2022-10-27 | End: 2022-10-27

## 2022-10-27 RX ORDER — MORPHINE SULFATE 4 MG/ML
2 INJECTION, SOLUTION INTRAMUSCULAR; INTRAVENOUS EVERY 10 MIN PRN
Status: DISCONTINUED | OUTPATIENT
Start: 2022-10-27 | End: 2022-10-27

## 2022-10-27 RX ORDER — METOCLOPRAMIDE 10 MG/1
10 TABLET ORAL ONCE
Status: COMPLETED | OUTPATIENT
Start: 2022-10-27 | End: 2022-10-27

## 2022-10-27 RX ORDER — EPHEDRINE SULFATE 50 MG/ML
INJECTION, SOLUTION INTRAVENOUS AS NEEDED
Status: DISCONTINUED | OUTPATIENT
Start: 2022-10-27 | End: 2022-10-27 | Stop reason: SURG

## 2022-10-27 RX ORDER — NICOTINE POLACRILEX 4 MG
30 LOZENGE BUCCAL
Status: DISCONTINUED | OUTPATIENT
Start: 2022-10-27 | End: 2022-10-27 | Stop reason: HOSPADM

## 2022-10-27 RX ORDER — DEXTROSE MONOHYDRATE 25 G/50ML
50 INJECTION, SOLUTION INTRAVENOUS
Status: DISCONTINUED | OUTPATIENT
Start: 2022-10-27 | End: 2022-10-27 | Stop reason: HOSPADM

## 2022-10-27 RX ORDER — HYDROMORPHONE HYDROCHLORIDE 1 MG/ML
0.6 INJECTION, SOLUTION INTRAMUSCULAR; INTRAVENOUS; SUBCUTANEOUS EVERY 5 MIN PRN
Status: DISCONTINUED | OUTPATIENT
Start: 2022-10-27 | End: 2022-10-27

## 2022-10-27 RX ORDER — MORPHINE SULFATE 10 MG/ML
6 INJECTION, SOLUTION INTRAMUSCULAR; INTRAVENOUS EVERY 10 MIN PRN
Status: DISCONTINUED | OUTPATIENT
Start: 2022-10-27 | End: 2022-10-27

## 2022-10-27 RX ORDER — ONDANSETRON 2 MG/ML
INJECTION INTRAMUSCULAR; INTRAVENOUS AS NEEDED
Status: DISCONTINUED | OUTPATIENT
Start: 2022-10-27 | End: 2022-10-27 | Stop reason: SURG

## 2022-10-27 RX ORDER — NEOSTIGMINE METHYLSULFATE 1 MG/ML
INJECTION, SOLUTION INTRAVENOUS AS NEEDED
Status: DISCONTINUED | OUTPATIENT
Start: 2022-10-27 | End: 2022-10-27 | Stop reason: SURG

## 2022-10-27 RX ORDER — BUPIVACAINE HYDROCHLORIDE AND EPINEPHRINE 2.5; 5 MG/ML; UG/ML
INJECTION, SOLUTION INFILTRATION; PERINEURAL AS NEEDED
Status: DISCONTINUED | OUTPATIENT
Start: 2022-10-27 | End: 2022-10-27 | Stop reason: HOSPADM

## 2022-10-27 RX ORDER — NICOTINE POLACRILEX 4 MG
15 LOZENGE BUCCAL
Status: DISCONTINUED | OUTPATIENT
Start: 2022-10-27 | End: 2022-10-27 | Stop reason: HOSPADM

## 2022-10-27 RX ORDER — SODIUM CHLORIDE 9 MG/ML
INJECTION, SOLUTION INTRAVENOUS CONTINUOUS
Status: DISCONTINUED | OUTPATIENT
Start: 2022-10-27 | End: 2022-10-27

## 2022-10-27 RX ORDER — FAMOTIDINE 20 MG/1
20 TABLET, FILM COATED ORAL ONCE
Status: COMPLETED | OUTPATIENT
Start: 2022-10-27 | End: 2022-10-27

## 2022-10-27 RX ORDER — MIDAZOLAM HYDROCHLORIDE 1 MG/ML
INJECTION INTRAMUSCULAR; INTRAVENOUS AS NEEDED
Status: DISCONTINUED | OUTPATIENT
Start: 2022-10-27 | End: 2022-10-27 | Stop reason: SURG

## 2022-10-27 RX ORDER — TRAMADOL HYDROCHLORIDE 50 MG/1
50 TABLET ORAL EVERY 6 HOURS PRN
Qty: 10 TABLET | Refills: 0 | Status: SHIPPED | OUTPATIENT
Start: 2022-10-27

## 2022-10-27 RX ORDER — NALOXONE HYDROCHLORIDE 0.4 MG/ML
80 INJECTION, SOLUTION INTRAMUSCULAR; INTRAVENOUS; SUBCUTANEOUS AS NEEDED
Status: DISCONTINUED | OUTPATIENT
Start: 2022-10-27 | End: 2022-10-27

## 2022-10-27 RX ORDER — ROCURONIUM BROMIDE 10 MG/ML
INJECTION, SOLUTION INTRAVENOUS AS NEEDED
Status: DISCONTINUED | OUTPATIENT
Start: 2022-10-27 | End: 2022-10-27 | Stop reason: SURG

## 2022-10-27 RX ORDER — GLYCOPYRROLATE 0.2 MG/ML
INJECTION, SOLUTION INTRAMUSCULAR; INTRAVENOUS AS NEEDED
Status: DISCONTINUED | OUTPATIENT
Start: 2022-10-27 | End: 2022-10-27 | Stop reason: SURG

## 2022-10-27 RX ADMIN — ROCURONIUM BROMIDE 30 MG: 10 INJECTION, SOLUTION INTRAVENOUS at 07:42:00

## 2022-10-27 RX ADMIN — EPHEDRINE SULFATE 5 MG: 50 INJECTION, SOLUTION INTRAVENOUS at 07:52:00

## 2022-10-27 RX ADMIN — EPHEDRINE SULFATE 5 MG: 50 INJECTION, SOLUTION INTRAVENOUS at 07:56:00

## 2022-10-27 RX ADMIN — MIDAZOLAM HYDROCHLORIDE 2 MG: 1 INJECTION INTRAMUSCULAR; INTRAVENOUS at 07:36:00

## 2022-10-27 RX ADMIN — ONDANSETRON 4 MG: 2 INJECTION INTRAMUSCULAR; INTRAVENOUS at 08:19:00

## 2022-10-27 RX ADMIN — SODIUM CHLORIDE: 9 INJECTION, SOLUTION INTRAVENOUS at 07:16:00

## 2022-10-27 RX ADMIN — GLYCOPYRROLATE 0.6 MG: 0.2 INJECTION, SOLUTION INTRAMUSCULAR; INTRAVENOUS at 08:21:00

## 2022-10-27 RX ADMIN — NEOSTIGMINE METHYLSULFATE 4.5 MG: 1 INJECTION, SOLUTION INTRAVENOUS at 08:21:00

## 2022-10-27 RX ADMIN — CEFAZOLIN SODIUM/WATER 2 G: 2 G/20 ML SYRINGE (ML) INTRAVENOUS at 07:47:00

## 2022-10-27 NOTE — BRIEF OP NOTE
Was Timoflorencio Galdamez got her  as an assistant is wrong Pre-Operative Diagnosis: Chronic kidney disease, NONFUNCTIONING DIALYSIS CATHETER      Post-Operative Diagnosis: CHRONIC KIDNEY DISEASE, NONFUNTIONING PERITONEAL DIALYSIS CATHETER       Procedure Performed:   Removal of peritoneal dialysis catheter    Surgeon(s) and Role:     Jelani Max MD - Primary    Assistant(s): Timmy Kruse CSA     Surgical Findings: Nonfunctional peritoneal dialysis catheter     Specimen: Catheter tip and catheter Cuff     Estimated Blood Loss: 2 mL    Dictation Number:      Tyrone Ahuja MD  10/27/2022  8:24 AM

## 2022-10-27 NOTE — OPERATIVE REPORT
[address] [facility] REPORT    PATIENT NAME: Keith Lara  : 1945   MRN: [id number]  SITE: [address] [facility] B:   10/27/22    PREOPERATIVE DIAGNOSIS: Nonfunctional peritoneal dialysis catheter      POSTOPERATIVE DIAGNOSIS: Nonfunctional peritoneal dialysis catheter    PROCEDURE PERFORMED: Removal of intraperitoneal peritoneal dialysis catheter    SURGEON:  Guido Lowe MD    ASST: JERALD Galo (Assistant helped position patient and helped with positioning, retraction, suturing, closure, dressings etc.)      ANESTHESIA: General endotracheal tube    ESTIMATED BLOOD LOSS:   12 ml    COMPLICATIONS: none    INDICATIONS:   This is a 69year old male who presents with a history of end-stage chronic kidney disease and nonfunctional peritoneal dialysis catheter. Patient now for peritoneal doses catheter removal.    Risk of procedure including bleeding, infection, postoperative hematoma, wound infection, perforated hollow viscus, vascular injury, sensory formation, inability replace peritoneal dialysis catheter, need for lifelong hemodialysis, as well as risk with anesthesia including myocardial infarction, respiratory failure, renal failure, pulmonary embolus, DVT, CVA, and even death were all discussed in detail with the patient who understands consents and wishes to proceed with the operation. OPERATIVE TECHNIQUE: The Patient was brought to the operating room, and placed on the operating table in supine position. General anesthetic was administered via endotracheal tube by anesthesia. The patient's stomach was decompressed with an orogastric tube, and the bladder was decompressed with a Haque catheter. The abdomen was prepped and draped in a sterile fashion. Small incision was made in the left periumbilical region at the entrance site. Dissection continued down through subcutaneous tissues electrocautery.   Peritoneal dialysis catheter was visualized and dissected free circumferentially. The catheter was dissected down to the region of the rectus sheath. Into rectus sheath was opened with electrocautery and the inner cuff was excised with electrocautery. The catheter was freed up and completely removed. The catheter tip was sent for culture. The exit cuff was freed up with electrocautery. Catheter was then cut and removed. The exit cuff was sent for cultures. At this time the fascia was closed with 0 Vicryl simple interrupted sutures were carefully injury to the intra-abdominal contents. Wound was irrigated with saline solution. Hemostasis was achieved using electrocautery. The catheter entrance site was closed with 2-0 Vicryl simple interrupted suture as well as 3-0 Vicryl running subcuticular suture. Mastisol and Steri-Strips were applied to the wound. The exit site was covered with 4 x 4 gauze and Tegaderm. Patient was transferred off the operative table to recovery room, extubated, in stable condition. All counts were correct at the end of the case.         [facility]    10/27/2022  8:26 AM  Stephen Marroquin MD

## 2022-10-27 NOTE — H&P (VIEW-ONLY)
Danyelle Mccall is a 69year old male. Patient presents with: Follow - Up: Pre-Op Discussion PD Cath Removal    HPI:   The etiology of the patient's renal failure is multiple myeloma. Patient had peritoneal dialysis catheter placed at [facility] on 2021   Catheter has been working well. Patient developed scrotal swelling and penis swelling 4 days ago    they have not had prior hemodialysis in the past.   the patient's nephrologist is Dr. Daisha Ivy . The patient does make urine. The patient's GFR is 4  Laboratory data on 2022: Patient presents for follow-up evaluation status post diagnostic laparoscopy and laparoscopic revision of peritoneal dialysis catheter on 2022. Patient was found to have a large right inguinal hernia as well as a smaller left inguinal hernia. Patient with complaints of none    Patient undergoing hemodialysis q MWF   The patient states the penile and scrotal swelling has improved  Patient is interested in maybe staying on HD long term    2022:Patient presents for follow-up evaluation status post diagnostic laparoscopy and laparoscopic revision of peritoneal dialysis catheter on 2022. Patient was found to have a large right inguinal hernia as well as a smaller left inguinal hernia. Patient with complaints of completely resolved abdomen scrotal or penile swelling.     Patient undergoing hemodialysis q MWF   The patient states the penile and scrotal swelling has resolved  Patient is interested in maybe staying on HD long term and having PD catheter removed    [address] REPORT    PATIENT NAME: Danyelle Mccall  : 1945   MRN: [id number]  SITE: [facility]: 22    PREOPERATIVE DIAGNOSIS: end-stage chronic kidney disease nonfunctional peritoneal dialysis catheter    POSTOPERATIVE DIAGNOSIS: end-stage chronic kidney disease   With nonfunctional peritoneal dialysis catheter, Bilateral inguinal hernia    PROCEDURE PERFORMED: Laparoscopic revision of peritoneal dialysis catheter with laparoscopic omentopexy. SURGEON: Luz Perdomo MD    ASST: JERALD Moscoso (Assistant helped position patient and helped with positioning, retraction, suturing, closure, dressings etc.)     ANESTHESIA: General endotracheal tube    ESTIMATED BLOOD LOSS: 1 ml    COMPLICATIONS: none     Allergies:  No Known Allergies   Current Meds:  Current Outpatient Medications   Medication Sig Dispense Refill   Lanthanum Carbonate 1000 MG Oral Chew Tab CHEW AND SWALLOW ONE TABLET BY MOUTH THREE TIMES DAILY WITH MEALS   ampicillin 500 MG Oral Cap Take 500 mg by mouth 2 (two) times daily. hydrALAZINE 50 MG Oral Tab Take 50 mg by mouth 3 (three) times daily. TORSEMIDE 20 MG Oral Tab TAKE TWO TABLETS BY MOUTH TWICE DAILY 120 tablet 5   OneTouch Delica Lancets 95M Does not apply Misc USE TO TEST BLOOD GLUCOSE TWICE DAILY 200 each 3   Insulin Pen Needle (BD PEN NEEDLE MINI U/F) 31G X 5 MM Does not apply Misc USE 1 NEEDLE DAILY WITH INSULIN  each 3   LOSARTAN 50 MG Oral Tab Take 1 tablet (50 mg total) by mouth daily. 90 tablet 3   Calcium Carbonate 1250 (500 Ca) MG Oral Chew Tab Chew 1 tablet by mouth 2 (two) times a day. sodium bicarbonate 650 MG Oral Tab Take 650 mg by mouth 2 (two) times daily. ONETOUCH ULTRA In Vitro Strip TEST TWICE DAILY 200 strip 3   Cholecalciferol (VITAMIN D) 25 MCG (1000 UT) Oral Tab Take by mouth daily. Mometasone Furoate 50 MCG/ACT Nasal Suspension 1 spray by Nasal route daily. (Patient taking differently: 1 spray by Nasal route as needed.) 1 Bottle 1   carvedilol 25 MG Oral Tab Take 1 tablet (25 mg total) by mouth 2 (two) times daily with meals.  60 tablet 11   OCUVITE-LUTEIN Oral Tab 1 tab daily   Blood Glucose Monitoring Suppl ([facility]) w/Device Does not apply Kit Test twice daily Dx Diabetes E11.9 1 kit 0   latanoprost (XALATAN) 0.005 % Ophthalmic Solution Place 1 drop into both eyes nightly. 6   FOLBIC 2.5-25-2 MG Oral Tab Take 1 tablet by mouth daily. 10   acyclovir (ZOVIRAX) 400 MG Oral Tab Take 400 mg by mouth daily.  11       HISTORY:  Past Medical History:   Diagnosis Date   Calculus of kidney 1994   Cancer ([facility]   multiple myloma   CKD (chronic kidney disease) stage V requiring chronic dialysis ([facility]   Diabetes ([facility] 2016   Essential hypertension 1986   Multiple myeloma ([facility]     Past Surgical History:   Procedure Laterality Date   COLONOSCOPY 2004   OTHER SURGICAL HISTORY 1994   knee   OTHER SURGICAL HISTORY   stem cell transplant   OTHER SURGICAL HISTORY 11/12/2019   removal parathyroid adenoma   PD CATHETER ANCHOR BELT   PD catheter placement     Family History   Problem Relation Age of Onset   Cancer Father   lung cancer   Heart Disorder Maternal Grandfather   Heart Disorder Brother   passed away with heart attack     Social History  Tobacco Use  Smoking status: Never  Smokeless tobacco: Never  Vaping Use  Vaping Use: Never used  Alcohol use: Yes  Comment: social  Drug use: No      ROS:     GENERAL HEALTH: otherwise feels well; denies weight loss  SKIN: denies any unusual skin lesions or rashes  EYES: no visual complaints or deficits  HEENT: denies nasal congestion, sinus pain or sore throat; hearing loss negative  RESPIRATORY: denies shortness of breath, wheezing or cough   CARDIOVASCULAR: denies chest pain or WANG; no palpitations   GI: denies rectal bleeding; narrow caliber bowel movements -    MUSCULOSKELETAL: no joint complaints upper or lower extremities, no back or rib pain  NEURO: no sensory or motor complaint  PSYCHE: no symptoms of depression or anxiety  HEMATOLOGY: denies hx anemia; denies bruising or excessive bleeding  ENDOCRINE: denies excessive thirst or urination; denies unexpected wt gain or wt loss    PHYSICAL EXAM:   GENERAL: well developed, well nourished male, in no apparent distress  SKIN: anicteric  EYES: PERRLA, EOMI, sclera anicteric  HEENT: normocephalic; normal nose, there is no supraclavicular adenopathy present  NECK: supple, no JVD,   RESPIRATORY: clear to percussion and auscultation, equal chest wall excursions  CARDIOVASCULAR: RRR, good peripheral perfusion  ABDOMEN: normal active BS, soft, non distended, nontender; there is no hepatosplenomegaly present, no palpable discrete masses present  Marked edema of the penis and scrotum. No large inguinal hernia present    8/26/2022: Abdomen is soft nondistended nontender. Incisions healing well  Edema of the penis and scrotum is markedly improved with some residual swelling present. No ecchymosis present. 9/30/2022: Abdomen is soft nondistended nontender. No abdominal wall swelling present. No penile or scrotal swelling present  Right inguinal hernia present. No obvious left inguinal hernia present. LYMPHATIC: no lymphadenopathy  EXTREMITIES: no cyanosis, clubbing or edema    ASSESSMENT/ PLAN:   This is a 69year old male presents with chronic renal failure with a peritoneal dialysis catheter that was placed 1/2 years ago. Patient now with acute onset of the edema and swelling of the penis and scrotum. Patient is status post diagnostic laparoscopy which revealed a large right inguinal hernia present as well as a smaller left inguinal hernia present. Patient is on hemodialysis. The penile and scrotal swelling has completely resolved. The patient is interested in lifelong hemodialysis and would like the PD catheter removed. I discussed hernia repair in detail with the patient as well as his wife. Patient feels that his hernia is asymptomatic and therefore does not want repair of either hernia. I did discuss concerns for incarceration or strangulation and the need for emergency surgery as well as increasing in size of the inguinal hernia. The patient understands this and wishes just to have the PD catheter removed.   The patient is receiving treatment for his multiple myeloma at [facility] on a monthly basis. Patient has neck scheduled for treatment on Saturday, October 8, 2022. Ideally I would like to wait approximate 3 weeks after his treatment to have catheter removed then if possible waiting 2 to 3 weeks before restarting these treatments. They will discuss this with [place] [facility] [place] I discussed the risk of procedure including bleeding, infection, nonhealing wound, scar formation, inability replace peritoneal dialysis catheter, need for lifelong hemodialysis, as well as risk with anesthesia include myocardial infarction, surgery failure, renal failure, pulmonary mass, DVT, and even death were all discussed in detail with the patient and his wife who understand and wish to proceed with the operation. We will plan removal of peritoneal dialysis catheter under general anesthetic at [facility] on Thursday, October 27, 2022. Patient will continue hemodialysis on Monday Wednesday Friday. .  Total time spent in direct patient contact and decision-making And coordinating the patient's care including greater than 50% face-to-face was 25 minutes.     Rod Brooks MD, Dr. Minor Galeana

## 2022-10-27 NOTE — ANESTHESIA PROCEDURE NOTES
Airway  Date/Time: 10/27/2022 7:44 AM  Urgency: elective    Difficult airway    General Information and Staff    Patient location during procedure: OR  Anesthesiologist: Rose Copeland MD  Performed: anesthesiologist     Indications and Patient Condition  Indications for airway management: anesthesia  Spontaneous Ventilation: absent  Sedation level: deep  Preoxygenated: yes  Patient position: sniffing  Mask difficulty assessment: 2 - vent by mask + OA or adjuvant +/- NMBA  Planned trial extubation    Final Airway Details  Final airway type: endotracheal airway      Successful airway: ETT  Cuffed: yes   Successful intubation technique: Video laryngoscopy  Facilitating devices/methods: intubating stylet  Endotracheal tube insertion site: oral  Blade: GlideScope  Blade size: #3  ETT size (mm): 7.5    Placement verified by: chest auscultation and capnometry   Measured from: teeth  ETT to teeth (cm): 23  Number of attempts at approach: 1  Ventilation between attempts: none  Number of other approaches attempted: 0

## 2022-10-27 NOTE — INTERVAL H&P NOTE
Pre-op Diagnosis: Chronic kidney disease    The above referenced H&P was reviewed by Anival Hair MD on 10/27/2022, the patient was examined and no significant changes have occurred in the patient's condition since the H&P was performed. I discussed with the patient and/or legal representative the potential benefits, risks and side effects of this procedure; the likelihood of the patient achieving goals; and potential problems that might occur during recuperation. I discussed reasonable alternatives to the procedure, including risks, benefits and side effects related to the alternatives and risks related to not receiving this procedure. We will proceed with procedure as planned.

## 2022-10-27 NOTE — CONSULTS
Roxanna Wong is a 69year old male. Patient presents with: Follow - Up: Pre-Op Discussion PD Cath Removal    HPI:   The etiology of the patient's renal failure is multiple myeloma. Patient had peritoneal dialysis catheter placed at [facility] on 2021   Catheter has been working well. Patient developed scrotal swelling and penis swelling 4 days ago    they have not had prior hemodialysis in the past.   the patient's nephrologist is Dr. Varun Torres . The patient does make urine. The patient's GFR is 4  Laboratory data on 2022: Patient presents for follow-up evaluation status post diagnostic laparoscopy and laparoscopic revision of peritoneal dialysis catheter on 2022. Patient was found to have a large right inguinal hernia as well as a smaller left inguinal hernia. Patient with complaints of none    Patient undergoing hemodialysis q MWF   The patient states the penile and scrotal swelling has improved  Patient is interested in maybe staying on HD long term    2022:Patient presents for follow-up evaluation status post diagnostic laparoscopy and laparoscopic revision of peritoneal dialysis catheter on 2022. Patient was found to have a large right inguinal hernia as well as a smaller left inguinal hernia. Patient with complaints of completely resolved abdomen scrotal or penile swelling.     Patient undergoing hemodialysis q MWF   The patient states the penile and scrotal swelling has resolved  Patient is interested in maybe staying on HD long term and having PD catheter removed    [address] REPORT    PATIENT NAME: Roxanna Wong  : 1945   MRN: [id number]  SITE: [facility]: 22    PREOPERATIVE DIAGNOSIS: end-stage chronic kidney disease nonfunctional peritoneal dialysis catheter    POSTOPERATIVE DIAGNOSIS: end-stage chronic kidney disease   With nonfunctional peritoneal dialysis catheter, Bilateral inguinal hernia    PROCEDURE PERFORMED: Laparoscopic revision of peritoneal dialysis catheter with laparoscopic omentopexy. SURGEON: Arielle Shirley MD    ASST: JERALD Joyce (Assistant helped position patient and helped with positioning, retraction, suturing, closure, dressings etc.)     ANESTHESIA: General endotracheal tube    ESTIMATED BLOOD LOSS: 1 ml    COMPLICATIONS: none     Allergies:  No Known Allergies   Current Meds:  Current Outpatient Medications   Medication Sig Dispense Refill   Lanthanum Carbonate 1000 MG Oral Chew Tab CHEW AND SWALLOW ONE TABLET BY MOUTH THREE TIMES DAILY WITH MEALS   ampicillin 500 MG Oral Cap Take 500 mg by mouth 2 (two) times daily. hydrALAZINE 50 MG Oral Tab Take 50 mg by mouth 3 (three) times daily. TORSEMIDE 20 MG Oral Tab TAKE TWO TABLETS BY MOUTH TWICE DAILY 120 tablet 5   OneTouch Delica Lancets 02Z Does not apply Misc USE TO TEST BLOOD GLUCOSE TWICE DAILY 200 each 3   Insulin Pen Needle (BD PEN NEEDLE MINI U/F) 31G X 5 MM Does not apply Misc USE 1 NEEDLE DAILY WITH INSULIN  each 3   LOSARTAN 50 MG Oral Tab Take 1 tablet (50 mg total) by mouth daily. 90 tablet 3   Calcium Carbonate 1250 (500 Ca) MG Oral Chew Tab Chew 1 tablet by mouth 2 (two) times a day. sodium bicarbonate 650 MG Oral Tab Take 650 mg by mouth 2 (two) times daily. ONETOUCH ULTRA In Vitro Strip TEST TWICE DAILY 200 strip 3   Cholecalciferol (VITAMIN D) 25 MCG (1000 UT) Oral Tab Take by mouth daily. Mometasone Furoate 50 MCG/ACT Nasal Suspension 1 spray by Nasal route daily. (Patient taking differently: 1 spray by Nasal route as needed.) 1 Bottle 1   carvedilol 25 MG Oral Tab Take 1 tablet (25 mg total) by mouth 2 (two) times daily with meals.  60 tablet 11   OCUVITE-LUTEIN Oral Tab 1 tab daily   Blood Glucose Monitoring Suppl ([facility]) w/Device Does not apply Kit Test twice daily Dx Diabetes E11.9 1 kit 0   latanoprost (XALATAN) 0.005 % Ophthalmic Solution Place 1 drop into both eyes nightly. 6   FOLBIC 2.5-25-2 MG Oral Tab Take 1 tablet by mouth daily. 10   acyclovir (ZOVIRAX) 400 MG Oral Tab Take 400 mg by mouth daily.  11       HISTORY:  Past Medical History:   Diagnosis Date   Calculus of kidney 1994   [facility]   [facility]   CKD (chronic kidney disease) stage V requiring chronic dialysis ([facility]   [facility] 2016   Essential hypertension 1986   Multiple myeloma ([facility]     Past Surgical History:   Procedure Laterality Date   COLONOSCOPY 2004   OTHER SURGICAL HISTORY 1994   knee   OTHER SURGICAL HISTORY   stem cell transplant   OTHER SURGICAL HISTORY 11/12/2019   removal parathyroid adenoma   PD CATHETER ANCHOR BELT   PD catheter placement     Family History   Problem Relation Age of Onset   Cancer Father   lung cancer   Heart Disorder Maternal Grandfather   Heart Disorder Brother   passed away with heart attack     Social History  Tobacco Use  Smoking status: Never  Smokeless tobacco: Never  Vaping Use  Vaping Use: Never used  Alcohol use: Yes  Comment: social  Drug use: No      ROS:     GENERAL HEALTH: otherwise feels well; denies weight loss  SKIN: denies any unusual skin lesions or rashes  EYES: no visual complaints or deficits  HEENT: denies nasal congestion, sinus pain or sore throat; hearing loss negative  RESPIRATORY: denies shortness of breath, wheezing or cough   CARDIOVASCULAR: denies chest pain or WANG; no palpitations   GI: denies rectal bleeding; narrow caliber bowel movements -    MUSCULOSKELETAL: no joint complaints upper or lower extremities, no back or rib pain  NEURO: no sensory or motor complaint  PSYCHE: no symptoms of depression or anxiety  HEMATOLOGY: denies hx anemia; denies bruising or excessive bleeding  ENDOCRINE: denies excessive thirst or urination; denies unexpected wt gain or wt loss    PHYSICAL EXAM:   GENERAL: well developed, well nourished male, in no apparent distress  SKIN: anicteric  EYES: PERRLA, EOMI, sclera anicteric  HEENT: normocephalic; normal nose, there is no supraclavicular adenopathy present  NECK: supple, no JVD,   RESPIRATORY: clear to percussion and auscultation, equal chest wall excursions  CARDIOVASCULAR: RRR, good peripheral perfusion  ABDOMEN: normal active BS, soft, non distended, nontender; there is no hepatosplenomegaly present, no palpable discrete masses present  Marked edema of the penis and scrotum. No large inguinal hernia present    8/26/2022: Abdomen is soft nondistended nontender. Incisions healing well  Edema of the penis and scrotum is markedly improved with some residual swelling present. No ecchymosis present. 9/30/2022: Abdomen is soft nondistended nontender. No abdominal wall swelling present. No penile or scrotal swelling present  Right inguinal hernia present. No obvious left inguinal hernia present. LYMPHATIC: no lymphadenopathy  EXTREMITIES: no cyanosis, clubbing or edema    ASSESSMENT/ PLAN:   This is a 69year old male presents with chronic renal failure with a peritoneal dialysis catheter that was placed 1/2 years ago. Patient now with acute onset of the edema and swelling of the penis and scrotum. Patient is status post diagnostic laparoscopy which revealed a large right inguinal hernia present as well as a smaller left inguinal hernia present. Patient is on hemodialysis. The penile and scrotal swelling has completely resolved. The patient is interested in lifelong hemodialysis and would like the PD catheter removed. I discussed hernia repair in detail with the patient as well as his wife. Patient feels that his hernia is asymptomatic and therefore does not want repair of either hernia. I did discuss concerns for incarceration or strangulation and the need for emergency surgery as well as increasing in size of the inguinal hernia. The patient understands this and wishes just to have the PD catheter removed.   The patient is receiving treatment for his multiple myeloma at [facility] on a monthly basis. Patient has neck scheduled for treatment on Saturday, October 8, 2022. Ideally I would like to wait approximate 3 weeks after his treatment to have catheter removed then if possible waiting 2 to 3 weeks before restarting these treatments. They will discuss this with her medical oncologist at [facility] [place]. I discussed the risk of procedure including bleeding, infection, nonhealing wound, scar formation, inability replace peritoneal dialysis catheter, need for lifelong hemodialysis, as well as risk with anesthesia include myocardial infarction, surgery failure, renal failure, pulmonary mass, DVT, and even death were all discussed in detail with the patient and his wife who understand and wish to proceed with the operation. We will plan removal of peritoneal dialysis catheter under general anesthetic at [facility] [place] [facility] on Thursday, October 27, 2022. Patient will continue hemodialysis on Monday Wednesday Friday. .  Total time spent in direct patient contact and decision-making And coordinating the patient's care including greater than 50% face-to-face was 25 minutes.     Farhad Velazquez MD, Dr. Jacquie Angelucci

## 2022-11-29 RX ORDER — LATANOPROST 50 UG/ML
1 SOLUTION/ DROPS OPHTHALMIC NIGHTLY
COMMUNITY

## 2022-11-30 ENCOUNTER — TELEPHONE (OUTPATIENT)
Dept: INTERNAL MEDICINE CLINIC | Facility: CLINIC | Age: 77
End: 2022-11-30

## 2022-11-30 ENCOUNTER — PATIENT MESSAGE (OUTPATIENT)
Dept: INTERNAL MEDICINE CLINIC | Facility: CLINIC | Age: 77
End: 2022-11-30

## 2022-11-30 ENCOUNTER — LAB ENCOUNTER (OUTPATIENT)
Dept: LAB | Facility: HOSPITAL | Age: 77
End: 2022-11-30
Attending: SURGERY
Payer: MEDICARE

## 2022-11-30 DIAGNOSIS — Z01.818 PREOP TESTING: ICD-10-CM

## 2022-11-30 LAB — SARS-COV-2 RNA RESP QL NAA+PROBE: NOT DETECTED

## 2022-11-30 NOTE — TELEPHONE ENCOUNTER
Please advise for DR. ARCE patient thanks - to DR.N GARCIA to DR. ARCE [Medical Office: ([facility])___] : at the medical office located in  [Home] : at home, [unfilled] , at the time of the visit. [Verbal consent obtained from patient] : the patient, [unfilled] [FreeTextEntry1] : She is taking Prednisone 15 mg daily for the past month.  She is moving well.\par \par Her eyes feel tired.  She sometimes feels dizzy.\par \par She can now mop her floors, and lift pots again.

## 2022-11-30 NOTE — TELEPHONE ENCOUNTER
To DR. ARCE - called spouse per hipaa - she was told patient can take insulin tonight and eat , have breakfast - after 10 am fast - no insulin that night - [facility]   [facility]     Surgery will be at 5pm tomorrow and patient should nit take insulin tomorrow night

## 2022-11-30 NOTE — TELEPHONE ENCOUNTER
Please call patient wife  Patient surgery  has been rescheduled from 12/8/22 to 12/1/22 at 5:00PM (tentative)  Patient/spouse need direction for insulin and fasting instructions  Pt has dialysis today at 3:30   Pt was told to fast from midnight tonight? Would like to confirm that this ok?   Tasked to nursing

## 2022-11-30 NOTE — TELEPHONE ENCOUNTER
I would hold tonight's insulin because of the fasting. Easier to control higher blood sugars than lower ones. Please ask her to to call Dr. Tatiana Diaz office his endocrinologist who helps manage his diabetes to confirm these recommendation and offer any other perioperative advise.

## 2022-11-30 NOTE — TELEPHONE ENCOUNTER
Regarding: surgery scheduled  ----- Message from [facility] sent at 11/30/2022  2:25 PM CST -----       ----- Message from Lucy Licona to Jose Owen MD sent at 11/30/2022 12:24 PM -----   sorry to get this info to you this late but this surgery schedule came about yesterday . Triston Cabrales who is on hemo dialysis, is getting his fistula placed in his left arm tomorrow by dr. Claudia Howell at [facility] outpatient surgery. He was quickly tested for covid this am and is  negative. We are now waiting on the time line for tomorrow's surgery.  Thank you  Triston Cabrales

## 2022-11-30 NOTE — TELEPHONE ENCOUNTER
From: Yohana Babb  To: Jose Wahl MD  Sent: 11/30/2022 12:24 PM CST  Subject: surgery scheduled    sorry to get this info to you this late but this surgery schedule came about yesterday . David Miller who is on hemo dialysis, is getting his fistula placed in his left arm tomorrow by dr. Mitchell Smith at [facility] outpatient surgery. He was quickly tested for covid this am and is negative. We are now waiting on the time line for tomorrow's surgery.  Thank you  David Bennettbrie

## 2022-12-01 ENCOUNTER — ANESTHESIA (OUTPATIENT)
Dept: SURGERY | Facility: HOSPITAL | Age: 77
End: 2022-12-01
Payer: MEDICARE

## 2022-12-01 ENCOUNTER — HOSPITAL ENCOUNTER (OUTPATIENT)
Facility: HOSPITAL | Age: 77
Setting detail: HOSPITAL OUTPATIENT SURGERY
Discharge: HOME | End: 2022-12-01
Attending: SURGERY | Admitting: SURGERY
Payer: MEDICARE

## 2022-12-01 ENCOUNTER — ANESTHESIA EVENT (OUTPATIENT)
Dept: SURGERY | Facility: HOSPITAL | Age: 77
End: 2022-12-01
Payer: MEDICARE

## 2022-12-01 VITALS
HEIGHT: 68 IN | RESPIRATION RATE: 16 BRPM | OXYGEN SATURATION: 95 % | SYSTOLIC BLOOD PRESSURE: 159 MMHG | WEIGHT: 202.13 LBS | TEMPERATURE: 98 F | HEART RATE: 79 BPM | DIASTOLIC BLOOD PRESSURE: 82 MMHG | BODY MASS INDEX: 30.63 KG/M2

## 2022-12-01 DIAGNOSIS — Z01.818 PREOP TESTING: Primary | ICD-10-CM

## 2022-12-01 PROBLEM — N18.5 CHRONIC RENAL FAILURE SYNDROME, STAGE 5 (HCC): Status: ACTIVE | Noted: 2022-12-01

## 2022-12-01 LAB
GLUCOSE BLDC GLUCOMTR-MCNC: 123 MG/DL (ref 70–99)
GLUCOSE BLDC GLUCOMTR-MCNC: 98 MG/DL (ref 70–99)
POTASSIUM SERPL-SCNC: 4.3 MMOL/L (ref 3.5–5.1)

## 2022-12-01 PROCEDURE — 84132 ASSAY OF SERUM POTASSIUM: CPT | Performed by: SURGERY

## 2022-12-01 PROCEDURE — 82962 GLUCOSE BLOOD TEST: CPT

## 2022-12-01 PROCEDURE — 031C0ZF BYPASS LEFT RADIAL ARTERY TO LOWER ARM VEIN, OPEN APPROACH: ICD-10-PCS | Performed by: SURGERY

## 2022-12-01 RX ORDER — NICOTINE POLACRILEX 4 MG
15 LOZENGE BUCCAL
Status: DISCONTINUED | OUTPATIENT
Start: 2022-12-01 | End: 2022-12-01

## 2022-12-01 RX ORDER — BUPIVACAINE HYDROCHLORIDE 2.5 MG/ML
INJECTION, SOLUTION EPIDURAL; INFILTRATION; INTRACAUDAL AS NEEDED
Status: DISCONTINUED | OUTPATIENT
Start: 2022-12-01 | End: 2022-12-01 | Stop reason: HOSPADM

## 2022-12-01 RX ORDER — ONDANSETRON 2 MG/ML
4 INJECTION INTRAMUSCULAR; INTRAVENOUS EVERY 6 HOURS PRN
Status: CANCELLED | OUTPATIENT
Start: 2022-12-01

## 2022-12-01 RX ORDER — METOCLOPRAMIDE 10 MG/1
10 TABLET ORAL ONCE
Status: COMPLETED | OUTPATIENT
Start: 2022-12-01 | End: 2022-12-01

## 2022-12-01 RX ORDER — HYDROCODONE BITARTRATE AND ACETAMINOPHEN 10; 325 MG/1; MG/1
1 TABLET ORAL EVERY 4 HOURS PRN
Status: DISCONTINUED | OUTPATIENT
Start: 2022-12-01 | End: 2022-12-01

## 2022-12-01 RX ORDER — MORPHINE SULFATE 4 MG/ML
4 INJECTION, SOLUTION INTRAMUSCULAR; INTRAVENOUS EVERY 10 MIN PRN
Status: DISCONTINUED | OUTPATIENT
Start: 2022-12-01 | End: 2022-12-01

## 2022-12-01 RX ORDER — NICOTINE POLACRILEX 4 MG
30 LOZENGE BUCCAL
Status: DISCONTINUED | OUTPATIENT
Start: 2022-12-01 | End: 2022-12-01 | Stop reason: HOSPADM

## 2022-12-01 RX ORDER — LABETALOL HYDROCHLORIDE 5 MG/ML
INJECTION, SOLUTION INTRAVENOUS AS NEEDED
Status: DISCONTINUED | OUTPATIENT
Start: 2022-12-01 | End: 2022-12-01 | Stop reason: SURG

## 2022-12-01 RX ORDER — NICOTINE POLACRILEX 4 MG
15 LOZENGE BUCCAL
Status: DISCONTINUED | OUTPATIENT
Start: 2022-12-01 | End: 2022-12-01 | Stop reason: HOSPADM

## 2022-12-01 RX ORDER — SODIUM CHLORIDE, SODIUM LACTATE, POTASSIUM CHLORIDE, CALCIUM CHLORIDE 600; 310; 30; 20 MG/100ML; MG/100ML; MG/100ML; MG/100ML
INJECTION, SOLUTION INTRAVENOUS CONTINUOUS
Status: DISCONTINUED | OUTPATIENT
Start: 2022-12-01 | End: 2022-12-01

## 2022-12-01 RX ORDER — HEPARIN SODIUM 1000 [USP'U]/ML
INJECTION, SOLUTION INTRAVENOUS; SUBCUTANEOUS AS NEEDED
Status: DISCONTINUED | OUTPATIENT
Start: 2022-12-01 | End: 2022-12-01 | Stop reason: HOSPADM

## 2022-12-01 RX ORDER — ONDANSETRON 2 MG/ML
INJECTION INTRAMUSCULAR; INTRAVENOUS AS NEEDED
Status: DISCONTINUED | OUTPATIENT
Start: 2022-12-01 | End: 2022-12-01 | Stop reason: SURG

## 2022-12-01 RX ORDER — HEPARIN SODIUM 1000 [USP'U]/ML
INJECTION, SOLUTION INTRAVENOUS; SUBCUTANEOUS AS NEEDED
Status: DISCONTINUED | OUTPATIENT
Start: 2022-12-01 | End: 2022-12-01 | Stop reason: SURG

## 2022-12-01 RX ORDER — MORPHINE SULFATE 4 MG/ML
2 INJECTION, SOLUTION INTRAMUSCULAR; INTRAVENOUS EVERY 10 MIN PRN
Status: DISCONTINUED | OUTPATIENT
Start: 2022-12-01 | End: 2022-12-01

## 2022-12-01 RX ORDER — DEXAMETHASONE SODIUM PHOSPHATE 4 MG/ML
VIAL (ML) INJECTION AS NEEDED
Status: DISCONTINUED | OUTPATIENT
Start: 2022-12-01 | End: 2022-12-01 | Stop reason: SURG

## 2022-12-01 RX ORDER — HYDROCODONE BITARTRATE AND ACETAMINOPHEN 10; 325 MG/1; MG/1
1 TABLET ORAL EVERY 6 HOURS PRN
Qty: 20 TABLET | Refills: 0 | Status: SHIPPED | OUTPATIENT
Start: 2022-12-01 | End: 2022-12-06

## 2022-12-01 RX ORDER — NICOTINE POLACRILEX 4 MG
30 LOZENGE BUCCAL
Status: DISCONTINUED | OUTPATIENT
Start: 2022-12-01 | End: 2022-12-01

## 2022-12-01 RX ORDER — LIDOCAINE HYDROCHLORIDE 10 MG/ML
INJECTION, SOLUTION EPIDURAL; INFILTRATION; INTRACAUDAL; PERINEURAL AS NEEDED
Status: DISCONTINUED | OUTPATIENT
Start: 2022-12-01 | End: 2022-12-01 | Stop reason: SURG

## 2022-12-01 RX ORDER — HYDROMORPHONE HYDROCHLORIDE 1 MG/ML
0.2 INJECTION, SOLUTION INTRAMUSCULAR; INTRAVENOUS; SUBCUTANEOUS EVERY 5 MIN PRN
Status: DISCONTINUED | OUTPATIENT
Start: 2022-12-01 | End: 2022-12-01

## 2022-12-01 RX ORDER — DEXTROSE MONOHYDRATE 25 G/50ML
50 INJECTION, SOLUTION INTRAVENOUS
Status: DISCONTINUED | OUTPATIENT
Start: 2022-12-01 | End: 2022-12-01

## 2022-12-01 RX ORDER — HYDROMORPHONE HYDROCHLORIDE 1 MG/ML
0.6 INJECTION, SOLUTION INTRAMUSCULAR; INTRAVENOUS; SUBCUTANEOUS EVERY 5 MIN PRN
Status: DISCONTINUED | OUTPATIENT
Start: 2022-12-01 | End: 2022-12-01

## 2022-12-01 RX ORDER — FAMOTIDINE 20 MG/1
20 TABLET, FILM COATED ORAL ONCE
Status: COMPLETED | OUTPATIENT
Start: 2022-12-01 | End: 2022-12-01

## 2022-12-01 RX ORDER — NALOXONE HYDROCHLORIDE 0.4 MG/ML
80 INJECTION, SOLUTION INTRAMUSCULAR; INTRAVENOUS; SUBCUTANEOUS AS NEEDED
Status: DISCONTINUED | OUTPATIENT
Start: 2022-12-01 | End: 2022-12-01

## 2022-12-01 RX ORDER — CEFAZOLIN SODIUM/WATER 2 G/20 ML
2 SYRINGE (ML) INTRAVENOUS ONCE
Status: COMPLETED | OUTPATIENT
Start: 2022-12-01 | End: 2022-12-01

## 2022-12-01 RX ORDER — SODIUM CHLORIDE 9 MG/ML
INJECTION, SOLUTION INTRAVENOUS CONTINUOUS
Status: DISCONTINUED | OUTPATIENT
Start: 2022-12-01 | End: 2022-12-01

## 2022-12-01 RX ORDER — HYDROMORPHONE HYDROCHLORIDE 1 MG/ML
0.4 INJECTION, SOLUTION INTRAMUSCULAR; INTRAVENOUS; SUBCUTANEOUS EVERY 5 MIN PRN
Status: DISCONTINUED | OUTPATIENT
Start: 2022-12-01 | End: 2022-12-01

## 2022-12-01 RX ORDER — DEXTROSE MONOHYDRATE 25 G/50ML
50 INJECTION, SOLUTION INTRAVENOUS
Status: DISCONTINUED | OUTPATIENT
Start: 2022-12-01 | End: 2022-12-01 | Stop reason: HOSPADM

## 2022-12-01 RX ORDER — MORPHINE SULFATE 10 MG/ML
6 INJECTION, SOLUTION INTRAMUSCULAR; INTRAVENOUS EVERY 10 MIN PRN
Status: DISCONTINUED | OUTPATIENT
Start: 2022-12-01 | End: 2022-12-01

## 2022-12-01 RX ORDER — PHENYLEPHRINE HCL 10 MG/ML
VIAL (ML) INJECTION AS NEEDED
Status: DISCONTINUED | OUTPATIENT
Start: 2022-12-01 | End: 2022-12-01 | Stop reason: SURG

## 2022-12-01 RX ORDER — ACETAMINOPHEN 500 MG
1000 TABLET ORAL ONCE
Status: COMPLETED | OUTPATIENT
Start: 2022-12-01 | End: 2022-12-01

## 2022-12-01 RX ADMIN — DEXAMETHASONE SODIUM PHOSPHATE 4 MG: 4 MG/ML VIAL (ML) INJECTION at 19:06:00

## 2022-12-01 RX ADMIN — CEFAZOLIN SODIUM/WATER 2 G: 2 G/20 ML SYRINGE (ML) INTRAVENOUS at 19:00:00

## 2022-12-01 RX ADMIN — ONDANSETRON 4 MG: 2 INJECTION INTRAMUSCULAR; INTRAVENOUS at 19:06:00

## 2022-12-01 RX ADMIN — LIDOCAINE HYDROCHLORIDE 50 MG: 10 INJECTION, SOLUTION EPIDURAL; INFILTRATION; INTRACAUDAL; PERINEURAL at 18:46:00

## 2022-12-01 RX ADMIN — LABETALOL HYDROCHLORIDE 10 MG: 5 INJECTION, SOLUTION INTRAVENOUS at 19:23:00

## 2022-12-01 RX ADMIN — LABETALOL HYDROCHLORIDE 10 MG: 5 INJECTION, SOLUTION INTRAVENOUS at 20:09:00

## 2022-12-01 RX ADMIN — HEPARIN SODIUM 3000 UNITS: 1000 INJECTION, SOLUTION INTRAVENOUS; SUBCUTANEOUS at 19:23:00

## 2022-12-01 RX ADMIN — PHENYLEPHRINE HCL 100 MCG: 10 MG/ML VIAL (ML) INJECTION at 19:08:00

## 2022-12-02 NOTE — OPERATIVE REPORT
[facility]    PATIENT'S NAME: Rob Perdomo   ATTENDING PHYSICIAN: Karina Altman MD   OPERATING PHYSICIAN: Karina Altman MD   PATIENT ACCOUNT#:   [de-identified]    LOCATION:  [place] [address]  MEDICAL RECORD #:   [id number]       YOB: 1945  ADMISSION DATE:       12/01/2022      OPERATION DATE:  12/01/2022    OPERATIVE REPORT    PREOPERATIVE DIAGNOSIS:  Renal failure and need for access. POSTOPERATIVE DIAGNOSIS:  Renal failure and need for access. PROCEDURE:  Creation of left distal forearm radial artery to cephalic vein arteriovenous fistula. ASSISTANT:  LITZY Jackson. ANESTHESIA:  General, Dr. Diego Shi. INDICATIONS:  The patient is a 27-year-old man with renal failure, on dialysis for about 3 months with a left jugular permacath, brought to the operating room now for planned arteriovenous fistula creation. The risks of procedure including bleeding, infection, nerve injury, scarring, need for further operation were discussed with the patient and he chose to proceed. OPERATIVE TECHNIQUE:  Patient was identified in the preoperative area and marked, brought into the operating room and placed in the supine position. General anesthesia was given. Patient's entire left upper extremity was prepped and draped in the usual manner. A time-out was done. His cephalic vein near the level of the distal forearm was somewhat lateral.  We made an incision in lateral forearm just above the wrist, identified the cephalic vein, ligated several branches, ligated distally in the hand at the level of the wrist, mobilized the vein a length of about 7 cm, opened the fascia over the radial artery, had Anesthesia give 3000 units of heparin IV. We clamped the radial artery, opened the length about 10 mm. We opened the vein, did an end-to-side anastomosis between the vein and the artery with standard microvascular technique and 7-0 Prolene suture.   We flushed everything appropriately, completed the anastomosis. There was an excellent thrill in the fistula, good flow to the hand. Hemostasis was obtained. We closed the wound in layers. Used a cannula to inject 30 mL of 0.25% Marcaine followed by Dermabond, 4 x 4, Tegaderm, Kerlix, and Ace bandage. Sponge, needle, and instrument counts were correct. Estimated blood loss about 20 mL. No complications. Patient tolerated the procedure well. Patient was extubated, being transported to recovery room in stable condition at the time of this dictation.       Dictated By Blanco Joy MD  d: 12/01/2022 20:14:38  t: 12/02/2022 07:06:52  Job [id number]  FAU/

## 2022-12-02 NOTE — ANESTHESIA PROCEDURE NOTES
Airway  Date/Time: 12/1/2022 6:48 PM  Urgency: Elective    Airway not difficult    General Information and Staff    Patient location during procedure: OR  Anesthesiologist: Devin Lopez MD  Performed: anesthesiologist     Indications and Patient Condition  Indications for airway management: anesthesia  Sedation level: deep  Preoxygenated: yes  Patient position: sniffing  Mask difficulty assessment: 1 - vent by mask    Final Airway Details  Final airway type: endotracheal airway      Successful airway: ETT  Cuffed: yes   Successful intubation technique: Video laryngoscopy  Facilitating devices/methods: intubating stylet  Endotracheal tube insertion site: oral  Blade: GlideScope  Blade size: #4  ETT size (mm): 7.5    Cormack-Lehane Classification: grade I - full view of glottis  Placement verified by: chest auscultation and capnometry   Measured from: teeth  Number of attempts at approach: 1

## 2022-12-02 NOTE — ANESTHESIA POSTPROCEDURE EVALUATION
Patient: Melo Freeman    Procedure Summary     Date: 12/01/22 Room / Location: [facility] OR 01 / [facility] OR    Anesthesia Start: [address] Anesthesia Stop: 2014    Procedure: Left distal forearm arteriovenous fistula (Left: Lower Arm) Diagnosis: (End stage renal disease; dialysis dependent)    Surgeons: Leena Little MD Anesthesiologist: Thalia Love MD    Anesthesia Type: general ASA Status: 3          Anesthesia Type: general    Vitals Value Taken Time   /89 12/01/22 2015   Temp  12/01/22 2015   Pulse 81 12/01/22 2014   Resp 96 12/01/22 2015   SpO2 93 % 12/01/22 2014   Vitals shown include unvalidated device data.     [facility] Post Evaluation:   Patient Evaluated in PACU  Patient Participation: complete - patient participated  Level of Consciousness: awake  Pain Management: adequate  Airway Patency:patent  Yes    Cardiovascular Status: acceptable  Respiratory Status: acceptable  Postoperative Hydration acceptable      Tata Stern MD  12/1/2022 8:15 PM

## 2022-12-02 NOTE — BRIEF OP NOTE
[facility] POST ANESTHESIA CARE UNIT  Brief Op Note       Patients Name: Narayan Brooks  Attending Physician: Soy Singh MD  Operating Physician: Migue Bravo MD  CSN: [id number]     Location:  OR  MRN: [id number]    YOB: 1945  Admission Date: 12/1/2022  Operation Date: 12/1/2022    Brief Operative Report    Pre-Operative Diagnosis: End stage renal disease; dialysis dependent    Post-Operative Diagnosis: same  Procedure Performed: Left distal forearm arteriovenous fistula creation    Primary Surgeon:  Migue Bravo MD    Assistants:  Maryjane Laws SA    Anesthesia: General  Anesthesiologist [facility]: Rhonda Romo MD    Findings:  4 mm vein good thrill and hand perfusion at completion  EBL: 20 mL    Complications: None    Specimens:  none  Condition: good      Migue Bravo MD  12/1/2022  8:11 PM

## 2023-01-10 ENCOUNTER — PATIENT MESSAGE (OUTPATIENT)
Dept: INTERNAL MEDICINE CLINIC | Facility: CLINIC | Age: 78
End: 2023-01-10

## 2023-01-10 NOTE — TELEPHONE ENCOUNTER
From: Hans Samayoa  To: Rod Brooks MD  Sent: 1/10/2023 11:18 AM CST  Subject: ekg results    if possible could the results be sent to my cardiologist on my last EKG results from my last visit. She should be on epic and her name is  at [facility]. She also wanted the lipid panel results and that may have been in Mar.2022. Most likely she is on the list that you have connected with in the past . I had not seen her in almost 2 years and she wanted to schedule an echo cardiagram and was wanting the results of the previous tests. I am just tired of all the labs I go through and possibly could avoid repeats. I am planning to get the echo through [facility] soon. Feeling great this is just follow up.  Thanks

## 2023-01-10 NOTE — TELEPHONE ENCOUNTER
Lab results and EKG faxed to Dr. Mari Hester at [phone]. Confirmation received. Vibes message sent to patient.

## 2023-01-18 ENCOUNTER — PATIENT MESSAGE (OUTPATIENT)
Dept: INTERNAL MEDICINE CLINIC | Facility: CLINIC | Age: 78
End: 2023-01-18

## 2023-01-23 ENCOUNTER — TELEPHONE (OUTPATIENT)
Dept: AUDIOLOGY | Facility: CLINIC | Age: 78
End: 2023-01-23

## 2023-01-23 NOTE — TELEPHONE ENCOUNTER
Spoke with patient. Hearing aid is back from repair and ready for  at the . He will return loaner at that time. No charge-under warranty.      Diego Abrams.

## 2023-02-06 ENCOUNTER — PATIENT OUTREACH (OUTPATIENT)
Dept: CASE MANAGEMENT | Age: 78
End: 2023-02-06

## 2023-02-08 ENCOUNTER — LAB ENCOUNTER (OUTPATIENT)
Dept: LAB | Facility: HOSPITAL | Age: 78
End: 2023-02-08
Attending: SURGERY
Payer: MEDICARE

## 2023-02-08 DIAGNOSIS — Z01.818 PREOP TESTING: ICD-10-CM

## 2023-02-08 NOTE — DISCHARGE INSTRUCTIONS
Keep ACE bandage on left arm. Ice for 30 minutes twice as day. May loosen ACE bandage if the hand or foot swells. Keep plastic water proof Tegaderm in place left chest for 3 days. May apply ice to operative site for pain control. Resume  Regular diet. Call surgeon if pain becomes severe or unable to use extremity. Mike Scales MD   [facility] [id number]  [facility]  [phone]    Please call my office for a follow-up appointment and wound check. You should be seen in the office within one week.

## 2023-02-09 ENCOUNTER — PATIENT MESSAGE (OUTPATIENT)
Dept: INTERNAL MEDICINE CLINIC | Facility: CLINIC | Age: 78
End: 2023-02-09

## 2023-02-09 DIAGNOSIS — R53.81 PHYSICAL DECONDITIONING: Primary | ICD-10-CM

## 2023-02-09 DIAGNOSIS — E11.9 TYPE 2 DIABETES MELLITUS WITHOUT COMPLICATION, WITHOUT LONG-TERM CURRENT USE OF INSULIN (HCC): ICD-10-CM

## 2023-02-09 DIAGNOSIS — R26.89 BALANCE PROBLEM: ICD-10-CM

## 2023-02-09 DIAGNOSIS — C90.01 MULTIPLE MYELOMA IN REMISSION (HCC): ICD-10-CM

## 2023-02-09 DIAGNOSIS — N18.5 CKD (CHRONIC KIDNEY DISEASE), STAGE V (HCC): ICD-10-CM

## 2023-02-09 LAB — SARS-COV-2 RNA RESP QL NAA+PROBE: NOT DETECTED

## 2023-02-09 NOTE — TELEPHONE ENCOUNTER
From: Alla Speak  To: Neeta Alvarado MD  Sent: 2/9/2023 5:16 PM CST  Subject: physical therpy    Before I went on hemodialysis I had done physical therapy at [facility] in [address]. Currently the dialysis I am doing every Mon, Wed, and Fri tires me out and I am hoping to start back for strength training and balance . I did call and they have all the info on prescribing Dr. ,insurance, and past history as well. Denise Brown is the therapist and he plans to get back to me, he will try to start next Tues 2/14 at 11am if possible.  Thanks   Maxine Wolf

## 2023-02-11 ENCOUNTER — HOSPITAL ENCOUNTER (OUTPATIENT)
Facility: HOSPITAL | Age: 78
Setting detail: HOSPITAL OUTPATIENT SURGERY
Discharge: HOME | End: 2023-02-11
Attending: SURGERY | Admitting: SURGERY
Payer: MEDICARE

## 2023-02-11 VITALS
TEMPERATURE: 98 F | RESPIRATION RATE: 18 BRPM | DIASTOLIC BLOOD PRESSURE: 67 MMHG | SYSTOLIC BLOOD PRESSURE: 149 MMHG | BODY MASS INDEX: 29.91 KG/M2 | OXYGEN SATURATION: 93 % | WEIGHT: 199.63 LBS | HEART RATE: 72 BPM | HEIGHT: 68.5 IN

## 2023-02-11 DIAGNOSIS — Z01.818 PREOP TESTING: Primary | ICD-10-CM

## 2023-02-11 PROBLEM — T82.9XXA COMPLICATION ASSOCIATED WITH DIALYSIS CATHETER: Status: ACTIVE | Noted: 2023-02-11

## 2023-02-11 PROCEDURE — 05PY03Z REMOVAL OF INFUSION DEVICE FROM UPPER VEIN, OPEN APPROACH: ICD-10-PCS | Performed by: SURGERY

## 2023-02-11 RX ORDER — HYDROCODONE BITARTRATE AND ACETAMINOPHEN 10; 325 MG/1; MG/1
1 TABLET ORAL EVERY 4 HOURS PRN
Status: DISCONTINUED | OUTPATIENT
Start: 2023-02-11 | End: 2023-02-11

## 2023-02-11 RX ORDER — LIDOCAINE HYDROCHLORIDE 10 MG/ML
INJECTION, SOLUTION EPIDURAL; INFILTRATION; INTRACAUDAL; PERINEURAL AS NEEDED
Status: DISCONTINUED | OUTPATIENT
Start: 2023-02-11 | End: 2023-02-11 | Stop reason: HOSPADM

## 2023-02-11 NOTE — BRIEF OP NOTE
[facility] POST ANESTHESIA CARE UNIT  Brief Op Note       Patients Name: Alla Bell  Attending Physician: Anna Vaca MD  Operating Physician: Aretha Pittman MD  CSN: [id number]     Location:  OR  MRN: [id number]    YOB: 1945  Admission Date: 2/11/2023  Operation Date: 2/11/2023    Brief Operative Report    Pre-Operative Diagnosis: Non-functioning permacath    Post-Operative Diagnosis: same    Procedure Performed: Removal of perma cath left jugular    Primary Surgeon:  Aretha Pittman MD    Assistants:  None  Anesthesia: Local  10 ml 1% lidocaine  Findings:catheter removed entirely    EBL:< 5 mL    Complications: None    Specimens:  none    Condition:  good      Aretha Pittman MD  2/11/2023  8:16 AM

## 2023-02-11 NOTE — OR PREOP
Noted bruising and swelling to patient's left lower arm. Patient reports this occurred yesterday when he got dialysis. Extremity is warm and dry, good circulation. Will inform Dr. Trupti Scott and OR staff.

## 2023-02-13 NOTE — OPERATIVE REPORT
[facility]    PATIENT'S NAME: Yasmine Tim   ATTENDING PHYSICIAN: Inessa Aguayo MD   OPERATING PHYSICIAN: Inessa Aguayo MD   PATIENT ACCOUNT#:   [de-identified]    LOCATION:  [address]  MEDICAL RECORD #:   [id number]       YOB: 1945  ADMISSION DATE:       02/11/2023      OPERATION DATE:  02/11/2023    OPERATIVE REPORT    PREOPERATIVE DIAGNOSIS:  Nonfunctioning left jugular Permacath. POSTOPERATIVE DIAGNOSIS:  Nonfunctioning left jugular Permacath. PROCEDURE:  Removal of left jugular Permacath by cutdown. ASSISTANT:  None. ANESTHESIA:  Local, 10 mL 1% lidocaine. INDICATIONS:  The patient is a 67-year-old man who has been dialyzed successfully for the last 9 weeks with a left forearm arteriovenous fistula. He is brought to the operating room now for removal of his left jugular Permacath. The risks of the procedure including bleeding, infection, nerve injury, scarring, need for further operations were discussed with the patient and he chose to proceed. OPERATIVE TECHNIQUE:  The patient was identified in the preoperative area and marked, brought to the operating room and placed in supine position. His left neck and chest were prepped in the usual sterile manner. A time-out was done. I used a fine needle to inject 10 mL of 1% lidocaine in the area of the catheter exit site and left infraclavicular pectoral region and all the way up to the level of the palpable cuff in the subcutaneous region. I made a small 4 mm incision on each side of the catheter, then using a combination of sharp and blunt dissection I dissected up to the level of the cuff, and through the catheter exit site tunnel dissected free the cuff after the patient had been placed in Trendelenburg and the catheter was removed in its entirety. The tip was not sent for culture.   The subcutaneous tissue was then closed with subcutaneous Vicryl suture, followed by Steri-Strips, 2 x 2's, and Tegaderm, followed by pressure dressing. Sponge, needle, and instrument counts were correct. Estimated blood loss was less than 5 mL. No complications. Patient tolerated the procedure well and was brought back to day surgery. No specimens.     Dictated By Colette Mccall MD  d: 02/11/2023 22:34:92  t: 02/11/2023 09:15:34  [facility] [id number]  [facility]/

## 2023-03-22 ENCOUNTER — HOSPITAL ENCOUNTER (EMERGENCY)
Facility: HOSPITAL | Age: 78
Discharge: HOME | End: 2023-03-22
Attending: EMERGENCY MEDICINE
Payer: MEDICARE

## 2023-03-22 VITALS
HEART RATE: 84 BPM | SYSTOLIC BLOOD PRESSURE: 167 MMHG | TEMPERATURE: 99 F | WEIGHT: 198 LBS | HEIGHT: 68 IN | RESPIRATION RATE: 20 BRPM | DIASTOLIC BLOOD PRESSURE: 80 MMHG | BODY MASS INDEX: 30.01 KG/M2 | OXYGEN SATURATION: 97 %

## 2023-03-22 DIAGNOSIS — I49.9 CARDIAC ARRHYTHMIA, UNSPECIFIED CARDIAC ARRHYTHMIA TYPE: Primary | ICD-10-CM

## 2023-03-22 LAB
ANION GAP SERPL CALC-SCNC: 9 MMOL/L (ref 0–18)
BASOPHILS # BLD AUTO: 0.01 X10(3) UL (ref 0–0.2)
BASOPHILS NFR BLD AUTO: 0.2 %
BUN BLD-MCNC: 39 MG/DL (ref 7–18)
BUN/CREAT SERPL: 7.2 (ref 10–20)
CALCIUM BLD-MCNC: 9.3 MG/DL (ref 8.5–10.1)
CHLORIDE SERPL-SCNC: 97 MMOL/L (ref 98–112)
CO2 SERPL-SCNC: 34 MMOL/L (ref 21–32)
CREAT BLD-MCNC: 5.45 MG/DL
DEPRECATED RDW RBC AUTO: 49.1 FL (ref 35.1–46.3)
EOSINOPHIL # BLD AUTO: 0.11 X10(3) UL (ref 0–0.7)
EOSINOPHIL NFR BLD AUTO: 2.2 %
ERYTHROCYTE [DISTWIDTH] IN BLOOD BY AUTOMATED COUNT: 14.7 % (ref 11–15)
GFR SERPLBLD BASED ON 1.73 SQ M-ARVRAT: 10 ML/MIN/1.73M2 (ref 60–?)
GLUCOSE BLD-MCNC: 230 MG/DL (ref 70–99)
HCT VFR BLD AUTO: 34.8 %
HGB BLD-MCNC: 11.2 G/DL
IMM GRANULOCYTES # BLD AUTO: 0.08 X10(3) UL (ref 0–1)
IMM GRANULOCYTES NFR BLD: 1.6 %
LYMPHOCYTES # BLD AUTO: 0.41 X10(3) UL (ref 1–4)
LYMPHOCYTES NFR BLD AUTO: 8.3 %
MCH RBC QN AUTO: 29.5 PG (ref 26–34)
MCHC RBC AUTO-ENTMCNC: 32.2 G/DL (ref 31–37)
MCV RBC AUTO: 91.6 FL
MONOCYTES # BLD AUTO: 0.8 X10(3) UL (ref 0.1–1)
MONOCYTES NFR BLD AUTO: 16.2 %
NEUTROPHILS # BLD AUTO: 3.54 X10 (3) UL (ref 1.5–7.7)
NEUTROPHILS # BLD AUTO: 3.54 X10(3) UL (ref 1.5–7.7)
NEUTROPHILS NFR BLD AUTO: 71.5 %
OSMOLALITY SERPL CALC.SUM OF ELEC: 307 MOSM/KG (ref 275–295)
PLATELET # BLD AUTO: 117 10(3)UL (ref 150–450)
POTASSIUM SERPL-SCNC: 3.8 MMOL/L (ref 3.5–5.1)
RBC # BLD AUTO: 3.8 X10(6)UL
SODIUM SERPL-SCNC: 140 MMOL/L (ref 136–145)
TSI SER-ACNC: 2.78 MIU/ML (ref 0.36–3.74)
WBC # BLD AUTO: 5 X10(3) UL (ref 4–11)

## 2023-03-22 PROCEDURE — 36415 COLL VENOUS BLD VENIPUNCTURE: CPT

## 2023-03-22 PROCEDURE — 93010 ELECTROCARDIOGRAM REPORT: CPT

## 2023-03-22 PROCEDURE — 99284 EMERGENCY DEPT VISIT MOD MDM: CPT

## 2023-03-22 PROCEDURE — 80048 BASIC METABOLIC PNL TOTAL CA: CPT | Performed by: EMERGENCY MEDICINE

## 2023-03-22 PROCEDURE — 84443 ASSAY THYROID STIM HORMONE: CPT | Performed by: EMERGENCY MEDICINE

## 2023-03-22 PROCEDURE — 85025 COMPLETE CBC W/AUTO DIFF WBC: CPT | Performed by: EMERGENCY MEDICINE

## 2023-03-22 PROCEDURE — 93005 ELECTROCARDIOGRAM TRACING: CPT

## 2023-03-23 ENCOUNTER — TELEPHONE (OUTPATIENT)
Dept: INTERNAL MEDICINE CLINIC | Facility: CLINIC | Age: 78
End: 2023-03-23

## 2023-03-23 DIAGNOSIS — I48.0 PAROXYSMAL ATRIAL FIBRILLATION (HCC): Primary | ICD-10-CM

## 2023-03-23 LAB
ATRIAL RATE: 86 BPM
P AXIS: 52 DEGREES
P-R INTERVAL: 144 MS
Q-T INTERVAL: 332 MS
Q-T INTERVAL: 402 MS
QRS DURATION: 90 MS
QRS DURATION: 96 MS
QTC CALCULATION (BEZET): 475 MS
QTC CALCULATION (BEZET): 481 MS
R AXIS: -9 DEGREES
R AXIS: 8 DEGREES
T AXIS: 32 DEGREES
T AXIS: 45 DEGREES
VENTRICULAR RATE: 123 BPM
VENTRICULAR RATE: 86 BPM

## 2023-03-23 NOTE — TELEPHONE ENCOUNTER
Spoke with Rubin Jacobs relayed physician message below. uRbin Jacobs verbalized understanding. She will call and make appointment with patient's cardiologist regarding new onset a-fib.

## 2023-03-23 NOTE — ED QUICK NOTES
Pt cleared by MD for discharge. Belongings with patient. Patient instructions reviewed including when and how to follow up with healthcare and when to seek for medical treatment. Peripheral IV removed. Pt A&ox4. Pt refused wheelchair. Pt walking with steady gait.

## 2023-03-23 NOTE — TELEPHONE ENCOUNTER
Please let patient know that I was able to review his emergency room visit. His initial EKG appeared to show a rhythm called \"atrial fibrillation\". His second EKG came back with a good normal rhythm. There were some other nonspecific changes. Reason for the phone call back is that he should contact his cardiologist.  In many circumstances people with atrial fibrillation that comes and goes may benefit from being put on a heart monitor. In addition there is usually a blood thinner called Eliquis that may need to be given however with him being on dialysis this does create some concerns about dosing. Please ask Dina Abbasi to call patient's cardiologist and let the cardiologist team at [facility] know that he had an episode of atrial fibrillation that converted to normal sinus when he was in the emergency room. He may be going in and out of atrial fibrillation without us knowing it. When people have that it does increase her chance of strokes. She can then let us know what the cardiologist says. The cardiologist may want to see him soon.

## 2023-03-23 NOTE — ED INITIAL ASSESSMENT (HPI)
Pt. Was at dialysis and when finished they noticed his HR was elevated, spouse thinks she heard them say 130bpm then started to fluctuate. Pt. Denies CP or lightheadedness.

## 2023-03-23 NOTE — TELEPHONE ENCOUNTER
Patient's wife, Jeana Myers is calling to inform Dr Coco Jones that the patient was in the ER last night. Wife states the patient is a dialysis/cancer patient. Patient had dialysis yesterday and his heart rate started to increase, he was brought the ER where he was kept for three hours and monitored. Jeana Myers states the patient had two EKGs completed, one when coming into the ER where the patient had some aifb and the second later in the evening which came back normal. Patient was informed that his heart rate increasing could have been caused by the hemodialysis but could not be 100%. Patient was discharged form the hospital around 1am and was instructed that other than being tired, he would resume normal activities starting today. Patient scheduled for physical therapy today at 1030, right now patient is sleeping but will see how he feel when waking up. Wife, Jeana Myers also states she was given hard copies of the patient's EKG reports, she is planning to stop in today to give them to Dr Coco Jones for his records. Ph # [phone]    FYI to Dr Coco Jones.

## 2023-03-24 ENCOUNTER — HOSPITAL ENCOUNTER (OUTPATIENT)
Dept: CV DIAGNOSTICS | Facility: HOSPITAL | Age: 78
Discharge: HOME | End: 2023-03-24
Attending: INTERNAL MEDICINE
Payer: MEDICARE

## 2023-03-24 DIAGNOSIS — I48.0 PAROXYSMAL ATRIAL FIBRILLATION (HCC): ICD-10-CM

## 2023-03-24 PROCEDURE — 93246 EXT ECG>7D<15D RECORDING: CPT | Performed by: INTERNAL MEDICINE

## 2023-03-24 PROCEDURE — 93247 EXT ECG>7D<15D SCAN A/R: CPT | Performed by: INTERNAL MEDICINE

## 2023-03-24 NOTE — TELEPHONE ENCOUNTER
Please call patient spouse, Mejia Encarnacion  Wanted Dr Zeb Marin to be aware of the following:    Dr Chano Elliott would like patient to start patient on Eliquis 5MG BID AM/PM  Dr Nichelle Quintero is aware that patient is cancer patient and dialysis     Mejia Encarnacion concerned about this medication combined with dialysis    Please call to advise  Tasked to nursing

## 2023-03-24 NOTE — TELEPHONE ENCOUNTER
Nonvalvular atrial fibrillation (to prevent stroke and systemic embolism):    Serum creatinine <1.5 mg/dL (133 micromole/L): No dosage adjustment necessary unless ? [de-identified]years of age and body weight ?60 kg, then reduce dose to 2.5 mg twice daily. Serum creatinine ?1.5 mg/dL (133 micromole/L) and either ? [de-identified]years of age or body weight ?60 k.5 mg twice daily. Severe or end-stage kidney disease (ESKD) not requiring dialysis: Apixaban or warfarin is considered appropriate (Ref). Some experts recommend apixaban 2.5 mg twice daily for CrCl 15 to 29 mL/minute (Ref). Hemodialysis, intermittent (thrice weekly): Not dialyzable to minimally dialyzable (AUC decreased by 14% over 4 hours) (Ref). According to the , no dosage adjustment necessary unless either ? [de-identified]years of age or body weight ?60 kg, then reduce to 2.5 mg twice daily. The  recommendations are derived from a single-dose pharmacokinetic and pharmacodynamic (anti-Factor Xa activity) evaluation in 8 patients (Ref). A multiple-dose pharmacokinetic study demonstrated drug accumulation in ESKD patients requiring hemodialysis, with a dose of 2.5 mg twice daily producing exposures similar to those produced by a 5 mg twice-daily dose in patients with normal kidney function (Ref). Despite this finding, a retrospective, propensity-matched cohort study of patients with ESKD requiring hemodialysis found that apixaban 5 mg twice daily resulted in fewer thromboembolic events and fewer major bleeding events, while apixaban 2.5 mg twice daily only resulted in fewer major bleeding events compared to warfarin (Ref). Note: Use with caution due to limited available data. Some experts avoid anticoagulation in patients with ESKD and atrial fibrillation unless risk of thromboembolism is very high (Ref).  In patients with ESKD and atrial fibrillation requiring dialysis, in whom a decision is made to anticoagulate, apixaban or warfarin is considered appropriate (Ref). Discussed with Taran Padilla. I do have a call into  regarding dosing of Eliquis. I have discussed with Dr. Millie Patiño. I did discuss with Taran Padilla that usual dose that I I am used to hearing for dialysis patient is 2.5 mg twice a day but I did see reference in up-to-date that 5 mg twice a day has been used. With that said Taran Padilla does have a call into  and is waiting to hear back before she actually starts that medication which has been called to her pharmacy. She will keep me informed. He does have the monitor in place for 14 days.

## 2023-03-26 ENCOUNTER — TELEPHONE (OUTPATIENT)
Dept: INTERNAL MEDICINE CLINIC | Facility: CLINIC | Age: 78
End: 2023-03-26

## 2023-03-26 NOTE — TELEPHONE ENCOUNTER
Discussed with Dorys Valera. There was communication with patient's hematologist Dr. Niels Paintnig. From hematological point of view okay to start Eliquis 5 mg twice a day. In addition Dorys Valera did confer with  that she wishes him to be on Eliquis 5 mg twice a day. He does have heart monitor in place. We will get results 5 days or so after the 14-day monitor is in place. Finally I discussed my concern regarding the dosage of Eliquis but in the end cardiology has recommended the 5 mg twice a day dose. There is information on up-to-date that the 5 mg twice a day dose can be used in dialysis patients    She would like me to see oTm Albarado after 14 day heart monitor and for now she will hold off as he is feeling okay without any heart irregularities. He goes to dialysis 3 times a week. She does she can call anytime for an appointment sooner than the 14-day heart monitor is completed. She also discussed with Tom Albarado watching out for bleeding.

## 2023-03-26 NOTE — TELEPHONE ENCOUNTER
Tried calling both numbers. No answer. I wanted to follow-up on the Eliquis prescription by patient's cardiologist Dr. Teddy Jules.

## 2023-03-28 ENCOUNTER — PATIENT MESSAGE (OUTPATIENT)
Dept: INTERNAL MEDICINE CLINIC | Facility: CLINIC | Age: 78
End: 2023-03-28

## 2023-03-28 NOTE — TELEPHONE ENCOUNTER
From: Vivian Lopez  To: Marielena Navarro MD  Sent: 3/28/2023 3:01 PM CDT  Subject: appt. Appt. scheduled for 11:30 April 21. Thank you  NANCY Salazar

## 2023-04-16 ENCOUNTER — TELEPHONE (OUTPATIENT)
Dept: INTERNAL MEDICINE CLINIC | Facility: CLINIC | Age: 78
End: 2023-04-16

## 2023-04-16 NOTE — TELEPHONE ENCOUNTER
Discussed with Tania Sandoval and Veronica Lopez. Holter monitor results reviewed. No atrial fibrillation. Other abnormalities noted.   I discussed that I will fax the results to his cardiologist.

## 2023-04-17 ENCOUNTER — TELEPHONE (OUTPATIENT)
Dept: INTERNAL MEDICINE CLINIC | Facility: CLINIC | Age: 78
End: 2023-04-17

## 2023-04-17 ENCOUNTER — TELEPHONE (OUTPATIENT)
Dept: AUDIOLOGY | Facility: CLINIC | Age: 78
End: 2023-04-17

## 2023-04-17 NOTE — TELEPHONE ENCOUNTER
Called back phone number and asked for clarification-which hearing aid ( right or left) will they be dropping off and what ear they need loaner for. Advised we can have one ready by 1:00pm, if they call by 1030 and left us know which ear?

## 2023-04-17 NOTE — TELEPHONE ENCOUNTER
Anu Love called to provide a corrected fax number   Cardiologist at [facility], Dr Hughes Paget  FAX#:  [phone]  Telephone#:  [phone]  Please refax results  Tasked to Dr Boni Munoz

## 2023-04-17 NOTE — TELEPHONE ENCOUNTER
Patients wife Savita Wilson calling to inform damaged hearing aide will be dropped off today at the  around 12:00-12:30. Patients wife asking if they can receive a loaner when dropping off the the other. Please [phone],[facility] [place].

## 2023-04-18 ENCOUNTER — AUDIOLOGY DOCUMENTATION (OUTPATIENT)
Dept: AUDIOLOGY | Facility: CLINIC | Age: 78
End: 2023-04-18

## 2023-04-18 NOTE — PROGRESS NOTES
Hearing Aid Information  [employer] [facility] P90-R  Right: #[id number]  Left #[id number]  Coupled to custom c-shells. Dispensing date: 5-25-21  Warranty: 3 years  Battery: Rechargeable  Wax Guard: Oval Breaker  Patient's wife dropped off left hearing aid. Wire is broken on left c-shell. Advised that this may or may not get covered in warranty due to broken wire. Will send whole unit with c-shell for repair. If c-shell not covered then cost would be $125. She agrees to have whole unit sent in. He picked up loaner aid yesterday so will call as soon as aid comes back from repair.

## 2023-04-18 NOTE — TELEPHONE ENCOUNTER
Spoke with wife as patient picked up loaner yesterday and on drop off form states left aid was lost.   Confirmed with wife today that left aid is not lost, but rather broken and they will drop off tomorrow for repair.

## 2023-04-18 NOTE — TELEPHONE ENCOUNTER
Report was faxed 4/17/23 to the given fax number -see TE 4/16/23  RN spoke with DR. ARCE who faxed holter monitor yesterday with confirmation

## 2023-04-21 ENCOUNTER — OFFICE VISIT (OUTPATIENT)
Dept: INTERNAL MEDICINE CLINIC | Facility: CLINIC | Age: 78
End: 2023-04-21

## 2023-04-21 VITALS
TEMPERATURE: 98 F | WEIGHT: 199 LBS | SYSTOLIC BLOOD PRESSURE: 140 MMHG | OXYGEN SATURATION: 98 % | DIASTOLIC BLOOD PRESSURE: 70 MMHG | BODY MASS INDEX: 30.16 KG/M2 | HEART RATE: 73 BPM | HEIGHT: 68 IN

## 2023-04-21 DIAGNOSIS — R53.82 CHRONIC FATIGUE, UNSPECIFIED: ICD-10-CM

## 2023-04-21 DIAGNOSIS — N18.6 ESRD ON HEMODIALYSIS (HCC): ICD-10-CM

## 2023-04-21 DIAGNOSIS — R41.3 MEMORY CHANGE: ICD-10-CM

## 2023-04-21 DIAGNOSIS — C90.01 MULTIPLE MYELOMA IN REMISSION (HCC): ICD-10-CM

## 2023-04-21 DIAGNOSIS — E11.65 TYPE 2 DIABETES MELLITUS WITH HYPERGLYCEMIA, WITH LONG-TERM CURRENT USE OF INSULIN (HCC): ICD-10-CM

## 2023-04-21 DIAGNOSIS — Z99.2 ESRD ON HEMODIALYSIS (HCC): ICD-10-CM

## 2023-04-21 DIAGNOSIS — Z12.5 PROSTATE CANCER SCREENING: ICD-10-CM

## 2023-04-21 DIAGNOSIS — E11.9 TYPE 2 DIABETES MELLITUS WITHOUT COMPLICATION, WITHOUT LONG-TERM CURRENT USE OF INSULIN (HCC): Primary | ICD-10-CM

## 2023-04-21 DIAGNOSIS — Z87.898 HISTORY OF ELEVATED PSA: ICD-10-CM

## 2023-04-21 DIAGNOSIS — I10 PRIMARY HYPERTENSION: ICD-10-CM

## 2023-04-21 DIAGNOSIS — Z79.4 TYPE 2 DIABETES MELLITUS WITH HYPERGLYCEMIA, WITH LONG-TERM CURRENT USE OF INSULIN (HCC): ICD-10-CM

## 2023-04-21 PROCEDURE — 99214 OFFICE O/P EST MOD 30 MIN: CPT | Performed by: INTERNAL MEDICINE

## 2023-04-21 RX ORDER — APIXABAN 5 MG/1
5 TABLET, FILM COATED ORAL 2 TIMES DAILY
COMMUNITY
Start: 2023-04-18

## 2023-04-21 RX ORDER — LANTHANUM CARBONATE 500 MG/1
500 TABLET, CHEWABLE ORAL
Qty: 1 TABLET | Refills: 0 | COMMUNITY
Start: 2023-04-21

## 2023-04-25 ENCOUNTER — AUDIOLOGY DOCUMENTATION (OUTPATIENT)
Dept: AUDIOLOGY | Facility: CLINIC | Age: 78
End: 2023-04-25

## 2023-04-26 ENCOUNTER — TELEPHONE (OUTPATIENT)
Dept: NEPHROLOGY | Facility: CLINIC | Age: 78
End: 2023-04-26

## 2023-04-26 NOTE — TELEPHONE ENCOUNTER
Thank you for the message. This patient is a dialysis patient. I do not see him in the office. I see him in the dialysis unit, so I will ask my dialysis nurse manager to call his wife to set up something through the dialysis clinic. He does not need to be seen in my office here.      I will coordinate with her separately

## 2023-04-26 NOTE — TELEPHONE ENCOUNTER
Patients wife Dina Abbasi calling to ask when patient is to schedule a visit. Patient receives dialysis at [facility] every M-W-F at 3:00 pm. Please call at [phone],MICGXE.

## 2023-05-03 ENCOUNTER — MED REC SCAN ONLY (OUTPATIENT)
Dept: INTERNAL MEDICINE CLINIC | Facility: CLINIC | Age: 78
End: 2023-05-03

## 2023-05-03 NOTE — PROGRESS NOTES
Orders signed and faxed to [place] [address] [facility] at # [phone]. Confirmation received. Copy to scanning.

## 2023-05-23 ENCOUNTER — OFFICE VISIT (OUTPATIENT)
Dept: OTOLARYNGOLOGY | Facility: CLINIC | Age: 78
End: 2023-05-23

## 2023-05-23 DIAGNOSIS — H61.23 BILATERAL IMPACTED CERUMEN: Primary | ICD-10-CM

## 2023-05-23 PROCEDURE — 69210 REMOVE IMPACTED EAR WAX UNI: CPT | Performed by: STUDENT IN AN ORGANIZED HEALTH CARE EDUCATION/TRAINING PROGRAM

## 2023-05-30 ENCOUNTER — TELEPHONE (OUTPATIENT)
Dept: INTERNAL MEDICINE CLINIC | Facility: CLINIC | Age: 78
End: 2023-05-30

## 2023-05-30 NOTE — TELEPHONE ENCOUNTER
Patient wife dropped off Parking Placard to be completed and signed by doctor. Wife would like to  at the .   Please call when ready for     Wife Hallie Soria can be reached at [phone]    Paperwork placed in Dr Murray Rodriguez

## 2023-06-02 NOTE — TELEPHONE ENCOUNTER
Parking placard form completed. In outbox. We can ask patient or wife if they would like to  so they can make a copy and make sure it is mailed.

## 2023-06-02 NOTE — TELEPHONE ENCOUNTER
Called patient per SHAYAN spoke to wife. Advised parking placard form completed. They will pickup at . Copy to scanning.

## 2023-08-28 ENCOUNTER — TELEPHONE (OUTPATIENT)
Dept: INTERNAL MEDICINE CLINIC | Facility: CLINIC | Age: 78
End: 2023-08-28

## 2023-08-28 DIAGNOSIS — S09.90XA TRAUMATIC INJURY OF HEAD, INITIAL ENCOUNTER: Primary | ICD-10-CM

## 2023-08-28 DIAGNOSIS — R29.898 MUSCULAR DECONDITIONING: ICD-10-CM

## 2023-08-28 DIAGNOSIS — R29.6 FALLING: ICD-10-CM

## 2023-08-28 DIAGNOSIS — R26.89 BALANCE DISORDER: ICD-10-CM

## 2023-08-29 ENCOUNTER — TELEPHONE (OUTPATIENT)
Dept: INTERNAL MEDICINE CLINIC | Facility: CLINIC | Age: 78
End: 2023-08-29

## 2023-08-29 ENCOUNTER — HOSPITAL ENCOUNTER (OUTPATIENT)
Dept: CT IMAGING | Facility: HOSPITAL | Age: 78
Discharge: HOME | End: 2023-08-29
Attending: INTERNAL MEDICINE
Payer: MEDICARE

## 2023-08-29 DIAGNOSIS — S09.90XA TRAUMATIC INJURY OF HEAD, INITIAL ENCOUNTER: ICD-10-CM

## 2023-08-29 PROCEDURE — 70450 CT HEAD/BRAIN W/O DYE: CPT | Performed by: INTERNAL MEDICINE

## 2023-08-30 ENCOUNTER — TELEPHONE (OUTPATIENT)
Dept: INTERNAL MEDICINE CLINIC | Facility: CLINIC | Age: 78
End: 2023-08-30

## 2023-09-15 ENCOUNTER — TELEPHONE (OUTPATIENT)
Dept: INTERNAL MEDICINE CLINIC | Facility: CLINIC | Age: 78
End: 2023-09-15

## 2023-09-15 ENCOUNTER — OFFICE VISIT (OUTPATIENT)
Dept: INTERNAL MEDICINE CLINIC | Facility: CLINIC | Age: 78
End: 2023-09-15

## 2023-09-15 VITALS
BODY MASS INDEX: 29.25 KG/M2 | SYSTOLIC BLOOD PRESSURE: 100 MMHG | RESPIRATION RATE: 20 BRPM | DIASTOLIC BLOOD PRESSURE: 56 MMHG | HEART RATE: 77 BPM | HEIGHT: 68 IN | TEMPERATURE: 98 F | OXYGEN SATURATION: 96 % | WEIGHT: 193 LBS

## 2023-09-15 DIAGNOSIS — N18.6 ESRD ON HEMODIALYSIS (HCC): ICD-10-CM

## 2023-09-15 DIAGNOSIS — Z79.01 CURRENT USE OF LONG TERM ANTICOAGULATION: ICD-10-CM

## 2023-09-15 DIAGNOSIS — R53.82 CHRONIC FATIGUE, UNSPECIFIED: ICD-10-CM

## 2023-09-15 DIAGNOSIS — Z87.898 HISTORY OF ELEVATED PSA: ICD-10-CM

## 2023-09-15 DIAGNOSIS — R53.83 FATIGUE, UNSPECIFIED TYPE: ICD-10-CM

## 2023-09-15 DIAGNOSIS — R41.3 MEMORY CHANGE: Primary | ICD-10-CM

## 2023-09-15 DIAGNOSIS — Z99.2 ESRD ON HEMODIALYSIS (HCC): ICD-10-CM

## 2023-09-15 DIAGNOSIS — E55.9 VITAMIN D DEFICIENCY: ICD-10-CM

## 2023-09-15 DIAGNOSIS — E11.9 TYPE 2 DIABETES MELLITUS WITHOUT COMPLICATION, WITHOUT LONG-TERM CURRENT USE OF INSULIN (HCC): Primary | ICD-10-CM

## 2023-09-15 DIAGNOSIS — I10 PRIMARY HYPERTENSION: ICD-10-CM

## 2023-09-15 DIAGNOSIS — C90.01 MULTIPLE MYELOMA IN REMISSION (HCC): ICD-10-CM

## 2023-09-15 DIAGNOSIS — R41.3 MEMORY CHANGE: ICD-10-CM

## 2023-09-15 DIAGNOSIS — R26.89 BALANCE DISORDER: ICD-10-CM

## 2023-09-15 PROCEDURE — 99215 OFFICE O/P EST HI 40 MIN: CPT | Performed by: INTERNAL MEDICINE

## 2023-09-15 RX ORDER — LANTHANUM CARBONATE 1000 MG/1
TABLET, CHEWABLE ORAL
COMMUNITY
Start: 2023-09-03

## 2023-09-15 RX ORDER — GLYCERIN/MALTODEXTRIN
30 LIQUID (ML) ORAL
COMMUNITY
Start: 2023-05-10

## 2023-09-15 RX ORDER — PEN NEEDLE, DIABETIC 31 G X1/4"
1 NEEDLE, DISPOSABLE MISCELLANEOUS DAILY
COMMUNITY
Start: 2023-08-02

## 2023-09-15 RX ORDER — BLOOD SUGAR DIAGNOSTIC
STRIP MISCELLANEOUS 2 TIMES DAILY
COMMUNITY
Start: 2023-08-21

## 2023-09-18 NOTE — TELEPHONE ENCOUNTER
From: Nighat Huang  Sent: 9/16/2023 9:28 AM CDT  To: [facility] Clinical Staff  Subject: Vaccinations    No we didn't discuss it. I cannot have it yet, but Doris Sicard can. Are you recommending all three for him. I see my MD in October and will address it then with her.

## 2023-09-20 ENCOUNTER — TELEPHONE (OUTPATIENT)
Dept: INTERNAL MEDICINE CLINIC | Facility: CLINIC | Age: 78
End: 2023-09-20

## 2023-09-20 NOTE — TELEPHONE ENCOUNTER
Please let Juan Luis Villeda patient's wife know that I am very happy that he will be seeing neurology September 25. Please keep appointment with Dr. Angel Silvestre as his opinion will be valuable and also he will be more available to us if you have any questions in the future. The 24-hour heart monitor on October 9 for  will also be valuable. I know this is a lot of appointments but this way we can get a full picture of [place].

## 2023-09-20 NOTE — TELEPHONE ENCOUNTER
Patient spouse called to notify Dr Violet Millan that patient scheduled for the following upcoming appointments:  9/25 neurologist appt (same practice as Dr Myesha Armando)  no appt available with Dr Myesha Armando until November 9/27 Dr Tati Hung patient cancel this appt? Keep this appt for a second opinion?   10/9 24 hour heart monitor scheduled for (Dr Janna Weinstein)    Patient understood Dr Violet Millan out of the office today    Tasked to Dr Violet Millan

## 2023-09-25 NOTE — TELEPHONE ENCOUNTER
Discussed with Ysabel Chow. She wishes to cancel Dr. Jaki Workman office as Ronda Antonio is getting overwhelmed with doctor visits. They did see Dr. Wander Kim today. He will be getting some labs and MRI of the brain. There is possibly some cognitive difficulties that he is experiencing. She wonders if she should cancel the heart monitor but I told her this would be important as I would like to see if we can get Ronda Antonio off his Eliquis. She agrees to consider continuing with the heart monitor scheduled for October and then we can follow-up and have a discussion with  regarding the continuous need of Eliquis. I did discuss with Ysabel Chow that Ronda Antonio may be entering a phase of increasing fall risk.

## 2023-09-27 ENCOUNTER — TELEPHONE (OUTPATIENT)
Dept: INTERNAL MEDICINE CLINIC | Facility: CLINIC | Age: 78
End: 2023-09-27

## 2023-09-27 ENCOUNTER — LAB ENCOUNTER (OUTPATIENT)
Dept: LAB | Age: 78
End: 2023-09-27
Attending: Other
Payer: MEDICARE

## 2023-09-27 DIAGNOSIS — Z12.5 PROSTATE CANCER SCREENING: ICD-10-CM

## 2023-09-27 DIAGNOSIS — E11.9 TYPE 2 DIABETES MELLITUS WITHOUT COMPLICATION, WITHOUT LONG-TERM CURRENT USE OF INSULIN (HCC): ICD-10-CM

## 2023-09-27 DIAGNOSIS — E55.9 VITAMIN D DEFICIENCY: ICD-10-CM

## 2023-09-27 DIAGNOSIS — G31.84 MCI (MILD COGNITIVE IMPAIRMENT) WITH MEMORY LOSS: ICD-10-CM

## 2023-09-27 DIAGNOSIS — R53.82 CHRONIC FATIGUE, UNSPECIFIED: ICD-10-CM

## 2023-09-27 LAB
CHOLEST SERPL-MCNC: 130 MG/DL (ref ?–200)
COMPLEXED PSA SERPL-MCNC: 5.57 NG/ML (ref ?–4)
EST. AVERAGE GLUCOSE BLD GHB EST-MCNC: 128 MG/DL (ref 68–126)
FASTING PATIENT LIPID ANSWER: NO
FOLATE SERPL-MCNC: >20 NG/ML (ref 8.7–?)
HBA1C MFR BLD: 6.1 % (ref ?–5.7)
HDLC SERPL-MCNC: 41 MG/DL (ref 40–59)
LDLC SERPL CALC-MCNC: 67 MG/DL (ref ?–100)
NONHDLC SERPL-MCNC: 89 MG/DL (ref ?–130)
TRIGL SERPL-MCNC: 119 MG/DL (ref 30–149)
TSI SER-ACNC: 2.01 MIU/ML (ref 0.36–3.74)
VIT B12 SERPL-MCNC: 1773 PG/ML (ref 193–986)
VIT D+METAB SERPL-MCNC: 71.2 NG/ML (ref 30–100)
VLDLC SERPL CALC-MCNC: 18 MG/DL (ref 0–30)

## 2023-09-27 PROCEDURE — 82306 VITAMIN D 25 HYDROXY: CPT

## 2023-09-27 PROCEDURE — 82746 ASSAY OF FOLIC ACID SERUM: CPT

## 2023-09-27 PROCEDURE — 36415 COLL VENOUS BLD VENIPUNCTURE: CPT

## 2023-09-27 PROCEDURE — 83036 HEMOGLOBIN GLYCOSYLATED A1C: CPT

## 2023-09-27 PROCEDURE — 84443 ASSAY THYROID STIM HORMONE: CPT

## 2023-09-27 PROCEDURE — 86780 TREPONEMA PALLIDUM: CPT

## 2023-09-27 PROCEDURE — 80061 LIPID PANEL: CPT

## 2023-09-27 PROCEDURE — 82607 VITAMIN B-12: CPT

## 2023-09-27 NOTE — TELEPHONE ENCOUNTER
[facility] faxing blood results  Placed in Dr Pastor Langston mail slot

## 2023-09-28 ENCOUNTER — TELEPHONE (OUTPATIENT)
Dept: INTERNAL MEDICINE CLINIC | Facility: CLINIC | Age: 78
End: 2023-09-28

## 2023-09-28 DIAGNOSIS — R82.90 ABNORMAL URINALYSIS: Primary | ICD-10-CM

## 2023-09-28 NOTE — TELEPHONE ENCOUNTER
Please let wife know Eddi Pain get labs that I ordered for Javan Recinos came out good. Hemoglobin A1c is 6.1 indicating diabetes under good control. His PSA came out a little bit elevated 5.57. For now we will likely do anything about that since he has been evaluated for by Dr. Johnathon Spurling for his mental changes. His PSA has fluctuated in the past.  We will monitor this in the future. His vitamin D level was good and cholesterol good. In terms of his PSA, he did see Dr. Efren Alvarez in the past.  I do think for now he needs to see Dr. Efren Alvarez but last time his PSA was elevated it was due to a urinary tract infection. I am not sure if Javan Recinos does produce urine. If so I left order for urine and urine culture. If he does not produce urine we can hold off and I will check his PSA sometime in the future. The test that Dr. Johnathon Spurling ordered is still being worked on. For now no changes on my hand.

## 2023-09-29 LAB — T PALLIDUM AB SER QL: NEGATIVE

## 2023-09-29 NOTE — TELEPHONE ENCOUNTER
Called and spoke with patient's wife, Diego Doyle. Relayed MD's message. Diego Doyle stated patient does still urinate, and she will have him do the urine tests. They are waiting for a call back for a neuro-psych consult. Diego Doyle states it may take a few months to get this appointment. Cassidy Ochoa has a follow up with Dr. Jessica Hendrickson in December.      FYI to Dr. Connor Ryan--

## 2023-10-02 ENCOUNTER — TELEPHONE (OUTPATIENT)
Dept: NEUROLOGY | Facility: CLINIC | Age: 78
End: 2023-10-02

## 2023-10-02 NOTE — TELEPHONE ENCOUNTER
Received a call from patient 's wife to inform the sooner apt for the neuro site testing is not until 12/14/23 with Gi Holly.

## 2023-10-19 ENCOUNTER — TELEPHONE (OUTPATIENT)
Dept: ENDOCRINOLOGY CLINIC | Facility: CLINIC | Age: 78
End: 2023-10-19

## 2023-10-19 ENCOUNTER — TELEPHONE (OUTPATIENT)
Facility: CLINIC | Age: 78
End: 2023-10-19

## 2023-10-19 ENCOUNTER — TELEPHONE (OUTPATIENT)
Dept: INTERNAL MEDICINE CLINIC | Facility: CLINIC | Age: 78
End: 2023-10-19

## 2023-10-19 NOTE — TELEPHONE ENCOUNTER
Patient's spouse dropped off disability forms from [address] [place] [address] for completion. Emailed Forms and completed JUANJO to Forms Department and sent originals via inter office mail to Forms Dept. Spouse will pick-up forms upon completion.

## 2023-10-19 NOTE — TELEPHONE ENCOUNTER
Patient's spouse dropped off disability papers from [address] [facility]. Completed emailed copies and JUANJO to Forms Department. Sent originals to Forms Dept. via inter-office mail. Spouse will pick-up forms upon completion.

## 2023-10-19 NOTE — TELEPHONE ENCOUNTER
Wife came to office and requested [facility] papers to be filled out for the [facility] to Forms Department via [employer]

## 2023-10-24 ENCOUNTER — HOSPITAL ENCOUNTER (OUTPATIENT)
Dept: MRI IMAGING | Age: 78
Discharge: HOME | End: 2023-10-24
Attending: Other

## 2023-10-24 DIAGNOSIS — G31.84 MCI (MILD COGNITIVE IMPAIRMENT) WITH MEMORY LOSS: ICD-10-CM

## 2023-10-24 PROCEDURE — 70551 MRI BRAIN STEM W/O DYE: CPT | Performed by: OTHER

## 2023-10-25 ENCOUNTER — TELEPHONE (OUTPATIENT)
Dept: NEUROLOGY | Facility: CLINIC | Age: 78
End: 2023-10-25

## 2023-10-25 NOTE — TELEPHONE ENCOUNTER
----- Message from Mariela Rocha MD sent at 10/25/2023  7:27 AM CDT -----  Please let patient know that MRI brain didn't show significant abnormalities.

## 2023-11-09 NOTE — TELEPHONE ENCOUNTER
Received form. Put in Dr. Bernal Blank files for review. Noted same form was also faxed to Dr. Charity Robertson. All questions looked to be related to pt's DM, not kidneys.

## 2023-11-09 NOTE — TELEPHONE ENCOUNTER
Forms received are to be completed by provider/clinical staff. [facility]

## 2023-11-09 NOTE — TELEPHONE ENCOUNTER
Received voicemail message from pt's wife   Requests call back to advise on status of the forms for the [place]    Call back # [phone]

## 2023-11-09 NOTE — TELEPHONE ENCOUNTER
Judy, does Dr. Patricio Gonzalez need to fill out or pcp? If nephro needs to, can someone fax us the form back so we can fill out? Fax # [phone]  Thank you!

## 2023-11-10 ENCOUNTER — TELEPHONE (OUTPATIENT)
Dept: ENDOCRINOLOGY CLINIC | Facility: CLINIC | Age: 78
End: 2023-11-10

## 2023-11-10 NOTE — TELEPHONE ENCOUNTER
Received disability forms for pt, to be filled out by provider. Forms have been placed in providers, folder for review and be filled out.

## 2023-11-15 ENCOUNTER — PATIENT OUTREACH (OUTPATIENT)
Dept: CASE MANAGEMENT | Age: 78
End: 2023-11-15

## 2023-11-15 NOTE — PROGRESS NOTES
Attempted to call patient regarding CCM intro and patient's wife Diego Doyle answered the phone. I introduced CCM and Diego Parents stated that she will be calling Dr. Alexey Flanagan office for an appointment patient is due for one. Diego Parents stated that patient has been doing fine and if she needs CCM that she has my number and she will call back if needed. Aileen Donaldson, [address] [place] [address], [address], [address] [place] [address]  Phone: [phone]. [place] [address]

## 2023-11-16 RX ORDER — LOSARTAN POTASSIUM 50 MG/1
50 TABLET ORAL DAILY
Qty: 90 TABLET | Refills: 3 | Status: SHIPPED | OUTPATIENT
Start: 2023-11-16

## 2023-11-16 NOTE — TELEPHONE ENCOUNTER
Pt on HD;  has been tolerating and K+ WNL  Refill request is for a maintenance medication and has met the criteria specified in the Ambulatory Medication Refill Standing Order for eligibility, visits, laboratory, alerts and was sent to the requested pharmacy.     Requested Prescriptions     Signed Prescriptions Disp Refills    LOSARTAN 50 MG Oral Tab 90 tablet 3     Sig: Take 1 tablet (50 mg total) by mouth daily     Authorizing Provider: Jaziel Ordonez     Ordering User: Guido Garcia

## 2023-11-20 ENCOUNTER — TELEPHONE (OUTPATIENT)
Dept: INTERNAL MEDICINE CLINIC | Facility: CLINIC | Age: 78
End: 2023-11-20

## 2023-11-20 NOTE — TELEPHONE ENCOUNTER
Please call Mejia Encarnacion and let her know I am just following up on the forms for Franko Hopping that I filled out. I will be away from the office for a week and just wanted to make sure if she wanted them mailed to her or will she pick them up at the .

## 2023-11-20 NOTE — TELEPHONE ENCOUNTER
I have made a copy of the pt's hypertension disability forms and placed them at the  in the scanning bin to be uploaded into the pt's chart. I also placed the original copy at the  so that the pt can pick them up on 11/27/23.

## 2023-11-20 NOTE — TELEPHONE ENCOUNTER
Spoke with patient relayed physician message below. Patient verbalized understanding. Anu Love states she will  the forms on 11/27.   To Dr. Boni Munoz

## 2023-11-29 ENCOUNTER — MED REC SCAN ONLY (OUTPATIENT)
Dept: INTERNAL MEDICINE CLINIC | Facility: CLINIC | Age: 78
End: 2023-11-29

## 2023-12-19 RX ORDER — PEN NEEDLE, DIABETIC 31 G X1/4"
1 NEEDLE, DISPOSABLE MISCELLANEOUS DAILY
Qty: 100 EACH | Refills: 3 | Status: SHIPPED | OUTPATIENT
Start: 2023-12-19

## 2023-12-19 NOTE — TELEPHONE ENCOUNTER
Refill request is for a maintenance medication and has met the criteria specified in the Ambulatory Medication Refill Standing Order for eligibility, visits, laboratory, alerts and was sent to the requested pharmacy.     Requested Prescriptions     Signed Prescriptions Disp Refills    Insulin Pen Needle (DROPLET PEN NEEDLES) 31G X 6 MM Does not apply Misc 100 each 3     Sig: Use with insulin daily     Authorizing Provider: Andrew Riggs     Ordering User: Kamla Negron

## 2023-12-27 ENCOUNTER — TELEPHONE (OUTPATIENT)
Dept: AUDIOLOGY | Facility: CLINIC | Age: 78
End: 2023-12-27

## 2023-12-27 NOTE — TELEPHONE ENCOUNTER
Spoke to spouse. Patient needs three package of wax filters. Will put at  for . Charges of $21 entered. Per wife he also needs ear cleaning. Gave phone room number to schedule. He saw Dr. Rukhsana Marsh at last visit in May 2023 for ear cleaning. She will schedule.

## 2023-12-29 ENCOUNTER — OFFICE VISIT (OUTPATIENT)
Dept: OTOLARYNGOLOGY | Facility: CLINIC | Age: 78
End: 2023-12-29

## 2023-12-29 DIAGNOSIS — H61.23 BILATERAL IMPACTED CERUMEN: Primary | ICD-10-CM

## 2023-12-29 RX ORDER — INSULIN DETEMIR 100 [IU]/ML
INJECTION, SOLUTION SUBCUTANEOUS
COMMUNITY
Start: 2023-09-21

## 2023-12-29 NOTE — PROGRESS NOTES
Yohana Babb is a 69year old male. Chief Complaint   Patient presents with    Ear Wax     Routine ear cleaning. Denies any pain. HISTORY OF PRESENT ILLNESS    Patient presents for cerumen removal. No other complaints or concerns at this time    Social History     Socioeconomic History    Marital status:    Tobacco Use    Smoking status: Never     Passive exposure: Never    Smokeless tobacco: Never   Vaping Use    Vaping Use: Never used   Substance and Sexual Activity    Alcohol use: Yes     Alcohol/week: 1.0 standard drink of alcohol     Types: 1 Glasses of wine per week     Comment: social    Drug use: Never       Family History   Problem Relation Age of Onset    Cancer Father         lung cancer     Hypertension Father     Heart Disorder Mother     Depression Mother     Psychiatric Mother     Heart Disorder Maternal Grandfather     Heart Disorder Brother         passed away with heart attack     Hypertension Brother     Hypertension Brother     Kidney Disease Brother     Kidney Disease Brother     Hypertension Brother        Past Medical History:   Diagnosis Date    Back problem     Calculus of kidney 1994    Cancer ([facility] [address]     multiple myloma    Diabetes ([facility] [address] 2016    Dialysis patient [facility])     M,W, F    Essential hypertension 1986    Hearing impairment     bilateral hearing aids    High blood pressure     Multiple myeloma (HCC)     Muscle weakness     Neuropathy     Slight in feet    Osteoarthritis     Renal disorder     Visual impairment     Glasses       Past Surgical History:   Procedure Laterality Date    COLONOSCOPY  2004    [address]    OTHER SURGICAL HISTORY  2014    stem cell transplant     OTHER SURGICAL HISTORY  11/12/2019    removal parathyroid adenoma    OTHER SURGICAL HISTORY  08/18/2022    Arthroscopy       REVIEW OF SYSTEMS    System Neg/Pos Details   Constitutional Negative Fatigue, fever and weight loss. ENMT Negative Drooling.    Eyes Negative Blurred vision and vision changes. Respiratory Negative Dyspnea and wheezing. Cardio Negative Chest pain, irregular heartbeat/palpitations and syncope. GI Negative Abdominal pain and diarrhea. Endocrine Negative Cold intolerance and heat intolerance. Neuro Negative Tremors. Psych Negative Anxiety and depression. Integumentary Negative Frequent skin infections, pigment change and rash. Hema/Lymph Negative Easy bleeding and easy bruising. PHYSICAL EXAM    There were no vitals taken for this visit. Constitutional Normal Overall appearance - Normal.        Neck Exam Normal Inspection - Normal. Palpation - Normal. Parotid gland - Normal. Thyroid gland - Normal.             Head/Face Normal Facial features - Normal. Eyebrows - Normal. Skull - Normal.             Ears Normal Inspection - Right: Normal, Left: Normal. Canal - Right: Normal, Left: Normal. TM - Right: Normal, Left: Normal.   Skin Normal Inspection - Normal.                              Canals:  Right: Canal reveals cerumen impaction,   Left: Canal reveals cerumen impaction,     Tympanic Membranes:  Right: Normal tympanic membrane. Left: Normal tympanic membrane. TM Visualized Method:   Right TM examined via otomicroscopy. Left TM examined via otomicroscopy. PROCEDURE:    Removal of cerumen impaction   The cerumen impaction was completely removed using microscopy. Removal was completed by using acurette and/or suction. Comments: Return to clinic as needed. Avoid q-tips, water precautions and use over the counter wax remedies as needed.       Current Outpatient Medications:     LEVEMIR FLEXPEN 100 UNIT/ML Subcutaneous Solution Pen-injector, INJECT 12-24 UNITS INTO THE SKIN INGHTLY, Disp: , Rfl:     Darbepoetin Adryan (ARANESP, ALBUMIN FREE, IJ), Darbepoetin Adryan (Aranesp), Disp: , Rfl:     LOSARTAN 50 MG Oral Tab, Take 1 tablet (50 mg total) by mouth daily, Disp: 90 tablet, Rfl: 3    apixaban (ELIQUIS) 2.5 MG Oral Tab, Take 1 tablet (2.5 mg total) by mouth 2 (two) times daily. , Disp: 60 tablet, Rfl: 11    Methoxy PEG-Epoetin Beta (MIRCERA IJ), 60 mcg., Disp: , Rfl:     Amino Acids (LIQUACEL) Oral Liquid, Take 30 mL by mouth., Disp: , Rfl:     torsemide 20 MG Oral Tab, Take 2 tablets (40 mg total) by mouth BID (Diuretic). , Disp: 360 tablet, Rfl: 3    NON FORMULARY, Velcade once monthly injection, Disp: , Rfl:     latanoprost 0.005 % Ophthalmic Solution, Place 1 drop into both eyes nightly., Disp: , Rfl:     acetaminophen 500 MG Oral Tab, Take 325 mg by mouth every 8 (eight) hours. , Disp: 1 tablet, Rfl: 0    hydrALAZINE 50 MG Oral Tab, Take 1 tablet (50 mg total) by mouth 3 (three) times daily. , Disp: , Rfl:     Calcium Carbonate 1250 (500 Ca) MG Oral Chew Tab, Chew 1 tablet (1,250 mg total) by mouth in the morning and 1 tablet (1,250 mg total) before bedtime. , Disp: , Rfl:     Cholecalciferol (VITAMIN D) 25 MCG (1000 UT) Oral Tab, Take 2 tablets by mouth daily. , Disp: , Rfl:     carvedilol 25 MG Oral Tab, Take 1 tablet (25 mg total) by mouth 2 (two) times daily with meals. , Disp: 60 tablet, Rfl: 11    OCUVITE-LUTEIN Oral Tab, 1 tab daily, Disp: , Rfl:     FOLBIC 2.5-25-2 MG Oral Tab, Take 1 tablet by mouth daily. , Disp: , Rfl: 10    acyclovir (ZOVIRAX) 400 MG Oral Tab, Take 1 tablet (400 mg total) by mouth daily. , Disp: , Rfl: 11    Insulin Pen Needle (DROPLET PEN NEEDLES) 31G X 6 MM Does not apply Misc, Use with insulin daily (Patient not taking: Reported on 12/29/2023), Disp: 100 each, Rfl: 3  ASSESSMENT AND PLAN    1. Bilateral impacted cerumen      All cerumen was removed using microscopy. I have asked the patient to return to see me as needed for repeat cerumen removal in the future.       Lesli Wilhelm MD    12/29/2023    8:29 AM

## 2024-01-04 ENCOUNTER — TELEPHONE (OUTPATIENT)
Dept: INTERNAL MEDICINE CLINIC | Facility: CLINIC | Age: 79
End: 2024-01-04

## 2024-01-11 DIAGNOSIS — E11.9 TYPE 2 DIABETES MELLITUS WITHOUT COMPLICATION, WITHOUT LONG-TERM CURRENT USE OF INSULIN (HCC): ICD-10-CM

## 2024-01-12 RX ORDER — BLOOD SUGAR DIAGNOSTIC
STRIP MISCELLANEOUS
Qty: 200 STRIP | Refills: 3 | Status: SHIPPED | OUTPATIENT
Start: 2024-01-12

## 2024-01-12 NOTE — TELEPHONE ENCOUNTER
Refill request is for a maintenance medication and has met the criteria specified in the Ambulatory Medication Refill Standing Order for eligibility, visits, laboratory, alerts and was sent to the requested pharmacy.    Requested Prescriptions     Signed Prescriptions Disp Refills    Glucose Blood (ONETOUCH ULTRA) In Vitro Strip 200 strip 3     Sig: TEST TWICE DAILY     Authorizing Provider: JANIE ELDER     Ordering User: CAROL PATRICIO

## 2024-01-15 ENCOUNTER — TELEPHONE (OUTPATIENT)
Dept: INTERNAL MEDICINE CLINIC | Facility: CLINIC | Age: 79
End: 2024-01-15

## 2024-01-15 DIAGNOSIS — R97.20 ELEVATED PSA: ICD-10-CM

## 2024-01-15 DIAGNOSIS — R82.90 ABNORMAL URINALYSIS: Primary | ICD-10-CM

## 2024-01-15 NOTE — TELEPHONE ENCOUNTER
Please mail labs for PSA along with urine and urine culture to patient's home.    (Discussed with Noemi.  Patient will be getting labs early February.  Will mail urine and urine culture since he does still produce urine even though he is on dialysis and repeat PSA.  Last PSA September was 5.57.)

## 2024-01-17 ENCOUNTER — OFFICE VISIT (OUTPATIENT)
Dept: OTOLARYNGOLOGY | Facility: CLINIC | Age: 79
End: 2024-01-17

## 2024-01-17 VITALS — WEIGHT: 190 LBS | HEIGHT: 66 IN | TEMPERATURE: 97 F | BODY MASS INDEX: 30.53 KG/M2

## 2024-01-17 DIAGNOSIS — H65.91 FLUID LEVEL BEHIND TYMPANIC MEMBRANE OF RIGHT EAR: Primary | ICD-10-CM

## 2024-01-17 DIAGNOSIS — H69.90 EUSTACHIAN TUBE DISORDER, UNSPECIFIED LATERALITY: ICD-10-CM

## 2024-01-17 PROCEDURE — 99213 OFFICE O/P EST LOW 20 MIN: CPT | Performed by: STUDENT IN AN ORGANIZED HEALTH CARE EDUCATION/TRAINING PROGRAM

## 2024-01-17 PROCEDURE — 92504 EAR MICROSCOPY EXAMINATION: CPT | Performed by: STUDENT IN AN ORGANIZED HEALTH CARE EDUCATION/TRAINING PROGRAM

## 2024-01-17 NOTE — PROGRESS NOTES
Balwinder Muniz is a 78 year old male.   Chief Complaint   Patient presents with    Ear Problem     Pt present with right  ear clogged causing hearing loss.       ASSESSMENT AND PLAN:   1. Fluid level behind tympanic membrane of right ear  78-year-old with a complicated medical history on dialysis he has had a hard time hearing from his right ear for the last couple weeks.  He does wear hearing aids and notices that his hearing aid on the right is not working as well    Exam appears that he has a serous effusion on the right.  No otitis.  Left ear appears to be normal.    Discussed that it appears that he has an effusion on the right.  This may be interfering with his hearing aid use.  Some medical comorbidities prevent Sudafed and oral steroids.  He will use Nasonex and perform auto insufflation several times a day.  Like to see him 2 to 3 weeks for reevaluation for the fluid is still present may consider myringotomy    2. Eustachian tube disorder, unspecified laterality        The patient indicates understanding of these issues and agrees to the plan.      EXAM:   Temp 97.4 °F (36.3 °C) (Tympanic)   Ht 5' 6\" (1.676 m)   Wt 190 lb (86.2 kg)   BMI 30.67 kg/m²     Pertinent exam findings may also be noted above in assessment and plan     System Details   Skin Inspection - Normal.   Constitutional Overall appearance - Normal.   Head/Face Symmetric, TMJ tenderness not present    Eyes EOMI, PERRL   Right ear:  Canal clear, TM intact, no DAY   Left ear:  Canal clear, TM intact, no DAY   Nose: Septum midline, inferior turbinates not enlarged, nasal valves without collapse    Oral cavity/Oropharynx: No lesions or masses on inspection or palpation, tonsils symmetric    Neck: Soft without LAD, thyroid not enlarged  Voice clear/ no stridor   Other:      Scopes and Procedures:             Current Outpatient Medications   Medication Sig Dispense Refill    Glucose Blood (ONETOUCH ULTRA) In Vitro Strip TEST TWICE DAILY 200  strip 3    LEVEMIR FLEXPEN 100 UNIT/ML Subcutaneous Solution Pen-injector INJECT 12-24 UNITS INTO THE SKIN INGHTLY      Darbepoetin Adryan (ARANESP, ALBUMIN FREE, IJ) Darbepoetin Adryan (Aranesp)      Insulin Pen Needle (DROPLET PEN NEEDLES) 31G X 6 MM Does not apply Misc Use with insulin daily 100 each 3    LOSARTAN 50 MG Oral Tab Take 1 tablet (50 mg total) by mouth daily 90 tablet 3    apixaban (ELIQUIS) 2.5 MG Oral Tab Take 1 tablet (2.5 mg total) by mouth 2 (two) times daily. 60 tablet 11    Methoxy PEG-Epoetin Beta (MIRCERA IJ) 60 mcg.      Amino Acids (LIQUACEL) Oral Liquid Take 30 mL by mouth.      torsemide 20 MG Oral Tab Take 2 tablets (40 mg total) by mouth BID (Diuretic). 360 tablet 3    NON FORMULARY Velcade once monthly injection      latanoprost 0.005 % Ophthalmic Solution Place 1 drop into both eyes nightly.      acetaminophen 500 MG Oral Tab Take 325 mg by mouth every 8 (eight) hours. 1 tablet 0    hydrALAZINE 50 MG Oral Tab Take 1 tablet (50 mg total) by mouth 3 (three) times daily.      Calcium Carbonate 1250 (500 Ca) MG Oral Chew Tab Chew 1 tablet (1,250 mg total) by mouth in the morning and 1 tablet (1,250 mg total) before bedtime.      Cholecalciferol (VITAMIN D) 25 MCG (1000 UT) Oral Tab Take 2 tablets by mouth daily.      carvedilol 25 MG Oral Tab Take 1 tablet (25 mg total) by mouth 2 (two) times daily with meals. 60 tablet 11    OCUVITE-LUTEIN Oral Tab 1 tab daily      FOLBIC 2.5-25-2 MG Oral Tab Take 1 tablet by mouth daily.  10    acyclovir (ZOVIRAX) 400 MG Oral Tab Take 1 tablet (400 mg total) by mouth daily.  11      Past Medical History:   Diagnosis Date    Back problem     Calculus of kidney 1994    Cancer (HCC)     multiple myloma    Diabetes (HCC) 2016    Dialysis patient (HCC)     M,W, F    Essential hypertension 1986    Hearing impairment     bilateral hearing aids    High blood pressure     Multiple myeloma (HCC)     Muscle weakness     Neuropathy     Slight in feet    Osteoarthritis      Renal disorder     Visual impairment     Glasses      Social History:  Social History     Socioeconomic History    Marital status:    Tobacco Use    Smoking status: Never     Passive exposure: Never    Smokeless tobacco: Never   Vaping Use    Vaping Use: Never used   Substance and Sexual Activity    Alcohol use: Yes     Alcohol/week: 1.0 standard drink of alcohol     Types: 1 Glasses of wine per week     Comment: social    Drug use: Never          Carlos Harper MD  1/17/2024  11:33 AM

## 2024-01-18 ENCOUNTER — TELEPHONE (OUTPATIENT)
Dept: INTERNAL MEDICINE CLINIC | Facility: CLINIC | Age: 79
End: 2024-01-18

## 2024-01-18 NOTE — TELEPHONE ENCOUNTER
Spoke to wife (ok per HIPAA). Asked if paperwork could be mailed to home address. Verified home address. Originals placed in outgoing mail bin at . Copy placed to be uploaded to patient records.

## 2024-01-30 ENCOUNTER — OFFICE VISIT (OUTPATIENT)
Dept: OTOLARYNGOLOGY | Facility: CLINIC | Age: 79
End: 2024-01-30

## 2024-01-30 DIAGNOSIS — H65.91 FLUID LEVEL BEHIND TYMPANIC MEMBRANE OF RIGHT EAR: ICD-10-CM

## 2024-01-30 DIAGNOSIS — H69.90 EUSTACHIAN TUBE DISORDER, UNSPECIFIED LATERALITY: Primary | ICD-10-CM

## 2024-01-30 PROCEDURE — 99213 OFFICE O/P EST LOW 20 MIN: CPT | Performed by: STUDENT IN AN ORGANIZED HEALTH CARE EDUCATION/TRAINING PROGRAM

## 2024-01-30 PROCEDURE — 69420 INCISION OF EARDRUM: CPT | Performed by: STUDENT IN AN ORGANIZED HEALTH CARE EDUCATION/TRAINING PROGRAM

## 2024-01-30 NOTE — PROGRESS NOTES
Balwinder Muniz is a 78 year old male.   Chief Complaint   Patient presents with    Follow - Up     F/up fluid behind eardrum of right ear       ASSESSMENT AND PLAN:   1. Eustachian tube disorder, unspecified laterality  78-year-old in follow-up regarding a right middle ear effusion.  Affecting his hearing aid use and communication ability.  Has been using the Flonase as multiple other medical issues    Exam he continues to have an effusion on the right with a thickened tympanic membrane.    Decision today to go ahead with myringotomy given the lack of response over the last several weeks and his comorbidities preventing other medical therapies    Myringotomy performed with evacuation of serous effusion.  This resulted in improvement in his hearing.  He can stop the Nasonex.  Discussed that the ear may drain a little bit as it heals.  They should keep their appointment for an audiogram in March.    2. Fluid level behind tympanic membrane of right ear        The patient indicates understanding of these issues and agrees to the plan.      EXAM:   There were no vitals taken for this visit.    Pertinent exam findings may also be noted above in assessment and plan     System Details   Skin Inspection - Normal.   Constitutional Overall appearance - Normal.   Head/Face Symmetric, TMJ tenderness not present    Eyes EOMI, PERRL   Right ear:  Canal clear, TM intact, no DAY   Left ear:  Canal clear, TM intact, no DAY   Nose: Septum midline, inferior turbinates not enlarged, nasal valves without collapse    Oral cavity/Oropharynx: No lesions or masses on inspection or palpation, tonsils symmetric    Neck: Soft without LAD, thyroid not enlarged  Voice clear/ no stridor   Other:      Scopes and Procedures:             Current Outpatient Medications   Medication Sig Dispense Refill    Glucose Blood (ONETOUCH ULTRA) In Vitro Strip TEST TWICE DAILY 200 strip 3    LEVEMIR FLEXPEN 100 UNIT/ML Subcutaneous Solution Pen-injector INJECT  12-24 UNITS INTO THE SKIN INGHTLY      Darbepoetin Adryan (ARANESP, ALBUMIN FREE, IJ) Darbepoetin Adryan (Aranesp)      Insulin Pen Needle (DROPLET PEN NEEDLES) 31G X 6 MM Does not apply Misc Use with insulin daily 100 each 3    LOSARTAN 50 MG Oral Tab Take 1 tablet (50 mg total) by mouth daily 90 tablet 3    apixaban (ELIQUIS) 2.5 MG Oral Tab Take 1 tablet (2.5 mg total) by mouth 2 (two) times daily. 60 tablet 11    Methoxy PEG-Epoetin Beta (MIRCERA IJ) 60 mcg.      Amino Acids (LIQUACEL) Oral Liquid Take 30 mL by mouth.      torsemide 20 MG Oral Tab Take 2 tablets (40 mg total) by mouth BID (Diuretic). 360 tablet 3    NON FORMULARY Velcade once monthly injection      latanoprost 0.005 % Ophthalmic Solution Place 1 drop into both eyes nightly.      acetaminophen 500 MG Oral Tab Take 325 mg by mouth every 8 (eight) hours. 1 tablet 0    hydrALAZINE 50 MG Oral Tab Take 1 tablet (50 mg total) by mouth 3 (three) times daily.      Calcium Carbonate 1250 (500 Ca) MG Oral Chew Tab Chew 1 tablet (1,250 mg total) by mouth in the morning and 1 tablet (1,250 mg total) before bedtime.      Cholecalciferol (VITAMIN D) 25 MCG (1000 UT) Oral Tab Take 2 tablets by mouth daily.      carvedilol 25 MG Oral Tab Take 1 tablet (25 mg total) by mouth 2 (two) times daily with meals. 60 tablet 11    OCUVITE-LUTEIN Oral Tab 1 tab daily      FOLBIC 2.5-25-2 MG Oral Tab Take 1 tablet by mouth daily.  10    acyclovir (ZOVIRAX) 400 MG Oral Tab Take 1 tablet (400 mg total) by mouth daily.  11      Past Medical History:   Diagnosis Date    Back problem     Calculus of kidney 1994    Cancer (HCC)     multiple myloma    Diabetes (HCC) 2016    Dialysis patient (HCC)     M,W, F    Essential hypertension 1986    Hearing impairment     bilateral hearing aids    High blood pressure     Multiple myeloma (HCC)     Muscle weakness     Neuropathy     Slight in feet    Osteoarthritis     Renal disorder     Visual impairment     Glasses      Social  History:  Social History     Socioeconomic History    Marital status:    Tobacco Use    Smoking status: Never     Passive exposure: Never    Smokeless tobacco: Never   Vaping Use    Vaping Use: Never used   Substance and Sexual Activity    Alcohol use: Yes     Alcohol/week: 1.0 standard drink of alcohol     Types: 1 Glasses of wine per week     Comment: social    Drug use: Never          Carlos Harper MD  1/30/2024  11:09 AM

## 2024-02-01 ENCOUNTER — TELEPHONE (OUTPATIENT)
Dept: OTOLARYNGOLOGY | Facility: CLINIC | Age: 79
End: 2024-02-01

## 2024-02-01 RX ORDER — OFLOXACIN 3 MG/ML
SOLUTION/ DROPS OPHTHALMIC
Qty: 5 ML | Refills: 0 | Status: SHIPPED | OUTPATIENT
Start: 2024-02-01

## 2024-02-01 NOTE — TELEPHONE ENCOUNTER
Patient wife calling stating patient is experiencing fluid coming out of the right eardrum. Would like to know if that's to be expected.Please advise

## 2024-02-01 NOTE — TELEPHONE ENCOUNTER
Will call in otic drops for his otorrhea after the myringotomy. Expected otorrhea, if doesn't slow down by next week should return to see me. Can keep hearing aid out until drainage stops. Probably at least 3 days.

## 2024-02-01 NOTE — TELEPHONE ENCOUNTER
Pt c/o of  of right ear drainage, color clear-slight ache. Pt seen in office on 01/30, myringotomy performed. Per pt no pain or itching. Drainage only when laying down, per pt has hearing aid and asking if he should continue to use it in ear ? And any further recommendations?please advise

## 2024-02-01 NOTE — TELEPHONE ENCOUNTER
Will call in otic drops for his otorrhea after the myringotomy. Expected otorrhea, if doesn't slow down by next week should return to see me. Can keep hearing aid out until drainage stops. Probably at least 3 day

## 2024-02-08 ENCOUNTER — TELEPHONE (OUTPATIENT)
Dept: OTOLARYNGOLOGY | Facility: CLINIC | Age: 79
End: 2024-02-08

## 2024-02-08 DIAGNOSIS — H69.90 EUSTACHIAN TUBE DISORDER, UNSPECIFIED LATERALITY: Primary | ICD-10-CM

## 2024-02-08 NOTE — TELEPHONE ENCOUNTER
Pt's wife called.  Pt has completed the ear drops.  Still having hearing loss in right ear.  Pt has scheduled an appointment audiology for hearing test on 3-1-24.  Need order.  Please call

## 2024-02-09 ENCOUNTER — TELEPHONE (OUTPATIENT)
Dept: INTERNAL MEDICINE CLINIC | Facility: CLINIC | Age: 79
End: 2024-02-09

## 2024-02-09 NOTE — TELEPHONE ENCOUNTER
Please call patient spouse regarding visit with oncologist  Patient experiencing back pain  Recommending an open MRI, no infusion, lower lumbar  Scheduled yesterday, earliest date through [facility] 2/27/24    Noemi would like to discuss with Dr Garza    Tasked to nursing

## 2024-02-09 NOTE — TELEPHONE ENCOUNTER
Tried calling.  Left message on [employer] machine that I will try calling Monday.  Will also send message through [employer].

## 2024-02-12 ENCOUNTER — PATIENT MESSAGE (OUTPATIENT)
Dept: INTERNAL MEDICINE CLINIC | Facility: CLINIC | Age: 79
End: 2024-02-12

## 2024-02-12 DIAGNOSIS — M54.50 LEFT LUMBAR PAIN: Primary | ICD-10-CM

## 2024-02-12 DIAGNOSIS — C90.00 MULTIPLE MYELOMA NOT HAVING ACHIEVED REMISSION (HCC): ICD-10-CM

## 2024-02-12 NOTE — TELEPHONE ENCOUNTER
Kati called, she is available for Dr Garza call when he is able to call  Tasked to Dr Garza as maury

## 2024-02-13 NOTE — TELEPHONE ENCOUNTER
Discussed with Noemi.  She gave me an update.    She has switched hematologist to Dr. Adams.  At [facility].  He will get his treatment starting tomorrow out of [facility].  He saw him February 8.    2.   He saw a psychologist.  Dr. 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   He is following with Dr. Evans and has an appointment February 28.    4.   He also has hearing problems.  He will be seeing an audiologist in the next several weeks for audiogram and possible hearing aids    5.   He has left lumbar pain a little bit above the waist and is scheduled for an MRI.  This MRI is scheduled February 27 at [facility] but she is wondering if she can get earlier at [place].  I think what she is describing is an MRI of the lumbar spine but she will get me the order and I can see if we can get it sooner at [place].    6.   She will get brain appointment with me in the next several weeks for follow-up.

## 2024-02-13 NOTE — TELEPHONE ENCOUNTER
From: aBlwinder Muniz  To: Geovanni Garza  Sent: 2/12/2024 6:22 PM CST  Subject: MRI for balwinder Fry wants a lumbar spine without contrast.Please call me for anything else or contact dr. Adams. Thanks

## 2024-02-15 DIAGNOSIS — E11.65 TYPE 2 DIABETES MELLITUS WITH HYPERGLYCEMIA, WITH LONG-TERM CURRENT USE OF INSULIN (HCC): Primary | ICD-10-CM

## 2024-02-15 DIAGNOSIS — Z79.4 TYPE 2 DIABETES MELLITUS WITH HYPERGLYCEMIA, WITH LONG-TERM CURRENT USE OF INSULIN (HCC): Primary | ICD-10-CM

## 2024-02-16 ENCOUNTER — TELEPHONE (OUTPATIENT)
Dept: INTERNAL MEDICINE CLINIC | Facility: CLINIC | Age: 79
End: 2024-02-16

## 2024-02-16 DIAGNOSIS — E83.52 HYPERCALCEMIA: Primary | ICD-10-CM

## 2024-02-19 NOTE — TELEPHONE ENCOUNTER
LOV: 9/8/22  RTC: 6 months    LM on both #s to call clinic to schedule f/u     RX pended for approval -thanks

## 2024-02-19 NOTE — TELEPHONE ENCOUNTER
Pts wife called and schedule appt for 3/18/24.  Please call regarding refill.    Pt is almost out of medication.

## 2024-02-20 ENCOUNTER — TELEPHONE (OUTPATIENT)
Dept: INTERNAL MEDICINE CLINIC | Facility: CLINIC | Age: 79
End: 2024-02-20

## 2024-02-20 RX ORDER — INSULIN DETEMIR 100 [IU]/ML
INJECTION, SOLUTION SUBCUTANEOUS
Qty: 15 ML | Refills: 0 | Status: SHIPPED | OUTPATIENT
Start: 2024-02-20

## 2024-02-21 NOTE — TELEPHONE ENCOUNTER
dr. naranjo pt has appt with audiology on 02/22, ok to put referral for hearing test? pt still cannot hear but not having any more drainage from ear. please advise, advised pt can schedule appt with you on Friday?

## 2024-02-22 ENCOUNTER — OFFICE VISIT (OUTPATIENT)
Dept: AUDIOLOGY | Facility: CLINIC | Age: 79
End: 2024-02-22
Payer: MEDICARE

## 2024-02-22 DIAGNOSIS — H90.6 MIXED HEARING LOSS, BILATERAL: Primary | ICD-10-CM

## 2024-02-22 PROCEDURE — 92557 COMPREHENSIVE HEARING TEST: CPT | Performed by: AUDIOLOGIST

## 2024-02-22 PROCEDURE — 92593 HEARING AID CHECK, BOTH EARS: CPT | Performed by: AUDIOLOGIST

## 2024-02-22 PROCEDURE — 92567 TYMPANOMETRY: CPT | Performed by: AUDIOLOGIST

## 2024-02-22 NOTE — PROGRESS NOTES
HEARING AID FOLLOW-UP    Balwinder Muniz  8/11/1945  [id number]    Patient is here for a hearing aid check following his hearing test.  History significant for middle ear issues and drainage and is being followed by ENT Dr. Harper for these issues. Recently had a myringotomy on his right ear.  Just finished drops used for right ear drainage.  Dr. Harper ordered hearing test.   Feels the hearing in is right ear has decreased.      Hearing Aid Information  [employer] P90-R  Right: #[id number]  Left #[id number]  Coupled to custom c-shells.   Dispensing date: 5-25-21  Warranty: 3 years  Battery: Rechargeable  Wax Guard: Cerustop    The patient has the following concerns:   Right hearing aid will not turn on or light up when in .   He is currently wearing his left aid and an over-the-counter amplifier device in his right ear temporarily.     In office the following actions were taken:  Cleaned aid  Cleaned earmold  Suctioned microphone port  Suctioned  port  Changed wax guard  Listening check    There was a large amount of dust/debris in the microphones of both aids.     Following cleaning, right aid will now light up, but amplification is very weak.   Left aid:  Good amplification.   Determined that the  needs to be replaced.      Sending right aid and C-shell into [employer] under warranty for repair.   Needs appointment for reprogramming at time of .         Patient is to follow up on March 1st, 2024.    Will need to watch to see if aid arrives in time.     2/22/2024  JI Bustillos

## 2024-02-23 ENCOUNTER — OFFICE VISIT (OUTPATIENT)
Dept: OTOLARYNGOLOGY | Facility: CLINIC | Age: 79
End: 2024-02-23

## 2024-02-23 VITALS — HEIGHT: 66 IN | BODY MASS INDEX: 31.34 KG/M2 | WEIGHT: 195 LBS

## 2024-02-23 DIAGNOSIS — H61.21 IMPACTED CERUMEN, RIGHT EAR: Primary | ICD-10-CM

## 2024-02-23 DIAGNOSIS — H90.6 MIXED CONDUCTIVE AND SENSORINEURAL HEARING LOSS OF BOTH EARS: ICD-10-CM

## 2024-02-23 PROCEDURE — 69210 REMOVE IMPACTED EAR WAX UNI: CPT | Performed by: STUDENT IN AN ORGANIZED HEALTH CARE EDUCATION/TRAINING PROGRAM

## 2024-02-23 PROCEDURE — 99213 OFFICE O/P EST LOW 20 MIN: CPT | Performed by: STUDENT IN AN ORGANIZED HEALTH CARE EDUCATION/TRAINING PROGRAM

## 2024-02-23 NOTE — PROGRESS NOTES
Balwinder Muniz is a 78 year old male.   Chief Complaint   Patient presents with    Follow - Up     Patient here for right ear f/u, no pain or drainage. Pt had hearing test yesterday.       ASSESSMENT AND PLAN:   1. Impacted cerumen, right ear      2. Mixed conductive and sensorineural hearing loss of both ears  78-year-old who I am seeing he wears hearing aids in each ear.  He was having a fluid issue on the right ear I had performed a myringotomy and overall it is feeling much better.  He saw the audiologist yesterday and he has an issue with his right hearing aid that there is ordering another part for that should come in in March.    On exam the myringotomy site is healed there is slight tympanosclerosis on the tympanic membrane no obvious effusion.  There is a large wax plug in the right ear that was removed.  When he put his hearing aid back and he did state that it felt better.    Will continue to follow this right ear and see how he responds to the new piece of his hearing aid that is coming in March.  A wax plug was removed today that did result in an improvement in his hearing.  He still did have an air-bone gap in each ear on the hearing test that is new compared to September 2022 with a flat tympanogram on the right.  Will currently observe this as clinically he is feeling better after the myringotomy may also be other factors including tympanosclerosis or cerumen.  If he is not having improvement after the new hearing aid piece comes then he can return    The patient indicates understanding of these issues and agrees to the plan.      EXAM:   Ht 5' 6\" (1.676 m)   Wt 195 lb (88.5 kg)   BMI 31.47 kg/m²     Pertinent exam findings may also be noted above in assessment and plan     System Details   Skin Inspection - Normal.   Constitutional Overall appearance - Normal.   Head/Face Symmetric, TMJ tenderness not present    Eyes EOMI, PERRL   Right ear:  Canal clear, TM intact, no DAY   Left ear:  Canal  clear, TM intact, no DAY   Nose: Septum midline, inferior turbinates not enlarged, nasal valves without collapse    Oral cavity/Oropharynx: No lesions or masses on inspection or palpation, tonsils symmetric    Neck: Soft without LAD, thyroid not enlarged  Voice clear/ no stridor   Other:      Scopes and Procedures:     Canals:  Right: Canal with cerumen preventing adequate view of TM, debrided with instrumentation    Tympanic Membranes:  Right: Normal tympanic membrane.     TM Visualized Method:   Right TM examined via otomicroscopy.      PROCEDURE:   Removal of cerumen impaction   The cerumen impaction was completely removed on the right side using microscopy as necessary.   Removal was completed by using a curette and suction.         Current Outpatient Medications   Medication Sig Dispense Refill    LEVEMIR FLEXPEN 100 UNIT/ML Subcutaneous Solution Pen-injector INJECT 12-24 UNITS INTO THE SKIN INGHTLY 15 mL 0    ofloxacin 0.3 % Ophthalmic Solution Apply to affected ear 5 drops twice a day x 5 days 5 mL 0    Glucose Blood (ONETOUCH ULTRA) In Vitro Strip TEST TWICE DAILY 200 strip 3    Darbepoetin Adryan (ARANESP, ALBUMIN FREE, IJ) Darbepoetin Adryan (Aranesp)      Insulin Pen Needle (DROPLET PEN NEEDLES) 31G X 6 MM Does not apply Misc Use with insulin daily 100 each 3    LOSARTAN 50 MG Oral Tab Take 1 tablet (50 mg total) by mouth daily 90 tablet 3    apixaban (ELIQUIS) 2.5 MG Oral Tab Take 1 tablet (2.5 mg total) by mouth 2 (two) times daily. 60 tablet 11    Methoxy PEG-Epoetin Beta (MIRCERA IJ) 60 mcg.      Amino Acids (LIQUACEL) Oral Liquid Take 30 mL by mouth.      torsemide 20 MG Oral Tab Take 2 tablets (40 mg total) by mouth BID (Diuretic). 360 tablet 3    NON FORMULARY Velcade once monthly injection      latanoprost 0.005 % Ophthalmic Solution Place 1 drop into both eyes nightly.      acetaminophen 500 MG Oral Tab Take 325 mg by mouth every 8 (eight) hours. 1 tablet 0    hydrALAZINE 50 MG Oral Tab Take 1 tablet  (50 mg total) by mouth 3 (three) times daily.      Calcium Carbonate 1250 (500 Ca) MG Oral Chew Tab Chew 1 tablet (1,250 mg total) by mouth in the morning and 1 tablet (1,250 mg total) before bedtime.      Cholecalciferol (VITAMIN D) 25 MCG (1000 UT) Oral Tab Take 2 tablets by mouth daily.      carvedilol 25 MG Oral Tab Take 1 tablet (25 mg total) by mouth 2 (two) times daily with meals. 60 tablet 11    OCUVITE-LUTEIN Oral Tab 1 tab daily      FOLBIC 2.5-25-2 MG Oral Tab Take 1 tablet by mouth daily.  10    acyclovir (ZOVIRAX) 400 MG Oral Tab Take 1 tablet (400 mg total) by mouth daily.  11      Past Medical History:   Diagnosis Date    Back problem     Calculus of kidney 1994    Cancer (HCC)     multiple myloma    Diabetes (HCC) 2016    Dialysis patient ([facility])     M,W, F    Essential hypertension 1986    Hearing impairment     bilateral hearing aids    High blood pressure     Multiple myeloma (HCC)     Muscle weakness     Neuropathy     Slight in feet    Osteoarthritis     Renal disorder     Visual impairment     Glasses      Social History:  Social History     Socioeconomic History    Marital status:    Tobacco Use    Smoking status: Never     Passive exposure: Never    Smokeless tobacco: Never   Vaping Use    Vaping Use: Never used   Substance and Sexual Activity    Alcohol use: Yes     Alcohol/week: 1.0 standard drink of alcohol     Types: 1 Glasses of wine per week     Comment: social    Drug use: Never          Carlos Harper MD  2/23/2024  9:46 AM

## 2024-02-27 ENCOUNTER — TELEPHONE (OUTPATIENT)
Dept: INTERNAL MEDICINE CLINIC | Facility: CLINIC | Age: 79
End: 2024-02-27

## 2024-02-27 ENCOUNTER — TELEPHONE (OUTPATIENT)
Dept: NEPHROLOGY | Facility: CLINIC | Age: 79
End: 2024-02-27

## 2024-02-27 NOTE — TELEPHONE ENCOUNTER
Aleisha Redd MD Kraman, David, MD Hi Dave, the repeat calcium for Balwinder was 9.1.  He had been taking a lot of Tums.  Just JOSE

## 2024-02-27 NOTE — TELEPHONE ENCOUNTER
Pts wife called to make sure that Dr. Redd adds a calcium lab to labs that pt is having done at dialysis. Dr Garza is requesting this.  Please call.

## 2024-03-01 ENCOUNTER — OFFICE VISIT (OUTPATIENT)
Dept: AUDIOLOGY | Facility: CLINIC | Age: 79
End: 2024-03-01
Payer: MEDICARE

## 2024-03-01 DIAGNOSIS — H90.3 SENSORINEURAL HEARING LOSS, BILATERAL: Primary | ICD-10-CM

## 2024-03-01 PROCEDURE — 92593 HEARING AID CHECK, BOTH EARS: CPT | Performed by: AUDIOLOGIST

## 2024-03-01 NOTE — PROGRESS NOTES
HEARING AID FOLLOW-UP    Balwinder Muniz  1945  [id number]    Patient is here for a routine. He is being seen today for  of repaired right hearing aid and C-shell  All electronics and housing were replaced-including rechargeable battery.         Hearing Aid Information  [employer]enn[employer]man P90-R  Right: #[id number]  Left #[id number]  Coupled to custom c-shells.   Dispensing date: 21  Warranty: 3 years  Battery: Rechargeable  Wax Guard: Cerustop        Both aids were connected to software. New right ear thresholds were entered in to audiogram in [facility] following a hearing screening of that ear in [place] today. Thresholds in that ear are stable as compared to audio on 2024 and following cerumen removal by Dr. Harper on 2023.     New feedback manager run.  Patient reports good volume and sound quality.   He does not like his aid paired to Bluetooth for phone calls. Does not use G[employer]el or change programs.     His warranty  in May 2024.  He would like to send in the left aid prior to it expiring for a clean/functional check and lithium battery replacement.      3/1/2024  JI Bustillos

## 2024-03-06 ENCOUNTER — TELEPHONE (OUTPATIENT)
Dept: NEPHROLOGY | Facility: CLINIC | Age: 79
End: 2024-03-06

## 2024-03-06 NOTE — TELEPHONE ENCOUNTER
Spoke to juliana at [facility]. Relayed note below and gave key and pharmacy. Verbalized understanding.

## 2024-03-12 ENCOUNTER — TELEPHONE (OUTPATIENT)
Dept: NEUROLOGY | Facility: CLINIC | Age: 79
End: 2024-03-12

## 2024-03-17 NOTE — PROGRESS NOTES
Name: Balwinder Muniz  Date: 3/18/24    Referring Physician: Dr. Garza    INITIAL VISIT:  Balwinder Muniz is a 78 year old male who presented for management of T2D. He was diagnosed with multiple myeloma in August of 2013 and underwent stem-cell transplant in 2014. He is in remission, getting solumedrol, remicaide,and something to prevent GI issues. He had many treatments with this and subsequently became diabetic.    He has stage V kidney disease due to multiple myeloma. He was on peritoneal dialysis and since recently, has changed to hemodialysis. He is now under the care of Dr. Redd.    Blood Glucose Today: 175  HbA1C or glycohemoglobin is 6.5 today (and it was 6.1 on 1/11/22 (and it was 5.9 on 4/12/21) and it was 6.3 % on 2/22/21 and it is 6.7 on 6/21/21)  Type 1 or Type 2?: Type 2  Checking 2 times per day usually at goal  The day after dialysis, fasting blood sugars were higher  Medications for DM: Pt usual dose of Basaglar is 12 units nightly  Patient is on dialysis and chemotherapy once a month  On Night of chemo patient using 24 units of basaglar  Next 2 days 22 units, then, 17 units on day 4 and the back down to usual 12 units   Episodes of hypoglycemia: none  Fasting blood sugars: reviewed    Dietary compliance: fair    Exercise: some    Polyuria/polydipsia: none    Blurred vision: none    Flu Vaccine This Season: yes, and also the Covid booster    REVIEW OF SYSTEMS  Eyes: Diabetic retinopathy present: none            Most recent visit to eye doctor in last 12 months: every 6 months    CV: Cardiovascular disease present: he has cardiomyopathy         Hypertension present: yes, on meds, at goal         Hyperlipidemia present: at goa (9/27/23)         Peripheral Vascular Disease present: none    : Nephropathy present: yes    Neuro: Neuropathy present: none    Skin: Infection or ulceration: none    Osteoporosis: none    Thyroid disease: none    Medications:     Current Outpatient Medications:      Insulin Pen Needle (BD PEN NEEDLE MICRO U/F) 32G X 6 MM Does not apply Misc, Use with insulin daily., Disp: 100 each, Rfl: 3    LEVEMIR FLEXPEN 100 UNIT/ML Subcutaneous Solution Pen-injector, INJECT 12-24 UNITS INTO THE SKIN INGHTLY, Disp: 15 mL, Rfl: 0    ofloxacin 0.3 % Ophthalmic Solution, Apply to affected ear 5 drops twice a day x 5 days, Disp: 5 mL, Rfl: 0    Glucose Blood (ONETOUCH ULTRA) In Vitro Strip, TEST TWICE DAILY, Disp: 200 strip, Rfl: 3    Darbepoetin Adryan (ARANESP, ALBUMIN FREE, IJ), Darbepoetin Adryan (Aranesp), Disp: , Rfl:     LOSARTAN 50 MG Oral Tab, Take 1 tablet (50 mg total) by mouth daily, Disp: 90 tablet, Rfl: 3    apixaban (ELIQUIS) 2.5 MG Oral Tab, Take 1 tablet (2.5 mg total) by mouth 2 (two) times daily., Disp: 60 tablet, Rfl: 11    Methoxy PEG-Epoetin Beta (MIRCERA IJ), 60 mcg., Disp: , Rfl:     Amino Acids (LIQUACEL) Oral Liquid, Take 30 mL by mouth., Disp: , Rfl:     torsemide 20 MG Oral Tab, Take 2 tablets (40 mg total) by mouth BID (Diuretic)., Disp: 360 tablet, Rfl: 3    NON FORMULARY, Velcade once monthly injection, Disp: , Rfl:     latanoprost 0.005 % Ophthalmic Solution, Place 1 drop into both eyes nightly., Disp: , Rfl:     acetaminophen 500 MG Oral Tab, Take 325 mg by mouth every 8 (eight) hours., Disp: 1 tablet, Rfl: 0    hydrALAZINE 50 MG Oral Tab, Take 1 tablet (50 mg total) by mouth 3 (three) times daily., Disp: , Rfl:     Calcium Carbonate 1250 (500 Ca) MG Oral Chew Tab, Chew 1 tablet (1,250 mg total) by mouth in the morning and 1 tablet (1,250 mg total) before bedtime., Disp: , Rfl:     Cholecalciferol (VITAMIN D) 25 MCG (1000 UT) Oral Tab, Take 2 tablets by mouth daily., Disp: , Rfl:     carvedilol 25 MG Oral Tab, Take 1 tablet (25 mg total) by mouth 2 (two) times daily with meals., Disp: 60 tablet, Rfl: 11    OCUVITE-LUTEIN Oral Tab, 1 tab daily, Disp: , Rfl:     FOLBIC 2.5-25-2 MG Oral Tab, Take 1 tablet by mouth daily., Disp: , Rfl: 10    acyclovir (ZOVIRAX) 400 MG  Oral Tab, Take 1 tablet (400 mg total) by mouth daily., Disp: , Rfl: 11     Allergies:   No Known Allergies    Social History:   Social History     Socioeconomic History    Marital status:    Tobacco Use    Smoking status: Never     Passive exposure: Never    Smokeless tobacco: Never   Vaping Use    Vaping Use: Never used   Substance and Sexual Activity    Alcohol use: Yes     Alcohol/week: 1.0 standard drink of alcohol     Types: 1 Glasses of wine per week     Comment: social    Drug use: Never       Medical History:   Past Medical History:   Diagnosis Date    Back problem     Calculus of kidney 1994    Cancer ([facility])     multiple myloma    Diabetes ([facility]) 2016    Dialysis patient ([facility])     M,W, F    Essential hypertension 1986    Hearing impairment     bilateral hearing aids    High blood pressure     Multiple myeloma (HCC)     Muscle weakness     Neuropathy     Slight in feet    Osteoarthritis     Renal disorder     Visual impairment     Glasses   Primary Hyperparathyroidism, s/p parathyroidectomy    Surgical history:   Past Surgical History:   Procedure Laterality Date    COLONOSCOPY  2004    OTHER SURGICAL HISTORY  1994    OTHER SURGICAL HISTORY  2014    stem cell transplant     OTHER SURGICAL HISTORY  11/12/2019    removal parathyroid adenoma    OTHER SURGICAL HISTORY  08/18/2022    Arthroscopy         PHYSICAL EXAM  Vitals:    03/18/24 1150   BP: 112/61   Pulse: 73   Weight: 175 lb (79.4 kg)   Height: 5' 8.5\" (1.74 m)       General Appearance:  Alert, in no acute distress, well developed  Eyes: normal conjunctivae, sclera.  Psychiatric:  oriented to time, self, and place  Nutritional:  no abnormal weight gain or loss      Lab Data:   Lab Results   Component Value Date     (H) 09/27/2023    A1C 6.5 (A) 03/18/2024     Lab Results   Component Value Date     (H) 03/22/2023    BUN 39 (H) 03/22/2023    BUNCREA 7.2 (L) 03/22/2023    CREATSERUM 5.45 (H) 03/22/2023    ANIONGAP 9 03/22/2023    GFRNAA  15 (L) 07/30/2019    GFRAA 17 (L) 07/30/2019    CA 9.3 03/22/2023    OSMOCALC 307 (H) 03/22/2023    ALKPHO 40 (L) 07/30/2019    AST 13 (L) 07/30/2019    ALT 30 07/30/2019    BILT 0.4 07/30/2019    TP 5.6 (L) 07/30/2019    ALB 3.4 07/30/2019    GLOBULIN 2.2 (L) 07/30/2019     03/22/2023    K 3.8 03/22/2023    CL 97 (L) 03/22/2023    CO2 34.0 (H) 03/22/2023     Lab Results   Component Value Date    CHOLEST 130 09/27/2023    TRIG 119 09/27/2023    HDL 41 09/27/2023    LDL 67 09/27/2023    VLDL 18 09/27/2023    NONHDLC 89 09/27/2023     Lab Results   Component Value Date    MALBP 356.0 (H) 05/02/2017    CREUR 86.6 05/02/2017         ASSESSMENT/PLAN:    This is a 77 year-old man here for evaluation and management of uncontrolled type 2 diabetes. We discussed the ABCs of DM. He has been fitted with peritoneal dialysis catheter and this is going well for him.     1.) Hyperglycemia Management- We discussed the importance of glycemic control to prevent complications of diabetes. We discussed the importance of SBGM.  - I asked him to continue to check blood sugars fasting, and two-hours after eating.   - I have asked him to continue his current regimen, unit or two extra the night before dialysis and note the blood sugars  - I explained to him that goals for fasting blood sugars should be 100-120 and for 2-hour post-prandial should be 140-160.    2.) Management of Diabetic Complications- We discussed the complications of diabetes include retinopathy, neuropathy, nephropathy and cardiovascular disease.   - He is up to date with screenings  - BP is at goal  - Vaccines- up to date  - Neuropathy- none  - CV- he has cardiomyopathy    3.) Lifestyle Management for Diabetes- We discussed importance of a low CHO diet, and recommend 45gm per meal or 135gm per day. We discussed the importance of trying to follow a Mediterranean diet, with an emphasis on vegetables at every meal, with lots whole grains, and protein from either  plant-based sources, or poultry and fish.   - He is having a healthy diet  - he tries and exercises as well     -Return to clinic in 1 year    Prior to this encounter, I spent over 15 minutes with preparing for the visit, including reviewing documents from other specialties as well as from PCP and going over test results and imaging studies. During the face to face encounter, I spent an additional 15 minutes which were determined for follow-up. Greater than 50% of the time was spent in counseling, anticipatory guidance, and coordination of care. Patient concerns were answered to the best of my knowledge.         3/18/24  Shanon Ledesma MD

## 2024-03-18 ENCOUNTER — TELEPHONE (OUTPATIENT)
Dept: INTERNAL MEDICINE CLINIC | Facility: CLINIC | Age: 79
End: 2024-03-18

## 2024-03-18 ENCOUNTER — PATIENT MESSAGE (OUTPATIENT)
Dept: ENDOCRINOLOGY CLINIC | Facility: CLINIC | Age: 79
End: 2024-03-18

## 2024-03-18 ENCOUNTER — OFFICE VISIT (OUTPATIENT)
Dept: ENDOCRINOLOGY CLINIC | Facility: CLINIC | Age: 79
End: 2024-03-18

## 2024-03-18 VITALS
SYSTOLIC BLOOD PRESSURE: 112 MMHG | DIASTOLIC BLOOD PRESSURE: 61 MMHG | HEART RATE: 73 BPM | WEIGHT: 175 LBS | HEIGHT: 68.5 IN | BODY MASS INDEX: 26.22 KG/M2

## 2024-03-18 DIAGNOSIS — Z79.4 TYPE 2 DIABETES MELLITUS WITH HYPERGLYCEMIA, WITH LONG-TERM CURRENT USE OF INSULIN (HCC): Primary | ICD-10-CM

## 2024-03-18 DIAGNOSIS — E11.65 TYPE 2 DIABETES MELLITUS WITH HYPERGLYCEMIA, WITH LONG-TERM CURRENT USE OF INSULIN (HCC): Primary | ICD-10-CM

## 2024-03-18 LAB
CARTRIDGE LOT#: ABNORMAL NUMERIC
GLUCOSE BLOOD: 175
HEMOGLOBIN A1C: 6.5 % (ref 4.3–5.6)
TEST STRIP EXPIRATION DATE: NORMAL DATE
TEST STRIP LOT #: NORMAL NUMERIC

## 2024-03-18 PROCEDURE — 99214 OFFICE O/P EST MOD 30 MIN: CPT | Performed by: INTERNAL MEDICINE

## 2024-03-18 PROCEDURE — 83036 HEMOGLOBIN GLYCOSYLATED A1C: CPT | Performed by: INTERNAL MEDICINE

## 2024-03-18 PROCEDURE — 82947 ASSAY GLUCOSE BLOOD QUANT: CPT | Performed by: INTERNAL MEDICINE

## 2024-03-18 RX ORDER — PEN NEEDLE, DIABETIC 32 GX 1/4"
NEEDLE, DISPOSABLE MISCELLANEOUS
Qty: 100 EACH | Refills: 3 | Status: SHIPPED | OUTPATIENT
Start: 2024-03-18

## 2024-03-18 NOTE — TELEPHONE ENCOUNTER
[facility] called re:Rx for pen needles     Patient has new insurance that covers BD products - BD does not have 31G X 6 MM    Ok to change Rx to 31G X 5 MM

## 2024-03-18 NOTE — TELEPHONE ENCOUNTER
Pt sent MCM stating he would like all that was done at today's visit to be sent to his PCP Dr. Garza, informed pt via [place], since his provider is apart of Epic, he will have access to all that was done.

## 2024-03-18 NOTE — TELEPHONE ENCOUNTER
Requested Prescriptions     Signed Prescriptions Disp Refills    Insulin Pen Needle (BD PEN NEEDLE MICRO U/F) 32G X 6 MM Does not apply Misc 100 each 3     Sig: Use with insulin daily.     Authorizing Provider: JANIE ELDER     Ordering User: PADMINI TOUSSAINT

## 2024-03-19 ENCOUNTER — TELEPHONE (OUTPATIENT)
Dept: AUDIOLOGY | Facility: CLINIC | Age: 79
End: 2024-03-19

## 2024-03-21 ENCOUNTER — OFFICE VISIT (OUTPATIENT)
Dept: INTERNAL MEDICINE CLINIC | Facility: CLINIC | Age: 79
End: 2024-03-21

## 2024-03-21 ENCOUNTER — MED REC SCAN ONLY (OUTPATIENT)
Dept: INTERNAL MEDICINE CLINIC | Facility: CLINIC | Age: 79
End: 2024-03-21

## 2024-03-21 ENCOUNTER — TELEPHONE (OUTPATIENT)
Dept: INTERNAL MEDICINE CLINIC | Facility: CLINIC | Age: 79
End: 2024-03-21

## 2024-03-21 ENCOUNTER — AUDIOLOGY DOCUMENTATION (OUTPATIENT)
Dept: AUDIOLOGY | Facility: CLINIC | Age: 79
End: 2024-03-21

## 2024-03-21 VITALS
HEIGHT: 68 IN | WEIGHT: 189 LBS | OXYGEN SATURATION: 98 % | BODY MASS INDEX: 28.64 KG/M2 | HEART RATE: 74 BPM | RESPIRATION RATE: 16 BRPM | SYSTOLIC BLOOD PRESSURE: 124 MMHG | DIASTOLIC BLOOD PRESSURE: 56 MMHG | TEMPERATURE: 98 F

## 2024-03-21 DIAGNOSIS — R41.3 MEMORY CHANGE: ICD-10-CM

## 2024-03-21 DIAGNOSIS — Z99.2 ESRD ON HEMODIALYSIS (HCC): ICD-10-CM

## 2024-03-21 DIAGNOSIS — E83.52 HYPERCALCEMIA: ICD-10-CM

## 2024-03-21 DIAGNOSIS — E11.9 TYPE 2 DIABETES MELLITUS WITHOUT COMPLICATION, WITHOUT LONG-TERM CURRENT USE OF INSULIN (HCC): ICD-10-CM

## 2024-03-21 DIAGNOSIS — R94.2 ABNORMAL PET OF LEFT LUNG: ICD-10-CM

## 2024-03-21 DIAGNOSIS — E11.65 TYPE 2 DIABETES MELLITUS WITH HYPERGLYCEMIA, WITH LONG-TERM CURRENT USE OF INSULIN (HCC): ICD-10-CM

## 2024-03-21 DIAGNOSIS — C90.00 MULTIPLE MYELOMA NOT HAVING ACHIEVED REMISSION (HCC): Primary | ICD-10-CM

## 2024-03-21 DIAGNOSIS — Z79.4 TYPE 2 DIABETES MELLITUS WITH HYPERGLYCEMIA, WITH LONG-TERM CURRENT USE OF INSULIN (HCC): ICD-10-CM

## 2024-03-21 DIAGNOSIS — R97.20 ELEVATED PSA: ICD-10-CM

## 2024-03-21 DIAGNOSIS — R82.90 ABNORMAL URINALYSIS: ICD-10-CM

## 2024-03-21 DIAGNOSIS — N18.6 ESRD ON HEMODIALYSIS (HCC): ICD-10-CM

## 2024-03-21 PROCEDURE — 99215 OFFICE O/P EST HI 40 MIN: CPT | Performed by: INTERNAL MEDICINE

## 2024-03-21 RX ORDER — LANTHANUM CARBONATE 500 MG/1
500 TABLET, CHEWABLE ORAL 2 TIMES DAILY WITH MEALS
COMMUNITY

## 2024-03-21 NOTE — TELEPHONE ENCOUNTER
Hi Dr. Harper.  Thank you for having seen brain in the past.  This is just an FYI in case we need to do anything different for these radiological findings.    Balwinder had a PET scan by his oncologist as it relates to his multiple myeloma on March 7, 2024.  This was done out of [facility] at [facility].    There was \"opacification of the right mastoid air cells which is new compared to the prior exam and may reflect mastoiditis\"    2.  An MRI of the brain done October 24, 2023 showed bilateral mastoid effusions right greater than left.  He had the MRI at [place].    3.   He has no pain over the mastoid area.  He does have chronic hearing loss and wears hearing aids.    I do not think I need to do anything as it relates to these radiographic findings but thought I would check with you.  Thank you in advance.  Anatoly.

## 2024-03-21 NOTE — PROGRESS NOTES
Balwinder Muniz is a 78 year old male.  HPI:     Chief Complaint   Patient presents with    Follow - Up      Balwinder is here with his wife Noemi    He will be seeing Dr. Evans from neurology coming up soon for memory problems    He follows with nephrology Dr. Redd for his end-stage renal disease    He follows with Dr. Adams for his multiple myeloma.    He follows with Dr. Ledesma for his diabetes.  Recent hemoglobin A1c 6.5    He has had a history of elevated PSA in the past but PSA February 2024 was 2.93.    Will get an updated urinalysis coming up.  He does still produce urine but no dysuria or frequency.    He is getting physical therapy and this seems to be helping    He did have a recent PET scan March 7, 2024.  There was findings of persistent patchy interstitial and airspace opacity in the left lower lobe which demonstrates hypermetabolic activity.  No other significant hypermetabolic lesions are identified on the exam.  No suspicious hypermetabolic osseous lesions.    The CT findings showed opacification of the right mastoid air cells which is new compared to prior exam and may reflect mastoiditis.  Patient had seen Dr. Harper in the past for \"fluid in the ear\".    There was a large exophytic cyst arising off the posterior aspect of the right kidney measuring up to 10 cm in size.  There is a stable hyperdense exophytic structure arising of the left kidney measuring 1.5 cm in size without corresponding FDG activity likely a proteinaceous cyst.  There was a stable 1.4 cm nodular soft tissue density right adrenal gland.  Prostamegaly was seen.    We spoke about the findings in the lungs.  He has absolutely no pulmonary symptoms.  No wheezing.  No shortness of breath.    Note that a PET scan done August 2, 2023 at [facility] showed focal FDG G avid groundglass and consolidative opacity within the left lower lobe.  There was additional low-level uptake in the right upper lobe.  There was a 13 mm nodule  in the caudal aspect of the right adrenal gland with low-level uptake at about background that may represent a exophytic adenoma.  This remained stable in comparison to most recent PET scan CT scan although gradually increasing in size or multiple prior remote studies.    Also note that February 10, 2022 there is hypermetabolism corresponding to a new patchy pulmonary infiltrates in the right upper lobe and lingula favoring a infectious inflammatory process.    A PET scan May 6, 2021 showed no hypermetabolic pulmonary nodule or mass.    We talked about this.  We talked about seeing a pulmonologist.  Right he has a lot of doctors appointments.  Because he is asymptomatic I feel for now he does not need to see pulmonary.  I did discuss that there might be a indolent infection of some sort because he is on immunosuppressant therapy but after discussing with him and Noemi we all elected not to pursue pulmonary consultation at this time.  They are aware that we could be missing something but unlikely since again he is asymptomatic.    He has had COVID vaccines February 10, 2021.  March 10, 2021.  October 25, 2021.  June 6, 2022.  September 15, 2022 and last one October 15,2023.  I did let him know there is another booster that the CDC recommends 4 months after last booster.  Current Outpatient Medications   Medication Sig Dispense Refill    lanthanum 500 MG Oral Chew Tab Chew 1 tablet (500 mg total) by mouth 2 (two) times daily with meals.      Insulin Pen Needle (BD PEN NEEDLE MICRO U/F) 32G X 6 MM Does not apply Misc Use with insulin daily. 100 each 3    LEVEMIR FLEXPEN 100 UNIT/ML Subcutaneous Solution Pen-injector INJECT 12-24 UNITS INTO THE SKIN INGHTLY 15 mL 0    Glucose Blood (ONETOUCH ULTRA) In Vitro Strip TEST TWICE DAILY 200 strip 3    LOSARTAN 50 MG Oral Tab Take 1 tablet (50 mg total) by mouth daily 90 tablet 3    apixaban (ELIQUIS) 2.5 MG Oral Tab Take 1 tablet (2.5 mg total) by mouth 2 (two) times daily.  60 tablet 11    Methoxy PEG-Epoetin Beta (MIRCERA IJ) 60 mcg.      Amino Acids (LIQUACEL) Oral Liquid Take 30 mL by mouth.      torsemide 20 MG Oral Tab Take 2 tablets (40 mg total) by mouth BID (Diuretic). 360 tablet 3    NON FORMULARY Velcade once monthly injection      latanoprost 0.005 % Ophthalmic Solution Place 1 drop into both eyes nightly.      acetaminophen 500 MG Oral Tab Take 325 mg by mouth every 8 (eight) hours. 1 tablet 0    hydrALAZINE 50 MG Oral Tab Take 1 tablet (50 mg total) by mouth 3 (three) times daily.      Cholecalciferol (VITAMIN D) 25 MCG (1000 UT) Oral Tab Take 2 tablets by mouth daily.      carvedilol 25 MG Oral Tab Take 1 tablet (25 mg total) by mouth 2 (two) times daily with meals. 60 tablet 11    OCUVITE-LUTEIN Oral Tab 1 tab daily      FOLBIC 2.5-25-2 MG Oral Tab Take 1 tablet by mouth daily.  10    acyclovir (ZOVIRAX) 400 MG Oral Tab Take 1 tablet (400 mg total) by mouth daily.  11    Darbepoetin Adryan (ARANESP, ALBUMIN FREE, IJ) Darbepoetin Adryan (Aranesp)        Past Medical History:   Diagnosis Date    Back problem     Calculus of kidney 1994    Cancer ([facility])     multiple myloma    Diabetes ([facility]) 2016    Dialysis patient ([facility])     M,W, F    ESRD (end stage renal disease) ([facility])     Essential hypertension 1986    Hearing impairment     bilateral hearing aids    High blood pressure     Multiple myeloma (HCC)     Muscle weakness     Neuropathy     Slight in feet    Osteoarthritis     Renal disorder     Visual impairment     Glasses      Social History:  Social History     Socioeconomic History    Marital status:    Tobacco Use    Smoking status: Never     Passive exposure: Never    Smokeless tobacco: Never   Vaping Use    Vaping Use: Never used   Substance and Sexual Activity    Alcohol use: Yes     Alcohol/week: 1.0 standard drink of alcohol     Types: 1 Glasses of wine per week     Comment: social    Drug use: Never        REVIEW OF SYSTEMS:   GENERAL HEALTH:  feels well  otherwise  RESPIRATORY:  Voices no shortness of breath with exertion or cough  CARDIOVASCULAR:  Voices no chest pain on exertion or shortness of breath  GI:   Voices no abdominal pain or changes of bowels   :Viices no urning or frequency of urination.  NEURO:  Voices no  headaches or dizziness.  He has been evaluated by Dr. Evans for memory problems    EXAM:   /56   Pulse 74   Temp 97.7 °F (36.5 °C) (Oral)   Resp 16   Ht 5' 8\" (1.727 m)   Wt 189 lb (85.7 kg)   SpO2 98%   BMI 28.74 kg/m²     GENERAL:  well developed, well nourished, in no apparent distress  SKIN:  no rashes , no suspicious lesions  HEENT: atraumatic.  Pharynx normal without exudate.  EYES:  PERRL. Sclera anicteric.  NECK:  Supple,  no adenopathy,  thyroid normal  LUNGS:  clear to auscultation.  Effort normal  CARDIO:  RRR without murmur.   S1 and S2 normal  GI:  good BS's,  no masses,   HSM or tenderness  EXTREMITIES : no cyanosis, clubbing or edema    ASSESSMENT AND PLAN:     1. Multiple myeloma not having achieved remission ([facility])  He is followed closely by Dr. Adams.    2. Elevated PSA  PSA now in the normal range.    3. Hypercalcemia  Resolved.    4. ESRD on hemodialysis ([facility])  Followed by Dr. Redd.    5. Memory change  Followed by Dr. Evans.  Started on Aricept 5 mg a day.    6. Type 2 diabetes mellitus without complication, without long-term current use of insulin ([facility])  Stable.  Hemoglobin A1c 6.5.  Followed by Dr. Ledesma    7. Abnormal PET of left lung  See above.  We talked about this extensively.  He is asymptomatic.  For now it would not appear he needs a pulmonary consultation.    This visit was 40 minutes.  I spent 10 minutes before visit preparing and reviewing old records.  Greater than 50% of the visit was engaged in counseling and review of past data.     The patient indicates understanding of these issues and agrees to the plan.    Geovanni Garza MD  3/21/2024  2:57 PM

## 2024-03-22 ENCOUNTER — TELEPHONE (OUTPATIENT)
Dept: INTERNAL MEDICINE CLINIC | Facility: CLINIC | Age: 79
End: 2024-03-22

## 2024-03-22 RX ORDER — INSULIN DETEMIR 100 [IU]/ML
24 INJECTION, SOLUTION SUBCUTANEOUS NIGHTLY
Qty: 24 ML | Refills: 1 | Status: SHIPPED | OUTPATIENT
Start: 2024-03-22

## 2024-03-22 NOTE — PROGRESS NOTES
Hearing Aid Information  Conju[employer]on SuasnaTouchotel [id number]  Right: #[id number]  Left #[id number]  Coupled to custom c-shells.   Dispensing date: 5-25-21  Warranty: 3 years  Battery: Rechargeable  Wax Guard: [employer]       Left hearing aid was dropped off and patient picked up loaner.  Left aid sent to [employer] for warranty repair and battery replacement.    C-shell was also sent in with repair under warranty.        Will call patient when aid is back.

## 2024-03-22 NOTE — TELEPHONE ENCOUNTER
Endocrine refill protocol for basal insulins     Protocol Criteria:  - Appointment with Endocrinology completed in the last 6 months or scheduled in the next 3 months    - A1c result completed in the last 6 months and is <8.5%       Verify appointment has been completed or scheduled in the appropriate timeline. If so can send a 90 day supply with 1 refill per provider protocol.    Verify A1c has been completed within the last 6 months and is below 8.5%     Last completed office visit: 3/18/24  Next scheduled Follow up: No appointment scheduled.     Last A1c result:    Component      Latest Ref Rng 3/18/2024   HEMOGLOBIN A1C      4.3 - 5.6 % 6.5 !

## 2024-03-22 NOTE — TELEPHONE ENCOUNTER
02/19/2024 2:29 PM CST   This result has an attachment that is not available.   MRI LUMBAR SPINE WO CONTRAST.    HISTORY: Acute left-sided low back pain with left-sided sciatica. Dx: Acute left-sided low back pain with left-sided sciatica; Multiple myeloma not having achieved remission    TECHNIQUE: MRI was performed of the lumbar spine without intravenous contrast using sagittal T1-weighted, sagittal T2-weighted, sagittal STIR, axial T1-weighted, and axial T2-weighted images.    COMPARISON: MRI lumbar spine 1/8/2018    FINDINGS: Examination degraded by motion and imaging findings are somewhat limited because of the patient's movement during the exam.    6 nonrib-bearing lumbar vertebrae vertebral bodies identified.  There is partial lumbarization of S1.  The lowest nonrib-bearing lumbar vertebrae vertebral body will be labeled S1.    Heterogeneous appearance of the bone marrow with scattered mild T1 hypointense and STIR hyperintense lesions in the L1 and L2 vertebral body. There is similar severe L1 compression deformity with unchanged mild wedging of the L2 superior endplate.    T1 hyperintense and mild STIR hyperintense lesion in the L4 vertebral body is progressed from prior. Questioned osseous involvement of the left L2 pedicle.    Presumed Modic type I endplate changes at L4-L5 and L5-S1    No epidural collections are appreciated. Developmentally slender central spinal canal.    There is maintenance of the normal lumbar spine lordosis. Levoconvex curvature with the apex at L1-L2    The conus medullaris terminates at the L1 level and is normal in signal. Nonspecific diffuse thickening of the cauda equina nerve roots    Fatty atrophy of the posterior paraspinal muscles. Partially imaged large 12 cm cystic structure in the right retroperitoneum    Multilevel degenerative lumbar spondylosis with marginal osteophytes, degenerative disc disease, facet arthrosis, and ligamentum flavum hypertrophy as detailed  below:    At T12-L1: Mild disc bulge osteophyte. No significant canal or foraminal stenosis.    At L1-L2: Diffuse disc bulge osteophyte, facet hypertrophy and ligamentum flavum thickening. Effacement of the traversing bilateral L2 nerve roots. Mild relative canal stenosis. Narrowing of the right lateral recess. Mild right foraminal stenosis.    At L2-L3: Diffuse disc bulge osteophyte with superimposed broad-based central and right lateral recess protrusion. Facet hypertrophy, ligamentum flavum thickening and prominent epidural fat. Severe canal stenosis, severe right greater than left lateral recess stenosis with effacement of the traversing right greater than left L3 nerve roots. Moderate to severe right and mild to moderate left foraminal stenosis    At L3-L4: Diffuse disc bulge osteophyte with asymmetric right greater than left ligamentum flavum thickening and facet hypertrophy. Effacement of the right thecal sac and moderate central canal stenosis. Moderate to severe lateral recess stenosis. Severe right and left foraminal stenosis    At L4-L5: Left asymmetric disc bulge osteophyte with superimposed large central protrusion/extrusion, facet hypertrophy and ligamentum flavum thickening. Moderate to severe canal stenosis. Severe left and moderate right lateral recess stenosis. Severe left and moderate right foraminal stenosis    At L5-S1: Diffuse disc bulge ossified with superimposed broad-based central and paracentral protrusion. Facet hypertrophy and ligament flavum thickening. Severe lateral recess stenosis. Mild to moderate central canal stenosis. Severe bilateral foraminal stenosis.    At S1-S2: No significant canal or foraminal stenosis.    Overall findings are progressed from prior 2018 examination    IMPRESSION:  1. Transitional lumbosacral anatomy as described above. Should the patient require an interventional or surgical procedure in the future, great care should be taken in assessing that the correct  level is localized.    2. New STIR hyperintense foci involving the L1, L2 and L4 bodies suspicious for myelomatous involvement.    3. Unchanged severe L1 compression deformity. No new or progressive vertebral height loss.    4. Multilevel degenerative spondylitic changes superimposed on a developmentally slender central spinal canal with moderate to severe canal and foraminal stenoses as above.    5. Additional findings, as described.    FINAL REPORT  Attending Radiologist:  Kirk Adair MD  Date Signed Off:  02/19/2024 14:29

## 2024-03-23 NOTE — TELEPHONE ENCOUNTER
Carlos Harper MD Kraman, David, MD  Caller: Unspecified (2 days ago,  4:20 PM)  Kwan Ledbetter, thanks for touching base. I think it is ok. There will be fluid in the mastoid associated with the fluid he had in his middle ear that I drained. The mastoid fluid can stick around for months and not surprising the imaging picked it up. It will slowly drain out with time. Have a nice weekend! Carlos

## 2024-03-25 ENCOUNTER — TELEPHONE (OUTPATIENT)
Dept: INTERNAL MEDICINE CLINIC | Facility: CLINIC | Age: 79
End: 2024-03-25

## 2024-03-25 ENCOUNTER — TELEPHONE (OUTPATIENT)
Dept: NEPHROLOGY | Facility: CLINIC | Age: 79
End: 2024-03-25

## 2024-03-25 NOTE — TELEPHONE ENCOUNTER
Dr Redd please see note below ,patient wife made aware that you are out of the office ,verbalized understanding.

## 2024-03-26 NOTE — TELEPHONE ENCOUNTER
Can you please call her and let he know that I will chat with Dr Garza about it.  Dr Garza will get back to her.  But 5 mg donepezil is OK by me

## 2024-03-30 RX ORDER — LANTHANUM CARBONATE 1000 MG/1
TABLET, CHEWABLE ORAL
Qty: 270 TABLET | Refills: 3 | Status: SHIPPED | OUTPATIENT
Start: 2024-03-30

## 2024-04-01 ENCOUNTER — TELEPHONE (OUTPATIENT)
Facility: CLINIC | Age: 79
End: 2024-04-01

## 2024-04-01 NOTE — TELEPHONE ENCOUNTER
Wife is requesting to speak to Dr. Redd or Rn about Dr. Garza stating that it is okay for pt to start aerosep medicine no higher than 5 mg at night.  Pt has not started it yet but wants to make sure this is okay please call

## 2024-04-02 ENCOUNTER — TELEPHONE (OUTPATIENT)
Dept: INTERNAL MEDICINE CLINIC | Facility: CLINIC | Age: 79
End: 2024-04-02

## 2024-04-02 NOTE — TELEPHONE ENCOUNTER
She called about the same thing last week, and I said yes okay to take Aricept 5 mg.  I discussed it with Dr. Garza last week as well.    Thanks

## 2024-04-02 NOTE — TELEPHONE ENCOUNTER
Dr. Redd, spoke to chad. She is concerned because she said Dr. Evans plans to increase the dose in 3 mths. Is 5 mg the max pt can be on? If so, should he start at a lower dose of 2.5 mg instead and then increase to 5 in 3 mths?

## 2024-04-02 NOTE — TELEPHONE ENCOUNTER
Discussed with Noemi.  Viewed MRI of lumbar spine was done February 19,024 at [facility].  I did mention the findings of hyperintense foci involving L1, L2 and L4 body suspicious for myelomatous involvement.  There was an unchanged severe L1 compression deformity.  We talked about possibility of osteoporosis.  We talked about getting a DEXA scan and possible treatment but for now Balwinder has been through a lot recently and we will hold off on any DEXA scans for now.    Thankfully he has no back pain now.    In addition there was mention of severe spinal canal stenosis at L2-L3 and foraminal stenosis at several levels.  Also moderate central canal stenosis at L3-L4.  Other spinal canal stenosis at other levels.  Again he has no back pain.  No leg weakness.  For now we will just be aware of this and monitor for changes in the future    Also there was mention of a partially imaged 12 cm cystic structure in the right retroperitoneum.  For now since no reports of suspicious malignancy regarding that finding we will just note this.    Noemi understands all the above and agrees.  She was already made aware of this by Dr. Adams

## 2024-04-02 NOTE — TELEPHONE ENCOUNTER
Max is 10mg - but lets touch base closer to when he wants to increase the dose from 5 to 10.  If he is tolerating well at that time I have no issue with 10.  But lets touch base when that time comes

## 2024-04-10 ENCOUNTER — TELEPHONE (OUTPATIENT)
Facility: CLINIC | Age: 79
End: 2024-04-10

## 2024-04-10 NOTE — TELEPHONE ENCOUNTER
Current Outpatient Medications   Medication Sig Dispense Refill    Lanthanum Carbonate 1000 MG Oral Chew Tab CHEW AND SWALLOW ONE TABLET BY MOUTH THREE TIMES A DAY WITH MEALS 270 tablet 3     Needs PA- pl call [phone]

## 2024-04-10 NOTE — TELEPHONE ENCOUNTER
Pt needs PA, please. Thank you.    Medication PA Requested:   lanthanum carbonate 1000 mg oral chew tab                                                       CoverMyMeds Used:  Key:  Quantity: 270 with 3 refills  Day Supply: 90 days  Sig: chew and swallow one tablet by mouth three times a day with meals  DX Code:             E83.39     N18.30                   CPT code (if applicable):   Case Number/Pending Ref#:

## 2024-04-11 NOTE — TELEPHONE ENCOUNTER
Medication PA Requested:   lanthanum carbonate 1000 mg oral chew tab                                                       CoverMyMeds Used: No  Key:  Quantity: 270 with 3 refills  Day Supply: 90 days  Sig: chew and swallow one tablet by mouth three times a day with meals  DX Code:             E83.39     N18.30                   CPT code (if applicable):   Case Number/Pending Ref#:      EPA submitted, LOV 3/25  Awaiting determination

## 2024-04-25 NOTE — PROGRESS NOTES
HEARING AID FOLLOW-UP    Naresh Escamilla  8/11/1945  [id number]    Patient is here for an annual hearing aid check. Hearing Aid Information  Denver Chakraborty P90-R  Right: #[id number]  Left #[id number]  Coupled to custom c-shells. Dispensing date: 5-25-21  Warranty: 3 years  Battery: Rechargeable  Wax Guard: Eleonore Labor  The patient has the following concerns:   Wondering if hearing has changed. In office the following actions were taken:  Cleaned aid  Suctioned microphone port  Changed wax guard  Verified user settings  Listening check   Completed audiogram with improvement noted to right side. (This ear has had fluctuating conductive component in the past. PE tube visible in TM and no drainage noted. )    Otoscopic shows some cerumen (non-occluding) bilaterally. Suggested ear cleaning by ENT. No change to hearing aid programming at this time. Patient is to follow up annually or sooner as needed.      9/1/2022  Diego Beltre show

## 2024-04-25 NOTE — TELEPHONE ENCOUNTER
Rn amado Franklin spoke to Cecilia stated that pt wife is not getting refill from [facility] will clarify pt wife where patient gets the refill of this medication.

## 2024-04-30 ENCOUNTER — TELEPHONE (OUTPATIENT)
Dept: AUDIOLOGY | Facility: CLINIC | Age: 79
End: 2024-04-30

## 2024-04-30 NOTE — TELEPHONE ENCOUNTER
Patients wife requesting office visit notes and hearing test that was last completed in March, needed for new hearing aides for different options/cost. Please contact wife when ready for pickup at the  at [facility], [phone],thanks.

## 2024-04-30 NOTE — TELEPHONE ENCOUNTER
Spoke with wife who is wanting to investigate cheaper options for hearing aids, maybe just an amplifier given patient's poorer hearing on the right.    Gave her copy of audiogram from 9-1-2022 and 2- as well as hearing aid check notes from 3-1-2024.     Put in envelope for her to  at .

## 2024-05-10 ENCOUNTER — TELEPHONE (OUTPATIENT)
Dept: NEPHROLOGY | Facility: CLINIC | Age: 79
End: 2024-05-10

## 2024-05-10 NOTE — TELEPHONE ENCOUNTER
Rn spoke to patient wife Noemi GEIGER verified said it's taking care of by [facility].

## 2024-05-10 NOTE — TELEPHONE ENCOUNTER
Patient wife is calling to find out if there is a way she can get MD notes from when patient see's MD at Dialysis.  She was instructed to call office.  She will also try to call Dialysis Center.

## 2024-06-24 ENCOUNTER — TELEPHONE (OUTPATIENT)
Dept: NEUROLOGY | Facility: CLINIC | Age: 79
End: 2024-06-24

## 2024-06-24 RX ORDER — DONEPEZIL HYDROCHLORIDE 5 MG/1
5 TABLET, FILM COATED ORAL NIGHTLY
Qty: 90 TABLET | Refills: 3 | OUTPATIENT
Start: 2024-06-24

## 2024-06-24 NOTE — TELEPHONE ENCOUNTER
Spoke with pt wife rescheduled appt 6/26 to next available August 29. Added on to waitlist. Pt wife advised  will run out of donepezil 5 MG Oral Tab  by the new rescheduled date. confirmed rx   [id number] - [place], [place] - [address] [phone], [phone]   . Pls advise.

## 2024-06-24 NOTE — TELEPHONE ENCOUNTER
Requested Prescriptions     Pending Prescriptions Disp Refills    donepezil 5 MG Oral Tab 90 tablet 3     Sig: Take 1 tablet (5 mg total) by mouth nightly.       LOV: 3/25/24  NOV: 8/29/24    [place] 3[place]25/24 (QTY 90/3RF)    Denied- 1 year supply sent on 3/25/24. Pt needs to contact pharmacy & request refill

## 2024-08-13 ENCOUNTER — TELEPHONE (OUTPATIENT)
Dept: INTERNAL MEDICINE CLINIC | Facility: CLINIC | Age: 79
End: 2024-08-13

## 2024-08-23 ENCOUNTER — TELEPHONE (OUTPATIENT)
Dept: INTERNAL MEDICINE CLINIC | Facility: CLINIC | Age: 79
End: 2024-08-23

## 2024-08-23 RX ORDER — TORSEMIDE 20 MG/1
TABLET ORAL
Qty: 360 TABLET | Refills: 3 | Status: SHIPPED | OUTPATIENT
Start: 2024-08-23

## 2024-08-23 NOTE — TELEPHONE ENCOUNTER
The following prescription was reviewed, authorized, and electronically sent to the pharmacy.    Requested Prescriptions     Signed Prescriptions Disp Refills    TORSEMIDE 20 MG Oral Tab 360 tablet 3     Sig: Take 2 tablets (40 mg total) by mouth twice daily (Diuretic).     Authorizing Provider: JANIE ELDER

## 2024-08-23 NOTE — TELEPHONE ENCOUNTER
Kwan Redd.  I received a request for torsemide refill.  20 mg tablets.  2 tablets twice a day.  I checked with Balwinder's family members and this is what he has been on for close to a year.  I thought I would just FYI you this just to make sure this is in keeping with your medication reconciliation since he is on dialysis.  Thank you.  Anatoly.

## 2024-09-19 ENCOUNTER — TELEPHONE (OUTPATIENT)
Dept: INTERNAL MEDICINE CLINIC | Facility: CLINIC | Age: 79
End: 2024-09-19

## 2024-09-19 ENCOUNTER — OFFICE VISIT (OUTPATIENT)
Dept: INTERNAL MEDICINE CLINIC | Facility: CLINIC | Age: 79
End: 2024-09-19

## 2024-09-19 VITALS
WEIGHT: 185 LBS | TEMPERATURE: 98 F | BODY MASS INDEX: 28.04 KG/M2 | HEIGHT: 68 IN | HEART RATE: 71 BPM | OXYGEN SATURATION: 94 % | DIASTOLIC BLOOD PRESSURE: 76 MMHG | SYSTOLIC BLOOD PRESSURE: 116 MMHG

## 2024-09-19 DIAGNOSIS — I48.0 PAROXYSMAL ATRIAL FIBRILLATION (HCC): ICD-10-CM

## 2024-09-19 DIAGNOSIS — R41.3 MEMORY CHANGE: ICD-10-CM

## 2024-09-19 DIAGNOSIS — Z99.2 ESRD ON HEMODIALYSIS (HCC): ICD-10-CM

## 2024-09-19 DIAGNOSIS — E11.9 TYPE 2 DIABETES MELLITUS WITHOUT COMPLICATION, WITHOUT LONG-TERM CURRENT USE OF INSULIN (HCC): ICD-10-CM

## 2024-09-19 DIAGNOSIS — C90.00 MULTIPLE MYELOMA NOT HAVING ACHIEVED REMISSION (HCC): Primary | ICD-10-CM

## 2024-09-19 DIAGNOSIS — R97.20 ELEVATED PSA: ICD-10-CM

## 2024-09-19 DIAGNOSIS — R94.2 ABNORMAL PET OF LEFT LUNG: ICD-10-CM

## 2024-09-19 DIAGNOSIS — N18.6 ESRD ON HEMODIALYSIS (HCC): ICD-10-CM

## 2024-09-19 PROCEDURE — G2211 COMPLEX E/M VISIT ADD ON: HCPCS | Performed by: INTERNAL MEDICINE

## 2024-09-19 PROCEDURE — 99215 OFFICE O/P EST HI 40 MIN: CPT | Performed by: INTERNAL MEDICINE

## 2024-09-19 NOTE — PROGRESS NOTES
Balwinder Muniz is a 79 year old male.  HPI:     Chief Complaint   Patient presents with    Follow - Up     Pt here for 6 month f/u       Balwinder presents for 6-month checkup with his wife Noemi    He is doing well.    He has multiple myeloma.  He has been followed closely with Dr. Adams.  He will be following up with a new hematologist as Dr. Adams is leaving the area.    He has history of hypertension.  Blood pressure under good control    Chronic kidney disease on end-stage dialysis.    We talked about vaccines.  He had RSV vaccine.  He will be getting flu and COVID booster.  He is had Shingrix x 2.  He is up-to-date with his pneumonia vaccine    He has a history of elevated PSA but it was felt in the past it was due to UTI.  Last PSA was 2.9 3 February 2024    He has labs at [facility] and I did review them.  Hemoglobin 8.9.  Platelet count 135,000.  BUN 52 and creatinine 7.41.  Calcium 9.8.  Glucose 152.  ALT 8.  Alkaline phosphatase 44.  AST 5.  .    He will have next labs early October.  I did give lab order for lipids and hemoglobin A1c if it could be done at [facility] with his hematology visit.    Blood sugar was just 109 this morning.    He will be make an appoint with Dr. Tye Mkceon for his history of paroxysmal atrial fibrillation.  He is on Eliquis 2.5 mg twice a day.  He is tolerating this well without any signs of bleeding.    He has seed cardiology  at [facility] but he is not able to make the trip down there on the days she is in the office.  Current Outpatient Medications   Medication Sig Dispense Refill    donepezil 5 MG Oral Tab Take 1 tablet (5 mg total) by mouth nightly. 90 tablet 3    TORSEMIDE 20 MG Oral Tab Take 2 tablets (40 mg total) by mouth twice daily (Diuretic). 360 tablet 3    Lanthanum Carbonate 1000 MG Oral Chew Tab CHEW AND SWALLOW ONE TABLET BY MOUTH THREE TIMES A DAY WITH MEALS 270 tablet 3    insulin detemir (LEVEMIR FLEXPEN) 100 UNIT/ML Subcutaneous  Solution Pen-injector Inject 24 Units into the skin nightly. 24 mL 1    lanthanum 500 MG Oral Chew Tab Chew 1 tablet (500 mg total) by mouth 2 (two) times daily with meals.      Insulin Pen Needle (BD PEN NEEDLE MICRO U/F) 32G X 6 MM Does not apply Misc Use with insulin daily. 100 each 3    Glucose Blood (ONETOUCH ULTRA) In Vitro Strip TEST TWICE DAILY 200 strip 3    Darbepoetin Adryan (ARANESP, ALBUMIN FREE, IJ) Darbepoetin Adryan (Aranesp)      LOSARTAN 50 MG Oral Tab Take 1 tablet (50 mg total) by mouth daily 90 tablet 3    apixaban (ELIQUIS) 2.5 MG Oral Tab Take 1 tablet (2.5 mg total) by mouth 2 (two) times daily. 60 tablet 11    Amino Acids (LIQUACEL) Oral Liquid Take 30 mL by mouth.      NON FORMULARY Velcade once monthly injection      latanoprost 0.005 % Ophthalmic Solution Place 1 drop into both eyes nightly.      acetaminophen 500 MG Oral Tab Take 325 mg by mouth every 8 (eight) hours. 1 tablet 0    Cholecalciferol (VITAMIN D) 25 MCG (1000 UT) Oral Tab Take 2 tablets by mouth daily.      carvedilol 25 MG Oral Tab Take 1 tablet (25 mg total) by mouth 2 (two) times daily with meals. 60 tablet 11    OCUVITE-LUTEIN Oral Tab 1 tab daily      FOLBIC 2.5-25-2 MG Oral Tab Take 1 tablet by mouth daily.  10    acyclovir (ZOVIRAX) 400 MG Oral Tab Take 1 tablet (400 mg total) by mouth daily.  11      Past Medical History:    Back problem    Calculus of kidney    Cancer (HCC)    multiple myloma    Diabetes (HCC)    Dialysis patient ([facility])    M,W, F    ESRD (end stage renal disease) ([facility])    Essential hypertension    Hearing impairment    bilateral hearing aids    High blood pressure    Multiple myeloma ([facility])    Muscle weakness    Neuropathy    Slight in feet    Osteoarthritis    Renal disorder    Visual impairment    Glasses      Social History:  Social History     Socioeconomic History    Marital status:    Tobacco Use    Smoking status: Never     Passive exposure: Never    Smokeless tobacco: Never   Vaping Use     Vaping status: Never Used   Substance and Sexual Activity    Alcohol use: Yes     Alcohol/week: 1.0 standard drink of alcohol     Types: 1 Glasses of wine per week     Comment: social    Drug use: Never        REVIEW OF SYSTEMS:   GENERAL HEALTH:  feels well otherwise  RESPIRATORY:  Voices no shortness of breath with exertion or cough  CARDIOVASCULAR:  Voices no chest pain on exertion or shortness of breath  GI:   Voices no abdominal pain or changes of bowels   :Viices no urning or frequency of urination.  NEURO:  Voices no  headaches or dizziness    EXAM:   /76   Pulse 71   Temp 97.9 °F (36.6 °C)   Ht 5' 8\" (1.727 m)   Wt 185 lb (83.9 kg)   SpO2 94%   BMI 28.13 kg/m²     GENERAL:  well developed, well nourished, in no apparent distress  SKIN:  no rashes ,   HEENT: atraumatic.  Pharynx normal without exudate.  EYES:  PERRL. Sclera anicteric.  NECK:  Supple,  no adenopathy,   LUNGS:  clear to auscultation.  Effort normal  CARDIO:  RRR without murmur.   S1 and S2 normal  GI:  good BS's,  no masses,   HSM or tenderness  EXTREMITIES : no cyanosis, clubbing or edema    ASSESSMENT AND PLAN:     1. Multiple myeloma not having achieved remission (HCC)  Doing well.  I did review Dr. Adams's note from March 12, 2024.  It is located in Care Everywhere.  Noted is IgG lambda multiple myeloma August 2013.  He is on maintenance monthly Velcade and Solu-Medrol maintenance since July 2014.  It is noted he gets Aranesp at dialysis.  He has low-level serum IVAD waxes and wanes.    2. Elevated PSA  Recent PSA as above within normal range.  For now I plan no more PSA measurements    3. ESRD on hemodialysis (HCC)  Doing well.  Tolerating well 3 times a week.    4. Memory change  On Aricept.  Low-dose.  Follows with Dr. Evans.    5. Type 2 diabetes mellitus without complication, without long-term current use of insulin ([facility])  Will update hemoglobin A1c next labs at [facility].  - Lipid Panel; Future  - Hemoglobin A1C;  Future    6. Abnormal PET of left lung  We have spoken about this in the past.  He is asymptomatic.  No shortness of breath.  No coughing.  No further follow-up for now.    This visit was 30 minutes.  I spent 10 minutes before visit preparing and reviewing old records.  Greater than 50% of the visit was engaged in counseling and review of past data.    Follow-up in 6 months    The patient indicates understanding of these issues and agrees to the plan.    Geovanni Garza MD  9/19/2024  3:04 PM

## 2024-10-18 ENCOUNTER — PATIENT MESSAGE (OUTPATIENT)
Dept: INTERNAL MEDICINE CLINIC | Facility: CLINIC | Age: 79
End: 2024-10-18

## 2024-10-21 NOTE — TELEPHONE ENCOUNTER
Kwan Redd.  I copy and pasted the note below from Balwinder's nurse regarding relatively low blood pressure.  There was an question about torsemide.    As you know he is already on Coreg 25 mg twice a day and Cozaar 50 mg a day.    If you can,  please let me know your thoughts regarding adjustment of his blood pressure medication.    He does have a remote history of atrial fibrillation so it may be wise to keep the beta-blocker the same dose.    Thank you in advance. Anatoly

## 2024-10-21 NOTE — TELEPHONE ENCOUNTER
Kwan Ledbetter.  This is pretty low.  I think we should stop both the torsemide and Cozaar.  We can tell him today in dialysis.  Thank you for letting me know

## 2024-10-21 NOTE — TELEPHONE ENCOUNTER
's message below relayed to patient's wife Noemi. Noemi verbalized understanding and will have the patient stop both the torsemide and losartan and keep the Coreg at the current dosage.     Noemi requested that  please call her also to discuss as the patient has a lot going on with their recent move, etc.   To

## 2024-10-21 NOTE — TELEPHONE ENCOUNTER
Please let Noemi patient's wife know that I did communicate with Dr. Redd about the message we had regarding Balwinder's blood pressure being low.  Dr. Redd recommended stopping both the torsemide and losartan.  We would keep the Coreg at current dosages.    She indicated that she will be discussing this with him during dialysis today.

## 2024-11-04 ENCOUNTER — TELEPHONE (OUTPATIENT)
Dept: INTERNAL MEDICINE CLINIC | Facility: CLINIC | Age: 79
End: 2024-11-04

## 2024-11-04 NOTE — TELEPHONE ENCOUNTER
Patient's wife, Noemi (Verified HIPAA - General Info) came into office for below reasons:    1.) Advise Dr. Garza patient saw Dr. Bernard on 10/31/24  2.) Advise Dr. Garza that Greg West that Dr. Bernard decreased  patient's carvedilol and patient is doing well on it.   3.) Drop off notes & Lab results from most recent [facility] visit  4.) Patient had echo and stress test done within the last couple of weeks.   5.) Coronary CT scan is on 12/10   6.) Dropped off Blood Pressure sheet. Blood pressures listed below:    10/31/24  12:30pm  92/58  11/1/24 7:30pm - 135/65  11/2/24  5:00pm - 155/81  11/2/24  9:00pm  - 138/72  11/4/24  1:00pm - 140/76    Forms placed in Dr. Garza's mailbox.     Please call Kati  with any questions.

## 2024-11-04 NOTE — TELEPHONE ENCOUNTER
Dr Garza please see note below.  
I placed orders for repeat calcium and electrolytes for Balwinder.  Orders in place.    ( Kwan Redd.  I placed renal function order at the request of Balwinder's wife.  I realize he had recent electrolytes by yourself showing elevated calcium of 11.1.  I am just following the request per wife.  I thought I would just send you this information as an FYI in case you see another set of labs, your way.  Thank you.  Anatoly. )      
Kwan Ledbetter, we are doing the ffollow up labs today at dialysis!  
Patient wife called  Was Calcium draw ordered for patient  Request sent from Dr Adams/Lewis   Please call to notify Noemi when order in place  They are hoping to go for test on Monday  Tasked to nursing     
Patient's wife notified that patient will be getting repeat labs today at dialysis. For now no apparent need to go Monday for repeat labs and calcium level per .   
Please advise - to   
Thank you for this update.  I will let Noemi patient's wife know this.  Thank you.    Clinical staff, please call patient's wife Noemi and let her know that Balwinder is getting repeat labs today at dialysis.  For now no apparent need to go Monday for repeat labs and calcium level.  
To   
(1) More than 48 hours/None

## 2024-11-05 ENCOUNTER — TELEPHONE (OUTPATIENT)
Dept: INTERNAL MEDICINE CLINIC | Facility: CLINIC | Age: 79
End: 2024-11-05

## 2024-11-05 NOTE — TELEPHONE ENCOUNTER
Kwan Redd.  It appears that Dr. Tyler ordered a CTA of brains coronary arteries for December 10.    I am just checking as it relates to the dye he will receive with the CTA.    Thank you.  Anatoly.

## 2024-11-06 NOTE — TELEPHONE ENCOUNTER
Yes ok with me - he is a long term dialysis pt so it should have minimal impact on renal reserve.  Thanks Anatoly!

## 2024-11-07 ENCOUNTER — TELEPHONE (OUTPATIENT)
Dept: INTERNAL MEDICINE CLINIC | Facility: CLINIC | Age: 79
End: 2024-11-07

## 2024-11-07 NOTE — TELEPHONE ENCOUNTER
Paperwork indicating that Balwinder's [place] Ju all to 1/2 tablet 2 times a day.  2.5 mg twice a day to start October 31.    Blood pressure November for 7:30 /65.  November 2 at 5 /81.  November 2 9 /72.  November 4 1 /76    Also labs were dropped off.    Hemoglobin 9.0.  MCV 79.5.  Platelet count 162.    Chemistries show sodium 138.  BUN 46.  Creatinine 7.97.  Glucose 144.  Liver enzymes unremarkable.

## 2024-12-06 ENCOUNTER — TELEPHONE (OUTPATIENT)
Dept: ENDOCRINOLOGY CLINIC | Facility: CLINIC | Age: 79
End: 2024-12-06

## 2024-12-06 DIAGNOSIS — Z79.4 TYPE 2 DIABETES MELLITUS WITH HYPERGLYCEMIA, WITH LONG-TERM CURRENT USE OF INSULIN (HCC): Primary | ICD-10-CM

## 2024-12-06 DIAGNOSIS — E11.65 TYPE 2 DIABETES MELLITUS WITH HYPERGLYCEMIA, WITH LONG-TERM CURRENT USE OF INSULIN (HCC): Primary | ICD-10-CM

## 2024-12-06 NOTE — TELEPHONE ENCOUNTER
[facility] states the solostar pens are discontinued and wondering if patient can switch to vials or a different insulin please call

## 2024-12-10 ENCOUNTER — HOSPITAL ENCOUNTER (OUTPATIENT)
Dept: CT IMAGING | Facility: HOSPITAL | Age: 79
Discharge: HOME | End: 2024-12-10
Attending: INTERNAL MEDICINE
Payer: MEDICARE

## 2024-12-10 VITALS
SYSTOLIC BLOOD PRESSURE: 129 MMHG | HEART RATE: 73 BPM | RESPIRATION RATE: 18 BRPM | BODY MASS INDEX: 28.88 KG/M2 | HEIGHT: 67 IN | DIASTOLIC BLOOD PRESSURE: 63 MMHG | WEIGHT: 184 LBS

## 2024-12-10 DIAGNOSIS — R94.39 ABNORMAL CARDIOVASCULAR STRESS TEST: ICD-10-CM

## 2024-12-10 PROCEDURE — 75574 CT ANGIO HRT W/3D IMAGE: CPT | Performed by: INTERNAL MEDICINE

## 2024-12-10 PROCEDURE — 75580 N-INVAS EST C FFR SW ALY CTA: CPT | Performed by: INTERNAL MEDICINE

## 2024-12-10 RX ORDER — METOPROLOL TARTRATE 100 MG/1
100 TABLET ORAL AS DIRECTED
COMMUNITY
Start: 2024-10-24

## 2024-12-10 RX ORDER — NITROGLYCERIN 0.4 MG/1
0.4 TABLET SUBLINGUAL ONCE
Status: COMPLETED | OUTPATIENT
Start: 2024-12-10 | End: 2024-12-10

## 2024-12-10 RX ORDER — METOPROLOL TARTRATE 25 MG/1
TABLET, FILM COATED ORAL
Status: COMPLETED
Start: 2024-12-10 | End: 2024-12-10

## 2024-12-10 RX ORDER — METOPROLOL TARTRATE 1 MG/ML
5 INJECTION, SOLUTION INTRAVENOUS SEE ADMIN INSTRUCTIONS
Status: DISCONTINUED | OUTPATIENT
Start: 2024-12-10 | End: 2024-12-12

## 2024-12-10 RX ORDER — METOPROLOL TARTRATE 1 MG/ML
INJECTION, SOLUTION INTRAVENOUS
Status: COMPLETED
Start: 2024-12-10 | End: 2024-12-10

## 2024-12-10 RX ORDER — DILTIAZEM HYDROCHLORIDE 5 MG/ML
INJECTION INTRAVENOUS
Status: COMPLETED
Start: 2024-12-10 | End: 2024-12-10

## 2024-12-10 RX ORDER — METOPROLOL TARTRATE 1 MG/ML
5 INJECTION, SOLUTION INTRAVENOUS
Status: COMPLETED | OUTPATIENT
Start: 2024-12-10 | End: 2024-12-10

## 2024-12-10 RX ORDER — METOPROLOL TARTRATE 25 MG/1
50 TABLET, FILM COATED ORAL ONCE AS NEEDED
Status: COMPLETED | OUTPATIENT
Start: 2024-12-10 | End: 2024-12-10

## 2024-12-10 RX ORDER — DILTIAZEM HYDROCHLORIDE 5 MG/ML
5 INJECTION INTRAVENOUS SEE ADMIN INSTRUCTIONS
Status: DISCONTINUED | OUTPATIENT
Start: 2024-12-10 | End: 2024-12-12

## 2024-12-10 RX ORDER — METOPROLOL TARTRATE 25 MG/1
100 TABLET, FILM COATED ORAL ONCE AS NEEDED
Status: COMPLETED | OUTPATIENT
Start: 2024-12-10 | End: 2024-12-10

## 2024-12-10 RX ADMIN — METOPROLOL TARTRATE 100 MG: 25 TABLET, FILM COATED ORAL at 11:19:00

## 2024-12-10 RX ADMIN — DILTIAZEM HYDROCHLORIDE 5 MG: 5 INJECTION INTRAVENOUS at 12:43:00

## 2024-12-10 RX ADMIN — METOPROLOL TARTRATE 100 MG: 25 TABLET, FILM COATED ORAL at 10:35:00

## 2024-12-10 RX ADMIN — METOPROLOL TARTRATE 5 MG: 1 INJECTION, SOLUTION INTRAVENOUS at 13:21:00

## 2024-12-10 RX ADMIN — METOPROLOL TARTRATE 5 MG: 1 INJECTION, SOLUTION INTRAVENOUS at 12:13:00

## 2024-12-10 RX ADMIN — DILTIAZEM HYDROCHLORIDE 5 MG: 5 INJECTION INTRAVENOUS at 12:30:00

## 2024-12-10 RX ADMIN — METOPROLOL TARTRATE 5 MG: 1 INJECTION, SOLUTION INTRAVENOUS at 13:37:00

## 2024-12-10 RX ADMIN — METOPROLOL TARTRATE 5 MG: 1 INJECTION, SOLUTION INTRAVENOUS at 12:23:00

## 2024-12-10 RX ADMIN — DILTIAZEM HYDROCHLORIDE 5 MG: 5 INJECTION INTRAVENOUS at 12:49:00

## 2024-12-10 RX ADMIN — NITROGLYCERIN 0.4 MG: 0.4 TABLET SUBLINGUAL at 14:18:00

## 2024-12-10 RX ADMIN — METOPROLOL TARTRATE 5 MG: 1 INJECTION, SOLUTION INTRAVENOUS at 13:44:00

## 2024-12-10 RX ADMIN — METOPROLOL TARTRATE 5 MG: 1 INJECTION, SOLUTION INTRAVENOUS at 12:04:00

## 2024-12-10 RX ADMIN — METOPROLOL TARTRATE 5 MG: 1 INJECTION, SOLUTION INTRAVENOUS at 12:18:00

## 2024-12-10 RX ADMIN — METOPROLOL TARTRATE 5 MG: 1 INJECTION, SOLUTION INTRAVENOUS at 13:29:00

## 2024-12-10 RX ADMIN — DILTIAZEM HYDROCHLORIDE 5 MG: 5 INJECTION INTRAVENOUS at 12:36:00

## 2024-12-10 NOTE — IMAGING NOTE
Report received from CAROL Garcia 1045.   Patient resting comfortably at this time.     METOPROLOL PO GIVEN 100 MILLIGRAMS  AT 1119  HR 71 /67     18 GAUGE IV STARTED AT 1201    1203 : HR 71 /69 METOPROLOL 5 MILLIGRAMS GIVEN IV PUSH  SEE  PROTOCOL    1213: HR 69 /85 METOPROLOL 5 MILLIGRAMS GIVEN IV PUSH     1218: HR 68 /72 METOPROLOL 5 MILLIGRAMS  GIVEN IV PUSH    1223: HR  70 /72 METOPROLOL 5 MILLIGRAMS  GIVEN IV PUSH    1230: HR 71 /70  CARDIZEM 5 MILLIGRAMS  GIVEN IV PUSH     1235: HR 68 /66  CARDIZEM 5 MILLIGRAMS  GIVEN IV PUSH     1243: HR 68 /70 CARDIZEM 5 MILLIGRAMS  GIVEN IV PUSH     1249: HR 66 /59 CARDIZEM 5 MILLIGRAMS  GIVEN IV PUSH     1300: HR 62 /69 CARDIZEM 5 MILLIGRAMS  GIVEN IV PUSH     Contacted .   Verbal orders given: 4 additional doses of Metoprolol 5mg IV. Okay to be given as indicated. Refer to MAR.  Okay to scan with mid-low 60s      1321: HR 65 /66 METOPROLOL 5 MILLIGRAMS GIVEN IV PUSH   1329: HR 65 /60 METOPROLOL 5 MILLIGRAMS GIVEN IV PUSH   1337: HR 67 /67 METOPROLOL 5 MILLIGRAMS GIVEN IV PUSH   1344: HR 67 /64 METOPROLOL 5 MILLIGRAMS GIVEN IV PUSH       TO CT TABLE @ 1414    CONNECT TO MONITOR  HR 67 /64    NITROGLYCERIN 0.4 MILLIGRAMS SUBLINGUAL GIVEN AT 1418       INJECTION STARTED AT 1424 HR 54 DURING SCAN PROCEDURE COMPLETE    POST SCAN HR 74 /71 AT 1430    PT TO HOLDING AREA  1433    VS     HR 74 /61 @ 1434  HR 65 /58 @ 1454      AVS  PROVIDED      1500 DISCHARGED HOME

## 2024-12-10 NOTE — IMAGING NOTE
TO RAD HOLDING AT 1000      HX TAKEN: ABNORMAL STRESS TEST    PER PT'S WIFE- PT IS A CANCER PT AND SHE STATES HIS CANCER DOCTOR NEVER ORDERS SCANS WITH CONTRAST.   SHE REQUESTS I CALL TO MAKE SURE THIS IS OK WITH HIS DOCTOR.  PT IS ALSO A DIALYSIS PT AND I  SAW NOTES IN PT'S CHART THAT DR ELDER AND DR MATTA ARE BOTH AWARE OF PT GETTING CTA WITH CONTRAST AND ARE OK WITH IT SINCE PT WILL BE GETTING DIALYSIS TOMORROW.     1008:  CALL MADE OUT TO DR FUNK'S OFFICE AT [facility] TO VERIFY IF SHE IS OK WITH CONTRAST GIVEN.   MESSAGE LEFT WITH  FOR RN TO CALL ME BACK.    1025: RN FROM DR FUNK'S OFFICE CALLED BACK AND STATES DR FUNK IS OUT OF TOWN BUT IN HER NOTES SHE MENTIONS THAT SHE IS AWARE THAT PT HAS CTA ORDERED.     DISCUSSED THIS AND WHAT I SAW IN DR ELDER'S AND DR MATTA'S NOTES ABOUT PT OK TO GET CONTRAST FOR CTA.  PT'S WIFE OK WITH ALL THIS AND OK TO PROCEED WITH TEST.     PT CONSENTED AT 1031     BASELINE VITAL SIGNS: HR 73  /63 BMI 28.82    CTA ORDERED BY DR VALENCIA WAS PT GIVEN CTA PREMEDS: YES PT TOOK 100 MG METOPROLOL PO LAST NIGHT  MG METOPROLOL PO AGAIN THIS AM.    METOPROLOL PO GIVEN 100 MILLIGRAMS  AT 1035    LABS FROM 12/5/24:   GFR = 8   CREATINE = 6.55  PT GETS HD AND WILL BE DIALYZED TOMORROW.    1045: CARE OF PT HANDED OVER TO CAROL PARMAR.

## 2024-12-10 NOTE — TELEPHONE ENCOUNTER
Dr. Ledesma,     Levemir is now discontinued, per pharm tech unable to see covered alt. Script pended for Lantus. Please advise, thanks!

## 2024-12-12 RX ORDER — INSULIN GLARGINE 100 [IU]/ML
24 INJECTION, SOLUTION SUBCUTANEOUS NIGHTLY
Qty: 21 ML | Refills: 1 | Status: SHIPPED | OUTPATIENT
Start: 2024-12-12 | End: 2025-02-11

## 2024-12-14 DIAGNOSIS — E11.65 TYPE 2 DIABETES MELLITUS WITH HYPERGLYCEMIA, WITH LONG-TERM CURRENT USE OF INSULIN (HCC): ICD-10-CM

## 2024-12-14 DIAGNOSIS — Z79.4 TYPE 2 DIABETES MELLITUS WITH HYPERGLYCEMIA, WITH LONG-TERM CURRENT USE OF INSULIN (HCC): ICD-10-CM

## 2024-12-17 RX ORDER — INSULIN DETEMIR 100 [IU]/ML
INJECTION, SOLUTION SUBCUTANEOUS
Qty: 15 ML | Refills: 0 | OUTPATIENT
Start: 2024-12-17

## 2024-12-17 NOTE — TELEPHONE ENCOUNTER
RX for levemir canceled - see TE dtd 12/6/24: stefanie sent  MC sent updating patient and advising to schedule follow-up apt

## 2025-01-03 ENCOUNTER — TELEPHONE (OUTPATIENT)
Facility: CLINIC | Age: 80
End: 2025-01-03

## 2025-01-03 NOTE — TELEPHONE ENCOUNTER
[facility] pharmacy states they need diagnosis code for lanthanum carbonate 1000 mg chewable please follow up

## 2025-01-06 ENCOUNTER — TELEPHONE (OUTPATIENT)
Dept: INTERNAL MEDICINE CLINIC | Facility: CLINIC | Age: 80
End: 2025-01-06

## 2025-01-06 NOTE — TELEPHONE ENCOUNTER
I spoke to Noemi patient's wife yesterday.  He did he have a angiogram.  I did receive the report.  Angiogram December 30, 2024.  This was with Dr. Inocencia Lim.  Normal left ventricular end-diastolic pressure.  Moderate to severe arctic stenosis.  Mild to moderate nonobstructive coronary disease.    Noemi indicated that Balwinder did not need any stents.  I did let her know about the moderate to severe arctic stenosis.  She indicated that Balwinder has an appointment with cardiologist in the next couple weeks.  I asked to see him after cardiology visit maybe late January or early February.    She verbalized understanding.  He is doing well.  No acute problems.

## 2025-01-09 NOTE — TELEPHONE ENCOUNTER
Medication Quantity Refills Start End   Lanthanum Carbonate 1000 MG Oral Chew Tab 270 tablet 3 3/30/2024 --   Sig:   CHEW AND SWALLOW ONE TABLET BY MOUTH THREE TIMES A DAY WITH MEALS     Route:   (none)           PRIOR AUTHORIZATION REQUESTED VIA COVERMYMEDS    LAWRENCE: NUB[place]

## 2025-01-09 NOTE — TELEPHONE ENCOUNTER
Received call back from [facility], spoke to Geetha, who informed me Medicare part B and D, no longer cover the Lanthanum at the retail pharmacy only through the dialysis center.  Called patient's wife, Noemi, per JUANJO, verified  and last name, informed her above. Wife provided phone number to [employer].  Called [facility], spoke to Judith, gave medication name, dose, and frequency. Per Judith, medication will be called into [facility] pharmacy and the medication will be delivered to the dialysis center.  Called Noemi back and informed of above. Understanding to information.

## 2025-01-09 NOTE — TELEPHONE ENCOUNTER
Called patient, spoke to wife, per JUANJO, verified  and last name, regarding Lanthanum Carbonate if supplied by diaylsis or pharmacy. Per wife medication comes from [facility].  PA on cover my meds, [facility] G[facility] DELACRUZ not needed.   Called [facility] pharmacy, spoke to Geetha, informed her of above. Verified patient is ESRD on PD. Geetha, to check with her support and will call us back if needed.

## 2025-02-11 ENCOUNTER — OFFICE VISIT (OUTPATIENT)
Dept: INTERNAL MEDICINE CLINIC | Facility: CLINIC | Age: 80
End: 2025-02-11

## 2025-02-11 VITALS
DIASTOLIC BLOOD PRESSURE: 54 MMHG | TEMPERATURE: 98 F | BODY MASS INDEX: 29.61 KG/M2 | WEIGHT: 188.63 LBS | OXYGEN SATURATION: 98 % | HEIGHT: 67 IN | SYSTOLIC BLOOD PRESSURE: 102 MMHG | HEART RATE: 80 BPM

## 2025-02-11 DIAGNOSIS — I35.0 NONRHEUMATIC AORTIC VALVE STENOSIS: ICD-10-CM

## 2025-02-11 DIAGNOSIS — R94.2 ABNORMAL PET SCAN, LUNG: ICD-10-CM

## 2025-02-11 DIAGNOSIS — D63.8 ANEMIA OF CHRONIC DISEASE: ICD-10-CM

## 2025-02-11 DIAGNOSIS — N18.6 ESRD ON HEMODIALYSIS (HCC): ICD-10-CM

## 2025-02-11 DIAGNOSIS — R93.7 ABNORMAL X-RAY OF RIBS: ICD-10-CM

## 2025-02-11 DIAGNOSIS — C90.00 MULTIPLE MYELOMA NOT HAVING ACHIEVED REMISSION (HCC): Primary | ICD-10-CM

## 2025-02-11 DIAGNOSIS — Z87.898 HISTORY OF ELEVATED PSA: ICD-10-CM

## 2025-02-11 DIAGNOSIS — R53.83 FATIGUE, UNSPECIFIED TYPE: ICD-10-CM

## 2025-02-11 DIAGNOSIS — I25.10 CORONARY ARTERY DISEASE INVOLVING NATIVE CORONARY ARTERY OF NATIVE HEART WITHOUT ANGINA PECTORIS: ICD-10-CM

## 2025-02-11 DIAGNOSIS — Z99.2 ESRD ON HEMODIALYSIS (HCC): ICD-10-CM

## 2025-02-11 DIAGNOSIS — R41.3 MEMORY CHANGE: ICD-10-CM

## 2025-02-11 DIAGNOSIS — E11.9 TYPE 2 DIABETES MELLITUS WITHOUT COMPLICATION, WITHOUT LONG-TERM CURRENT USE OF INSULIN (HCC): ICD-10-CM

## 2025-02-11 PROCEDURE — 99215 OFFICE O/P EST HI 40 MIN: CPT | Performed by: INTERNAL MEDICINE

## 2025-02-11 RX ORDER — MOMETASONE FUROATE MONOHYDRATE 50 UG/1
SPRAY, METERED NASAL DAILY
COMMUNITY

## 2025-02-11 RX ORDER — CARVEDILOL 25 MG/1
TABLET ORAL
COMMUNITY
Start: 2025-02-11

## 2025-02-11 RX ORDER — MULTIVITAMIN
1 TABLET ORAL DAILY
COMMUNITY

## 2025-02-11 NOTE — PROGRESS NOTES
Balwinder Muniz is a 79 year old male.  HPI:     Chief Complaint   Patient presents with    Checkup     Balwinder is here with his wife Noemi.    He is getting along fairly well.  He does have chronic fatigue.    He has been treated at [facility] hematology oncology for his multiple myeloma.  Next visit is with [facility] for hematology oncology.    He is waiting for results of recent bone marrow exam February 7.    I did share his PET scan results and I did copy and paste below for reference.  I gave him a copy.    PETCT FDG Whole Body Subsequent Treatment Strategy    Anatomical Region Laterality Modality   -- -- Positron Emission Tomography (PET)     Narrative    HISTORY:  Multiple myeloma.    COMPARISON: PET/CT dated 3/7/2024    TECHNIQUE: The patient was intravenously injected with 11.4 mCi F-18 FDG in the right antecubital IV. The blood glucose level at the time of injection was 106 mg/dL. The scan was performed 60-90 minutes after isotope injection.    The scan was performed from the base of the skull to the midthigh. Axial, coronal, sagittal and 3-D rotating images were acquired and created. CT scan was performed without oral or IV contrast for anatomic localization and attenuation correction. The PET/CT with fusion images were reviewed and interpreted on a dedicated [employer] workstation.    FINDINGS:    HEAD/NECK: No uniquely suspicious hypermetabolic lymph nodes.    Anatomic evaluation is limited by unenhanced/nondiagnostic technique.  Chronic opacification and/or effusion right mastoid air cells.  Scattered vascular calcifications.      CHEST: Low-level hypermetabolism (SUV max 2.6) corresponding to patchy mixed density opacities posteromedial right lower lobe (including on axial 112).  Interval improvement in previous patchy hypermetabolic opacities left lower lobe.  No uniquely suspicious FDG avid thoracic lymph nodes or pulmonary nodules, though there are interspersed small pulmonary nodules which  are too small to reliably characterize.    Anatomic evaluation is again limited but without overt pleural or pericardial effusion.  Scattered vascular calcifications with again mineralization about the aortic and mitral valves.  Suspect mild similar fusiform ectasia ascending aorta though accurate measurements are limited.  Mild stable gynecomastia.      ABDOMEN/PELVIS: No uniquely suspicious new hypermetabolic lymph nodes or visceral lesions.  Scattered bowel hypermetabolism is probably within the considerable range of physiologic variance though consider confirmation of up-to-date colonoscopy.  Non-FDG avid subdermal nodularity left anterior abdominal wall (axial 171).    Study is not adequate for visceral lesion detection/characterization.  There are again several probable renal cysts of varying complexity but with solid lesion(s) not excluded.  Scattered vascular calcifications with mild similar fusiform prominence distal celiac axis.  Bladder wall thickening, though accentuated by underdistention limiting evaluation.  No ascites or overt abnormal bowel or collecting system dilatation.  No substantial excreted urinary activity may be sequelae of underlying renal insufficiency.      MUSCULOSKELETAL: Mild new hypermetabolism (SUV max 4.2) posterolateral left 8th rib (axial 131).  There is a chronic fracture deformity though acute on chronic fracture is in the differential.  More modest hypermetabolism posteromedial right 10th and 11th ribs (including on axial 146) with at least subtle new focal bone production/periosteal reaction.    No other uniquely suspicious new hypermetabolic skeletal lesion(s).  There are again areas of patchy marrow lucency/heterogeneity.  Scattered osseous and soft tissue periarticular hypermetabolism may be degenerative/inflammatory.  Additional interspersed muscle hypermetabolism which could relate to recent use and/or strain.  Focal soft tissue uptake anterior to the right proximal  femur (around axial 244) may be inflammatory rather than mild misregistration with the subjacent bone though consider attention on future follow-up.    Multifocal vascular calcifications.      IMPRESSION:  1. Mild new hypermetabolism posterolateral left 8th rib and to a lesser extent the posteromedial right 10th and 11th ribs.  Posttraumatic and/or inflammatory change is in the differential though recommend attention on follow-up.  Interspersed marrow heterogeneity, but with otherwise no other definite uniquely suspicious new hypermetabolic osseous lesion(s).    2. Previous FDG avid left lower lobe opacities have improved but with mild new hypermetabolic opacities in the right lower lobe which may be infectious/inflammatory.  Recommend attention on follow-up.    3. Persistent opacification and/or effusion right mastoid air cells.    4. Please see above discussion for further details.    FINAL REPORT  Attending Radiologist:  Jeet Haque MD  Date Signed Off:  01/29/2025 07:22    As for the rib findings he did fall in the past.  He did have significant bruising left chest.  The rib abnormalities may be traumatic in nature.    In terms of his pulmonary findings he has no coughing.  No wheezing.  No shortness of breath.  He has had findings like this on the prior PET scans.    There was mention of several probable renal cysts of varying complexity but with solid lesions not excluded.    He will be discussing the results of the PET scan with his hematologist oncologist group.    He had a CTA of his coronary arteries that appeared to possibly show a hemodynamically significant stenosis in the proximal LAD.  At that time a CTA showed moderate calcified plaquing of the proximal LAD with a stenosis of 50 to 70%.  Mild to moderate mid RCA plaquing of 40 to 60%.  Calcified trileaflet aortic valve.  He FFR CT showed that there was a suggestion of hemodynamically significant stenosis.  Normal FFR CT of circumflex and RCA.    He  subsequently had a coronary angiogram December 30, 2024 that showed normal LVEDP.  Moderate to severe aortic stenosis.  Mild to moderate nonobstructive coronary artery disease.  No stenting was needed.    I discussed this with Noemi and Balwinder.  They will be seeing cardiology Dr. Inocencia Messer coming up.    I did inquire about statin use.  He is not on a statin.  They will discuss this with cardiology.    Noemi indicates there may be no treatment for his multiple myeloma but this will be discussed with his upcoming visits with hematology oncology.    He has had flu vaccine COVID-vaccine and RSV.  He is up-to-date with his pneumonia vaccine.    His COVID vaccine was September 26, 2024 and according to current CDC guidelines repeat COVID vaccine in 6 months has been recommended and I did discuss this with him.    His recent hemoglobin was 8.1.    I did give requisition for hemoglobin A1c and lipids next time he has labs.    He does see Dr. Evans for memory loss.    All in all I discussed that I thought Balwinder was doing very well with his multiple medical problems.  He agreed and Noemi's wife agreed.        Current Outpatient Medications   Medication Sig Dispense Refill    Multiple Vitamin (ONE-DAILY MULTI VITAMINS) Oral Tab Take 1 tablet by mouth daily.      mometasone furoate 50 MCG/ACT Nasal Suspension by Nasal route daily.      NON FORMULARY Liquid protein fortifier oral      carvedilol 25 MG Oral Tab Take 1/2 tablet twice a day.      donepezil 5 MG Oral Tab Take 1 tablet (5 mg total) by mouth nightly. 90 tablet 3    Lanthanum Carbonate 1000 MG Oral Chew Tab CHEW AND SWALLOW ONE TABLET BY MOUTH THREE TIMES A DAY WITH MEALS 270 tablet 3    insulin detemir (LEVEMIR FLEXPEN) 100 UNIT/ML Subcutaneous Solution Pen-injector Inject 24 Units into the skin nightly. (Patient taking differently: Inject 12 Units into the skin nightly.) 24 mL 1    lanthanum 500 MG Oral Chew Tab Chew 1 tablet (500 mg total) by mouth 2  (two) times daily with meals.      Insulin Pen Needle (BD PEN NEEDLE MICRO U/F) 32G X 6 MM Does not apply Misc Use with insulin daily. 100 each 3    Glucose Blood (ONETOUCH ULTRA) In Vitro Strip TEST TWICE DAILY 200 strip 3    apixaban (ELIQUIS) 2.5 MG Oral Tab Take 1 tablet (2.5 mg total) by mouth 2 (two) times daily. 60 tablet 11    NON FORMULARY Velcade once monthly injection      latanoprost 0.005 % Ophthalmic Solution Place 1 drop into both eyes nightly.      acetaminophen 500 MG Oral Tab Take 325 mg by mouth every 8 (eight) hours. 1 tablet 0    Cholecalciferol (VITAMIN D) 25 MCG (1000 UT) Oral Tab Take 2 tablets by mouth daily.      OCUVITE-LUTEIN Oral Tab 1 tab daily      FOLBIC 2.5-25-2 MG Oral Tab Take 1 tablet by mouth daily.  10    acyclovir (ZOVIRAX) 400 MG Oral Tab Take 1 tablet (400 mg total) by mouth daily.  11    Amino Acids (LIQUACEL) Oral Liquid Take 30 mL by mouth.        Past Medical History:    Back problem    Calculus of kidney    Cancer (HCC)    multiple myloma    Diabetes (HCC)    Dialysis patient    M,W, F    ESRD (end stage renal disease) (HCC)    Essential hypertension    Hearing impairment    bilateral hearing aids    High blood pressure    Multiple myeloma (HCC)    Muscle weakness    Neuropathy    Slight in feet    Osteoarthritis    Renal disorder    Visual impairment    Glasses      Social History:  Social History     Socioeconomic History    Marital status:    Tobacco Use    Smoking status: Never     Passive exposure: Never    Smokeless tobacco: Never   Vaping Use    Vaping status: Never Used   Substance and Sexual Activity    Alcohol use: Yes     Alcohol/week: 1.0 standard drink of alcohol     Types: 1 Glasses of wine per week     Comment: social    Drug use: Never        REVIEW OF SYSTEMS:   GENERAL HEALTH:  feels well otherwise  RESPIRATORY:  Voices no shortness of breath with exertion or cough  CARDIOVASCULAR:  Voices no chest pain on exertion or shortness of breath  GI:    Voices no abdominal pain or changes of bowels   :Viices no urning or frequency of urination.  NEURO:  Voices no  headaches or dizziness    EXAM:   /54   Pulse 80   Temp 97.8 °F (36.6 °C)   Ht 5' 7\" (1.702 m)   Wt 188 lb 9.6 oz (85.5 kg)   SpO2 98%   BMI 29.54 kg/m²     GENERAL:  well developed, well nourished, in no apparent distress  SKIN:  no rashes ,   HEENT: atraumatic.   Pharynx normal without exudate.  EYES:  PERRL. Sclera anicteric.  NECK:  Supple,  no adenopathy,   LUNGS:  clear to auscultation.  Effort normal  CARDIO:  RRR with systolic murmur murmur.   S1 and S2 normal  GI:  good BS's,  no masses,   HSM or tenderness  EXTREMITIES : no cyanosis, clubbing or edema.  I do feel a faint left dorsal pedal pulse.  I do not feel a right dorsal pedal pulse.  There is no ischemic changes in his feet.    ASSESSMENT AND PLAN:     1. Multiple myeloma not having achieved remission (HCC)  Multiple myeloma.  Receiving treatment at [facility].  He has follow-ups.  Seems to be doing well.    2. History of elevated PSA  Last PSA was February 2024 and PSA was 2.93.  For now I am not going to recheck any PSAs.  He is asymptomatic.    3. ESRD on hemodialysis ([facility])  On dialysis.    4. Memory change  He follows Dr. Evans.    5. Type 2 diabetes mellitus without complication, without long-term current use of insulin ([facility])  Will get updated hemoglobin A1c.  He takes excellent follow-up of his blood sugars.  On the days that he gets cancer treatment his blood sugars do go up and 1 documentation was his blood sugar goes up to 225.  He then adjust his insulin to 24 units of insulin on the day of cancer treatment.  22 days the day after.  17 units the second day after.  13 units the third day after and then he resumes 12 units daily.  - Hemoglobin A1C; Future  - Lipid Panel; Future    6. Coronary artery disease involving native coronary artery of native heart without angina pectoris  Asymptomatic.  No chest pain.  I  did inquire about statin and he will discuss this with his cardiologist.  I did write a note on his angiogram report to discuss with cardiology Lipitor or Crestor.  Will need to coordinate with renal the safest statin product if cardiology recommends statin.    7. Fatigue, unspecified type  Multifactorial.  Will check TSH.  - TSH W Reflex To Free T4; Future    8. Anemia of chronic disease  Anemia of chronic disease.    9. Abnormal PET scan, lung  Abnormal PET scan of lung.  Asymptomatic.  See above.    10. Abnormal x-ray of ribs  May be due to trauma.  Results will be reviewed by hematology oncology.    11. Nonrheumatic aortic valve stenosis  He has an upcoming appoint with cardiology.  Results of angiogram given to him.  He will discuss with cardiology his aortic stenosis and nonobstructive coronary artery disease and recommendations for statin.    This visit was 40 minutes.  I spent 10 minutes before visit preparing and reviewing old records.  Greater than 50% of the visit was engaged in counseling and review of past data.    Follow-up in May.    The patient indicates understanding of these issues and agrees to the plan.    Geovanni Garza MD  2/11/2025  12:31 PM

## 2025-02-14 ENCOUNTER — TELEPHONE (OUTPATIENT)
Dept: NEPHROLOGY | Facility: CLINIC | Age: 80
End: 2025-02-14

## 2025-02-14 ENCOUNTER — TELEPHONE (OUTPATIENT)
Dept: INTERNAL MEDICINE CLINIC | Facility: CLINIC | Age: 80
End: 2025-02-14

## 2025-02-14 DIAGNOSIS — E11.9 TYPE 2 DIABETES MELLITUS WITHOUT COMPLICATION, WITHOUT LONG-TERM CURRENT USE OF INSULIN (HCC): Primary | ICD-10-CM

## 2025-02-14 NOTE — TELEPHONE ENCOUNTER
[facility] faxing Medicare New Prescription Order for Diabetic Testing Supplies    Placed in blue folder

## 2025-02-14 NOTE — TELEPHONE ENCOUNTER
Order for diabetic supplies to include test strips, and lancets faxed  to [facility] [address] at  [phone] and confirmation received

## 2025-02-14 NOTE — TELEPHONE ENCOUNTER
[facility] needs to confirm that MD wanted patient to start Venofer.  RN states that patient wife informed her that he was needing to take this medication due to an Iron deficiency.   Please call

## 2025-02-17 NOTE — TELEPHONE ENCOUNTER
Dr Tramaine GARCIA please see note below spoke to pt wife about the note below was planning to do the Venofer infusion at [facility] which wife prefers due to worsening fatigue but was advised will have to check Iron Tibc first last done was 2022.Rn called Noemi spoke to Debra is aware of the situation stated pt had recent Iron level done this month ,will call Dominique from [facility] for update provided #[phone].   Vital Signs Last 24 Hrs  T(C): 36.4 (05-09-24 @ 08:20), Max: 36.6 (05-08-24 @ 19:41)  T(F): 97.6 (05-09-24 @ 08:20), Max: 97.9 (05-08-24 @ 19:41)  HR: --  BP: --  BP(mean): --  RR: 17 (05-09-24 @ 08:20) (16 - 17)  SpO2: 99% (05-08-24 @ 15:26) (99% - 99%)    Orthostatic VS  05-09-24 @ 08:20  Lying BP: --/-- HR: --  Sitting BP: 124/68 HR: 62  Standing BP: 132/60 HR: 64  Site: upper left arm  Mode: electronic  Orthostatic VS  05-08-24 @ 19:41  Lying BP: --/-- HR: --  Sitting BP: 129/76 HR: 113  Standing BP: 93/81 HR: 74  Site: --  Mode: --  Orthostatic VS  05-08-24 @ 15:26  Lying BP: --/-- HR: --  Sitting BP: 143/72 HR: 79  Standing BP: 141/83 HR: 88  Site: --  Mode: --  Orthostatic VS  05-08-24 @ 09:01  Lying BP: --/-- HR: --  Sitting BP: 144/61 HR: 58  Standing BP: 153/76 HR: 72  Site: --  Mode: --  Orthostatic VS  05-07-24 @ 19:27  Lying BP: --/-- HR: --  Sitting BP: 141/77 HR: 66  Standing BP: 124/68 HR: 71  Site: --  Mode: --   Vital Signs Last 24 Hrs  T(C): 36.8 (05-10-24 @ 08:38), Max: 36.8 (05-10-24 @ 08:38)  T(F): 98.2 (05-10-24 @ 08:38), Max: 98.2 (05-10-24 @ 08:38)  HR: --  BP: --  BP(mean): --  RR: 17 (05-10-24 @ 08:38) (17 - 17)  SpO2: --    Orthostatic VS  05-10-24 @ 08:38  Lying BP: --/-- HR: --  Sitting BP: 149/64 HR: 63  Standing BP: 137/72 HR: 71  Site: --  Mode: --  Orthostatic VS  05-09-24 @ 19:29  Lying BP: --/-- HR: --  Sitting BP: 127/83 HR: 73  Standing BP: 140/69 HR: 75  Site: --  Mode: --  Orthostatic VS  05-09-24 @ 08:20  Lying BP: --/-- HR: --  Sitting BP: 124/68 HR: 62  Standing BP: 132/60 HR: 64  Site: upper left arm  Mode: electronic  Orthostatic VS  05-08-24 @ 19:41  Lying BP: --/-- HR: --  Sitting BP: 129/76 HR: 113  Standing BP: 93/81 HR: 74  Site: --  Mode: --  Orthostatic VS  05-08-24 @ 15:26  Lying BP: --/-- HR: --  Sitting BP: 143/72 HR: 79  Standing BP: 141/83 HR: 88  Site: --  Mode: --

## 2025-02-17 NOTE — TELEPHONE ENCOUNTER
Thank you.  Could you pls let [facility] dialysis manager know to make sure they don't give the IV iron there too    Pts wife should call them directly next time to make sure something like that doesn;t happen

## 2025-02-17 NOTE — TELEPHONE ENCOUNTER
Form filled out and faxed back to [phone] with confirmation received. Form sent to scanning.

## 2025-02-20 ENCOUNTER — OFFICE VISIT (OUTPATIENT)
Dept: ENDOCRINOLOGY CLINIC | Facility: CLINIC | Age: 80
End: 2025-02-20
Payer: MEDICARE

## 2025-02-20 VITALS
DIASTOLIC BLOOD PRESSURE: 49 MMHG | SYSTOLIC BLOOD PRESSURE: 91 MMHG | WEIGHT: 183 LBS | HEART RATE: 59 BPM | HEIGHT: 68.5 IN | BODY MASS INDEX: 27.42 KG/M2

## 2025-02-20 DIAGNOSIS — E11.65 TYPE 2 DIABETES MELLITUS WITH HYPERGLYCEMIA, WITH LONG-TERM CURRENT USE OF INSULIN (HCC): Primary | ICD-10-CM

## 2025-02-20 DIAGNOSIS — Z79.4 TYPE 2 DIABETES MELLITUS WITH HYPERGLYCEMIA, WITH LONG-TERM CURRENT USE OF INSULIN (HCC): Primary | ICD-10-CM

## 2025-02-20 LAB
GLUCOSE BLOOD: 171
HEMOGLOBIN A1C: 6.5 % (ref 4.3–5.6)
TEST STRIP EXPIRATION DATE: NORMAL DATE
TEST STRIP LOT #: NORMAL NUMERIC

## 2025-02-20 PROCEDURE — 99214 OFFICE O/P EST MOD 30 MIN: CPT | Performed by: INTERNAL MEDICINE

## 2025-02-20 PROCEDURE — 82947 ASSAY GLUCOSE BLOOD QUANT: CPT | Performed by: INTERNAL MEDICINE

## 2025-02-20 PROCEDURE — 83036 HEMOGLOBIN GLYCOSYLATED A1C: CPT | Performed by: INTERNAL MEDICINE

## 2025-02-20 NOTE — TELEPHONE ENCOUNTER
Called dialysis center- spoke with [facility] and related message.    Patient is receiving IV infusion at [facility], care everywhere notes available.

## 2025-02-20 NOTE — PROGRESS NOTES
Name: Balwinder Muniz  Date: 2/20/25    Referring Physician: Dr. Garza    INITIAL VISIT:  Balwinder Muniz is a 79 year old male who presented for management of T2D. He was diagnosed with multiple myeloma in August of 2013 and underwent stem-cell transplant in 2014. He is in remission, getting solumedrol, remicaide,and something to prevent GI issues. He had many treatments with this and subsequently became diabetic.    He has stage V kidney disease due to multiple myeloma. He was on peritoneal dialysis and since recently, has changed to hemodialysis. He is now under the care of Dr. Redd.    Blood Glucose Today: 171  HbA1C or glycohemoglobin is 6.5 today (and it was 6.5 on 3/18/24 and it was 6.1 on 1/11/22 (and it was 5.9 on 4/12/21) and it was 6.3 % on 2/22/21 and it is 6.7 on 6/21/21)  Type 1 or Type 2?: Type 2  Checking 2 times per day usually at goal  The day after dialysis, fasting blood sugars were higher  Medications for DM: Pt usual dose of Levemir is 12 units nightly  Patient is on dialysis and chemotherapy once a month  On Night of chemo patient using 24 units of levemir  Next 2 days 22 units, then, 17 units on day 4 and then back down to usual 12 units   Episodes of hypoglycemia: none  Fasting blood sugars: reviewed    Dietary compliance: fair    Exercise: some    Polyuria/polydipsia: none    Blurred vision: none    Flu Vaccine This Season: yes, and also the Covid booster    REVIEW OF SYSTEMS  Eyes: Diabetic retinopathy present: none            Most recent visit to eye doctor in last 12 months: every 6 months    CV: Cardiovascular disease present: he has cardiomyopathy         Hypertension present: yes, on meds, at goal         Hyperlipidemia present: at goal (9/27/23)         Peripheral Vascular Disease present: none    : Nephropathy present: yes    Neuro: Neuropathy present: none    Skin: Infection or ulceration: none    Osteoporosis: none    Thyroid disease: none    Medications:     Current  Outpatient Medications:     apixaban (ELIQUIS) 2.5 MG Oral Tab, Take 1 tablet (2.5 mg total) by mouth 2 (two) times daily, Disp: 60 tablet, Rfl: 11    Multiple Vitamin (ONE-DAILY MULTI VITAMINS) Oral Tab, Take 1 tablet by mouth daily., Disp: , Rfl:     mometasone furoate 50 MCG/ACT Nasal Suspension, by Nasal route daily., Disp: , Rfl:     NON FORMULARY, Liquid protein fortifier oral, Disp: , Rfl:     carvedilol 25 MG Oral Tab, Take 1/2 tablet twice a day., Disp: , Rfl:     donepezil 5 MG Oral Tab, Take 1 tablet (5 mg total) by mouth nightly., Disp: 90 tablet, Rfl: 3    Lanthanum Carbonate 1000 MG Oral Chew Tab, CHEW AND SWALLOW ONE TABLET BY MOUTH THREE TIMES A DAY WITH MEALS, Disp: 270 tablet, Rfl: 3    insulin detemir (LEVEMIR FLEXPEN) 100 UNIT/ML Subcutaneous Solution Pen-injector, Inject 24 Units into the skin nightly. (Patient taking differently: Inject 12 Units into the skin nightly.), Disp: 24 mL, Rfl: 1    lanthanum 500 MG Oral Chew Tab, Chew 1 tablet (500 mg total) by mouth 2 (two) times daily with meals., Disp: , Rfl:     Insulin Pen Needle (BD PEN NEEDLE MICRO U/F) 32G X 6 MM Does not apply Misc, Use with insulin daily., Disp: 100 each, Rfl: 3    Glucose Blood (ONETOUCH ULTRA) In Vitro Strip, TEST TWICE DAILY, Disp: 200 strip, Rfl: 3    Amino Acids (LIQUACEL) Oral Liquid, Take 30 mL by mouth., Disp: , Rfl:     NON FORMULARY, Velcade once monthly injection, Disp: , Rfl:     latanoprost 0.005 % Ophthalmic Solution, Place 1 drop into both eyes nightly., Disp: , Rfl:     acetaminophen 500 MG Oral Tab, Take 325 mg by mouth every 8 (eight) hours., Disp: 1 tablet, Rfl: 0    Cholecalciferol (VITAMIN D) 25 MCG (1000 UT) Oral Tab, Take 2 tablets by mouth daily., Disp: , Rfl:     OCUVITE-LUTEIN Oral Tab, 1 tab daily, Disp: , Rfl:     FOLBIC 2.5-25-2 MG Oral Tab, Take 1 tablet by mouth daily., Disp: , Rfl: 10    acyclovir (ZOVIRAX) 400 MG Oral Tab, Take 1 tablet (400 mg total) by mouth daily., Disp: , Rfl: 11      Allergies:   No Known Allergies    Social History:   Social History     Socioeconomic History    Marital status:    Tobacco Use    Smoking status: Never     Passive exposure: Never    Smokeless tobacco: Never   Vaping Use    Vaping status: Never Used   Substance and Sexual Activity    Alcohol use: Yes     Alcohol/week: 1.0 standard drink of alcohol     Types: 1 Glasses of wine per week     Comment: social    Drug use: Never       Medical History:   Past Medical History:    Back problem    Calculus of kidney    Cancer (HCC)    multiple myloma    Diabetes (HCC)    Dialysis patient    M,W, F    ESRD (end stage renal disease) (HCC)    Essential hypertension    Hearing impairment    bilateral hearing aids    High blood pressure    Multiple myeloma (HCC)    Muscle weakness    Neuropathy    Slight in feet    Osteoarthritis    Renal disorder    Visual impairment    Glasses   Primary Hyperparathyroidism, s/p parathyroidectomy    Surgical history:   Past Surgical History:   Procedure Laterality Date    Colonoscopy  2004    Other surgical history  1994    Other surgical history  2014    stem cell transplant     Other surgical history  11/12/2019    removal parathyroid adenoma    Other surgical history  08/18/2022    Arthroscopy         PHYSICAL EXAM  Vitals:    02/20/25 1143   BP: 91/49   Pulse: 59   Weight: 183 lb (83 kg)   Height: 5' 8.5\" (1.74 m)         General Appearance:  Alert, in no acute distress, well developed  Eyes: normal conjunctivae, sclera.  Psychiatric:  oriented to time, self, and place  Nutritional:  no abnormal weight gain or loss      Lab Data:   Lab Results   Component Value Date     (H) 09/27/2023    A1C 6.5 (A) 02/20/2025     Lab Results   Component Value Date     (H) 03/22/2023    BUN 39 (H) 03/22/2023    BUNCREA 7.2 (L) 03/22/2023    CREATSERUM 5.45 (H) 03/22/2023    ANIONGAP 9 03/22/2023    GFRNAA 15 (L) 07/30/2019    GFRAA 17 (L) 07/30/2019    CA 9.3 03/22/2023    OSMOCALC  307 (H) 03/22/2023    ALKPHO 40 (L) 07/30/2019    AST 13 (L) 07/30/2019    ALT 30 07/30/2019    BILT 0.4 07/30/2019    TP 5.6 (L) 07/30/2019    ALB 3.4 07/30/2019    GLOBULIN 2.2 (L) 07/30/2019     03/22/2023    K 3.8 03/22/2023    CL 97 (L) 03/22/2023    CO2 34.0 (H) 03/22/2023     Lab Results   Component Value Date    CHOLEST 130 09/27/2023    TRIG 119 09/27/2023    HDL 41 09/27/2023    LDL 67 09/27/2023    VLDL 18 09/27/2023    NONHDLC 89 09/27/2023     Lab Results   Component Value Date    MALBP 356.0 (H) 05/02/2017    CREUR 86.6 05/02/2017         ASSESSMENT/PLAN:    This is a 79 year-old man here for evaluation and management of uncontrolled type 2 diabetes. We discussed the ABCs of DM. He has been fitted with peritoneal dialysis catheter and this is going well for him.     1.) Hyperglycemia Management- We discussed the importance of glycemic control to prevent complications of diabetes. We discussed the importance of SBGM.  - I asked him to continue to check blood sugars fasting, and two-hours after eating.   - I have asked him to decrease the insulin doses:  Decrease Levemir from 12 units to 10 units at night  Patient is on dialysis and chemotherapy once a month  On Night of chemo patient should take 22 instead of 24 units of Levemir  Next 2 days , he should take 20 units instead of 22 units, then, 15 units on day 4 and then back down to usual 10 units     - I explained to him that goals for fasting blood sugars should be 100-120 and for 2-hour post-prandial should be 140-160.    2.) Management of Diabetic Complications- We discussed the complications of diabetes include retinopathy, neuropathy, nephropathy and cardiovascular disease.   - He is up to date with screenings  - BP is at goal  - Vaccines- up to date  - Neuropathy- none  - CV- he has cardiomyopathy    3.) Lifestyle Management for Diabetes- We discussed importance of a low CHO diet, and recommend 45gm per meal or 135gm per day. We discussed  the importance of trying to follow a Mediterranean diet, with an emphasis on vegetables at every meal, with lots whole grains, and protein from either plant-based sources, or poultry and fish.   - He is having a healthy diet  - he tries and exercises as well     -Return to clinic in 1 year    Prior to this encounter, I spent over 15 minutes with preparing for the visit, including reviewing documents from other specialties as well as from PCP and going over test results and imaging studies. During the face to face encounter, I spent an additional 15 minutes which were determined for follow-up. Greater than 50% of the time was spent in counseling, anticipatory guidance, and coordination of care. Patient concerns were answered to the best of my knowledge.         2/20/25  Shanon Ledesma MD

## 2025-02-21 ENCOUNTER — PATIENT MESSAGE (OUTPATIENT)
Dept: ENDOCRINOLOGY CLINIC | Facility: CLINIC | Age: 80
End: 2025-02-21

## 2025-02-24 RX ORDER — APIXABAN 2.5 MG/1
2.5 TABLET, FILM COATED ORAL 2 TIMES DAILY
Qty: 60 TABLET | Refills: 11 | Status: SHIPPED | OUTPATIENT
Start: 2025-02-24

## 2025-02-24 NOTE — TELEPHONE ENCOUNTER
Last office visit note currently in progress.     FYHASEEB Ledesma, patient states he is able to afford the copay for long acting insulin. Both lantus and basaglar are covered, would you like to prescribe one of these?     Looks like he is currently on levemir which has been discontinued by the . Thanks.

## 2025-02-24 NOTE — TELEPHONE ENCOUNTER
Per 10/2023  I did speak to  earlier today and she gave him the rationale for continuing the Eliquis but thankfully at a reduced dose of 2.5 mg twice a day. I can call in that prescription if you need. I do not think we should cut the 5 mg tablets in half.     Refill request is for a maintenance medication and has met the criteria specified in the Ambulatory Medication Refill Standing Order for eligibility, visits, laboratory, alerts and was sent to the requested pharmacy.    Requested Prescriptions     Signed Prescriptions Disp Refills    apixaban (ELIQUIS) 2.5 MG Oral Tab 60 tablet 11     Sig: Take 1 tablet (2.5 mg total) by mouth 2 (two) times daily     Authorizing Provider: JANIE ELDER     Ordering User: JUSTO BOURNE

## 2025-02-25 RX ORDER — INSULIN GLARGINE 100 [IU]/ML
INJECTION, SOLUTION SUBCUTANEOUS
Qty: 24 ML | Refills: 3 | Status: SHIPPED | OUTPATIENT
Start: 2025-02-24

## 2025-03-08 ENCOUNTER — HOSPITAL ENCOUNTER (INPATIENT)
Facility: HOSPITAL | Age: 80
LOS: 1 days | Discharge: HOME | End: 2025-03-09
Attending: EMERGENCY MEDICINE | Admitting: INTERNAL MEDICINE
Payer: MEDICARE

## 2025-03-08 ENCOUNTER — APPOINTMENT (OUTPATIENT)
Dept: GENERAL RADIOLOGY | Facility: HOSPITAL | Age: 80
End: 2025-03-08
Attending: EMERGENCY MEDICINE
Payer: MEDICARE

## 2025-03-08 DIAGNOSIS — N18.6 ESRD (END STAGE RENAL DISEASE) ON DIALYSIS (HCC): ICD-10-CM

## 2025-03-08 DIAGNOSIS — Z99.2 ESRD (END STAGE RENAL DISEASE) ON DIALYSIS (HCC): ICD-10-CM

## 2025-03-08 DIAGNOSIS — I48.91 ATRIAL FIBRILLATION WITH RVR (HCC): Primary | ICD-10-CM

## 2025-03-08 LAB
ALBUMIN SERPL-MCNC: 4 G/DL (ref 3.2–4.8)
ALBUMIN/GLOB SERPL: 2.2 {RATIO} (ref 1–2)
ALP LIVER SERPL-CCNC: 50 U/L
ALT SERPL-CCNC: 8 U/L
ANION GAP SERPL CALC-SCNC: 6 MMOL/L (ref 0–18)
AST SERPL-CCNC: 17 U/L (ref ?–34)
BASOPHILS # BLD AUTO: 0 X10(3) UL (ref 0–0.2)
BASOPHILS NFR BLD AUTO: 0 %
BILIRUB SERPL-MCNC: 0.3 MG/DL (ref 0.2–1.1)
BUN BLD-MCNC: 33 MG/DL (ref 9–23)
BUN/CREAT SERPL: 7.7 (ref 10–20)
CALCIUM BLD-MCNC: 8.5 MG/DL (ref 8.7–10.4)
CHLORIDE SERPL-SCNC: 96 MMOL/L (ref 98–112)
CO2 SERPL-SCNC: 33 MMOL/L (ref 21–32)
CREAT BLD-MCNC: 4.27 MG/DL
DEPRECATED RDW RBC AUTO: 53.2 FL (ref 35.1–46.3)
EGFRCR SERPLBLD CKD-EPI 2021: 13 ML/MIN/1.73M2 (ref 60–?)
EOSINOPHIL # BLD AUTO: 0.03 X10(3) UL (ref 0–0.7)
EOSINOPHIL NFR BLD AUTO: 0.5 %
ERYTHROCYTE [DISTWIDTH] IN BLOOD BY AUTOMATED COUNT: 19.3 % (ref 11–15)
GLOBULIN PLAS-MCNC: 1.8 G/DL (ref 2–3.5)
GLUCOSE BLD-MCNC: 134 MG/DL (ref 70–99)
GLUCOSE BLDC GLUCOMTR-MCNC: 111 MG/DL (ref 70–99)
GLUCOSE BLDC GLUCOMTR-MCNC: 205 MG/DL (ref 70–99)
HCT VFR BLD AUTO: 30.5 %
HGB BLD-MCNC: 9.5 G/DL
IMM GRANULOCYTES # BLD AUTO: 0.03 X10(3) UL (ref 0–1)
IMM GRANULOCYTES NFR BLD: 0.5 %
LYMPHOCYTES # BLD AUTO: 0.47 X10(3) UL (ref 1–4)
LYMPHOCYTES NFR BLD AUTO: 8.3 %
MCH RBC QN AUTO: 24.3 PG (ref 26–34)
MCHC RBC AUTO-ENTMCNC: 31.1 G/DL (ref 31–37)
MCV RBC AUTO: 78 FL
MONOCYTES # BLD AUTO: 0.59 X10(3) UL (ref 0.1–1)
MONOCYTES NFR BLD AUTO: 10.4 %
NEUTROPHILS # BLD AUTO: 4.56 X10 (3) UL (ref 1.5–7.7)
NEUTROPHILS # BLD AUTO: 4.56 X10(3) UL (ref 1.5–7.7)
NEUTROPHILS NFR BLD AUTO: 80.3 %
OSMOLALITY SERPL CALC.SUM OF ELEC: 289 MOSM/KG (ref 275–295)
PLATELET # BLD AUTO: 129 10(3)UL (ref 150–450)
POTASSIUM SERPL-SCNC: 4 MMOL/L (ref 3.5–5.1)
PROT SERPL-MCNC: 5.8 G/DL (ref 5.7–8.2)
RBC # BLD AUTO: 3.91 X10(6)UL
SODIUM SERPL-SCNC: 135 MMOL/L (ref 136–145)
TROPONIN I SERPL HS-MCNC: 26 NG/L
WBC # BLD AUTO: 5.7 X10(3) UL (ref 4–11)

## 2025-03-08 PROCEDURE — 99223 1ST HOSP IP/OBS HIGH 75: CPT | Performed by: INTERNAL MEDICINE

## 2025-03-08 PROCEDURE — 71045 X-RAY EXAM CHEST 1 VIEW: CPT | Performed by: EMERGENCY MEDICINE

## 2025-03-08 RX ORDER — DEXTROSE MONOHYDRATE 25 G/50ML
50 INJECTION, SOLUTION INTRAVENOUS
Status: DISCONTINUED | OUTPATIENT
Start: 2025-03-08 | End: 2025-03-09

## 2025-03-08 RX ORDER — ACETAMINOPHEN 325 MG/1
650 TABLET ORAL EVERY 4 HOURS PRN
Status: DISCONTINUED | OUTPATIENT
Start: 2025-03-08 | End: 2025-03-09

## 2025-03-08 RX ORDER — ACETAMINOPHEN 500 MG
500 TABLET ORAL EVERY 4 HOURS PRN
Status: DISCONTINUED | OUTPATIENT
Start: 2025-03-08 | End: 2025-03-09

## 2025-03-08 RX ORDER — DONEPEZIL HYDROCHLORIDE 5 MG/1
5 TABLET, FILM COATED ORAL NIGHTLY
Status: DISCONTINUED | OUTPATIENT
Start: 2025-03-08 | End: 2025-03-09

## 2025-03-08 RX ORDER — CARVEDILOL 25 MG/1
25 TABLET ORAL 2 TIMES DAILY WITH MEALS
Status: DISCONTINUED | OUTPATIENT
Start: 2025-03-09 | End: 2025-03-08

## 2025-03-08 RX ORDER — LATANOPROST 50 UG/ML
1 SOLUTION/ DROPS OPHTHALMIC NIGHTLY
Status: DISCONTINUED | OUTPATIENT
Start: 2025-03-08 | End: 2025-03-09

## 2025-03-08 RX ORDER — ONDANSETRON 2 MG/ML
4 INJECTION INTRAMUSCULAR; INTRAVENOUS EVERY 6 HOURS PRN
Status: DISCONTINUED | OUTPATIENT
Start: 2025-03-08 | End: 2025-03-09

## 2025-03-08 RX ORDER — INSULIN DEGLUDEC 100 U/ML
10 INJECTION, SOLUTION SUBCUTANEOUS NIGHTLY
Status: DISCONTINUED | OUTPATIENT
Start: 2025-03-09 | End: 2025-03-09

## 2025-03-08 RX ORDER — HYDROCODONE BITARTRATE AND ACETAMINOPHEN 5; 325 MG/1; MG/1
2 TABLET ORAL EVERY 4 HOURS PRN
Status: DISCONTINUED | OUTPATIENT
Start: 2025-03-08 | End: 2025-03-09

## 2025-03-08 RX ORDER — CARVEDILOL 12.5 MG/1
12.5 TABLET ORAL 2 TIMES DAILY WITH MEALS
Status: DISCONTINUED | OUTPATIENT
Start: 2025-03-08 | End: 2025-03-09

## 2025-03-08 RX ORDER — NICOTINE POLACRILEX 4 MG
30 LOZENGE BUCCAL
Status: DISCONTINUED | OUTPATIENT
Start: 2025-03-08 | End: 2025-03-09

## 2025-03-08 RX ORDER — INSULIN DEGLUDEC 100 U/ML
11 INJECTION, SOLUTION SUBCUTANEOUS ONCE
Status: COMPLETED | OUTPATIENT
Start: 2025-03-08 | End: 2025-03-08

## 2025-03-08 RX ORDER — HYDROCODONE BITARTRATE AND ACETAMINOPHEN 5; 325 MG/1; MG/1
1 TABLET ORAL EVERY 4 HOURS PRN
Status: DISCONTINUED | OUTPATIENT
Start: 2025-03-08 | End: 2025-03-09

## 2025-03-08 RX ORDER — NICOTINE POLACRILEX 4 MG
15 LOZENGE BUCCAL
Status: DISCONTINUED | OUTPATIENT
Start: 2025-03-08 | End: 2025-03-09

## 2025-03-08 NOTE — H&P
[facility]  part of [facility]  HISTORY AND PHYSICAL       Balwinderjessica Muniz Patient Status:  Emergency    1945  79 year old CSN [id number]   Location  Attending Dale Meek MD     PCP Geovanni Garza MD         DATE OF ADMISSION: 3/8/2025     CHIEF COMPLAINT: Sent in by     HISTORY OF PRESENT ILLNESS  This is a 79 year oldmale who was sent in by  After noting irregular rhythm and tachycardia.  Patient with history of multiple myeloma undergoing chemotherapy.  Due to his chemotherapy sessions his hemodialysis sessions are intermittently changed.  Patient was receiving HD today and towards the end of his session was noted that he went into atrial fibrillation with RVR.  Patient was sent to ED for further evaluation.  Patient denied subjective feelings of palpitations, shortness of breath, chest pain, abdominal pain, nausea vomiting, fevers or chills.  Patient does note he has history of atrial fibrillation in the past.  Patient has been on anticoagulation with Eliquis.    PAST MEDICAL HISTORY  Past Medical History:    Back problem    Calculus of kidney    Cancer (HCC)    multiple myloma    Diabetes (HCC)    Dialysis patient    M,W, F    ESRD (end stage renal disease) (HCC)    Essential hypertension    Hearing impairment    bilateral hearing aids    High blood pressure    Multiple myeloma (HCC)    Muscle weakness    Neuropathy    Slight in feet    Osteoarthritis    Renal disorder    Visual impairment    Glasses        PAST SURGICAL HISTORY  Past Surgical History:   Procedure Laterality Date    Colonoscopy      Other surgical history      Other surgical history      stem cell transplant     Other surgical history  2019    removal parathyroid adenoma    Other surgical history  2022    Arthroscopy       ALLERGIES   Patient has no known allergies.    MEDICATIONS  Patient's Medications   New Prescriptions    No medications on file   Previous Medications     ACETAMINOPHEN 500 MG ORAL TAB    Take 325 mg by mouth every 8 (eight) hours.    ACYCLOVIR (ZOVIRAX) 400 MG ORAL TAB    Take 1 tablet (400 mg total) by mouth daily.    AMINO ACIDS (LIQUACEL) ORAL LIQUID    Take 30 mL by mouth.    APIXABAN (ELIQUIS) 2.5 MG ORAL TAB    Take 1 tablet (2.5 mg total) by mouth 2 (two) times daily    CARVEDILOL 25 MG ORAL TAB    Take 1/2 tablet twice a day.    CHOLECALCIFEROL (VITAMIN D) 25 MCG (1000 UT) ORAL TAB    Take 2 tablets by mouth daily.    DONEPEZIL 5 MG ORAL TAB    Take 1 tablet (5 mg total) by mouth nightly.    FOLBIC 2.5-25-2 MG ORAL TAB    Take 1 tablet by mouth daily.    GLUCOSE BLOOD (ONETOUCH ULTRA) IN VITRO STRIP    TEST TWICE DAILY    INSULIN DETEMIR (LEVEMIR FLEXPEN) 100 UNIT/ML SUBCUTANEOUS SOLUTION PEN-INJECTOR    Inject 24 Units into the skin nightly.    INSULIN GLARGINE (BASAGLAR KWIKPEN) 100 UNIT/ML SUBCUTANEOUS SOLUTION PEN-INJECTOR    Take Basaglar 10 units at night; On Night of chemo patient should take 22 of Basaglar; On the next 2 days,  he should take 20 units; on day 4, he should take 15 units on day 4 and then back down to usual 10 units from the 5 day onwards.    INSULIN PEN NEEDLE (BD PEN NEEDLE MICRO U/F) 32G X 6 MM DOES NOT APPLY MISC    Use with insulin daily.    LANTHANUM 500 MG ORAL CHEW TAB    Chew 1 tablet (500 mg total) by mouth 2 (two) times daily with meals.    LANTHANUM CARBONATE 1000 MG ORAL CHEW TAB    CHEW AND SWALLOW ONE TABLET BY MOUTH THREE TIMES A DAY WITH MEALS    LATANOPROST 0.005 % OPHTHALMIC SOLUTION    Place 1 drop into both eyes nightly.    MOMETASONE FUROATE 50 MCG/ACT NASAL SUSPENSION    by Nasal route daily.    MULTIPLE VITAMIN (ONE-DAILY MULTI VITAMINS) ORAL TAB    Take 1 tablet by mouth daily.    NON FORMULARY    Velcade once monthly injection    NON FORMULARY    Liquid protein fortifier oral    OCUVITE-LUTEIN ORAL TAB    1 tab daily   Modified Medications    No medications on file   Discontinued Medications    No medications  on file       SOCIAL HISTORY  Social History     Socioeconomic History    Marital status:    Tobacco Use    Smoking status: Never     Passive exposure: Never    Smokeless tobacco: Never   Vaping Use    Vaping status: Never Used   Substance and Sexual Activity    Alcohol use: Yes     Alcohol/week: 1.0 standard drink of alcohol     Types: 1 Glasses of wine per week     Comment: social    Drug use: Never       FAMILY HISTORY  Family History   Problem Relation Age of Onset    Cancer Father         lung cancer     Hypertension Father     Heart Disorder Mother     Depression Mother     Psychiatric Mother     Heart Disorder Maternal Grandfather     Heart Disorder Brother         passed away with heart attack     Hypertension Brother     Hypertension Brother     Kidney Disease Brother     Kidney Disease Brother     Hypertension Brother        PHYSICAL EXAM  Vital signs:  /76   Pulse 81   Temp 97.4 °F (36.3 °C) (Oral)   Resp 13   Ht 5' 7\" (1.702 m)   Wt 184 lb (83.5 kg)   SpO2 93%   BMI 28.82 kg/m²      GENERAL:  Awake and alert, in no acute distress.  HEART:  Regular rhythm.  Regular rate   LUNGS:  Air entry was minimally decreased.  No increased work of breathing or wheezes   ABDOMEN: Soft and non-tender.    PSYCHIATRIC: Normal mood      IMAGING    XR CHEST AP PORTABLE  (CPT=71045)    Result Date: 3/8/2025  CONCLUSION: No acute cardiopulmonary abnormality.   Dictated by (CST): Jose Martin Ledbetter MD on 3/08/2025 at 4:17 PM     Finalized by (CST): Jose Martin Ledbetter MD on 3/08/2025 at 4:17 PM            Data:  Recent Labs   Lab 03/08/25  1500   RBC 3.91   HGB 9.5*   HCT 30.5*   MCV 78.0*   MCH 24.3*   MCHC 31.1   RDW 19.3*   NEPRELIM 4.56   WBC 5.7   .0*     Recent Labs   Lab 03/08/25  1500   *   BUN 33*   CREATSERUM 4.27*   CA 8.5*   ALB 4.0   *   K 4.0   CL 96*   CO2 33.0*   ALKPHO 50   AST 17   ALT 8*   BILT 0.3   TP 5.8       ASSESSMENT/PLAN      Atrial fibrillation with RVR (HCC)  -Occurred  at end of session of HD.  -In ED, patient was noted to have grossly elevated heart rates into the 130s to 150s.  -Patient was started on IV Cardizem gtt.  -After several hours patient converted to sinus rhythm.  -Heart rates improved.  -Patient weaned off Cardizem.  -Restarting home regimen.  -Restart anticoagulation.  -Telemetry monitoring  -Cardiology on consult, appreciate further recommendations    ESRD on HD  -Completed HD session on day of admission.  -Nephrology consulted, appreciate recommendations for further HD.    History of multiple myeloma  -Undergoing chemotherapy sessions.  -Continue to monitor    Type 2 DM   - Monitoring Blood glucose with POC checks  - SSI to cover hyperglycemia  - Hypoglycemia protocol  - Will monitor and adjust agents as needed.        Plan of care discussed with patient at bedside.  Discussed with ED physician and RN. Decision made that pt needs hospitalization for further management/monitoring.      Rg Mclean MD    This note was prepared using Dragon Medical voice recognition dictation software. As a result errors may occur. When identified these errors have been corrected. While every attempt is made to correct errors during dictation discrepancies may still exist

## 2025-03-08 NOTE — ED PROVIDER NOTES
Patient Seen in: [facility] Emergency Department      History     Chief Complaint   Patient presents with    Arrythmia/Palpitations     Stated Complaint: arrythmia    Subjective:   HPI      79-year-old male with history of hypertension, diabetes, end-stage renal disease on hemodialysis Monday, Wednesday, and Friday, multiple myeloma presently receiving treatment, and prior atrial fibrillation for which he is on Eliquis presents with complaints of cardiac arrhythmia.  The patient had his dialysis schedule changed this week because of chemotherapy.  He received hemodialysis today and, towards the end of his dialysis, went into atrial fibrillation with RVR.  When he completed his dialysis he was advised by the dialysis nurse to come to the emergency department for evaluation.  He is without acute complaints aside from feeling tired which he typically feels after dialysis.  He denies any chest pain.  No palpitations.  No dyspnea.  He had a similar episode approximately 2 years ago at which time he converted back to a normal sinus rhythm without intervention.  It was at that time that he followed up with cardiology and was started on Eliquis.  He has had no known interim episodes of arrhythmias.    Objective:     Past Medical History:    Back problem    Calculus of kidney    Cancer (HCC)    multiple myloma    Diabetes (HCC)    Dialysis patient    M,W, F    ESRD (end stage renal disease) (HCC)    Essential hypertension    Hearing impairment    bilateral hearing aids    High blood pressure    Multiple myeloma (HCC)    Muscle weakness    Neuropathy    Slight in feet    Osteoarthritis    Renal disorder    Visual impairment    Glasses              Past Surgical History:   Procedure Laterality Date    Colonoscopy  2004    Other surgical history  1994    Other surgical history  2014    stem cell transplant     Other surgical history  11/12/2019    removal parathyroid adenoma    Other surgical history  08/18/2022     Arthroscopy                Social History     Socioeconomic History    Marital status:    Tobacco Use    Smoking status: Never     Passive exposure: Never    Smokeless tobacco: Never   Vaping Use    Vaping status: Never Used   Substance and Sexual Activity    Alcohol use: Yes     Alcohol/week: 1.0 standard drink of alcohol     Types: 1 Glasses of wine per week     Comment: social    Drug use: Never                  Physical Exam     ED Triage Vitals [03/08/25 1443]   /67   Pulse (!) 131   Resp 20   Temp 97.4 °F (36.3 °C)   Temp src Oral   SpO2 98 %   O2 Device None (Room air)       Current Vitals:   Vital Signs  BP: 107/69  Pulse: 84  Resp: 13  Temp: 97.4 °F (36.3 °C)  Temp src: Oral  MAP (mmHg): 82    Oxygen Therapy  SpO2: 96 %  O2 Device: None (Room air)        Physical Exam    General Appearance: alert, no distress  Eyes: pupils equal and round no pallor or injection  ENT, Mouth: mucous membranes moist  Respiratory: there are no retractions, lungs are clear to auscultation  Cardiovascular: Tachycardic, irregular rhythm  Gastrointestinal:  abdomen is soft and non tender, no masses, bowel sounds normal  Neurological: Speech normal.  Moving extremities x 4.  Skin: warm and dry, no rashes.  Musculoskeletal: neck is supple non tender        Extremities are symmetrical, full range of motion  Psychiatric: patient is oriented X 3, there is no agitation    ED Course     Labs Reviewed   CBC WITH DIFFERENTIAL WITH PLATELET - Abnormal; Notable for the following components:       Result Value    HGB 9.5 (*)     HCT 30.5 (*)     MCV 78.0 (*)     MCH 24.3 (*)     RDW-SD 53.2 (*)     RDW 19.3 (*)     .0 (*)     Lymphocyte Absolute 0.47 (*)     All other components within normal limits   COMP METABOLIC PANEL (14) - Abnormal; Notable for the following components:    Glucose 134 (*)     Sodium 135 (*)     Chloride 96 (*)     CO2 33.0 (*)     BUN 33 (*)     Creatinine 4.27 (*)     BUN/CREA Ratio 7.7 (*)      Calcium, Total 8.5 (*)     eGFR-Cr 13 (*)     ALT 8 (*)     Globulin  1.8 (*)     A/G Ratio 2.2 (*)     All other components within normal limits   TROPONIN I HIGH SENSITIVITY - Normal   RAINBOW DRAW LAVENDER   RAINBOW DRAW LIGHT GREEN   RAINBOW DRAW BLUE   RAINBOW DRAW GOLD     EKG    Rate, intervals and axes as noted on EKG Report.  Rate: 131  Rhythm: Atrial Fibrillation  Axis: Normal  Reading: Atrial fibrillation with RVR, no acute ischemic changes                     MDM      Patient arrives in atrial fibrillation with a heart rate in the 130s to 150s.  He is comfortable appearing and without complaints with borderline low blood pressure (90s systolic).  After initial assessment the patient was given a bolus of IV Cardizem and a drip was started.  His heart rate has improved but he remains in atrial fibrillation with a heart rate around 100.  Plan to admit to cardiac telemetry for continued diltiazem drip and further cardiology evaluation.  Discussed with Dr. Mclean, hospitalist.  Also communicated with Dr. Suresh Nolen, nephrology.  Also communicated with [facility] [place].    I spent a total of 33 minutes of critical care time in obtaining history, performing a physical exam, bedside monitoring of interventions, collecting and interpreting tests and discussion with consultants but not including time spent performing procedures.      Admission disposition: 3/8/2025  4:51 PM           Medical Decision Making      Disposition and Plan     Clinical Impression:  1. Atrial fibrillation with RVR ([facility])    2. ESRD (end stage renal disease) on dialysis ([facility])         Disposition:  Admit  3/8/2025  4:51 pm    Follow-up:  No follow-up provider specified.  We recommend that you schedule follow up care with a primary care provider within the next three months to obtain basic health screening including reassessment of your blood pressure.      Medications Prescribed:  Current Discharge Medication List               Supplementary Documentation:         Hospital Problems       Present on Admission  Date Reviewed: 3/5/2025            ICD-10-CM Noted POA    * (Principal) Atrial fibrillation with RVR (HCC) I48.91 3/8/2025 Unknown

## 2025-03-08 NOTE — ED INITIAL ASSESSMENT (HPI)
Pt came in from dialysis prior to arrival here for heart rate irregular HR at 120-130's.  Pt with fistula to the left upper arm.  Denies chest pain, shortness of breath.  Pt is A/Ox 4, breathing unlabored.  Per wife pt is on cancer treatment.

## 2025-03-08 NOTE — HISTORICAL OFFICE NOTE
Facility Logo [facility]  [address], [address] [place] [address]  [phone]      Balwinder Muniz  Consult  Demographics:  Name: Balwinder Muniz YOB: 1945  Age: 79, Male Medical Record No: [id number]  Visited Date/Time: 10/01/2024 01:10 PM    Chief Complaints  Consult for AF  History of Present Illness  The pt is a 79 year old with atrial fibrillation, which was a paroxysmal event, in the setting of known burden of PACs and paroxysmal AT.  He has AS, and hypertension controlled on medications.  He has no chest pain, dyspnea, orthopnea, PND, edema, palpitations, lightheadedness, or syncope.   Cardiac risk factors Never smoked  Past Medical History  1.Atrial fibrillation (Afib), paroxysmal - A Fib  2.Paroxysmal atrial tachycardia  3.Premature atrial contractions  4.Nonrheumatic aortic (valve) stenosis  5.Benign hypertension  6.DM (diabetes mellitus) Type 2  7.Hx of multiple myeloma  8.Primary hyperparathyroidism  9.Venous insufficiency  10.Anemia  11.Thrombocytopenia  12.ESRD (end stage renal disease) on dialysis  13.Current use of anticoagulant therapy  Family History  1. Father Age 69 - Heart attack (Reason for death)  Social History  Smoking status Never smoked  Tobacco usage - No (Non-smoker (finding))  Review of systems  Constitutional No history of Weight Loss, Fevers, Chills, Anorexia, Fatigue and Malaise  Eyes No history of Blurry vision, Visual changes, Double vision, Discharge, Eye pain, Dry eyes and Decreased vision  ENT No history of Sorethroat, Tinnitus, Bloody nose (epistaxis), Hearing loss, Sinusitis, Gingival bleeding and Pain with swallowing  Hem/Lymphatic No history of Easy bruising, Bleeding diathesis, Blood clots, Swollen glands, Lymphedema, Hx of blood transfusion, Anemia and Bleeding problems  Physical Examination  Constitutional 94% O2  Vitals Right Arm Sitting  / 62 mmHg, Pulse rate 73 bpm, Height in 5' 6\", BMI: 29.9, Weight in 185 lbs (or)  84 kgs and BSA : 2 cc/m²  General Appearance No Acute Distress and Well groomed  Head/Eyes/Ears/Nose/Mouth/Throat Sclera Clear and Mucous membranes Moist  Neck Normal carotid pulsations, No carotid bruits and No JVD  Respiratory Unlabored, Lungs clear with normal breath sounds and Equal bilaterally  Cardiovascular   Upper Extremities No clubbing, No cyanosis and No edema  Skin Warm and dry  Neurologic / Psychiatric Alert and Oriented and Non-focal  Speech Normal speech  EKG/Other abnormalities  CV EXAM:  No JVP  Carotid pulses normal, no carotid bruits  Radial pulses normal  RRR, normal S1/S2, no murmur, rub, or gallop  No lower extremity edema   Allergies  No known medication allergies.  Medications (Info obtained by: Verbal)  1.acetaminophen 500 MG Oral Tab, Take 325 mg by mouth every 8 (eight) hours.  2.acyclovir (ZOVIRAX) 400 MG Oral Tab, Take 1 tablet (400 mg total) by mouth daily.  3.Amino Acids (LIQUACEL) Oral Liquid, Take 30 mL by mouth.  4.apixaban (ELIQUIS) 2.5 MG Oral Tab, Take 1 tablet (2.5 mg total) by mouth 2 (two) times daily.  5.carvedilol 25 MG Oral Tab, Take 1 tablet (25 mg total) by mouth 2 (two) times daily with meals.  6.Cholecalciferol (VITAMIN D) 25 MCG (1000 UT) Oral Tab, Take 2 tablets by mouth daily.  7.donepezil 5 MG Oral Tab, Take 1 tablet (5 mg total) by mouth nightly.  8.FOLBIC 2.5-25-2 MG Oral Tab, Take 1 tablet by mouth daily.  9.FosrenoL 1,000 mg chewable tablet, Take 1 tablet orally 2 times a day.  10.Glucose Blood (ONETOUCH ULTRA) In Vitro Strip, TEST TWICE DAILY  11.insulin detemir (LEVEMIR FLEXPEN) 100 UNIT/ML Subcutaneous Solution Pen-injector, Inject 24 Units into the skin nightly.  12.Insulin Pen Needle (BD PEN NEEDLE MICRO U/F) 32G X 6 MM Does not apply Misc, Use with insulin daily.  13.lanthanum 500 MG Oral Chew Tab, Chew 1 tablet (500 mg total) by mouth 2 (two) times daily with meals.  14.latanoprost 0.005 % Ophthalmic Solution, Place 1 drop into both eyes  nightly.  15.LiquaceL 16 gram-100 kcal/30 mL oral liquid, (oral) once in the morning.  16.LOSARTAN 50 MG Oral Tab, Take 1 tablet (50 mg total) by mouth daily  17.TORSEMIDE 20 MG Oral Tab, Take 2 tablets (40 mg total) by mouth twice daily (Diuretic).  18.OCUVITE-LUTEIN Oral Tab, 1 tab daily  Impression  1.Atrial fibrillation (Afib), paroxysmal - A Fib  2.Paroxysmal atrial tachycardia  3.Premature atrial contractions  4.Nonrheumatic aortic (valve) stenosis  5.Benign hypertension  6.DM (diabetes mellitus) Type 2  7.Hx of multiple myeloma  8.AV (arteriovenous fistula)  9.ESRD (end stage renal disease) on dialysis  10.Current use of anticoagulant therapy  Assessment & Plan  -Stress test indicated for CAD risk stratification. Due to limited function (<4 METS), inability to exercise for a test, and increased BMI with central adiposity that makes tissue attenuation artifact likely, this will be a Annika PET stress test.  -Echocardiogram  -We will review results by phone if satisfactory.   -Blood work per Dr Garza and Dr Redd  -Follow-up with Dr Tyler in 1 year, or sooner if needed  Labs and Diagnostics ordered  1.EKG (electrocardiogram) (Today)  Future appointments  1.Referral Visit - Geovanni Garza ([email or web address]) : (Today)  Miscellaneous  1.Weight monitoring (regime/therapy)  Nurses documentation  Use of Assistive Devices: None  Refills: N/A  Upcoming Sx: N/A  EKG: None   Patient instructions  -Annika PET stress test  -Echocardiogram  -We will review results by phone if satisfactory.   -Blood work per Dr Kayy Redd  -Follow-up with Dr Tyler in 1 year, or sooner if needed  -Please bring in you medication bottles or updated medicine list to your next appointment.   -Call [facility] if you have any problems or concerns at [phone]  CPOE Orders carried out by: Lindsay BENNETT  Care Providers: Ej Tyler MD, Lindsay BENNETT and Humera BENNETT  Electronically Authenticated by  Ej Tyler  MD  10/15/2024 10:00:31 AM  Disclaimer: Components of this note were documented using voice recognition system and are subject to errors not corrected at proofreading. Contact the author of this note for any clarifications.

## 2025-03-09 VITALS
DIASTOLIC BLOOD PRESSURE: 75 MMHG | HEART RATE: 88 BPM | WEIGHT: 192.44 LBS | TEMPERATURE: 98 F | RESPIRATION RATE: 18 BRPM | BODY MASS INDEX: 30.2 KG/M2 | HEIGHT: 67 IN | SYSTOLIC BLOOD PRESSURE: 141 MMHG | OXYGEN SATURATION: 94 %

## 2025-03-09 LAB
ANION GAP SERPL CALC-SCNC: 9 MMOL/L (ref 0–18)
BASOPHILS # BLD AUTO: 0 X10(3) UL (ref 0–0.2)
BASOPHILS NFR BLD AUTO: 0 %
BUN BLD-MCNC: 54 MG/DL (ref 9–23)
BUN/CREAT SERPL: 8.3 (ref 10–20)
CALCIUM BLD-MCNC: 8.4 MG/DL (ref 8.7–10.4)
CHLORIDE SERPL-SCNC: 95 MMOL/L (ref 98–112)
CO2 SERPL-SCNC: 32 MMOL/L (ref 21–32)
CREAT BLD-MCNC: 6.49 MG/DL
DEPRECATED RDW RBC AUTO: 53.5 FL (ref 35.1–46.3)
EGFRCR SERPLBLD CKD-EPI 2021: 8 ML/MIN/1.73M2 (ref 60–?)
EOSINOPHIL # BLD AUTO: 0.09 X10(3) UL (ref 0–0.7)
EOSINOPHIL NFR BLD AUTO: 1.9 %
ERYTHROCYTE [DISTWIDTH] IN BLOOD BY AUTOMATED COUNT: 19.1 % (ref 11–15)
GLUCOSE BLD-MCNC: 79 MG/DL (ref 70–99)
GLUCOSE BLDC GLUCOMTR-MCNC: 93 MG/DL (ref 70–99)
HCT VFR BLD AUTO: 27 %
HGB BLD-MCNC: 8.3 G/DL
IMM GRANULOCYTES # BLD AUTO: 0.03 X10(3) UL (ref 0–1)
IMM GRANULOCYTES NFR BLD: 0.6 %
LYMPHOCYTES # BLD AUTO: 0.59 X10(3) UL (ref 1–4)
LYMPHOCYTES NFR BLD AUTO: 12.7 %
MAGNESIUM SERPL-MCNC: 2.1 MG/DL (ref 1.6–2.6)
MCH RBC QN AUTO: 24.1 PG (ref 26–34)
MCHC RBC AUTO-ENTMCNC: 30.7 G/DL (ref 31–37)
MCV RBC AUTO: 78.5 FL
MONOCYTES # BLD AUTO: 0.56 X10(3) UL (ref 0.1–1)
MONOCYTES NFR BLD AUTO: 12 %
NEUTROPHILS # BLD AUTO: 3.38 X10 (3) UL (ref 1.5–7.7)
NEUTROPHILS # BLD AUTO: 3.38 X10(3) UL (ref 1.5–7.7)
NEUTROPHILS NFR BLD AUTO: 72.8 %
OSMOLALITY SERPL CALC.SUM OF ELEC: 296 MOSM/KG (ref 275–295)
PLATELET # BLD AUTO: 116 10(3)UL (ref 150–450)
POTASSIUM SERPL-SCNC: 4.2 MMOL/L (ref 3.5–5.1)
RBC # BLD AUTO: 3.44 X10(6)UL
SODIUM SERPL-SCNC: 136 MMOL/L (ref 136–145)
WBC # BLD AUTO: 4.7 X10(3) UL (ref 4–11)

## 2025-03-09 PROCEDURE — 99239 HOSP IP/OBS DSCHRG MGMT >30: CPT | Performed by: INTERNAL MEDICINE

## 2025-03-09 RX ORDER — METOPROLOL SUCCINATE 50 MG/1
50 TABLET, EXTENDED RELEASE ORAL
Qty: 30 TABLET | Refills: 0 | Status: SHIPPED | OUTPATIENT
Start: 2025-03-10 | End: 2025-04-09

## 2025-03-09 RX ORDER — METOPROLOL SUCCINATE 50 MG/1
50 TABLET, EXTENDED RELEASE ORAL
Status: DISCONTINUED | OUTPATIENT
Start: 2025-03-10 | End: 2025-03-09

## 2025-03-09 NOTE — CONSULTS
Cardiology Consultation      Balwinder Muniz Patient Status:  Inpatient    1945 MRN [id number]   Location [facility] 3W/SW Attending Rg Mclean MD   Hosp Day # 1 PCP Geovanni Garza MD     Reason for Consultation:  A-fib, RVR    History of Present Illness:  Balwinder Muniz is a(n) 79 year old male with chronic medical conditions including multiple myeloma on chemotherapy, Paroxysmal A-fib, DM 2, ESRD on HDwho presents after patient was noted to have irregular and tachycardic rhythm during the end of her his dialysis session.  He was started on Cardizem infusion in the emergency room after A-fib with RVR was confirmed.  Since then, he has converted to normal sinus rhythm.  His anticoagulation was resumed.  Patient is otherwise doing well and denies any complaints.  He notes that prior to his dialysis sessions he holds his blood pressure medications including carvedilol.  This was the instance yesterday.    History:  Past Medical History:    Back problem    Calculus of kidney    Cancer (HCC)    multiple myloma    Diabetes (HCC)    Dialysis patient    M,W, F    ESRD (end stage renal disease) (HCC)    Essential hypertension    Hearing impairment    bilateral hearing aids    High blood pressure    Multiple myeloma (HCC)    Muscle weakness    Neuropathy    Slight in feet    Osteoarthritis    Renal disorder    Visual impairment    Glasses     Past Surgical History:   Procedure Laterality Date    Colonoscopy      Other surgical history      Other surgical history      stem cell transplant     Other surgical history  2019    removal parathyroid adenoma    Other surgical history  2022    Arthroscopy     Family History   Problem Relation Age of Onset    Cancer Father         lung cancer     Hypertension Father     Heart Disorder Mother     Depression Mother     Psychiatric Mother     Heart Disorder Maternal Grandfather     Heart Disorder Brother         passed away with heart  attack     Hypertension Brother     Hypertension Brother     Kidney Disease Brother     Kidney Disease Brother     Hypertension Brother       reports that he has never smoked. He has never been exposed to tobacco smoke. He has never used smokeless tobacco. He reports current alcohol use of about 1.0 standard drink of alcohol per week. He reports that he does not use drugs.    Allergies:  Allergies[1]    Medications:    Current Facility-Administered Medications:     dilTIAZem (cardIZEM) 100 mg in sodium chloride 0.9% 100 mL IVPB-ADDV, 2.5-20 mg/hr, Intravenous, Continuous    sodium chloride 0.9 % IV bolus 250 mL, 250 mL, Intravenous, Once    glucose (Dex4) 15 GM/59ML oral liquid 15 g, 15 g, Oral, Q15 Min PRN **OR** glucose (Glutose) 40% oral gel 15 g, 15 g, Oral, Q15 Min PRN **OR** glucose-vitamin C (Dex-4) chewable tab 4 tablet, 4 tablet, Oral, Q15 Min PRN **OR** dextrose 50% injection 50 mL, 50 mL, Intravenous, Q15 Min PRN **OR** glucose (Dex4) 15 GM/59ML oral liquid 30 g, 30 g, Oral, Q15 Min PRN **OR** glucose (Glutose) 40% oral gel 30 g, 30 g, Oral, Q15 Min PRN **OR** glucose-vitamin C (Dex-4) chewable tab 8 tablet, 8 tablet, Oral, Q15 Min PRN    acetaminophen (Tylenol Extra Strength) tab 500 mg, 500 mg, Oral, Q4H PRN    acetaminophen (Tylenol) tab 650 mg, 650 mg, Oral, Q4H PRN **OR** HYDROcodone-acetaminophen (Norco) 5-325 MG per tab 1 tablet, 1 tablet, Oral, Q4H PRN **OR** HYDROcodone-acetaminophen (Norco) 5-325 MG per tab 2 tablet, 2 tablet, Oral, Q4H PRN    ondansetron (Zofran) 4 MG/2ML injection 4 mg, 4 mg, Intravenous, Q6H PRN    apixaban (Eliquis) tab 2.5 mg, 2.5 mg, Oral, BID    donepezil (Aricept) tab 5 mg, 5 mg, Oral, Nightly    latanoprost (Xalatan) 0.005 % ophthalmic solution 1 drop, 1 drop, Both Eyes, Nightly    carvedilol (Coreg) tab 12.5 mg, 12.5 mg, Oral, BID with meals    insulin degludec (Tresiba) 100 units/mL flextouch 10 Units, 10 Units, Subcutaneous, Nightly    Review of Systems:  A  comprehensive review of systems was negative if not otherwise mention in above HPI.    /75 (BP Location: Right arm)   Pulse 88   Temp 97.7 °F (36.5 °C) (Oral)   Resp 18   Ht 5' 7\" (1.702 m)   Wt 192 lb 7.4 oz (87.3 kg)   SpO2 94%   BMI 30.14 kg/m²   Temp (24hrs), Av.5 °F (36.4 °C), Min:97.4 °F (36.3 °C), Max:97.7 °F (36.5 °C)       Intake/Output Summary (Last 24 hours) at 3/9/2025 1100  Last data filed at 3/9/2025 0815  Gross per 24 hour   Intake 640 ml   Output 2 ml   Net 638 ml     Wt Readings from Last 3 Encounters:   25 192 lb 7.4 oz (87.3 kg)   25 183 lb (83 kg)   25 188 lb 9.6 oz (85.5 kg)       Physical Exam:   General: Alert and oriented x 3. No apparent distress. No respiratory or constitutional distress.  HEENT: Normocephalic, anicteric sclera, neck supple.  Neck: No JVD  Cardiac: Regular rate and rhythm. S1, S2 normal.  Grade 3 systolic murmur.  Lungs: Clear without wheezes, rales, rhonchi or dullness.  Normal excursions and effort.  Abdomen: Soft, non-tender. BS-present.  Extremities: Without clubbing, cyanosis or edema.   Neurologic: Alert and oriented, normal affect.  Skin: Warm and dry.     Laboratory Data:  Lab Results   Component Value Date    WBC 4.7 2025    HGB 8.3 2025    HCT 27.0 2025    .0 2025    CREATSERUM 6.49 2025    BUN 54 2025     2025    K 4.2 2025    CL 95 2025    CO2 32.0 2025    GLU 79 2025    CA 8.4 2025    ALB 4.0 2025    ALKPHO 50 2025    BILT 0.3 2025    TP 5.8 2025    AST 17 2025    ALT 8 2025    MG 2.1 2025    PGLU 93 2025       Imaging/results:  Troponin 26   EKG A-fib, RVR  C 2024-moderate to severe aortic stenosis, mild-moderate nonobstructive CAD      Assessment:  Paroxysmal A-fib, presenting with A-fib with RVR-s/p conversion to NSR  Moderate to severe aortic valve stenosis  Mild to moderate  nonobstructive CAD  ESRD on HD  Multiple myeloma on chemotherapy      Plan:  Repeat EKG  Resume anticoagulation and continue Eliquis 2.5 mg twice daily  Patient holds his carvedilol prior to dialysis sessions due to trying to avoid low blood pressures.  I would recommend switching to metoprolol succinate which should hopefully have less of a blood pressure effect and hopefully be able to be tolerated along with his dialysis.  As an outpatient, would also recommend EP evaluation.        Thank you for allowing me to participate in the care of your patient.      John Gay DO  Cardiologist  [facility]  3/9/2025 11:00 AM      Note to the patient: The 21st Century Cures Act makes medical notes like these available to patients in the interest of transparency. However, be advised that this is a medical document. It is intended as peer to peer communication. It is written in medical language and may contain abbreviations or verbiage that are unfamiliar. It may appear blunt or direct. Medical documents are intended to carry relevant information, facts as evident, and clinical opinion of the practitioner.     Disclaimer: Components of this note were documented using voice recognition system and are subject to errors not corrected at proofreading. Contact the author of this note for any clarifications.          [1] No Known Allergies

## 2025-03-09 NOTE — DISCHARGE SUMMARY
[facility]  part of [facility]    Discharge Summary    Balwinder Muniz Patient Status:  Inpatient    1945 MRN [id number]   Location [facility] 3W/SW Attending Rg Mclean MD   Hosp Day # 1 PCP Geovanni Garza MD     Date of Admission: 3/8/2025     Date of Discharge: 25      Lace+ Score: 67  59-90 High Risk  29-58 Medium Risk  0-28   Low Risk.    TCM Follow-Up Recommendation:  LACE > 58: High Risk of readmission after discharge from the hospital.    DISCHARGE DX: Principal Problem:    Atrial fibrillation with RVR (HCC)  Active Problems:    ESRD (end stage renal disease) on dialysis (HCC)       The patient was seen and examined on day of discharge and this discharge summary is in conjunction with any daily progress note from day of discharge.    HPI per admitting physician: \"This is a 79 year oldmale who was sent in by  After noting irregular rhythm and tachycardia.  Patient with history of multiple myeloma undergoing chemotherapy.  Due to his chemotherapy sessions his hemodialysis sessions are intermittently changed.  Patient was receiving HD today and towards the end of his session was noted that he went into atrial fibrillation with RVR.  Patient was sent to ED for further evaluation.  Patient denied subjective feelings of palpitations, shortness of breath, chest pain, abdominal pain, nausea vomiting, fevers or chills.  Patient does note he has history of atrial fibrillation in the past.  Patient has been on anticoagulation with Eliquis. \"    Hospital Course:      Atrial fibrillation with RVR (HCC)  -Occurred at end of session of HD.  -In ED, patient was noted to have grossly elevated heart rates into the 130s to 150s.  -Patient was started on IV Cardizem gtt.   -After several hours patient converted to sinus rhythm.  -Heart rates improved.  -Patient weaned off Cardizem.  -Continue anticoagulation.  -Cardiology on consult, recommended change to metoprolol from  coreg.  Cleared for dc with follow up as ouptpt     ESRD on HD  -Completed HD session on day of admission.  -Nephrology consulted, rec resuming regularly scheduled HD      History of multiple myeloma  -Undergoing chemotherapy sessions.  -Continue to monitor     Type 2 DM   - Monitoring Blood glucose with POC checks  - Continue home regimen      Physical Exam:    Vitals:    03/09/25 0101 03/09/25 0500 03/09/25 0523 03/09/25 0857   BP: 140/74  137/69 141/75   BP Location: Right arm  Right arm Right arm   Pulse: 92  72 88   Resp: 20 18 18   Temp:    97.7 °F (36.5 °C)   TempSrc:    Oral   SpO2: 96%  94% 94%   Weight:  192 lb 7.4 oz (87.3 kg)     Height:         Patient Weight for the past 72 hrs:   Weight   03/08/25 1443 184 lb (83.5 kg)   03/08/25 1801 191 lb 5.8 oz (86.8 kg)   03/09/25 0500 192 lb 7.4 oz (87.3 kg)       Intake/Output Summary (Last 24 hours) at 3/9/2025 1126  Last data filed at 3/9/2025 0815  Gross per 24 hour   Intake 640 ml   Output 2 ml   Net 638 ml       GENERAL:  Awake and alert, in no acute distress.  HEART:  Regular rhythm.  Regular rate   LUNGS:  Air entry was minimally decreased.  No increased work of breathing or wheezes   ABDOMEN: Soft and non-tender.    PSYCHIATRIC: Normal mood    CULTURE:   No results found for this visit on 03/08/25.    IMAGING STUDIES: SOME MAY NEED FOLLOW UP WITH PCP   XR CHEST AP PORTABLE  (CPT=71045)    Result Date: 3/8/2025  CONCLUSION: No acute cardiopulmonary abnormality.   Dictated by (CST): Jose Martin Ledbetter MD on 3/08/2025 at 4:17 PM     Finalized by (CST): Jose Martin Ledbetter MD on 3/08/2025 at 4:17 PM           LABS :     Lab Results   Component Value Date    WBC 4.7 03/09/2025    HGB 8.3 (L) 03/09/2025    HCT 27.0 (L) 03/09/2025    .0 (L) 03/09/2025    CREATSERUM 6.49 (H) 03/09/2025    BUN 54 (H) 03/09/2025     03/09/2025    K 4.2 03/09/2025    CL 95 (L) 03/09/2025    CO2 32.0 03/09/2025    GLU 79 03/09/2025    CA 8.4 (L) 03/09/2025    ALB 4.0 03/08/2025     ALKPHO 50 03/08/2025    BILT 0.3 03/08/2025    TP 5.8 03/08/2025    AST 17 03/08/2025    ALT 8 (L) 03/08/2025    INR 1.0 11/28/2018    TSH 2.010 09/27/2023    PSA 4.80 (H) 04/25/2022    MG 2.1 03/09/2025    PHOS 3.7 08/19/2022    B12 1,773 (H) 09/27/2023       Recent Labs   Lab 03/08/25  1500 03/09/25  0637   RBC 3.91 3.44*   HGB 9.5* 8.3*   HCT 30.5* 27.0*   MCV 78.0* 78.5*   MCH 24.3* 24.1*   MCHC 31.1 30.7*   RDW 19.3* 19.1*   NEPRELIM 4.56 3.38   WBC 5.7 4.7   .0* 116.0*     Recent Labs   Lab 03/08/25  1500 03/09/25  0637   * 79   BUN 33* 54*   CREATSERUM 4.27* 6.49*   CA 8.5* 8.4*   ALB 4.0  --    * 136   K 4.0 4.2   CL 96* 95*   CO2 33.0* 32.0   ALKPHO 50  --    AST 17  --    ALT 8*  --    BILT 0.3  --    TP 5.8  --      Lab Results   Component Value Date    INR 1.0 11/28/2018       Disposition: Discharge to Home    Condition at Discharge: Stable     Discharge Medications:      Discharge Medications        START taking these medications        Instructions Prescription details   metoprolol succinate ER 50 MG Tb24  Commonly known as: Toprol XL  Start taking on: March 10, 2025      Take 1 tablet (50 mg total) by mouth Daily Beta Blocker.   Stop taking on: April 9, 2025  Quantity: 30 tablet  Refills: 0            CONTINUE taking these medications        Instructions Prescription details   acetaminophen 500 MG Tabs  Commonly known as: Tylenol Extra Strength      Take 325 mg by mouth every 8 (eight) hours.   Quantity: 1 tablet  Refills: 0     acyclovir 400 MG Tabs  Commonly known as: Zovirax      Take 1 tablet (400 mg total) by mouth daily.   Refills: 11     Basaglar KwikPen 100 UNIT/ML Sopn  Generic drug: insulin glargine      Take Basaglar 10 units at night; On Night of chemo patient should take 22 of Basaglar; On the next 2 days,  he should take 20 units; on day 4, he should take 15 units on day 4 and then back down to usual 10 units from the 5 day onwards.   Quantity: 24 mL  Refills: 3      BD Pen Needle Micro U/F 32G X 6 MM Misc  Generic drug: Insulin Pen Needle      Use with insulin daily.   Quantity: 100 each  Refills: 3     donepezil 5 MG Tabs  Commonly known as: Aricept      Take 1 tablet (5 mg total) by mouth nightly.   Quantity: 90 tablet  Refills: 3     Eliquis 2.5 MG Tabs  Generic drug: apixaban      Take 1 tablet (2.5 mg total) by mouth 2 (two) times daily   Quantity: 60 tablet  Refills: 11     Folbic 2.5-25-2 MG Tabs  Generic drug: folacin-pyridoxine-cyancobalamin      Take 1 tablet by mouth daily.   Refills: 10     lanthanum 500 MG Chew  Commonly known as: Fosrenol      Chew 1 tablet (500 mg total) by mouth 2 (two) times daily with meals.   Refills: 0     latanoprost 0.005 % Soln  Commonly known as: Xalatan      Place 1 drop into both eyes nightly.   Refills: 0     LiquaCel Liqd      Take 30 mL by mouth.   Refills: 0     multivitamin Tabs      1 tab daily   Refills: 0     NON FORMULARY      Velcade once monthly injection   Refills: 0     NON FORMULARY      Liquid protein fortifier oral   Refills: 0     One-Daily Multi Vitamins Tabs      Take 1 tablet by mouth daily.   Refills: 0     OneTouch Ultra Strp  Generic drug: Glucose Blood      TEST TWICE DAILY   Quantity: 200 strip  Refills: 3     Vitamin D 25 MCG (1000 UT) Tabs      Take 2 tablets by mouth daily.   Refills: 0            STOP taking these medications      ARANESP (ALBUMIN FREE) IJ        carvedilol 25 MG Tabs  Commonly known as: Coreg                  Where to Get Your Medications        These medications were sent to [id number] - [place], [place] - [address] [phone], [phone]  [address] [address]      Phone: [phone]   metoprolol succinate ER 50 MG Tb24         Follow up Visits  No follow-up provider specified.  Geovanni Garza MD    Consultants         Provider   Role Specialty     Ej Tyler MD      Consulting Physician Cardiac Electrophysiology     Erick Hamilton MD      Consulting  Physician NEPHROLOGY              Other Discharge Instructions:       ----------------------------------------------------  35 MIN SPENT ON THIS DC   Rg Mclean MD    3/9/2025

## 2025-03-09 NOTE — PLAN OF CARE
Patient medically cleared to discharge by team. IV and tele monitor removed. Discharge instructions reviewed with patient and wife at bedside.     Problem: Patient Centered Care  Goal: Patient preferences are identified and integrated in the patient's plan of care  Description: Interventions:  - What would you like us to know as we care for you? From home with wife  - Provide timely, complete, and accurate information to patient/family  - Incorporate patient and family knowledge, values, beliefs, and cultural backgrounds into the planning and delivery of care  - Encourage patient/family to participate in care and decision-making at the level they choose  - Honor patient and family perspectives and choices  Outcome: Progressing     Problem: Diabetes/Glucose Control  Goal: Glucose maintained within prescribed range  Description: INTERVENTIONS:  - Monitor Blood Glucose as ordered  - Assess for signs and symptoms of hyperglycemia and hypoglycemia  - Administer ordered medications to maintain glucose within target range  - Assess barriers to adequate nutritional intake and initiate nutrition consult as needed  - Instruct patient on self management of diabetes  Outcome: Progressing     Problem: Patient/Family Goals  Goal: Patient/Family Long Term Goal  Description: Patient's Long Term Goal: to go back home    Interventions:  - follow medical regimen  - See additional Care Plan goals for specific interventions  Outcome: Progressing  Goal: Patient/Family Short Term Goal  Description: Patient's Short Term Goal: to not be in AFIB    Interventions:   - follow medical regimen  - See additional Care Plan goals for specific interventions  Outcome: Progressing

## 2025-03-09 NOTE — PLAN OF CARE
VSS overnight, no complaints of pain. Patient maintained NSR overnight. Consults to see patient today.    Problem: Patient Centered Care  Goal: Patient preferences are identified and integrated in the patient's plan of care  Description: Interventions:  - What would you like us to know as we care for you? I'm from home with wife.  - Provide timely, complete, and accurate information to patient/family  - Incorporate patient and family knowledge, values, beliefs, and cultural backgrounds into the planning and delivery of care  - Encourage patient/family to participate in care and decision-making at the level they choose  - Honor patient and family perspectives and choices  Outcome: Progressing     Problem: Diabetes/Glucose Control  Goal: Glucose maintained within prescribed range  Description: INTERVENTIONS:  - Monitor Blood Glucose as ordered  - Assess for signs and symptoms of hyperglycemia and hypoglycemia  - Administer ordered medications to maintain glucose within target range  - Assess barriers to adequate nutritional intake and initiate nutrition consult as needed  - Instruct patient on self management of diabetes  Outcome: Progressing     Problem: Patient/Family Goals  Goal: Patient/Family Long Term Goal  Description: Patient's Long Term Goal: Maintain NSR    Interventions:  - Monitor on tele  - See additional Care Plan goals for specific interventions  Outcome: Progressing  Goal: Patient/Family Short Term Goal  Description: Patient's Short Term Goal: Go home    Interventions:   - Cardiology consult  - See additional Care Plan goals for specific interventions  Outcome: Progressing

## 2025-03-10 ENCOUNTER — TELEPHONE (OUTPATIENT)
Dept: INTERNAL MEDICINE CLINIC | Facility: CLINIC | Age: 80
End: 2025-03-10

## 2025-03-10 ENCOUNTER — TELEPHONE (OUTPATIENT)
Facility: CLINIC | Age: 80
End: 2025-03-10

## 2025-03-10 LAB
Q-T INTERVAL: 340 MS
QRS DURATION: 98 MS
QTC CALCULATION (BEZET): 502 MS
R AXIS: 21 DEGREES
T AXIS: 62 DEGREES
VENTRICULAR RATE: 131 BPM

## 2025-03-10 NOTE — TELEPHONE ENCOUNTER
Noemi states that patient has dialysis on Saturday, 3/8/2025, and finished dialysis around 2 pm.  Patient had Afib and was sent to EMERGENCY ROOM.  Patient spent the night in EMERGENCY ROOM and was treated and then discharged.  Noemi states Dr Gay saw patient while in EMERGENCY ROOM and was taken off Carvedilol and started patient on Metoprolol 50 mg in morning with food.    Patient is scheduled to see his Cardiologist, Dr Timo Alexis in May.  Patient is scheduled for Dialysis today at 2:30 pm.  Please call.  Thank you.

## 2025-03-10 NOTE — TELEPHONE ENCOUNTER
Patient's wife Kati is calling with an update patient had dialysis on 3/8 at the end of dialysis patient went into Afib.  Patient was taken to the ER and put on an IV drip at 3 pm the Afib cleared.  Was was admitted to the hospital he saw Dr Gay.  Dr Gay thinks the Afib was caused by dialysis, he took patient off Carvidolol and started him on Metformin 50 mg in the morning with food.  Dr Gay was going to relay the information to patient's cardiologist/oncologist    Patient had a cancer treatment, they did all of his blood work except one test because patient had not fasted.    Any questions call Noemi [phone]

## 2025-03-11 ENCOUNTER — PATIENT OUTREACH (OUTPATIENT)
Dept: CASE MANAGEMENT | Age: 80
End: 2025-03-11

## 2025-03-11 NOTE — PROGRESS NOTES
TCM Request   Hospital Follow up for PCP (Discharge 3/9 elm)     PCP   Geovanni Garza  [facility]  [address] [place] [id number]  [phone]  Appt made for 3/13@10:30am    Confirmed with pt   Closing encounter

## 2025-03-12 NOTE — TELEPHONE ENCOUNTER
Goal Outcome Evaluation: Data: Vital signs stable, assessments within normal limits.   Feeding well, tolerated and retained.   Cord drying, no signs of infection noted.   Baby voiding and stooling.   No evidence of significant jaundice, mother instructed of signs/symptoms to look for and report per discharge instructions.   Discharge outcomes on care plan met.   No apparent pain.  Action: Review of care plan, teaching, and discharge instructions done with mother. Infant identification with ID bands done, mother verification with signature obtained. Metabolic and hearing screen completed.  Response: Mother states understanding and comfort with infant cares and feeding. All questions about baby care addressed. Baby discharged with parents at 1820.                         Patient's spouse calling back, states wants to know if it needs to addressed. States due to the dialysis flow caused the afib. Please call.

## 2025-03-13 ENCOUNTER — OFFICE VISIT (OUTPATIENT)
Dept: INTERNAL MEDICINE CLINIC | Facility: CLINIC | Age: 80
End: 2025-03-13

## 2025-03-13 VITALS
DIASTOLIC BLOOD PRESSURE: 72 MMHG | HEART RATE: 78 BPM | RESPIRATION RATE: 16 BRPM | TEMPERATURE: 98 F | OXYGEN SATURATION: 98 % | SYSTOLIC BLOOD PRESSURE: 118 MMHG | WEIGHT: 185 LBS | BODY MASS INDEX: 27.72 KG/M2 | HEIGHT: 68.5 IN

## 2025-03-13 DIAGNOSIS — I48.91 ATRIAL FIBRILLATION WITH RVR (HCC): Primary | ICD-10-CM

## 2025-03-13 DIAGNOSIS — R41.3 MEMORY CHANGE: ICD-10-CM

## 2025-03-13 DIAGNOSIS — I25.10 CORONARY ARTERY DISEASE INVOLVING NATIVE CORONARY ARTERY OF NATIVE HEART WITHOUT ANGINA PECTORIS: ICD-10-CM

## 2025-03-13 DIAGNOSIS — E11.9 TYPE 2 DIABETES MELLITUS WITHOUT COMPLICATION, WITHOUT LONG-TERM CURRENT USE OF INSULIN (HCC): ICD-10-CM

## 2025-03-13 DIAGNOSIS — C90.00 MULTIPLE MYELOMA NOT HAVING ACHIEVED REMISSION (HCC): ICD-10-CM

## 2025-03-13 DIAGNOSIS — N18.6 ESRD ON HEMODIALYSIS (HCC): ICD-10-CM

## 2025-03-13 DIAGNOSIS — Z99.2 ESRD ON HEMODIALYSIS (HCC): ICD-10-CM

## 2025-03-13 DIAGNOSIS — Z87.898 HISTORY OF ELEVATED PSA: ICD-10-CM

## 2025-03-13 PROCEDURE — 99214 OFFICE O/P EST MOD 30 MIN: CPT | Performed by: INTERNAL MEDICINE

## 2025-03-13 PROCEDURE — G2211 COMPLEX E/M VISIT ADD ON: HCPCS | Performed by: INTERNAL MEDICINE

## 2025-03-13 RX ORDER — CARVEDILOL 12.5 MG/1
12.5 TABLET ORAL 2 TIMES DAILY
COMMUNITY
Start: 2025-02-15 | End: 2025-03-13

## 2025-03-13 RX ORDER — LANCETS 33 GAUGE
1 EACH MISCELLANEOUS 3 TIMES DAILY
COMMUNITY
Start: 2025-02-17

## 2025-03-13 NOTE — PROGRESS NOTES
Balwinder Muniz is a 79 year old male.  HPI:     Chief Complaint   Patient presents with    Hospital F/U     3/8 Sudden onset Afib,  treated with Cardizem  drip and converted.      Balwinder is here with his wife Noemi.  He was recently in the hospital from March 8 to March 9 of this year with atrial fibrillation with rapid ventricular response.    He was in dialysis and noted to have an irregular rhythm and tachycardia.  Balwinder indicates that he was asymptomatic.  No chest pain.  No palpitations.  No shortness of breath.    I did review the discharge summary.  He was started on IV Cardizem in the emergency room.  His heart rates were in the 130s up to 150s.    Previously had atrial fibrillation that was paroxysmal and has been on Eliquis 2.5 mg twice a day.    Noemi indicated that he saw Dr. Gay from cardiology and his beta-blockers were changed from Coreg to metoprolol.    He feels well.    He has a follow-up with  from cardiology April 22.  An echocardiogram May 20.    He also follows with  from cardiology.    Noemi indicates that the cardiologist above no what is happened and changed from Coreg to metoprolol.    He does have a history of hypertension and blood pressure under good control.    He has had shingles vaccine.  RSV.  He had Prevnar 23 2021 and Prevnar 2017.  He has had flu vaccine.  He did have Moderna vaccine September 2024.    I did give him lipid order that he can get with next labs.    He is on dialysis followed by Dr. Redd.  Dialysis 3 times a week.  Doing well.    Blood sugars are very good and he was in the low 100s this morning.    His hemoglobin is 8.3.  MCV 78.  Platelet count 116,000.  GFR 8.  He is getting IV iron and Aranesp.      Current Outpatient Medications   Medication Sig Dispense Refill    Lancets (ONETOUCH DELICA PLUS [id number]) Does not apply Misc 1 strip by In Vitro route 3 (three) times daily.      metoprolol succinate ER 50 MG Oral Tablet 24 Hr Take 1  tablet (50 mg total) by mouth Daily Beta Blocker. 30 tablet 0    insulin glargine (BASAGLAR KWIKPEN) 100 UNIT/ML Subcutaneous Solution Pen-injector Take Basaglar 10 units at night; On Night of chemo patient should take 22 of Basaglar; On the next 2 days,  he should take 20 units; on day 4, he should take 15 units on day 4 and then back down to usual 10 units from the 5 day onwards. 24 mL 3    apixaban (ELIQUIS) 2.5 MG Oral Tab Take 1 tablet (2.5 mg total) by mouth 2 (two) times daily 60 tablet 11    Multiple Vitamin (ONE-DAILY MULTI VITAMINS) Oral Tab Take 1 tablet by mouth daily.      NON FORMULARY Liquid protein fortifier oral      donepezil 5 MG Oral Tab Take 1 tablet (5 mg total) by mouth nightly. 90 tablet 3    lanthanum 500 MG Oral Chew Tab Chew 1 tablet (500 mg total) by mouth 2 (two) times daily with meals.      Insulin Pen Needle (BD PEN NEEDLE MICRO U/F) 32G X 6 MM Does not apply Misc Use with insulin daily. 100 each 3    Glucose Blood (ONETOUCH ULTRA) In Vitro Strip TEST TWICE DAILY 200 strip 3    Amino Acids (LIQUACEL) Oral Liquid Take 30 mL by mouth.      NON FORMULARY Velcade once monthly injection      latanoprost 0.005 % Ophthalmic Solution Place 1 drop into both eyes nightly.      acetaminophen 500 MG Oral Tab Take 325 mg by mouth every 8 (eight) hours. 1 tablet 0    Cholecalciferol (VITAMIN D) 25 MCG (1000 UT) Oral Tab Take 2 tablets by mouth daily.      OCUVITE-LUTEIN Oral Tab 1 tab daily      FOLBIC 2.5-25-2 MG Oral Tab Take 1 tablet by mouth daily.  10    acyclovir (ZOVIRAX) 400 MG Oral Tab Take 1 tablet (400 mg total) by mouth daily.  11      Past Medical History:    Back problem    Calculus of kidney    Cancer (HCC)    multiple myloma    Diabetes (HCC)    Dialysis patient    M,W, F    ESRD (end stage renal disease) (HCC)    Essential hypertension    Hearing impairment    bilateral hearing aids    High blood pressure    Multiple myeloma (HCC)    Muscle weakness    Neuropathy    Slight in feet     Osteoarthritis    Renal disorder    Visual impairment    Glasses      Social History:  Social History     Socioeconomic History    Marital status:    Tobacco Use    Smoking status: Never     Passive exposure: Never    Smokeless tobacco: Never   Vaping Use    Vaping status: Never Used   Substance and Sexual Activity    Alcohol use: Yes     Alcohol/week: 1.0 standard drink of alcohol     Types: 1 Glasses of wine per week     Comment: social    Drug use: Never     Social Drivers of Health     Food Insecurity: No Food Insecurity (3/8/2025)    NCSS - Food Insecurity     Worried About Running Out of Food in the Last Year: No     Ran Out of Food in the Last Year: No   Transportation Needs: No Transportation Needs (3/8/2025)    NCSS - Transportation     Lack of Transportation: No   Housing Stability: Not At Risk (3/8/2025)    NCSS - Housing/Utilities     Has Housing: Yes     Worried About Losing Housing: No     Unable to Get Utilities: No        REVIEW OF SYSTEMS:   GENERAL HEALTH:  feels well otherwise  RESPIRATORY:  Voices no shortness of breath with exertion or cough  CARDIOVASCULAR:  Voices no chest pain on exertion or shortness of breath  GI:   Voices no abdominal pain or changes of bowels   :Viices no urning or frequency of urination.  NEURO:  Voices no  headaches or dizziness    EXAM:   /72   Pulse 78   Temp 98.3 °F (36.8 °C) (Oral)   Resp 16   Ht 5' 8.5\" (1.74 m)   Wt 185 lb (83.9 kg)   SpO2 98%   BMI 27.72 kg/m²     GENERAL:  well developed, well nourished, in no apparent distress  LUNGS:  clear to auscultation.  Effort normal  CARDIO:  RRR with systolic murmur.   S1 and S2 normal  GI:  good BS's,  no masses,   HSM or tenderness  EXTREMITIES : no cyanosis, clubbing or edema    ASSESSMENT AND PLAN:     1. Atrial fibrillation with RVR (HCC)  Atrial fibrillation.  Admitted to hospital with rapid ventricular rate.  Converted.  Was on IV Cardizem for short while.  Now on metoprolol.  Holding  normal sinus rhythm.  Asymptomatic.  He does have follow-up with cardiology as above.  He is known to have aortic stenosis.  Also nonobstructive coronary artery disease.    I did review his EKG from March 8, 2025 showing atrial fibrillation with a heart rate of 131.  Nonspecific ST abnormality.  ST depressed in inferior and lateral leads.    2. Type 2 diabetes mellitus without complication, without long-term current use of insulin ([facility])  Hemoglobin A1c 6.5.  Diabetes controlled.    3. Multiple myeloma not having achieved remission ([facility])  Follows closely with oncology.  .    4. History of elevated PSA  History of elevated PSA having a UTI.  Last PSA 2.8 3 February 2024.  For now we will not pursue any more follow-up PSAs.    5. ESRD on hemodialysis ([facility])  Dialysis.  Tolerating well.    6. Memory change  He has been evaluated for memory change.    7. Coronary artery disease involving native coronary artery of native heart without angina pectoris  Stable.  He is asymptomatic.  He is noted to have had a CTA of his coronary arteries that appeared to possibly show a hemodynamically significant stenosis of the proximal LAD.  At that time when he had a CTA it showed moderate calcified plaquing of the proximal LAD with a stenosis of 50 to 70%.  Mild to moderate mid RCA plaquing of 40 to 60%.  He had an FFR CT that showed there was a suggestion of hemodynamically significant stenosis.  Normal FFR CT of circumflex and RCA.    He had a subsequent coronary angiogram December 30, 2024 that showed normal LVEDP.  Moderate to severe aortic stenosis.  Mild to moderate nonobstructive coronary artery disease.  No stenting was needed.    He knows about the aortic stenosis and this is being addressed by his cardiologist.    This visit was 30 minutes.  I spent 10 minutes before visit preparing and reviewing old records.  Greater than 50% of the visit was engaged in counseling and review of past data.    He has a follow-up with me  May 13, 2025    The patient indicates understanding of these issues and agrees to the plan.    Geovanni Garza MD  3/13/2025  11:05 AM

## 2025-04-04 ENCOUNTER — PATIENT MESSAGE (OUTPATIENT)
Dept: INTERNAL MEDICINE CLINIC | Facility: CLINIC | Age: 80
End: 2025-04-04

## 2025-04-05 ENCOUNTER — HOSPITAL ENCOUNTER (EMERGENCY)
Facility: HOSPITAL | Age: 80
Discharge: HOME | End: 2025-04-05
Attending: EMERGENCY MEDICINE
Payer: MEDICARE

## 2025-04-05 VITALS
RESPIRATION RATE: 17 BRPM | TEMPERATURE: 99 F | HEART RATE: 83 BPM | DIASTOLIC BLOOD PRESSURE: 78 MMHG | OXYGEN SATURATION: 95 % | HEIGHT: 68 IN | BODY MASS INDEX: 28.79 KG/M2 | WEIGHT: 190 LBS | SYSTOLIC BLOOD PRESSURE: 134 MMHG

## 2025-04-05 DIAGNOSIS — I48.91 ATRIAL FIBRILLATION WITH RVR (HCC): Primary | ICD-10-CM

## 2025-04-05 LAB
ANION GAP SERPL CALC-SCNC: 8 MMOL/L (ref 0–18)
BASOPHILS # BLD AUTO: 0.01 X10(3) UL (ref 0–0.2)
BASOPHILS NFR BLD AUTO: 0.2 %
BUN BLD-MCNC: 38 MG/DL (ref 9–23)
BUN/CREAT SERPL: 7.5 (ref 10–20)
CALCIUM BLD-MCNC: 8.9 MG/DL (ref 8.7–10.4)
CHLORIDE SERPL-SCNC: 95 MMOL/L (ref 98–112)
CO2 SERPL-SCNC: 33 MMOL/L (ref 21–32)
CREAT BLD-MCNC: 5.1 MG/DL
DEPRECATED RDW RBC AUTO: 50.4 FL (ref 35.1–46.3)
EGFRCR SERPLBLD CKD-EPI 2021: 11 ML/MIN/1.73M2 (ref 60–?)
EOSINOPHIL # BLD AUTO: 0.04 X10(3) UL (ref 0–0.7)
EOSINOPHIL NFR BLD AUTO: 0.6 %
ERYTHROCYTE [DISTWIDTH] IN BLOOD BY AUTOMATED COUNT: 18.1 % (ref 11–15)
GLUCOSE BLD-MCNC: 119 MG/DL (ref 70–99)
HCT VFR BLD AUTO: 30.2 %
HGB BLD-MCNC: 9.4 G/DL
IMM GRANULOCYTES # BLD AUTO: 0.03 X10(3) UL (ref 0–1)
IMM GRANULOCYTES NFR BLD: 0.5 %
LYMPHOCYTES # BLD AUTO: 0.48 X10(3) UL (ref 1–4)
LYMPHOCYTES NFR BLD AUTO: 7.6 %
MAGNESIUM SERPL-MCNC: 2.1 MG/DL (ref 1.6–2.6)
MCH RBC QN AUTO: 24.1 PG (ref 26–34)
MCHC RBC AUTO-ENTMCNC: 31.1 G/DL (ref 31–37)
MCV RBC AUTO: 77.4 FL
MONOCYTES # BLD AUTO: 0.64 X10(3) UL (ref 0.1–1)
MONOCYTES NFR BLD AUTO: 10.1 %
NEUTROPHILS # BLD AUTO: 5.15 X10 (3) UL (ref 1.5–7.7)
NEUTROPHILS # BLD AUTO: 5.15 X10(3) UL (ref 1.5–7.7)
NEUTROPHILS NFR BLD AUTO: 81 %
OSMOLALITY SERPL CALC.SUM OF ELEC: 292 MOSM/KG (ref 275–295)
PLATELET # BLD AUTO: 148 10(3)UL (ref 150–450)
POTASSIUM SERPL-SCNC: 3.6 MMOL/L (ref 3.5–5.1)
RBC # BLD AUTO: 3.9 X10(6)UL
SODIUM SERPL-SCNC: 136 MMOL/L (ref 136–145)
WBC # BLD AUTO: 6.4 X10(3) UL (ref 4–11)

## 2025-04-05 PROCEDURE — 85025 COMPLETE CBC W/AUTO DIFF WBC: CPT | Performed by: EMERGENCY MEDICINE

## 2025-04-05 PROCEDURE — 99285 EMERGENCY DEPT VISIT HI MDM: CPT

## 2025-04-05 PROCEDURE — 80048 BASIC METABOLIC PNL TOTAL CA: CPT | Performed by: EMERGENCY MEDICINE

## 2025-04-05 PROCEDURE — 83735 ASSAY OF MAGNESIUM: CPT | Performed by: EMERGENCY MEDICINE

## 2025-04-05 PROCEDURE — 93010 ELECTROCARDIOGRAM REPORT: CPT

## 2025-04-05 PROCEDURE — 93005 ELECTROCARDIOGRAM TRACING: CPT

## 2025-04-05 PROCEDURE — 96374 THER/PROPH/DIAG INJ IV PUSH: CPT

## 2025-04-05 PROCEDURE — 99284 EMERGENCY DEPT VISIT MOD MDM: CPT

## 2025-04-05 RX ORDER — METOPROLOL TARTRATE 1 MG/ML
5 INJECTION, SOLUTION INTRAVENOUS ONCE
Status: COMPLETED | OUTPATIENT
Start: 2025-04-05 | End: 2025-04-05

## 2025-04-05 RX ORDER — METOPROLOL TARTRATE 25 MG/1
25 TABLET, FILM COATED ORAL ONCE
Status: COMPLETED | OUTPATIENT
Start: 2025-04-05 | End: 2025-04-05

## 2025-04-05 NOTE — DISCHARGE INSTRUCTIONS
Continue your home metoprolol tomorrow morning.  We recommend discussing with your cardiologist whether or not you should be taking metoprolol on the day of dialysis.  If your blood pressure remained stable during dialysis, it may be beneficial to continue taking your metoprolol on days of dialysis as this will likely keep your heart rate from going into a arrhythmia or fast heart rate.  I would discuss this with your cardiologist as early as Monday.  Please continue your Eliquis as well as your other medications, follow-up with your regular doctor, return to the ER as needed for any new or worsening symptoms.

## 2025-04-05 NOTE — ED PROVIDER NOTES
Patient Seen in: [facility] Emergency Department      History     Chief Complaint   Patient presents with    Arrythmia/Palpitations     Stated Complaint: Irregular and high pulse (120-140)    Subjective:   HPI      79-year-old male history of multiple myeloma, diabetes, dialysis now Thursday Saturday who presents for evaluation of A-fib that occurred after finishing dialysis today, asymptomatic otherwise, did not take his metoprolol this morning as they were instructed to avoid blood pressure medications prior to dialysis.  Chest pain denies any other symptoms.    Objective:     Past Medical History:    Back problem    Calculus of kidney    Cancer (HCC)    multiple myloma    Diabetes (HCC)    Dialysis patient    M,W, F    ESRD (end stage renal disease) (HCC)    Essential hypertension    Hearing impairment    bilateral hearing aids    High blood pressure    Multiple myeloma (HCC)    Muscle weakness    Neuropathy    Slight in feet    Osteoarthritis    Renal disorder    Visual impairment    Glasses              Past Surgical History:   Procedure Laterality Date    Colonoscopy  2004    Other surgical history  1994    Other surgical history  2014    stem cell transplant     Other surgical history  11/12/2019    removal parathyroid adenoma    Other surgical history  08/18/2022    Arthroscopy                Social History     Socioeconomic History    Marital status:    Tobacco Use    Smoking status: Never     Passive exposure: Never    Smokeless tobacco: Never   Vaping Use    Vaping status: Never Used   Substance and Sexual Activity    Alcohol use: Not Currently     Alcohol/week: 1.0 standard drink of alcohol     Types: 1 Glasses of wine per week     Comment: social    Drug use: Never     Social Drivers of Health     Food Insecurity: No Food Insecurity (3/8/2025)    NCSS - Food Insecurity     Worried About Running Out of Food in the Last Year: No     Ran Out of Food in the Last Year: No   Transportation  Needs: No Transportation Needs (3/8/2025)    NCSS - Transportation     Lack of Transportation: No   Housing Stability: Not At Risk (3/8/2025)    NCSS - Housing/Utilities     Has Housing: Yes     Worried About Losing Housing: No     Unable to Get Utilities: No                  Physical Exam     ED Triage Vitals [04/05/25 1246]   /85   Pulse (!) 145   Resp 22   Temp 98.6 °F (37 °C)   Temp src Temporal   SpO2    O2 Device        Current Vitals:   Vital Signs  BP: 129/85  Pulse: (!) 145  Resp: 22  Temp: 98.6 °F (37 °C)  Temp src: Temporal        Physical Exam  Vitals reviewed.   Constitutional:       Appearance: Normal appearance.   HENT:      Head: Normocephalic.   Cardiovascular:      Rate and Rhythm: Tachycardia present. Rhythm irregular.   Pulmonary:      Effort: Pulmonary effort is normal. No respiratory distress.   Abdominal:      Palpations: Abdomen is soft.      Tenderness: There is no abdominal tenderness.   Musculoskeletal:         General: Normal range of motion.      Right lower leg: No edema.      Left lower leg: No edema.   Skin:     General: Skin is warm.      Capillary Refill: Capillary refill takes less than 2 seconds.   Neurological:      General: No focal deficit present.      Mental Status: He is alert and oriented to person, place, and time.   Psychiatric:         Mood and Affect: Mood normal.         Behavior: Behavior normal.             ED Course     Labs Reviewed   CBC WITH DIFFERENTIAL WITH PLATELET - Abnormal; Notable for the following components:       Result Value    HGB 9.4 (*)     HCT 30.2 (*)     MCV 77.4 (*)     MCH 24.1 (*)     RDW-SD 50.4 (*)     RDW 18.1 (*)     .0 (*)     Lymphocyte Absolute 0.48 (*)     All other components within normal limits   BASIC METABOLIC PANEL (8) - Abnormal; Notable for the following components:    Glucose 119 (*)     Chloride 95 (*)     CO2 33.0 (*)     BUN 38 (*)     Creatinine 5.10 (*)     BUN/CREA Ratio 7.5 (*)     eGFR-Cr 11 (*)     All  other components within normal limits   MAGNESIUM - Normal   RAINBOW DRAW LAVENDER   RAINBOW DRAW LIGHT GREEN   RAINBOW DRAW BLUE   RAINBOW DRAW GOLD     EKG    Rate, intervals and axes as noted on EKG Report.  Rate: 131  Rhythm: Atrial Fibrillation  Reading: A-fib with RVR rate of 131, LVH changes, no significant ST elevations or depressions contiguous T wave inversions.  Intervals otherwise unremarkable.           ED Course as of 04/05/25 1405  ------------------------------------------------------------  Time: 04/05 1355  Comment: Repeat EKG shows rate of 85 normal sinus rhythm, slightly prolonged QT at 43, otherwise normal intervals, no ST elevations, depressions or T wave inversions.  Nonspecific ST changes isolated lead III              MDM              Medical Decision Making  79-year-old male who presents for evaluation of A-fib occurred after dialysis, held home metoprolol this morning.    May be secondary to volume show starting dialysis and also not on metoprolol.  Blood pressure stable, appears to be euvolemic, no chest pain shortness of breath, we will proceed with IV metoprolol and consider oral and reassess.    By my nurse at approximately 1 minute after IV metoprolol patient converted to sinus rhythm, repeat EKG noted in ED course, confirms normal sinus rhythm, his labs show normal mag, rest of labs reassuring for patient's ESRD, appear to be at baseline no hyperkalemia.     Will discharge, given oral metoprolol prior to discharge, plan to continue his 50 metoprolol XL in the morning, call his cardiologist on Monday to discuss his ER visit and whether or not we should be considering taking his metoprolol on the day of dialysis to prevent any dialysis into his A-fib.  Continue his Eliquis, return precautions discussed patient discharged.    Upon my evaluation, this patient had a high probability of imminent or life-threatening deterioration due to critical findings of ECG, which required my direct  attention, intervention, and personal management.    I have personally provided 35 minutes of critical care time, exclusive of time spent on separately billable procedures.  Time includes review of all pertinent laboratory/radiology results, discussion with consultants, and monitoring for potential decompensation.  Performed interventions included  IV metoprolol for symptmatic a fib with rvr .      Disposition and Plan     Clinical Impression:  1. Atrial fibrillation with RVR (HCC)         Disposition:  Discharge  4/5/2025  2:05 pm    Follow-up:  Geovanni Garza MD  [address] [place] [id number]  [phone]    Follow up in 3 day(s)      We recommend that you schedule follow up care with a primary care provider within the next three months to obtain basic health screening including reassessment of your blood pressure.      Medications Prescribed:  Current Discharge Medication List              Supplementary Documentation:

## 2025-04-05 NOTE — ED INITIAL ASSESSMENT (HPI)
Patient arrives from dialysis with reports of irregular HR per staff. Patient denies CP/SOB. Reports same thing happened last month after dialysis, was admitted for obs.

## 2025-04-06 LAB
ATRIAL RATE: 85 BPM
P AXIS: 37 DEGREES
P-R INTERVAL: 148 MS
Q-T INTERVAL: 298 MS
Q-T INTERVAL: 406 MS
QRS DURATION: 102 MS
QRS DURATION: 96 MS
QTC CALCULATION (BEZET): 440 MS
QTC CALCULATION (BEZET): 483 MS
R AXIS: -3 DEGREES
R AXIS: -8 DEGREES
T AXIS: 23 DEGREES
T AXIS: 38 DEGREES
VENTRICULAR RATE: 131 BPM
VENTRICULAR RATE: 85 BPM

## 2025-04-10 ENCOUNTER — TELEPHONE (OUTPATIENT)
Dept: ENDOCRINOLOGY CLINIC | Facility: CLINIC | Age: 80
End: 2025-04-10

## 2025-04-10 NOTE — TELEPHONE ENCOUNTER
Received a fax of patients most recent eye exam dated on 04/08/2025 with Satya Patten MD at [facility]. Eye exam was documented in patient's 'Diabetes Flowsheet' and placed in providers folder for review.

## 2025-04-15 ENCOUNTER — TELEPHONE (OUTPATIENT)
Dept: INTERNAL MEDICINE CLINIC | Facility: CLINIC | Age: 80
End: 2025-04-15

## 2025-04-15 NOTE — TELEPHONE ENCOUNTER
Respiratory infection triage:    Fever:  [x]  No fever  []  Fever>100.4    Cough:  [] Tight cough  [] Cough with exertion  [] Dry cough  [x] Sputum production, Color: white    Breathing:  [] Mild shortness of breath interfering with activity  [] Wheezing  [] Pain with deep breathing  [] Using inhaler  Fatigued    Other symptoms:  [] Sore throat  [] Difficulty swallowing  [] Nasal drainage/congestion  [x] Sinus congestion/pressure:blowing nose   [] Ear pain  [] Body aches  [] Poor appetite  [] Loss of sense of smell   [] Loss of sense of taste  []Conjunctivitis?    [] Any recent travel?  [] Any sick contacts?  [] Are you a healthcare worker?    Will start Nasonex when patient wakes up    ADDITIONAL NOTES:    Called spouse per HIPAA who states patient started with symptoms Monday early morning- had dialysis Monday at 2;30 pm went through it fine .Last BM 2 days ago - what can spouse give him . Has nasonex at home and saline spray - not started yet .  To             Notified patient that we will route this message to the doctor and see what their recommendations would be. In the meantime, if anything worsens, they were advised to call back or seek emergent evaluation.

## 2025-04-15 NOTE — TELEPHONE ENCOUNTER
Spouse called, Covid test was negative    Gave patient Nasonex and 1 Tylenol  He is resting comfortably

## 2025-04-15 NOTE — TELEPHONE ENCOUNTER
Patient wife called.. Patient has phlegm and congestion. Started on Monday . Has hard time breathing when laying down. Please call and advise    Kati #[phone]

## 2025-04-15 NOTE — TELEPHONE ENCOUNTER
We could tell Noemi the following.    It would be wise to do a COVID test if not already done.  If they can do that then call the results that would be helpful.    I do not think any antibiotics are needed for now.    She should call if Balwinder should get fevers chills or change in mucus color.    Most of the over-the-counter \"cold\" preparations have potential conflicts in dialysis patients.    Tylenol is safe to use.

## 2025-04-15 NOTE — TELEPHONE ENCOUNTER
Called spouse per HIPAA and relayed  message - she will call back with covid test results    To  - spouse wants to know if patient can use dulcolax - he had no  BM in 2 days, she also wants to know if the biopsy shows cancer if the whole thyroid will be removed , also if a certain radiologist will do the biopsy

## 2025-05-01 RX ORDER — PEN NEEDLE, DIABETIC 32 GX 1/4"
1 NEEDLE, DISPOSABLE MISCELLANEOUS DAILY
Qty: 100 EACH | Refills: 3 | Status: SHIPPED | OUTPATIENT
Start: 2025-05-01

## 2025-05-01 NOTE — TELEPHONE ENCOUNTER
Refill request is for a maintenance medication and has met the criteria specified in the Ambulatory Medication Refill Standing Order for eligibility, visits, laboratory, alerts and was sent to the requested pharmacy.    Requested Prescriptions     Signed Prescriptions Disp Refills    Insulin Pen Needle (BD PEN NEEDLE MICRO U/F) 32G X 6 MM Does not apply Misc 100 each 3     Sig: Use with insulin daily     Authorizing Provider: JANIE ELDER     Ordering User: CAROL PATRICIO

## 2025-05-02 ENCOUNTER — LAB ENCOUNTER (OUTPATIENT)
Dept: LAB | Facility: HOSPITAL | Age: 80
End: 2025-05-02
Attending: Other
Payer: MEDICARE

## 2025-05-02 DIAGNOSIS — G30.9 ALZHEIMER DISEASE (HCC): ICD-10-CM

## 2025-05-02 DIAGNOSIS — F02.80 ALZHEIMER DISEASE (HCC): ICD-10-CM

## 2025-05-02 PROCEDURE — 36415 COLL VENOUS BLD VENIPUNCTURE: CPT

## 2025-05-02 PROCEDURE — 83520 IMMUNOASSAY QUANT NOS NONAB: CPT

## 2025-05-05 LAB
PHOSPHORYLATED TAU 217 (P-TAU217), PLASMA: 5.93 PG/ML
PHOSPHORYLATED TAU 217 (P-TAU217), PLASMA: 5.93 PG/ML

## 2025-05-07 LAB
BETA-AMYLOID 40: 574.9 PG/ML
BETA-AMYLOID 40: 574.9 PG/ML
BETA-AMYLOID 42/40 RATIO: 0.1
BETA-AMYLOID 42/40 RATIO: 0.1
BETA-AMYLOID 42: 57.02 PG/ML
BETA-AMYLOID 42: 57.02 PG/ML

## 2025-05-09 ENCOUNTER — TELEPHONE (OUTPATIENT)
Dept: INTERNAL MEDICINE CLINIC | Facility: CLINIC | Age: 80
End: 2025-05-09

## 2025-05-09 DIAGNOSIS — E11.9 TYPE 2 DIABETES MELLITUS WITHOUT COMPLICATION, WITHOUT LONG-TERM CURRENT USE OF INSULIN (HCC): Primary | ICD-10-CM

## 2025-05-13 ENCOUNTER — LAB ENCOUNTER (OUTPATIENT)
Dept: LAB | Age: 80
End: 2025-05-13
Attending: INTERNAL MEDICINE
Payer: MEDICARE

## 2025-05-13 ENCOUNTER — OFFICE VISIT (OUTPATIENT)
Dept: INTERNAL MEDICINE CLINIC | Facility: CLINIC | Age: 80
End: 2025-05-13

## 2025-05-13 VITALS
OXYGEN SATURATION: 96 % | DIASTOLIC BLOOD PRESSURE: 64 MMHG | BODY MASS INDEX: 28.49 KG/M2 | WEIGHT: 188 LBS | SYSTOLIC BLOOD PRESSURE: 132 MMHG | HEART RATE: 84 BPM | TEMPERATURE: 99 F | HEIGHT: 68 IN

## 2025-05-13 DIAGNOSIS — R41.3 MEMORY CHANGE: ICD-10-CM

## 2025-05-13 DIAGNOSIS — E11.9 TYPE 2 DIABETES MELLITUS WITHOUT COMPLICATION, WITHOUT LONG-TERM CURRENT USE OF INSULIN (HCC): Primary | ICD-10-CM

## 2025-05-13 DIAGNOSIS — Z87.898 HISTORY OF ELEVATED PSA: ICD-10-CM

## 2025-05-13 DIAGNOSIS — C90.00 MULTIPLE MYELOMA NOT HAVING ACHIEVED REMISSION (HCC): ICD-10-CM

## 2025-05-13 DIAGNOSIS — I25.10 CORONARY ARTERY DISEASE INVOLVING NATIVE CORONARY ARTERY OF NATIVE HEART WITHOUT ANGINA PECTORIS: ICD-10-CM

## 2025-05-13 DIAGNOSIS — I35.0 NONRHEUMATIC AORTIC VALVE STENOSIS: ICD-10-CM

## 2025-05-13 DIAGNOSIS — Z99.2 ESRD ON HEMODIALYSIS (HCC): ICD-10-CM

## 2025-05-13 DIAGNOSIS — R94.2 ABNORMAL PET SCAN, LUNG: ICD-10-CM

## 2025-05-13 DIAGNOSIS — E11.9 TYPE 2 DIABETES MELLITUS WITHOUT COMPLICATION, WITHOUT LONG-TERM CURRENT USE OF INSULIN (HCC): ICD-10-CM

## 2025-05-13 DIAGNOSIS — N18.6 ESRD ON HEMODIALYSIS (HCC): ICD-10-CM

## 2025-05-13 DIAGNOSIS — G31.9 CEREBRAL NEURODEGENERATIVE DISEASE: ICD-10-CM

## 2025-05-13 DIAGNOSIS — I48.0 PAROXYSMAL ATRIAL FIBRILLATION (HCC): ICD-10-CM

## 2025-05-13 DIAGNOSIS — D63.8 ANEMIA OF CHRONIC DISEASE: ICD-10-CM

## 2025-05-13 LAB
ALBUMIN SERPL-MCNC: 4.2 G/DL (ref 3.2–4.8)
ALBUMIN/GLOB SERPL: 2.3 {RATIO} (ref 1–2)
ALP LIVER SERPL-CCNC: 49 U/L (ref 45–117)
ALT SERPL-CCNC: <7 U/L (ref 10–49)
ANION GAP SERPL CALC-SCNC: 7 MMOL/L (ref 0–18)
AST SERPL-CCNC: <8 U/L (ref ?–34)
BILIRUB SERPL-MCNC: 0.5 MG/DL (ref 0.2–1.1)
BUN BLD-MCNC: 49 MG/DL (ref 9–23)
BUN/CREAT SERPL: 7 (ref 10–20)
CALCIUM BLD-MCNC: 9 MG/DL (ref 8.7–10.4)
CHLORIDE SERPL-SCNC: 98 MMOL/L (ref 98–112)
CHOLEST SERPL-MCNC: 114 MG/DL (ref ?–200)
CO2 SERPL-SCNC: 32 MMOL/L (ref 21–32)
CREAT BLD-MCNC: 7.01 MG/DL (ref 0.7–1.3)
EGFRCR SERPLBLD CKD-EPI 2021: 7 ML/MIN/1.73M2 (ref 60–?)
EST. AVERAGE GLUCOSE BLD GHB EST-MCNC: 137 MG/DL (ref 68–126)
FASTING PATIENT LIPID ANSWER: NO
FASTING STATUS PATIENT QL REPORTED: NO
GLOBULIN PLAS-MCNC: 1.8 G/DL (ref 2–3.5)
GLUCOSE BLD-MCNC: 97 MG/DL (ref 70–99)
HBA1C MFR BLD: 6.4 % (ref ?–5.7)
HDLC SERPL-MCNC: 43 MG/DL (ref 40–59)
LDLC SERPL CALC-MCNC: 57 MG/DL (ref ?–100)
NONHDLC SERPL-MCNC: 71 MG/DL (ref ?–130)
OSMOLALITY SERPL CALC.SUM OF ELEC: 297 MOSM/KG (ref 275–295)
POTASSIUM SERPL-SCNC: 3.7 MMOL/L (ref 3.5–5.1)
PROT SERPL-MCNC: 6 G/DL (ref 5.7–8.2)
SODIUM SERPL-SCNC: 137 MMOL/L (ref 136–145)
TRIGL SERPL-MCNC: 62 MG/DL (ref 30–149)
TSI SER-ACNC: 2.78 UIU/ML (ref 0.55–4.78)
VLDLC SERPL CALC-MCNC: 9 MG/DL (ref 0–30)

## 2025-05-13 PROCEDURE — 36415 COLL VENOUS BLD VENIPUNCTURE: CPT

## 2025-05-13 PROCEDURE — 99215 OFFICE O/P EST HI 40 MIN: CPT | Performed by: INTERNAL MEDICINE

## 2025-05-13 PROCEDURE — 83036 HEMOGLOBIN GLYCOSYLATED A1C: CPT

## 2025-05-13 PROCEDURE — 84443 ASSAY THYROID STIM HORMONE: CPT

## 2025-05-13 PROCEDURE — 80061 LIPID PANEL: CPT

## 2025-05-13 PROCEDURE — 80053 COMPREHEN METABOLIC PANEL: CPT

## 2025-05-13 RX ORDER — SEVELAMER CARBONATE 800 MG/1
800 TABLET, FILM COATED ORAL
Status: ON HOLD | COMMUNITY

## 2025-05-13 NOTE — PROGRESS NOTES
Balwinder Muniz is a 79 year old male.  HPI:     Chief Complaint   Patient presents with    Checkup     3 month. Patient has been getting A. Fib after cancer treatments      Balwinder presents with his wife Noemi    He is being followed by cardiology out of [facility].  Dr.Michelle Lim.  Noemi indicates that she is a cardiac oncologist.    He has been having difficulties with paroxysmal atrial fibrillation.  Interesting it seems to happen after dialysis.  It happened March 8, April 9 and May 3.  He is on Eliquis for stroke prophylaxis.  She indicates that 's it is the result of longstanding Velcade that had been being used for his multiple myeloma.    He had been seeing Dr.Renne Bernard from [facility] but apparently she will be out for maternity leave.    He follows with nurse practitioner's from [facility] for his multiple myeloma    He also sees Dr. Evans and it is felt that he may have neurodegenerative cerebral disease consistent with Alzheimer's disease.    I did copy and paste his last office visit note below for reference.    Assessment    1.  Alzheimer's disease  Patient with most likely neurodegenerative disease such as Alzheimer's, considering slow progression over a year.  Most likely complicated by the complicated medical history.  MRI of the brain did not reveal any other etiology.  Neuropsychological testing seems to be confirming the diagnosis.  Assessment & Plan  Alzheimer's disease  Progressive cognitive decline with memory recall challenges. Differential includes parkinsonian disease, less likely. Neuropathy may affect balance. Blood test available for confirmation.  - Order blood test to confirm Alzheimer's disease.  - Increase Donepezil to 10 mg orally at night.  - Encourage regular exercise and socialization.     Neuropathy  Contributing to balance issues, possibly exacerbated by chemotherapy for multiple myeloma.     Multiple myeloma  Managed with Silanetriol and  Velcade. Chemotherapy may contribute to neuropathy and balance issues.     2. Ataxia  Most likely due to significant history of sensory neuropathy accounting for sensory ataxia.  Patient will continue with with physical therapy for the balance.  Lifestyle modifications were suggested.     Education and counseling provided to patient. Instructed patient to call my office or seek medical attention immediately if symptoms worsen.  Patient verbalized understanding of information given. All questions were answered. All side effects of drugs were discussed.      Return to clinic in: No follow-ups on file.     Dewey Evans MD    Lab testing below.    Component  Ref Range & Units (hover) 5/2/25 10:59 AM   Beta Amyloid 42/40 Ratio 0.099 Low    Beta-amyloid 42 57.02   Beta-amyloid 40 574.90   Comment: Plasma beta-amyloid 1-42/1-40 ratios less than or equal to 0.102  suggest a higher probability of a patient being clinically  diagnosed with Alzheimer's Disease (AD), while values above 0.102  suggest a lower probability of AD diagnosis. Precise plasma  testing of Beta Amyloid 42 and Beta Amyloid 40 has demonstrated  comparable effectiveness to traditional cerebrospinal fluid  testing and amyloid positron emission tomography (PET) scans.  When assessing the risk of AD pathology as the underlying cause  for mild cognitive impairment (MCI) or dementia, it is important  to consider various factors such as medical and family history,  nutritional deficiency biomarkers, neuroimaging, and physical,  neurological, and neuropsychological examinations.                                                              .....  Methodology: Sample6 Chemiluminescence Enzyme Immunoassay (CLEIA).               Values obtained with different methods cannot be       Component  Ref Range & Units (hover) 5/2/25 10:59 AM   Phosphorylated Tau 217 ([id number]), Plasma 5.93 High    Comment:     Clinical cutoff value was established using samples from a  patient  cohort characterized with amyloid PET data. A p-jvd242 value of >0.18  is a reported surrogate marker for beta amyloid pathology, and can be  used to facilitate biological identification of Alzheimer's disease  (1). p-[id number] has also been used in clinical trials to monitor  patients on anti-amyloid therapy (2,3).      Test performed by Anafore chemiluminescent enzyme  immunoassay (CLEIA). Values obtained with different methods cannot be  used interchangeably. The validated limit of quantification is 0.06  pg/mL. Assay detection limit is 0.03 pg/mL.       Brain did have a CTA of his coronary arteries December 10, 2024.    This showed moderate calcified plaquing of the proximal LAD with stenosis of 50 to 70%.  Mild to moderate mid RCA plaquing of 40 to 60%.  Mild calcified plaquing of circumflex and obtuse marginal and posterior descending branches.  Calcified trileaflet aortic valve, calcified aortic root, calcified mitral annulus, left atrial enlargement and mild left ventricular vertebrate.    FFR CT result copy and pasted below for reference.    DATE:             12/11/24     FFRct analysis was performed on the original data set. Diagrammatic representation of the FFRct analysis is provided in a separate PDF document in PACS. Normal FFR range is >0.80.     DETAILED FINDINGS:            1. LAD:            There is a hemodynamically significant stenosis in the proximal LAD segment with a value of 0.8 distal to the stenosis.  2. LCX:            Normal FFRct  3. RCA:           Normal FFRct     CONCLUSION:      Based on FFRct analysis, there is hemodynamically significant stenosis in the proximal LAD.      Gal Alberts MD  12/11/2024  2:03 PM    He subsequently had a coronary artery angiogram December 30 2024 that showed normal LVEDP.  There was moderate to severe aortic stenosis.  Mild to moderate nonobstructive coronary artery disease.  No stenting was needed.    Noemi does say that he gets  shortness of breath and winded with activity.  However no acute changes.    He also has a cardiologist  from [facility].  I believe he is the main cardiologist.  He will be having an echocardiogram the 20th.    He also will be seeing a electrophysiologist Alanna 10.    He sees Dr. Ledesma for his diabetes mellitus.  He is on insulin.  It is felt that his diabetes mellitus is well-controlled.    He does have a history of elevated PSA but this was felt to be due to UTI and last PSA February 2024 was 2.93.  I do not think Balwinder clinically needs any more PSA testing.    He has had Pneumovax 23 in 2021 and Prevnar 2017.  He has had Shingrix x 2.  He has had RSV vaccine.    He follows with Dr. Redd and  regarding his chronic kidney disease and is now on dialysis 3 times a week.        Current Medications[1]   Past Medical History[2]   Social History:  Short Social Hx on File[3]     REVIEW OF SYSTEMS:   GENERAL HEALTH:  feels well without any acute symptoms.  Fatigue is a problem.  RESPIRATORY:  Voices no shortness of breath with exertion or cough acutely  CARDIOVASCULAR:  Voices no chest pain on exertion or shortness of breath acutely  GI:   Voices no abdominal pain or changes of bowels  NEURO: Per Dr. Evans patient felt to have neurodegenerative disease consistent with Alzheimer's disease.    EXAM:   /64   Pulse 84   Temp 98.7 °F (37.1 °C)   Ht 5' 8\" (1.727 m)   Wt 188 lb (85.3 kg)   SpO2 96%   BMI 28.59 kg/m²     GENERAL:  well developed, well nourished, in no apparent distress  SKIN:  no rashes ,   HEENT: atraumatic.  Pharynx normal without exudate.  EYES:  PERRL. Sclera anicteric  NECK:  Supple,  no adenopathy,    LUNGS:  clear to auscultation.  Effort normal  CARDIO:  RRR without murmur.   S1 and S2 normal  GI:  good BS's,  no masses,   HSM or tenderness  EXTREMITIES : no cyanosis, clubbing or edema.  I do feel 1+ dorsal pedal pulses both feet.  No deformities.    ASSESSMENT AND  PLAN:     1. Type 2 diabetes mellitus without complication, without long-term current use of insulin (HCC)  Type 2 diabetes mellitus.  On insulin.  Followed by Dr. Ledesma.    2. ESRD on hemodialysis (HCC)  End-stage renal disease on hemodialysis.    3. History of elevated PSA  Last PSA February 2024 was 2.93.  I will not be checking any more PSAs as patient is clinically stable.    4. Multiple myeloma not having achieved remission ([facility])  Multiple myeloma followed at [facility].    5. Coronary artery disease involving native coronary artery of native heart without angina pectoris  Coronary artery disease.  He does see  and Dr. Rivas both at [facility].    6. Memory change  [place] to have neurodegenerative disease consistent with Alzheimer's disease.  He has had a workup with Dr. Evans.    7. Abnormal PET scan, lung  He has a chronically abnormal PET scan of the lungs but no respiratory symptoms.  Felt not to be due to malignancy.  He has no infectious symptoms.    8. Nonrheumatic aortic valve stenosis  Found to have aortic stenosis.  He follows with cardiology.  For the most part asymptomatic.  He is having a follow-up echocardiogram coming up soon.    9. Anemia of chronic disease  Anemia of chronic disease.    10. Paroxysmal atrial fibrillation (HCC)  On Eliquis.  He will be seeing EP cardiologist coming up in June.    11. Cerebral neurodegenerative disease  To have neurodegenerative disease such as Alzheimer's disease.  Followed by Dr. Evans.    This visit was 40 minutes.  I spent 10 minutes before visit preparing and reviewing old records.  Greater than 50% of the visit was engaged in counseling and review of past data.     The patient indicates understanding of these issues and agrees to the plan.    Geovanni Garza MD  5/13/2025  12:31 PM         [1]   Current Outpatient Medications   Medication Sig Dispense Refill    sevelamer carbonate 800 MG Oral Tab Take 1 tablet (800 mg total) by  mouth 3 (three) times daily with meals.      metoprolol succinate ER 50 MG Oral Tablet 24 Hr Take 1 tablet (50 mg total) by mouth daily.      donepezil 10 MG Oral Tab Take 1 tablet (10 mg total) by mouth nightly. 90 tablet 3    Insulin Pen Needle (BD PEN NEEDLE MICRO U/F) 32G X 6 MM Does not apply Misc Use with insulin daily 100 each 3    Lancets (ONETOUCH DELICA PLUS [id number]) Does not apply Misc 1 strip by In Vitro route 3 (three) times daily.      insulin glargine (BASAGLAR KWIKPEN) 100 UNIT/ML Subcutaneous Solution Pen-injector Take Basaglar 10 units at night; On Night of chemo patient should take 22 of Basaglar; On the next 2 days,  he should take 20 units; on day 4, he should take 15 units on day 4 and then back down to usual 10 units from the 5 day onwards. 24 mL 3    apixaban (ELIQUIS) 2.5 MG Oral Tab Take 1 tablet (2.5 mg total) by mouth 2 (two) times daily 60 tablet 11    Multiple Vitamin (ONE-DAILY MULTI VITAMINS) Oral Tab Take 1 tablet by mouth daily.      NON FORMULARY Liquid protein fortifier oral      Glucose Blood (ONETOUCH ULTRA) In Vitro Strip TEST TWICE DAILY 200 strip 3    Amino Acids (LIQUACEL) Oral Liquid Take 30 mL by mouth.      NON FORMULARY Velcade once monthly injection      latanoprost 0.005 % Ophthalmic Solution Place 1 drop into both eyes nightly.      acetaminophen 500 MG Oral Tab Take 325 mg by mouth every 8 (eight) hours. (Patient taking differently: Take 325 mg by mouth every 6 (six) hours as needed for Pain.) 1 tablet 0    Cholecalciferol (VITAMIN D) 25 MCG (1000 UT) Oral Tab Take 2 tablets by mouth daily.      OCUVITE-LUTEIN Oral Tab 1 tab daily      FOLBIC 2.5-25-2 MG Oral Tab Take 1 tablet by mouth daily.  10    acyclovir (ZOVIRAX) 400 MG Oral Tab Take 1 tablet (400 mg total) by mouth daily.  11   [2]   Past Medical History:   Atrial fibrillation (HCC)    Back problem    Calculus of kidney    Cancer (HCC)    multiple myloma    Diabetes (HCC)    Dialysis patient    M,W, F     ESRD (end stage renal disease) (HCC)    Essential hypertension    Hearing impairment    bilateral hearing aids    High blood pressure    Multiple myeloma (HCC)    Muscle weakness    Neuropathy    Slight in feet    Osteoarthritis    Renal disorder    Visual impairment    Glasses   [3]   Social History  Socioeconomic History    Marital status:    Tobacco Use    Smoking status: Never     Passive exposure: Never    Smokeless tobacco: Never   Vaping Use    Vaping status: Never Used   Substance and Sexual Activity    Alcohol use: Not Currently     Alcohol/week: 1.0 standard drink of alcohol     Types: 1 Glasses of wine per week     Comment: social    Drug use: Never     Social Drivers of Health     Food Insecurity: No Food Insecurity (3/8/2025)    NCSS - Food Insecurity     Worried About Running Out of Food in the Last Year: No     Ran Out of Food in the Last Year: No   Transportation Needs: No Transportation Needs (3/8/2025)    NCSS - Transportation     Lack of Transportation: No   Housing Stability: Not At Risk (3/8/2025)    NCSS - Housing/Utilities     Has Housing: Yes     Worried About Losing Housing: No     Unable to Get Utilities: No

## 2025-05-14 ENCOUNTER — TELEPHONE (OUTPATIENT)
Dept: INTERNAL MEDICINE CLINIC | Facility: CLINIC | Age: 80
End: 2025-05-14

## 2025-05-14 NOTE — TELEPHONE ENCOUNTER
Kwan Singer and all.  I believe yesterday we had patient's sign off on paperwork relating to getting access to care everywhere at [facility].    But I still seem to be locked out about getting updated results from [facility].    Do you remember faxing the written approval to [facility]?  Maybe it takes a couple days for it to be processed?.  Thank you.

## 2025-05-19 ENCOUNTER — HOSPITAL ENCOUNTER (INPATIENT)
Facility: HOSPITAL | Age: 80
LOS: 8 days | Discharge: NURSING, REHAB OR LONG-TERM CARE | End: 2025-05-27
Attending: EMERGENCY MEDICINE | Admitting: INTERNAL MEDICINE
Payer: MEDICARE

## 2025-05-19 DIAGNOSIS — I48.91 ATRIAL FIBRILLATION WITH RVR (HCC): Primary | ICD-10-CM

## 2025-05-19 DIAGNOSIS — R53.1 WEAKNESS GENERALIZED: ICD-10-CM

## 2025-05-19 LAB
ALBUMIN SERPL-MCNC: 4.5 G/DL (ref 3.2–4.8)
ALP LIVER SERPL-CCNC: 56 U/L (ref 45–117)
ALT SERPL-CCNC: <7 U/L (ref 10–49)
ANION GAP SERPL CALC-SCNC: 10 MMOL/L (ref 0–18)
AST SERPL-CCNC: <8 U/L (ref ?–34)
BASOPHILS # BLD AUTO: 0.02 X10(3) UL (ref 0–0.2)
BASOPHILS NFR BLD AUTO: 0.3 %
BILIRUB DIRECT SERPL-MCNC: 0.2 MG/DL (ref ?–0.3)
BILIRUB SERPL-MCNC: 0.6 MG/DL (ref 0.2–1.1)
BUN BLD-MCNC: 30 MG/DL (ref 9–23)
BUN/CREAT SERPL: 6 (ref 10–20)
CALCIUM BLD-MCNC: 8.9 MG/DL (ref 8.7–10.4)
CHLORIDE SERPL-SCNC: 92 MMOL/L (ref 98–112)
CO2 SERPL-SCNC: 34 MMOL/L (ref 21–32)
CREAT BLD-MCNC: 4.97 MG/DL (ref 0.7–1.3)
DEPRECATED RDW RBC AUTO: 50.4 FL (ref 35.1–46.3)
EGFRCR SERPLBLD CKD-EPI 2021: 11 ML/MIN/1.73M2 (ref 60–?)
EOSINOPHIL # BLD AUTO: 0.03 X10(3) UL (ref 0–0.7)
EOSINOPHIL NFR BLD AUTO: 0.5 %
ERYTHROCYTE [DISTWIDTH] IN BLOOD BY AUTOMATED COUNT: 17.4 % (ref 11–15)
GLUCOSE BLD-MCNC: 119 MG/DL (ref 70–99)
GLUCOSE BLDC GLUCOMTR-MCNC: 126 MG/DL (ref 70–99)
GLUCOSE BLDC GLUCOMTR-MCNC: 128 MG/DL (ref 70–99)
HCT VFR BLD AUTO: 28.7 % (ref 39–53)
HGB BLD-MCNC: 8.6 G/DL (ref 13–17.5)
IMM GRANULOCYTES # BLD AUTO: 0.02 X10(3) UL (ref 0–1)
IMM GRANULOCYTES NFR BLD: 0.3 %
LYMPHOCYTES # BLD AUTO: 0.2 X10(3) UL (ref 1–4)
LYMPHOCYTES NFR BLD AUTO: 3.2 %
MCH RBC QN AUTO: 23.8 PG (ref 26–34)
MCHC RBC AUTO-ENTMCNC: 30 G/DL (ref 31–37)
MCV RBC AUTO: 79.3 FL (ref 80–100)
MONOCYTES # BLD AUTO: 0.49 X10(3) UL (ref 0.1–1)
MONOCYTES NFR BLD AUTO: 7.8 %
NEUTROPHILS # BLD AUTO: 5.53 X10 (3) UL (ref 1.5–7.7)
NEUTROPHILS # BLD AUTO: 5.53 X10(3) UL (ref 1.5–7.7)
NEUTROPHILS NFR BLD AUTO: 87.9 %
OSMOLALITY SERPL CALC.SUM OF ELEC: 289 MOSM/KG (ref 275–295)
PLATELET # BLD AUTO: 117 10(3)UL (ref 150–450)
POTASSIUM SERPL-SCNC: 3.2 MMOL/L (ref 3.5–5.1)
PROT SERPL-MCNC: 6.5 G/DL (ref 5.7–8.2)
RBC # BLD AUTO: 3.62 X10(6)UL (ref 3.8–5.8)
SODIUM SERPL-SCNC: 136 MMOL/L (ref 136–145)
WBC # BLD AUTO: 6.3 X10(3) UL (ref 4–11)

## 2025-05-19 PROCEDURE — 99223 1ST HOSP IP/OBS HIGH 75: CPT | Performed by: INTERNAL MEDICINE

## 2025-05-19 RX ORDER — POLYETHYLENE GLYCOL 3350 17 G/17G
17 POWDER, FOR SOLUTION ORAL DAILY PRN
Status: DISCONTINUED | OUTPATIENT
Start: 2025-05-19 | End: 2025-05-27

## 2025-05-19 RX ORDER — BISACODYL 10 MG
10 SUPPOSITORY, RECTAL RECTAL
Status: DISCONTINUED | OUTPATIENT
Start: 2025-05-19 | End: 2025-05-27

## 2025-05-19 RX ORDER — NICOTINE POLACRILEX 4 MG
15 LOZENGE BUCCAL
Status: DISCONTINUED | OUTPATIENT
Start: 2025-05-19 | End: 2025-05-27

## 2025-05-19 RX ORDER — SEVELAMER CARBONATE 800 MG/1
800 TABLET, FILM COATED ORAL
Status: DISCONTINUED | OUTPATIENT
Start: 2025-05-20 | End: 2025-05-27

## 2025-05-19 RX ORDER — LATANOPROST 50 UG/ML
1 SOLUTION/ DROPS OPHTHALMIC NIGHTLY
Status: DISCONTINUED | OUTPATIENT
Start: 2025-05-19 | End: 2025-05-27

## 2025-05-19 RX ORDER — MV-MN/OM3/DHA/EPA/FISH/LUT/ZEA 250-5-1 MG
1 CAPSULE ORAL DAILY
COMMUNITY

## 2025-05-19 RX ORDER — MOMETASONE FUROATE MONOHYDRATE 50 UG/1
1 SPRAY, METERED NASAL DAILY PRN
COMMUNITY

## 2025-05-19 RX ORDER — ACETAMINOPHEN 325 MG/1
1-2 TABLET ORAL EVERY 6 HOURS PRN
COMMUNITY

## 2025-05-19 RX ORDER — ACETAMINOPHEN 500 MG
500 TABLET ORAL EVERY 4 HOURS PRN
Status: DISCONTINUED | OUTPATIENT
Start: 2025-05-19 | End: 2025-05-27

## 2025-05-19 RX ORDER — DONEPEZIL HYDROCHLORIDE 10 MG/1
10 TABLET, FILM COATED ORAL NIGHTLY
Status: DISCONTINUED | OUTPATIENT
Start: 2025-05-19 | End: 2025-05-27

## 2025-05-19 RX ORDER — HYDROCODONE BITARTRATE AND ACETAMINOPHEN 5; 325 MG/1; MG/1
1 TABLET ORAL EVERY 4 HOURS PRN
Refills: 0 | Status: DISCONTINUED | OUTPATIENT
Start: 2025-05-19 | End: 2025-05-20

## 2025-05-19 RX ORDER — ACETAMINOPHEN 325 MG/1
650 TABLET ORAL EVERY 4 HOURS PRN
Status: DISCONTINUED | OUTPATIENT
Start: 2025-05-19 | End: 2025-05-27

## 2025-05-19 RX ORDER — SENNOSIDES 8.6 MG
17.2 TABLET ORAL NIGHTLY PRN
Status: DISCONTINUED | OUTPATIENT
Start: 2025-05-19 | End: 2025-05-27

## 2025-05-19 RX ORDER — DILTIAZEM HYDROCHLORIDE 5 MG/ML
10 INJECTION INTRAVENOUS ONCE
Status: COMPLETED | OUTPATIENT
Start: 2025-05-19 | End: 2025-05-19

## 2025-05-19 RX ORDER — NICOTINE POLACRILEX 4 MG
30 LOZENGE BUCCAL
Status: DISCONTINUED | OUTPATIENT
Start: 2025-05-19 | End: 2025-05-27

## 2025-05-19 RX ORDER — DEXTROSE MONOHYDRATE 25 G/50ML
50 INJECTION, SOLUTION INTRAVENOUS
Status: DISCONTINUED | OUTPATIENT
Start: 2025-05-19 | End: 2025-05-27

## 2025-05-19 RX ORDER — METOPROLOL SUCCINATE 50 MG/1
50 TABLET, EXTENDED RELEASE ORAL DAILY
Status: DISCONTINUED | OUTPATIENT
Start: 2025-05-19 | End: 2025-05-27

## 2025-05-19 RX ORDER — AMIODARONE HYDROCHLORIDE 200 MG/1
400 TABLET ORAL 2 TIMES DAILY WITH MEALS
Status: DISCONTINUED | OUTPATIENT
Start: 2025-05-19 | End: 2025-05-27

## 2025-05-19 RX ORDER — METOPROLOL TARTRATE 50 MG
50 TABLET ORAL ONCE
Status: COMPLETED | OUTPATIENT
Start: 2025-05-19 | End: 2025-05-19

## 2025-05-19 RX ORDER — HYDROCODONE BITARTRATE AND ACETAMINOPHEN 5; 325 MG/1; MG/1
2 TABLET ORAL EVERY 4 HOURS PRN
Refills: 0 | Status: DISCONTINUED | OUTPATIENT
Start: 2025-05-19 | End: 2025-05-20

## 2025-05-19 NOTE — H&P
[facility]  part of [facility]  HISTORY AND PHYSICAL       Balwinder Muniz Patient Status:  Emergency    1945  79 year old CSN [id number]   Location  Attending Antonino Dumont MD     PCP Geovanni Garza MD         DATE OF ADMISSION: 2025     CHIEF COMPLAINT: Generalized weakness    HISTORY OF PRESENT ILLNESS  This is a 79 year oldmale who presented feeling generally weak and lightheaded.  Patient with history of ESRD and went to dialysis on day of admission.  After finishing his session patient again to feel very weak, tired and lightheaded.  Patient was sent to ED for further evaluation.  In the ED he was found to be in atrial fibrillation.  At time of interview, patient was sleepy and mildly confused.  He reported feeling weak.  Denied any new focal weakness or sensory changes.    Otherwise denied current chest pain, shortness of breath, abdominal pain, nausea or vomiting, fevers or chills.    PAST MEDICAL HISTORY  Past Medical History[1]     PAST SURGICAL HISTORY  Past Surgical History[2]    ALLERGIES   Patient has no known allergies.    MEDICATIONS  Patient's Medications   New Prescriptions    No medications on file   Previous Medications    ACETAMINOPHEN 325 MG ORAL TAB    Take 1-2 tablets (325-650 mg total) by mouth every 6 (six) hours as needed.    ACYCLOVIR (ZOVIRAX) 400 MG ORAL TAB    Take 1 tablet (400 mg total) by mouth daily.    AMINO ACIDS (LIQUACEL) ORAL LIQUID    Take 30 mL by mouth daily.    APIXABAN (ELIQUIS) 2.5 MG ORAL TAB    Take 1 tablet (2.5 mg total) by mouth 2 (two) times daily    CHOLECALCIFEROL (VITAMIN D) 25 MCG (1000 UT) ORAL TAB    Take 2 tablets by mouth in the morning.    DONEPEZIL 10 MG ORAL TAB    Take 1 tablet (10 mg total) by mouth nightly.    FOLBIC 2.5-25-2 MG ORAL TAB    Take 1 tablet by mouth daily.    INSULIN GLARGINE (BASAGLAR KWIKPEN) 100 UNIT/ML SUBCUTANEOUS SOLUTION PEN-INJECTOR    Take Basaglar 10 units at night; On Night of chemo  patient should take 22 of Basaglar; On the next 2 days,  he should take 20 units; on day 4, he should take 15 units on day 4 and then back down to usual 10 units from the 5 day onwards.    INSULIN PEN NEEDLE (BD PEN NEEDLE MICRO U/F) 32G X 6 MM DOES NOT APPLY MISC    Use with insulin daily    LATANOPROST 0.005 % OPHTHALMIC SOLUTION    Place 1 drop into both eyes nightly.    METOPROLOL SUCCINATE ER 50 MG ORAL TABLET 24 HR    Take 1 tablet (50 mg total) by mouth daily.    MOMETASONE FUROATE 50 MCG/ACT NASAL SUSPENSION    1 spray by Nasal route daily as needed.    MULTIPLE VITAMINS-MINERALS (OCUVITE ADULT 50+) ORAL CAP    Take 1 tablet by mouth daily.    SEVELAMER CARBONATE 800 MG ORAL TAB    Take 1 tablet (800 mg total) by mouth 3 (three) times daily with meals.   Modified Medications    No medications on file   Discontinued Medications    ACETAMINOPHEN 500 MG ORAL TAB    Take 325 mg by mouth every 8 (eight) hours.    GLUCOSE BLOOD (ONETOUCH ULTRA) IN VITRO STRIP    TEST TWICE DAILY    LANCETS (ONETOUCH DELICA PLUS [id number]) DOES NOT APPLY MISC    1 strip by In Vitro route 3 (three) times daily.    METOPROLOL SUCCINATE ER 50 MG ORAL TABLET 24 HR    Take 1 tablet (50 mg total) by mouth Daily Beta Blocker.    MULTIPLE VITAMIN (ONE-DAILY MULTI VITAMINS) ORAL TAB    Take 1 tablet by mouth daily.    NON FORMULARY    Velcade once monthly injection    NON FORMULARY    Liquid protein fortifier oral    OCUVITE-LUTEIN ORAL TAB    1 tab daily       SOCIAL HISTORY  Social Hx on file[3]    FAMILY HISTORY  Family History[4]    PHYSICAL EXAM  Vital signs:  /69   Pulse 93   Temp 98.3 °F (36.8 °C) (Oral)   Resp 23   Ht 5' 7\" (1.702 m)   Wt 183 lb (83 kg)   SpO2 96%   BMI 28.66 kg/m²      GENERAL: Sleepy, but easily wakens to voice, in no acute distress.  HEART:  Irregular rhythm.  Mild tachycardia  LUNGS:  Air entry was minimally decreased.  No increased work of breathing or wheezes   ABDOMEN: Soft and non-tender.     NEUROLOGICAL: Sleepy, but easily can to voice.  Mild confusion noted.  Generalized weakness, but there was no new focal motor deficit.  PSYCHIATRIC: Flat mood      IMAGING    No results found.     Data:  Recent Labs   Lab 05/19/25  1629   RBC 3.62*   HGB 8.6*   HCT 28.7*   MCV 79.3*   MCH 23.8*   MCHC 30.0*   RDW 17.4*   NEPRELIM 5.53   WBC 6.3   .0*     Recent Labs   Lab 05/13/25  0808 05/19/25  1629   GLU 97 119*   BUN 49* 30*   CREATSERUM 7.01* 4.97*   CA 9.0 8.9   ALB 4.2 4.5    136   K 3.7 3.2*   CL 98 92*   CO2 32.0 34.0*   ALKPHO 49 56   AST <8 <8   ALT <7* <7*   BILT 0.5 0.6   TP 6.0 6.5       ASSESSMENT/PLAN      Atrial fibrillation with RVR (HCC)  -Initially started on diltiazem gtt., weaning as able  - Restart metoprolol for rate control  -Continue chronic anticoagulation with Eliquis  -Telemetry monitoring  -Cardiology on consult, appreciate further recs      ESRD on HD  - Nephrology consulted, appreciate recommendations for further HD.    Generalized Weakness  - Unclear etiology  - Likely related to HD and A-fib as above  - Treating underlying conditions  - PT/OT  - Continue to monitor    Type 2 DM   - Monitoring Blood glucose with POC checks  - SSI to cover hyperglycemia  - Hypoglycemia protocol  - Will monitor and adjust agents as needed.        Plan of care discussed with patient at bedside.  Discussed with ED physician and RN. Decision made that pt needs hospitalization for further management/monitoring.      Rg Mclean MD    This note was prepared using Dragon Medical voice recognition dictation software. As a result errors may occur. When identified these errors have been corrected. While every attempt is made to correct errors during dictation discrepancies may still exist         [1]   Past Medical History:   Atrial fibrillation (HCC)    Back problem    Calculus of kidney    Cancer (HCC)    multiple myloma    Diabetes (HCC)    Dialysis patient    M,W, F    ESRD (end stage  renal disease) (HCC)    Essential hypertension    Hearing impairment    bilateral hearing aids    High blood pressure    Multiple myeloma (HCC)    Muscle weakness    Neuropathy    Slight in feet    Osteoarthritis    Renal disorder    Visual impairment    Glasses   [2]   Past Surgical History:  Procedure Laterality Date    Colonoscopy  2004    Other surgical history  1994    Other surgical history  2014    stem cell transplant     Other surgical history  11/12/2019    removal parathyroid adenoma    Other surgical history  08/18/2022    Arthroscopy   [3]   Social History  Socioeconomic History    Marital status:    Tobacco Use    Smoking status: Never     Passive exposure: Never    Smokeless tobacco: Never   Vaping Use    Vaping status: Never Used   Substance and Sexual Activity    Alcohol use: Not Currently     Alcohol/week: 1.0 standard drink of alcohol     Types: 1 Glasses of wine per week     Comment: social    Drug use: Never   [4]   Family History  Problem Relation Age of Onset    Cancer Father         lung cancer     Hypertension Father     Heart Disorder Mother     Depression Mother     Psychiatric Mother     Heart Disorder Maternal Grandfather     Heart Disorder Brother         passed away with heart attack     Hypertension Brother     Hypertension Brother     Kidney Disease Brother     Kidney Disease Brother     Hypertension Brother

## 2025-05-19 NOTE — ED QUICK NOTES
Patient stable for transfer to floor at this time. A&Ox4, skin p/w/d. Denies cp/katy. Pain managed. Iv site patent and intact. All belongings accompanying patient to floor.

## 2025-05-19 NOTE — ED INITIAL ASSESSMENT (HPI)
Pt presented to ED from his dialysis treatment with wife c/o KAYLEIGH with a low SPO2 of 86% on RA. Pt has early onset of alzheimer's. Wife reported the symptoms of increased fatigue started on the weekend.

## 2025-05-19 NOTE — ED QUICK NOTES
Orders for admission, patient is aware of plan and ready to go upstairs. Any questions, please call ED RN Ayde at extension [phone].     Patient Covid vaccination status: Fully vaccinated     COVID Test Ordered in ED: None    COVID Suspicion at Admission: N/A    Running Infusions: Medication Infusions[1] None    Mental Status/LOC at time of transport: A&Ox3    Other pertinent information:   CIWA score: N/A   NIH score:  N/A             [1]

## 2025-05-19 NOTE — CONSULTS
Cardiology (consult dictated)    Assessment:  1.  Paroxysmal atrial fibrillation, initially with rapid ventricular response.  He has now converted to sinus rhythm.    2.  Third episode of A-fib over the last 6 weeks.    3.  Multiple myeloma, under active treatment.    4.  End-stage renal disease, on hemodialysis Monday Wednesday Friday.    5.  Murmur of aortic stenosis.    6.  Hypertension, diabetes, dyslipidemia    7.  Early senile dementia.      Plan:  1.  Begin amiodarone to hold sinus rhythm.    2.  Continue Eliquis for stroke prevention.    3.  Echocardiogram tomorrow.      Thank you.

## 2025-05-19 NOTE — CONSULTS
[facility]    PATIENT'S NAME: NAI DING   ATTENDING PHYSICIAN: Antonino Dumont MD   CONSULTING PHYSICIAN: Teofilo Johnson MD   PATIENT ACCOUNT#:   [id number]    LOCATION:  [address]  MEDICAL RECORD #:   [id number]       YOB: 1945  ADMISSION DATE:       05/19/2025      CONSULT DATE:  05/19/2025    REPORT OF CONSULTATION      HISTORY OF PRESENT ILLNESS:  The patient is a 79-year-old gentleman who was at dialysis today when he began to feel tachycardic and lightheaded.  He was found to be in atrial fibrillation and was brought to the emergency room.  He has had 2 episodes of atrial fibrillation over the past 6 weeks.  Each of them appears to have been self-limited.  At the present time, he is somnolent but arousable and denies any symptoms of chest pain, dizziness, or palpitations at this time.    PAST MEDICAL HISTORY:  End-stage renal disease, on hemodialysis; multiple myeloma, undergoing longstanding chemotherapy; diabetes; hypertension; glaucoma.    MEDICATIONS:  Home medications:  Acyclovir, apixaban, vitamin D, donepezil, folic acid, metoprolol succinate, insulin.    ALLERGIES:  No known drug allergies.    SOCIAL HISTORY:  Nonsmoker.  Very little alcohol.  Wife is here at the bedside.  He is  with 3 children and 6 grandchildren.    PHYSICAL EXAMINATION:    GENERAL:  Well-developed, well-nourished male, no acute distress.  Alert and oriented x3.    VITAL SIGNS:  Blood pressure 122/70; respirations 26, breathing unlabored; pulse 96, regular rhythm.  Initial heart rate was 140 and irregular.  HEENT:  Unremarkable.    NECK:  There is increased jugular venous pressure.  Carotids are brisk without bruits.  No thyromegaly.  LUNGS:  Clear anterolaterally.  HEART:  S1, S2 normal.  There is a grade 2 to 3 over 6 mid- to late-peaking systolic ejection murmur consistent with aortic stenosis.  ABDOMEN:  Soft, nontender.  EXTREMITIES:  Warm and dry with good pulses and no edema.       LABORATORY DATA:  Sodium 136, potassium 3.2, bicarb 34, BUN 30, creatinine 4.97.  Hemoglobin 8.6, WBC 6.3, platelets 117.    EKG shows what appears to be atrial fibrillation with a rate of 130.  Second EKG shows sinus rhythm with a rate of 97, no acute ischemic changes.  There is voltage for LVH.    Chest x-ray is pending.     ASSESSMENT:    1.   Paroxysmal atrial fibrillation.  Has converted spontaneously to sinus rhythm.  2.   Multiple myeloma, under active treatment.  3.   End-stage renal disease, on hemodialysis.  Had dialysis today.  4.   Murmur of aortic stenosis.  5.   Hypertension, diabetes, dyslipidemia.  6.   Early senile dementia.    PLAN:    1.   Continue Eliquis for stroke prevention.  2.   Continue other home medications.    3.   Begin amiodarone to hold sinus rhythm.    Thank you for this consultation.    Dictated By Teoiflo Johnson MD  d: 05/19/2025 17:59:21  t: 05/19/2025 18:20:14  Job [id number]  [facility]/

## 2025-05-20 ENCOUNTER — APPOINTMENT (OUTPATIENT)
Dept: CV DIAGNOSTICS | Facility: HOSPITAL | Age: 80
End: 2025-05-20
Attending: INTERNAL MEDICINE
Payer: MEDICARE

## 2025-05-20 ENCOUNTER — APPOINTMENT (OUTPATIENT)
Dept: GENERAL RADIOLOGY | Facility: HOSPITAL | Age: 80
End: 2025-05-20
Attending: HOSPITALIST
Payer: MEDICARE

## 2025-05-20 ENCOUNTER — TELEPHONE (OUTPATIENT)
Dept: INTERNAL MEDICINE CLINIC | Facility: CLINIC | Age: 80
End: 2025-05-20

## 2025-05-20 LAB
ANION GAP SERPL CALC-SCNC: 11 MMOL/L (ref 0–18)
ATRIAL RATE: 97 BPM
BASOPHILS # BLD AUTO: 0.01 X10(3) UL (ref 0–0.2)
BASOPHILS NFR BLD AUTO: 0.3 %
BUN BLD-MCNC: 45 MG/DL (ref 9–23)
BUN/CREAT SERPL: 6 (ref 10–20)
CALCIUM BLD-MCNC: 8.7 MG/DL (ref 8.7–10.4)
CHLORIDE SERPL-SCNC: 92 MMOL/L (ref 98–112)
CO2 SERPL-SCNC: 33 MMOL/L (ref 21–32)
CREAT BLD-MCNC: 7.54 MG/DL (ref 0.7–1.3)
DEPRECATED RDW RBC AUTO: 50.2 FL (ref 35.1–46.3)
EGFRCR SERPLBLD CKD-EPI 2021: 7 ML/MIN/1.73M2 (ref 60–?)
EOSINOPHIL # BLD AUTO: 0.03 X10(3) UL (ref 0–0.7)
EOSINOPHIL NFR BLD AUTO: 0.9 %
ERYTHROCYTE [DISTWIDTH] IN BLOOD BY AUTOMATED COUNT: 17.5 % (ref 11–15)
GLUCOSE BLD-MCNC: 154 MG/DL (ref 70–99)
GLUCOSE BLDC GLUCOMTR-MCNC: 131 MG/DL (ref 70–99)
GLUCOSE BLDC GLUCOMTR-MCNC: 136 MG/DL (ref 70–99)
GLUCOSE BLDC GLUCOMTR-MCNC: 162 MG/DL (ref 70–99)
GLUCOSE BLDC GLUCOMTR-MCNC: 245 MG/DL (ref 70–99)
HCT VFR BLD AUTO: 25.8 % (ref 39–53)
HGB BLD-MCNC: 7.7 G/DL (ref 13–17.5)
IMM GRANULOCYTES # BLD AUTO: 0.05 X10(3) UL (ref 0–1)
IMM GRANULOCYTES NFR BLD: 1.4 %
LYMPHOCYTES # BLD AUTO: 0.16 X10(3) UL (ref 1–4)
LYMPHOCYTES NFR BLD AUTO: 4.6 %
MAGNESIUM SERPL-MCNC: 2.2 MG/DL (ref 1.6–2.6)
MCH RBC QN AUTO: 23.5 PG (ref 26–34)
MCHC RBC AUTO-ENTMCNC: 29.8 G/DL (ref 31–37)
MCV RBC AUTO: 78.7 FL (ref 80–100)
MONOCYTES # BLD AUTO: 0.39 X10(3) UL (ref 0.1–1)
MONOCYTES NFR BLD AUTO: 11.1 %
NEUTROPHILS # BLD AUTO: 2.87 X10 (3) UL (ref 1.5–7.7)
NEUTROPHILS # BLD AUTO: 2.87 X10(3) UL (ref 1.5–7.7)
NEUTROPHILS NFR BLD AUTO: 81.7 %
OSMOLALITY SERPL CALC.SUM OF ELEC: 297 MOSM/KG (ref 275–295)
P AXIS: 30 DEGREES
P-R INTERVAL: 156 MS
PLATELET # BLD AUTO: 96 10(3)UL (ref 150–450)
PLATELETS.RETICULATED NFR BLD AUTO: 2.6 % (ref 0–7)
POTASSIUM SERPL-SCNC: 3.7 MMOL/L (ref 3.5–5.1)
Q-T INTERVAL: 340 MS
Q-T INTERVAL: 404 MS
QRS DURATION: 100 MS
QRS DURATION: 92 MS
QTC CALCULATION (BEZET): 513 MS
QTC CALCULATION (BEZET): 519 MS
R AXIS: 1 DEGREES
R AXIS: 23 DEGREES
RBC # BLD AUTO: 3.28 X10(6)UL (ref 3.8–5.8)
SODIUM SERPL-SCNC: 136 MMOL/L (ref 136–145)
T AXIS: 36 DEGREES
T AXIS: 56 DEGREES
VENTRICULAR RATE: 140 BPM
VENTRICULAR RATE: 97 BPM
WBC # BLD AUTO: 3.5 X10(3) UL (ref 4–11)

## 2025-05-20 PROCEDURE — 99233 SBSQ HOSP IP/OBS HIGH 50: CPT | Performed by: HOSPITALIST

## 2025-05-20 PROCEDURE — 99223 1ST HOSP IP/OBS HIGH 75: CPT | Performed by: INTERNAL MEDICINE

## 2025-05-20 PROCEDURE — 93306 TTE W/DOPPLER COMPLETE: CPT | Performed by: INTERNAL MEDICINE

## 2025-05-20 PROCEDURE — 71045 X-RAY EXAM CHEST 1 VIEW: CPT | Performed by: HOSPITALIST

## 2025-05-20 PROCEDURE — 5A0935A ASSISTANCE WITH RESPIRATORY VENTILATION, LESS THAN 24 CONSECUTIVE HOURS, HIGH NASAL FLOW/VELOCITY: ICD-10-PCS | Performed by: HOSPITALIST

## 2025-05-20 RX ORDER — INSULIN DEGLUDEC 100 U/ML
10 INJECTION, SOLUTION SUBCUTANEOUS NIGHTLY
Status: DISCONTINUED | OUTPATIENT
Start: 2025-05-20 | End: 2025-05-27

## 2025-05-20 RX ORDER — ALBUMIN (HUMAN) 12.5 G/50ML
25 SOLUTION INTRAVENOUS
Status: ACTIVE | OUTPATIENT
Start: 2025-05-21 | End: 2025-05-22

## 2025-05-20 RX ORDER — DOXEPIN HYDROCHLORIDE 50 MG/1
1 CAPSULE ORAL DAILY
Status: DISCONTINUED | OUTPATIENT
Start: 2025-05-20 | End: 2025-05-27

## 2025-05-20 RX ORDER — IPRATROPIUM BROMIDE AND ALBUTEROL SULFATE 2.5; .5 MG/3ML; MG/3ML
3 SOLUTION RESPIRATORY (INHALATION)
Status: DISCONTINUED | OUTPATIENT
Start: 2025-05-20 | End: 2025-05-22

## 2025-05-20 NOTE — TELEPHONE ENCOUNTER
Patients wife is calling to let Dr Garza know that the patient is in the hospital for atrial fibrillation.    Any questions call patients wife

## 2025-05-20 NOTE — PLAN OF CARE
Confused, at baseline per wife. Had increase in O2 requirements overnight, see orders. Max assist. Cardizem gtt off  Problem: CARDIOVASCULAR - ADULT  Goal: Maintains optimal cardiac output and hemodynamic stability  Description: INTERVENTIONS:  - Monitor vital signs, rhythm, and trends  - Monitor for bleeding, hypotension and signs of decreased cardiac output  - Evaluate effectiveness of vasoactive medications to optimize hemodynamic stability  - Monitor arterial and/or venous puncture sites for bleeding and/or hematoma  - Assess quality of pulses, skin color and temperature  - Assess for signs of decreased coronary artery perfusion - ex. Angina  - Evaluate fluid balance, assess for edema, trend weights  Outcome: Progressing  Goal: Absence of cardiac arrhythmias or at baseline  Description: INTERVENTIONS:  - Continuous cardiac monitoring, monitor vital signs, obtain 12 lead EKG if indicated  - Evaluate effectiveness of antiarrhythmic and heart rate control medications as ordered  - Initiate emergency measures for life threatening arrhythmias  - Monitor electrolytes and administer replacement therapy as ordered  Outcome: Progressing     Problem: RESPIRATORY - ADULT  Goal: Achieves optimal ventilation and oxygenation  Description: INTERVENTIONS:  - Assess for changes in respiratory status  - Assess for changes in mentation and behavior  - Position to facilitate oxygenation and minimize respiratory effort  - Oxygen supplementation based on oxygen saturation or ABGs  - Provide Smoking Cessation handout, if applicable  - Encourage broncho-pulmonary hygiene including cough, deep breathe, Incentive Spirometry  - Assess the need for suctioning and perform as needed  - Assess and instruct to report SOB or any respiratory difficulty  - Respiratory Therapy support as indicated  - Manage/alleviate anxiety  - Monitor for signs/symptoms of CO2 retention  Outcome: Progressing     Problem: SAFETY ADULT - FALL  Goal: Free from fall  injury  Description: INTERVENTIONS:  - Assess pt frequently for physical needs  - Identify cognitive and physical deficits and behaviors that affect risk of falls.  - [place] fall precautions as indicated by assessment.  - Educate pt/family on patient safety including physical limitations  - Instruct pt to call for assistance with activity based on assessment  - Modify environment to reduce risk of injury  - Provide assistive devices as appropriate  - Consider OT/PT consult to assist with strengthening/mobility  - Encourage toileting schedule  Outcome: Progressing     Problem: DISCHARGE PLANNING  Goal: Discharge to home or other facility with appropriate resources  Description: INTERVENTIONS:  - Identify barriers to discharge w/pt and caregiver  - Include patient/family/discharge partner in discharge planning  - Arrange for needed discharge resources and transportation as appropriate  - Identify discharge learning needs (meds, wound care, etc)  - Arrange for interpreters to assist at discharge as needed  - Consider post-discharge preferences of patient/family/discharge partner  - Complete POLST form as appropriate  - Assess patient's ability to be responsible for managing their own health  - Refer to Case Management Department for coordinating discharge planning if the patient needs post-hospital services based on physician/LIP order or complex needs related to functional status, cognitive ability or social support system  Outcome: Progressing

## 2025-05-20 NOTE — PROGRESS NOTES
[facility]  Progress Note     Balwinder Muniz  : 1945    Status: Inpatient  Day #: 1    Attending: Anatoliy Haque MD  PCP: Geovanni Garza MD     Assessment and Plan:    Atrial fibrillation with RVR  -back in sinus rhythm on amiodarone and metoprolol  -Continue chronic anticoagulation with Eliquis  -Telemetry monitoring  -Cardiology on consult, appreciate further recs    Acute hypoxic respiratory failure  Possible aspiration pneumonia vs pulmonary edema  -CXR reviewed - edema vs pna vs both  -start abx for possible pneumonia  -ST eval  -repeat CXR in AM  -wean O2     ESRD on HD  - Nephrology consulted, appreciate recommendations for further HD.     Generalized Weakness  - Unclear etiology  - Likely related to HD and A-fib as above  - Treating underlying conditions  - PT/OT  - Continue to monitor     Type 2 DM   - Monitoring Blood glucose with POC checks  - SSI to cover hyperglycemia  - Hypoglycemia protocol  - Will monitor and adjust agents as needed.     MM  -monitor counts    Dementia  -monitor mental status        DVT Mechanical Prophylaxis:   SCDs,    DVT Pharmacologic Prophylaxis   Medication    apixaban (Eliquis) tab 2.5 mg               Subjective:      Initial Chief Complaint:  lightheadedness    No SOB currently. No CP.     10 point ROS completed and was negative, except for pertinent positive and negatives stated in subjective.      Objective:      Temp:  [97.6 °F (36.4 °C)-99.8 °F (37.7 °C)] 98.8 °F (37.1 °C)  Pulse:  [] 79  Resp:  [20-26] 20  BP: (113-174)/() 131/74  SpO2:  [74 %-100 %] 99 %  General:  Alert, no distress  HEENT:  Normocephalic, atraumatic  Cardiac:  Regular rate, regular rhythm  Pulmonary:  b/l crackles  Gastrointestinal:  Soft, non-tender, normal bowel sounds  Musculoskeletal:  No joint swelling  Extremities:  No edema, no cyanosis, no clubbing  Neurologic:  nonfocal  Psychiatric:  Normal affect, calm and appropriate    Intake/Output Summary (Last 24 hours)  at 5/20/2025 1515  Last data filed at 5/20/2025 1100  Gross per 24 hour   Intake 464.34 ml   Output 0 ml   Net 464.34 ml         Recent Labs   Lab 05/19/25  1629 05/20/25  1135   WBC 6.3 3.5*   HGB 8.6* 7.7*   HCT 28.7* 25.8*   .0* 96.0*   RBC 3.62* 3.28*   MCV 79.3* 78.7*   MCH 23.8* 23.5*   MCHC 30.0* 29.8*   RDW 17.4* 17.5*   NEPRELIM 5.53 2.87     Recent Labs   Lab 05/19/25  1629 05/20/25  1135   BUN 30* 45*   CREATSERUM 4.97* 7.54*   CA 8.9 8.7   ALB 4.5  --     136   K 3.2* 3.7   CL 92* 92*   CO2 34.0* 33.0*   * 154*   MG  --  2.2   BILT 0.6  --    AST <8  --    ALT <7*  --    ALKPHO 56  --    TP 6.5  --        XR CHEST AP PORTABLE  (CPT=71045)  Result Date: 5/20/2025  CONCLUSION:   Hazy bibasilar opacities are new since comparison chest radiograph from March, 2025. Primary differential considerations include pulmonary edema or multifocal pneumonia/aspiration.  Radiographic follow-up to resolution is advised.   Questionable small left pleural effusion.    A preliminary report was issued by the [facility] teleradiology service. There are no major discrepancies.  [facility]-remote  Dictated by (CST): Eriberto Maddox MD on 5/20/2025 at 8:23 AM     Finalized by (CST): Eriberto Maddox MD on 5/20/2025 at 8:26 AM          EKG 12 Lead  Result Date: 5/20/2025  Normal sinus rhythm Minimal voltage criteria for LVH, may be normal variant ( Christofer product ) Prolonged QT interval or tu fusion, consider myocardial disease, electrolyte imbalance, or drug effects Abnormal ECG When compared with ECG of 19-MAY-2025 16:10, Sinus rhythm has replaced Atrial fibrillation Confirmed by TONG MCCOY, FREIRE ([id number]) on 5/20/2025 12:43:06 PM    EKG 12 Lead  Result Date: 5/20/2025  Atrial flutter Minimal voltage criteria for LVH, may be normal variant ( Christofer product ) Nonspecific ST abnormality Abnormal ECG When compared with ECG of 05-APR-2025 13:52, Atrial flutter has replaced Sinus rhythm Vent. rate has increased BY   55 BPM T wave amplitude has decreased in Lateral leads Confirmed by TONG MCCOY CASH ([id number]) on 5/20/2025 12:35:51 PM    Medications:  Scheduled Medications[1]   PRN Meds: PRN Medications[2]    Supplementary Documentation:   DVT Mechanical Prophylaxis:   SCDs,    DVT Pharmacologic Prophylaxis   Medication    apixaban (Eliquis) tab 2.5 mg                Code Status: Full Code  Haque: External urinary catheter in place  Haque Duration (in days):   Central line:    WATSON: 5/22/2025        **Certification      PHYSICIAN Certification of Need for Inpatient Hospitalization - Initial Certification    Patient will require inpatient services that will reasonably be expected to span two midnight's based on the clinical documentation in H+P.   Based on patients current state of illness, I anticipate that, after discharge, patient will require TBD.            MDM High. Time spent on chart/note review, review of labs/imaging, discussion with patient, physical exam, discussion with staff, consultants, coordinating care, writing progress note, discussion of plan of care.      Anatoliy Haque MD         [1]    ipratropium-albuterol  3 mL Nebulization 4 times per day    [START ON 5/21/2025] epoetin rabia  5,000 Units Subcutaneous Once per day on Monday Wednesday Friday    amiodarone  400 mg Oral BID with meals    apixaban  2.5 mg Oral BID    donepezil  10 mg Oral Nightly    latanoprost  1 drop Both Eyes Nightly    metoprolol succinate ER  50 mg Oral Daily    sevelamer carbonate  800 mg Oral TID CC    insulin aspart  1-5 Units Subcutaneous TID CC and HS   [2]   [START ON 5/21/2025] sodium chloride **AND** [START ON 5/21/2025] albumin human    glucose **OR** glucose **OR** glucose-vitamin C **OR** dextrose **OR** glucose **OR** glucose **OR** glucose-vitamin C    acetaminophen    acetaminophen **OR** [DISCONTINUED] HYDROcodone-acetaminophen **OR** [DISCONTINUED] HYDROcodone-acetaminophen    polyethylene glycol (PEG 3350)    sennosides     bisacodyl

## 2025-05-20 NOTE — CONSULTS
[facility]  part of [facility]    Report of Consultation    Balwinder Muniz Patient Status:  Inpatient    1945 MRN [id number]   Location [facility] 3W/SW Attending Anatoliy Haque MD   Hosp Day # 1 PCP Geovanni Garza MD     Date of Admission:  2025  Date of Consult:  2025   Reason for Consultation:   ESRD    History of Present Illness:   Patient is a 79 year old male who was admitted to the hospital for Atrial fibrillation with RVR (HCC):    The patient receives dialysis every  and Friday.  Had issues with atrial fibrillation and RVR after dialysis recurrently.    He sees a cardiologist at [facility] who believes that his history of exposure to Velcade has caused these problems.    Yesterday after dialysis he became tachycardic and lightheaded    Past Medical History  Past Medical History:   Atrial fibrillation (HCC)    Back problem    Calculus of kidney    Cancer (HCC)    multiple myloma    Diabetes (HCC)    Dialysis patient    M,W, F    ESRD (end stage renal disease) (HCC)    Essential hypertension    Hearing impairment    bilateral hearing aids    High blood pressure    Multiple myeloma (HCC)    Muscle weakness    Neuropathy    Slight in feet    Osteoarthritis    Renal disorder    Visual impairment    Glasses       Past Surgical History  Past Surgical History:  Procedure Laterality Date    Colonoscopy      Other surgical history      Other surgical history      stem cell transplant     Other surgical history  2019    removal parathyroid adenoma    Other surgical history  2022    Arthroscopy       Family History  Family History  Problem Relation Age of Onset    Cancer Father         lung cancer     Hypertension Father     Heart Disorder Mother     Depression Mother     Psychiatric Mother     Heart Disorder Maternal Grandfather     Heart Disorder Brother         passed away with heart attack     Hypertension Brother     Hypertension  Brother     Kidney Disease Brother     Kidney Disease Brother     Hypertension Brother        Social History  Social History  Socioeconomic History    Marital status:    Tobacco Use    Smoking status: Never     Passive exposure: Never    Smokeless tobacco: Never   Vaping Use    Vaping status: Never Used   Substance and Sexual Activity    Alcohol use: Not Currently     Alcohol/week: 1.0 standard drink of alcohol     Types: 1 Glasses of wine per week     Comment: social    Drug use: Never     Social Drivers of Health     Food Insecurity: No Food Insecurity (5/19/2025)    NCSS - Food Insecurity     Worried About Running Out of Food in the Last Year: No     Ran Out of Food in the Last Year: No   Transportation Needs: No Transportation Needs (5/19/2025)    NCSS - Transportation     Lack of Transportation: No   Housing Stability: Not At Risk (5/19/2025)    NCSS - Housing/Utilities     Has Housing: Yes     Worried About Losing Housing: No     Unable to Get Utilities: No       Current Medications:  Current Facility-Administered Medications   Medication Dose Route Frequency    ipratropium-albuterol (Duoneb) 0.5-2.5 (3) MG/3ML inhalation solution 3 mL  3 mL Nebulization 4 times per day    amiodarone (Pacerone) tab 400 mg  400 mg Oral BID with meals    dilTIAZem (cardIZEM) 100 mg in sodium chloride 0.9% 100 mL IVPB-ADDV  2.5-20 mg/hr Intravenous Continuous    apixaban (Eliquis) tab 2.5 mg  2.5 mg Oral BID    donepezil (Aricept) tab 10 mg  10 mg Oral Nightly    latanoprost (Xalatan) 0.005 % ophthalmic solution 1 drop  1 drop Both Eyes Nightly    metoprolol succinate ER (Toprol XL) 24 hr tab 50 mg  50 mg Oral Daily    sevelamer carbonate (Renvela) tab 800 mg  800 mg Oral TID CC    glucose (Dex4) 15 GM/59ML oral liquid 15 g  15 g Oral Q15 Min PRN    Or    glucose (Glutose) 40% oral gel 15 g  15 g Oral Q15 Min PRN    Or    glucose-vitamin C (Dex-4) chewable tab 4 tablet  4 tablet Oral Q15 Min PRN    Or    dextrose 50%  injection 50 mL  50 mL Intravenous Q15 Min PRN    Or    glucose (Dex4) 15 GM/59ML oral liquid 30 g  30 g Oral Q15 Min PRN    Or    glucose (Glutose) 40% oral gel 30 g  30 g Oral Q15 Min PRN    Or    glucose-vitamin C (Dex-4) chewable tab 8 tablet  8 tablet Oral Q15 Min PRN    acetaminophen (Tylenol Extra Strength) tab 500 mg  500 mg Oral Q4H PRN    acetaminophen (Tylenol) tab 650 mg  650 mg Oral Q4H PRN    Or    HYDROcodone-acetaminophen (Norco) 5-325 MG per tab 1 tablet  1 tablet Oral Q4H PRN    Or    HYDROcodone-acetaminophen (Norco) 5-325 MG per tab 2 tablet  2 tablet Oral Q4H PRN    polyethylene glycol (PEG 3350) (Miralax) 17 g oral packet 17 g  17 g Oral Daily PRN    sennosides (Senokot) tab 17.2 mg  17.2 mg Oral Nightly PRN    bisacodyl (Dulcolax) 10 MG rectal suppository 10 mg  10 mg Rectal Daily PRN    insulin aspart (NovoLOG) 100 Units/mL FlexPen 1-5 Units  1-5 Units Subcutaneous TID CC and HS     Medications Prior to Admission   Medication Sig    acetaminophen 325 MG Oral Tab Take 1-2 tablets (325-650 mg total) by mouth every 6 (six) hours as needed.    Multiple Vitamins-Minerals (OCUVITE ADULT 50+) Oral Cap Take 1 tablet by mouth daily.    mometasone furoate 50 MCG/ACT Nasal Suspension 1 spray by Nasal route daily as needed.    Loratadine 5 MG Oral Chew Tab Chew 1 tablet (5 mg total) by mouth every other day.    sevelamer carbonate 800 MG Oral Tab Take 1 tablet (800 mg total) by mouth 3 (three) times daily with meals.    metoprolol succinate ER 50 MG Oral Tablet 24 Hr Take 1 tablet (50 mg total) by mouth daily.    donepezil 10 MG Oral Tab Take 1 tablet (10 mg total) by mouth nightly.    insulin glargine (BASAGLAR KWIKPEN) 100 UNIT/ML Subcutaneous Solution Pen-injector Take Basaglar 10 units at night; On Night of chemo patient should take 22 of Basaglar; On the next 2 days,  he should take 20 units; on day 4, he should take 15 units on day 4 and then back down to usual 10 units from the 5 day onwards.     apixaban (ELIQUIS) 2.5 MG Oral Tab Take 1 tablet (2.5 mg total) by mouth 2 (two) times daily    Amino Acids (LIQUACEL) Oral Liquid Take 30 mL by mouth daily.    latanoprost 0.005 % Ophthalmic Solution Place 1 drop into both eyes nightly.    Cholecalciferol (VITAMIN D) 25 MCG (1000 UT) Oral Tab Take 2 tablets by mouth in the morning.    FOLBIC 2.5-25-2 MG Oral Tab Take 1 tablet by mouth daily.    acyclovir (ZOVIRAX) 400 MG Oral Tab Take 1 tablet (400 mg total) by mouth daily.    Insulin Pen Needle (BD PEN NEEDLE MICRO U/F) 32G X 6 MM Does not apply Misc Use with insulin daily         Review of Systems:     General: weak           A comprehensive 12 point review of systems was completed.  Pertinent positives as above and all the rest were negative.     Physical Exam:   /74 (BP Location: Right arm)   Pulse 90   Temp 98.8 °F (37.1 °C) (Oral)   Resp 20   Ht 5' 7\" (1.702 m)   Wt 184 lb (83.5 kg)   SpO2 100%   BMI 28.82 kg/m²      Intake/Output Summary (Last 24 hours) at 5/20/2025 1152  Last data filed at 5/20/2025 1100  Gross per 24 hour   Intake 464.34 ml   Output 0 ml   Net 464.34 ml     Wt Readings from Last 1 Encounters:   05/20/25 184 lb (83.5 kg)       Exam  Gen: No acute distress  Heent: NC AT, mucous memb clear, neck supple  Pulm: Lungs clear, normal respiratory effort  CV: Heart with regular rate and rhythm, no edema  Abd: Abdomen soft, nontender, nondistended, no organomegaly, bowel sounds present  Skin: no symptoms reported  Psych: alert and oriented        Results:     Laboratory Data:  Recent Labs   Lab 05/19/25  1629   RBC 3.62*   HGB 8.6*   HCT 28.7*   MCV 79.3*   MCH 23.8*   MCHC 30.0*   RDW 17.4*   NEPRELIM 5.53   WBC 6.3   .0*         Recent Labs   Lab 05/19/25  1629   *   BUN 30*   CREATSERUM 4.97*   CA 8.9      K 3.2*   CL 92*   CO2 34.0*        Imaging:  XR CHEST AP PORTABLE  (CPT=71045)  Result Date: 5/20/2025  CONCLUSION:   Hazy bibasilar opacities are new since  comparison chest radiograph from March, 2025. Primary differential considerations include pulmonary edema or multifocal pneumonia/aspiration.  Radiographic follow-up to resolution is advised.   Questionable small left pleural effusion.    A preliminary report was issued by the [facility] teleradiology service. There are no major discrepancies.  [facility]  Dictated by (CST): Eriberto Maddox MD on 5/20/2025 at 8:23 AM     Finalized by (CST): Eriberto Maddox MD on 5/20/2025 at 8:26 AM                    Impression/Receommendations:     1 -ESRD  Plan dialysis tomorrow on his regular day.  Will have to see how he does    2 - afib  Patient was started on amiodarone.  He will continue Eliquis    3 - multiple myeloma  He receives treatment from [facility]    Discussed with the patient's daughter and wife at the bedside    Thank you for allowing me to participate in the care of your patient.    VIVIANA MATTA MD  5/20/2025

## 2025-05-20 NOTE — PROGRESS NOTES
Patient received alert to self, patient knows he is in the hospital. He is desaturating on room air into 70s(noted when patient removed nasal canula several times; tape applied to canula to keep it in place).     Patient has productive cough. He reported shortness of breath (expiratory wheezes noted) and received a Neb treatment. Following treatment he reported feeling better.     Family to room, requesting direct updates from MD. Family is refusing [place] medication on patient's behalf. Communicated to Dr Haque.     ECHO completed.     Plan is for HD tomorrow.

## 2025-05-20 NOTE — ED PROVIDER NOTES
Patient Seen in: [facility] Emergency Department    History     Chief Complaint   Patient presents with    Difficulty Breathing       HPI    Patient presents to the ED with his wife for severe weakness.  She states he did not sleep last night due to be congested and after dialysis today he was completely wiped out.  Slightly confused and weak to the point where he could barely walk.  She brought him to the ER she could not care for him in this state.    History reviewed. Past Medical History[1]    History reviewed. Past Surgical History[2]      Medications :  Prescriptions Prior to Admission[3]     Family History[4]    Smoking Status: Social Hx on file[5]    Constitutional and vital signs reviewed.      Social History and Family History elements reviewed from today, pertinent positives to the presenting problem noted.    Physical Exam     ED Triage Vitals [05/19/25 1613]   BP (!) 147/109   Pulse (!) 133   Resp 25   Temp 99.8 °F (37.7 °C)   Temp src Temporal   SpO2 96 %   O2 Device Nasal cannula       All measures to prevent infection transmission during my interaction with the patient were taken. Handwashing was performed prior to and after the exam.  Stethoscope and any equipment used during my examination was cleaned with super sani-cloth germicidal wipes following the exam.     Physical Exam  Vitals and nursing note reviewed.   Constitutional:       Appearance: He is obese. He is not ill-appearing or toxic-appearing.      Comments: Appears somnolent and weak   HENT:      Head: Normocephalic and atraumatic.   Eyes:      General:         Right eye: No discharge.         Left eye: No discharge.      Conjunctiva/sclera: Conjunctivae normal.   Neck:      Trachea: No tracheal deviation.   Cardiovascular:      Rate and Rhythm: Tachycardia present. Rhythm irregular.   Pulmonary:      Effort: Pulmonary effort is normal. No respiratory distress.      Breath sounds: No stridor.   Abdominal:      General: There is no  distension.      Palpations: Abdomen is soft.   Musculoskeletal:         General: No deformity.   Skin:     General: Skin is warm.   Neurological:      Mental Status: He is oriented to person, place, and time.   Psychiatric:         Mood and Affect: Mood normal.         Behavior: Behavior normal.         ED Course        Labs Reviewed   BASIC METABOLIC PANEL (8) - Abnormal; Notable for the following components:       Result Value    Glucose 119 (*)     Potassium 3.2 (*)     Chloride 92 (*)     CO2 34.0 (*)     BUN 30 (*)     Creatinine 4.97 (*)     BUN/CREA Ratio 6.0 (*)     eGFR-Cr 11 (*)     All other components within normal limits   CBC WITH DIFFERENTIAL WITH PLATELET - Abnormal; Notable for the following components:    RBC 3.62 (*)     HGB 8.6 (*)     HCT 28.7 (*)     MCV 79.3 (*)     MCH 23.8 (*)     MCHC 30.0 (*)     RDW-SD 50.4 (*)     RDW 17.4 (*)     .0 (*)     Lymphocyte Absolute 0.20 (*)     All other components within normal limits   HEPATIC FUNCTION PANEL (7) - Abnormal; Notable for the following components:    ALT <7 (*)     All other components within normal limits   POCT GLUCOSE - Abnormal; Notable for the following components:    POC Glucose  126 (*)     All other components within normal limits   POCT GLUCOSE - Abnormal; Notable for the following components:    POC Glucose  128 (*)     All other components within normal limits   RAINBOW DRAW LAVENDER   RAINBOW DRAW LIGHT GREEN   RAINBOW DRAW BLUE     EKG    Rate, intervals and axes as noted on EKG Report.  Rate: Tachycardic, 140 bpm  Rhythm: Atrial Fibrillation  Reading: A-fib with RVR, LVH, nonspecific ST abnormality, abnormal EKG    EKG #2   Rate, axes and intervals as noted on EKG report.   Rate: Normal, 97 bpm   Rhythm: Sinus rhythm   Reading: LVH, prolonged QT, abnormal EKG           As Interpreted by me    Imaging Results Available and Reviewed while in ED: No results found.  ED Medications Administered:   Medications   amiodarone  (Pacerone) tab 400 mg (400 mg Oral Given 5/19/25 2110)   dilTIAZem (cardIZEM) 100 mg in sodium chloride 0.9% 100 mL IVPB-ADDV (5 mg/hr Intravenous New Bag 5/19/25 2111)   apixaban (Eliquis) tab 2.5 mg (2.5 mg Oral Given 5/19/25 2110)   donepezil (Aricept) tab 10 mg (10 mg Oral Given 5/19/25 2110)   latanoprost (Xalatan) 0.005 % ophthalmic solution 1 drop (1 drop Both Eyes Given 5/19/25 2110)   metoprolol succinate ER (Toprol XL) 24 hr tab 50 mg (50 mg Oral Not Given 5/19/25 2130)   sevelamer carbonate (Renvela) tab 800 mg (has no administration in time range)   glucose (Dex4) 15 GM/59ML oral liquid 15 g (has no administration in time range)     Or   glucose (Glutose) 40% oral gel 15 g (has no administration in time range)     Or   glucose-vitamin C (Dex-4) chewable tab 4 tablet (has no administration in time range)     Or   dextrose 50% injection 50 mL (has no administration in time range)     Or   glucose (Dex4) 15 GM/59ML oral liquid 30 g (has no administration in time range)     Or   glucose (Glutose) 40% oral gel 30 g (has no administration in time range)     Or   glucose-vitamin C (Dex-4) chewable tab 8 tablet (has no administration in time range)   acetaminophen (Tylenol Extra Strength) tab 500 mg (has no administration in time range)   acetaminophen (Tylenol) tab 650 mg (has no administration in time range)     Or   HYDROcodone-acetaminophen (Norco) 5-325 MG per tab 1 tablet (has no administration in time range)     Or   HYDROcodone-acetaminophen (Norco) 5-325 MG per tab 2 tablet (has no administration in time range)   polyethylene glycol (PEG 3350) (Miralax) 17 g oral packet 17 g (has no administration in time range)   sennosides (Senokot) tab 17.2 mg (has no administration in time range)   bisacodyl (Dulcolax) 10 MG rectal suppository 10 mg (has no administration in time range)   insulin aspart (NovoLOG) 100 Units/mL FlexPen 1-5 Units ( Subcutaneous Not Given 5/19/25 2100)   dilTIAZem (cardIZEM) 25 mg/5mL  injection 10 mg (10 mg Intravenous Given 5/19/25 1642)   metoprolol tartrate (Lopressor) tab 50 mg (50 mg Oral Given 5/19/25 1726)         MDM     Vitals:    05/19/25 1800 05/19/25 1815 05/19/25 1847 05/19/25 1955   BP: 114/69  134/72 148/83   BP Location:   Right arm Right arm   Pulse: 93 89 94 96   Resp: 23 21 20 20   Temp:   99 °F (37.2 °C) 98.6 °F (37 °C)   TempSrc:   Oral Oral   SpO2: 96% 97% 94% 95%   Weight:    84.6 kg   Height:    170.2 cm (5' 7\")     *I personally reviewed and interpreted all ED vitals.    Pulse Ox: 95%, Room air, Normal     Monitor Interpretation:   normal sinus rhythm, atrial fibrillation, with ventricular rate of 140 as interpreted by me.  The cardiac monitor was ordered to monitor heart rate.    Differential Diagnosis/ Diagnostic Considerations: Generalized weakness, anemia, A-fib with RVR, deconditioning, Alzheimer's, other    Complicating Factors: The patient already has does not have any pertinent problems on file. to contribute to the complexity of this ED evaluation.    Medical Decision Making  Patient presents to the ED with severe weakness and slight confusion per wife.  Found to be in A-fib with RVR on arrival.  Workup without other concerning findings.  Patient was given diltiazem in the ED and eventually converted to normal sinus rhythm.  Will admit for ongoing workup given his severe weakness.  Discussed with Dr. Johnson for cardiology consult, Dr. Bishop for nephrology consultation and Dr. Mclean for admission.    Problems Addressed:  Atrial fibrillation with RVR (HCC): chronic illness or injury with exacerbation, progression, or side effects of treatment  Weakness generalized: acute illness or injury that poses a threat to life or bodily functions    Amount and/or Complexity of Data Reviewed  Independent Historian: spouse     Details: Wife Provides history details  Labs: ordered. Decision-making details documented in ED Course.  ECG/medicine tests: ordered and independent  interpretation performed. Decision-making details documented in ED Course.  Discussion of management or test interpretation with external provider(s): Discussed with Dr. Johnson for cardiology consult, Dr. Bishop for nephrology consultation and Dr. Mclean for admission.        Condition upon leaving the department: Stable    Disposition and Plan     Clinical Impression:  1. Atrial fibrillation with RVR (HCC)    2. Weakness generalized        Disposition:  Admit    Follow-up:  No follow-up provider specified.    Medications Prescribed:  Current Discharge Medication List          Hospital Problems       Present on Admission  Date Reviewed: 5/16/2025          ICD-10-CM Noted POA    * (Principal) Atrial fibrillation with RVR (HCC) I48.91 3/8/2025 Unknown    Weakness generalized R53.1 5/19/2025 Unknown                       [1]   Past Medical History:   Atrial fibrillation (HCC)    Back problem    Calculus of kidney    Cancer (HCC)    multiple myloma    Diabetes (HCC)    Dialysis patient    M,W, F    ESRD (end stage renal disease) (HCC)    Essential hypertension    Hearing impairment    bilateral hearing aids    High blood pressure    Multiple myeloma (HCC)    Muscle weakness    Neuropathy    Slight in feet    Osteoarthritis    Renal disorder    Visual impairment    Glasses   [2]   Past Surgical History:  Procedure Laterality Date    Colonoscopy  2004    Other surgical history  1994    Other surgical history  2014    stem cell transplant     Other surgical history  11/12/2019    removal parathyroid adenoma    Other surgical history  08/18/2022    Arthroscopy   [3]   Medications Prior to Admission   Medication Sig Dispense Refill Last Dose/Taking    acetaminophen 325 MG Oral Tab Take 1-2 tablets (325-650 mg total) by mouth every 6 (six) hours as needed.   Past Month    Multiple Vitamins-Minerals (OCUVITE ADULT 50+) Oral Cap Take 1 tablet by mouth daily.   5/19/2025 at  9:30 AM    mometasone furoate 50 MCG/ACT Nasal  Suspension 1 spray by Nasal route daily as needed.   Past Week    Loratadine 5 MG Oral Chew Tab Chew 1 tablet (5 mg total) by mouth every other day.   5/18/2025 Evening    sevelamer carbonate 800 MG Oral Tab Take 1 tablet (800 mg total) by mouth 3 (three) times daily with meals.   5/19/2025 at  9:30 AM    metoprolol succinate ER 50 MG Oral Tablet 24 Hr Take 1 tablet (50 mg total) by mouth daily.   5/18/2025 at  9:30 PM    donepezil 10 MG Oral Tab Take 1 tablet (10 mg total) by mouth nightly. 90 tablet 3 5/18/2025 at  9:30 PM    insulin glargine (BASAGLAR KWIKPEN) 100 UNIT/ML Subcutaneous Solution Pen-injector Take Basaglar 10 units at night; On Night of chemo patient should take 22 of Basaglar; On the next 2 days,  he should take 20 units; on day 4, he should take 15 units on day 4 and then back down to usual 10 units from the 5 day onwards. 24 mL 3 5/18/2025 at 10:00 PM    apixaban (ELIQUIS) 2.5 MG Oral Tab Take 1 tablet (2.5 mg total) by mouth 2 (two) times daily 60 tablet 11 5/19/2025 at  9:30 AM    Amino Acids (LIQUACEL) Oral Liquid Take 30 mL by mouth daily.   5/19/2025 at  9:30 AM    latanoprost 0.005 % Ophthalmic Solution Place 1 drop into both eyes nightly.   5/18/2025 at  9:30 PM    Cholecalciferol (VITAMIN D) 25 MCG (1000 UT) Oral Tab Take 2 tablets by mouth in the morning.   5/19/2025 at  9:30 AM    FOLBIC 2.5-25-2 MG Oral Tab Take 1 tablet by mouth daily.  10 5/19/2025 at  9:30 AM    acyclovir (ZOVIRAX) 400 MG Oral Tab Take 1 tablet (400 mg total) by mouth daily.  11 5/19/2025 at  9:30 AM    Insulin Pen Needle (BD PEN NEEDLE MICRO U/F) 32G X 6 MM Does not apply Misc Use with insulin daily 100 each 3    [4]   Family History  Problem Relation Age of Onset    Cancer Father         lung cancer     Hypertension Father     Heart Disorder Mother     Depression Mother     Psychiatric Mother     Heart Disorder Maternal Grandfather     Heart Disorder Brother         passed away with heart attack     Hypertension  Brother     Hypertension Brother     Kidney Disease Brother     Kidney Disease Brother     Hypertension Brother    [5]   Social History  Socioeconomic History    Marital status:    Tobacco Use    Smoking status: Never     Passive exposure: Never    Smokeless tobacco: Never   Vaping Use    Vaping status: Never Used   Substance and Sexual Activity    Alcohol use: Not Currently     Alcohol/week: 1.0 standard drink of alcohol     Types: 1 Glasses of wine per week     Comment: social    Drug use: Never

## 2025-05-20 NOTE — PLAN OF CARE
Problem: Patient Centered Care  Goal: Patient preferences are identified and integrated in the patient's plan of care  Description: Interventions:  - What would you like us to know as we care for you? - Provide timely, complete, and accurate information to patient/family  - Incorporate patient and family knowledge, values, beliefs, and cultural backgrounds into the planning and delivery of care  - Encourage patient/family to participate in care and decision-making at the level they choose  - Honor patient and family perspectives and choices  Outcome: Progressing     Problem: CARDIOVASCULAR - ADULT  Goal: Maintains optimal cardiac output and hemodynamic stability  Description: INTERVENTIONS:  - Monitor vital signs, rhythm, and trends  - Monitor for bleeding, hypotension and signs of decreased cardiac output  - Evaluate effectiveness of vasoactive medications to optimize hemodynamic stability  - Monitor arterial and/or venous puncture sites for bleeding and/or hematoma  - Assess quality of pulses, skin color and temperature  - Assess for signs of decreased coronary artery perfusion - ex. Angina  - Evaluate fluid balance, assess for edema, trend weights  Outcome: Progressing  Goal: Absence of cardiac arrhythmias or at baseline  Description: INTERVENTIONS:  - Continuous cardiac monitoring, monitor vital signs, obtain 12 lead EKG if indicated  - Evaluate effectiveness of antiarrhythmic and heart rate control medications as ordered  - Initiate emergency measures for life threatening arrhythmias  - Monitor electrolytes and administer replacement therapy as ordered  Outcome: Progressing     Problem: RESPIRATORY - ADULT  Goal: Achieves optimal ventilation and oxygenation  Description: INTERVENTIONS:  - Assess for changes in respiratory status  - Assess for changes in mentation and behavior  - Position to facilitate oxygenation and minimize respiratory effort  - Oxygen supplementation based on oxygen saturation or ABGs  -  Provide Smoking Cessation handout, if applicable  - Encourage broncho-pulmonary hygiene including cough, deep breathe, Incentive Spirometry  - Assess the need for suctioning and perform as needed  - Assess and instruct to report SOB or any respiratory difficulty  - Respiratory Therapy support as indicated  - Manage/alleviate anxiety  - Monitor for signs/symptoms of CO2 retention  Outcome: Progressing     Problem: SAFETY ADULT - FALL  Goal: Free from fall injury  Description: INTERVENTIONS:  - Assess pt frequently for physical needs  - Identify cognitive and physical deficits and behaviors that affect risk of falls.  - [place] fall precautions as indicated by assessment.  - Educate pt/family on patient safety including physical limitations  - Instruct pt to call for assistance with activity based on assessment  - Modify environment to reduce risk of injury  - Provide assistive devices as appropriate  - Consider OT/PT consult to assist with strengthening/mobility  - Encourage toileting schedule  Outcome: Progressing     Problem: DISCHARGE PLANNING  Goal: Discharge to home or other facility with appropriate resources  Description: INTERVENTIONS:  - Identify barriers to discharge w/pt and caregiver  - Include patient/family/discharge partner in discharge planning  - Arrange for needed discharge resources and transportation as appropriate  - Identify discharge learning needs (meds, wound care, etc)  - Arrange for interpreters to assist at discharge as needed  - Consider post-discharge preferences of patient/family/discharge partner  - Complete POLST form as appropriate  - Assess patient's ability to be responsible for managing their own health  - Refer to Case Management Department for coordinating discharge planning if the patient needs post-hospital services based on physician/LIP order or complex needs related to functional status, cognitive ability or social support system  Outcome: Progressing

## 2025-05-20 NOTE — TELEPHONE ENCOUNTER
We can let wife Noemi know that I saw that.  I am sorry to hear he is in the hospital.  The labs that I did the other day came out acceptable.

## 2025-05-20 NOTE — PROGRESS NOTES
Progress Note  Balwinder Muniz Patient Status:  Observation    1945 MRN [id number]   Location [facility] 3W/SW Attending Anatoliy Haque MD   Hosp Day # 0 PCP Geovanni Garza MD     Attending Cardiologist: Jayson     SUBJECTIVE:    Review of systems is limited with patient's underlying dementia.  He can tell me his name and where he is but does not remember the year or why he is in the hospital.  Denies chest pain or dyspnea.    VITALS:  /74 (BP Location: Right arm)   Pulse 90   Temp 98.8 °F (37.1 °C) (Oral)   Resp 20   Ht 5' 7\" (1.702 m)   Wt 184 lb (83.5 kg)   SpO2 100%   BMI 28.82 kg/m²   INTAKE/OUTPUT:    Intake/Output Summary (Last 24 hours) at 2025 0910  Last data filed at 2025 0600  Gross per 24 hour   Intake 104.34 ml   Output 0 ml   Net 104.34 ml     Last 3 Weights   25 0500 184 lb (83.5 kg)   25 1955 186 lb 9.6 oz (84.6 kg)   25 1613 183 lb (83 kg)   25 1136 188 lb (85.3 kg)   25 1246 190 lb (86.2 kg)     LABS:  Recent Labs   Lab 25  1629   *   BUN 30*   CREATSERUM 4.97*   EGFRCR 11*   CA 8.9      K 3.2*   CL 92*   CO2 34.0*     Recent Labs   Lab 25  1629   RBC 3.62*   HGB 8.6*   HCT 28.7*   MCV 79.3*   MCH 23.8*   MCHC 30.0*   RDW 17.4*   NEPRELIM 5.53   WBC 6.3   .0*     No results for input(s): \"TROP\", \"CK\" in the last 168 hours.  DIAGNOSTICS:  TELEMETRY: ST    ROS: Negative unless noted above   PHYSICAL EXAM:  General: Alert and oriented x 2. No apparent distress.  HEENT: Normocephalic, sclera are nonicteric. Hearing appropriate bilaterally.  Neck: No JVD or Carotid bruits. Trachea midline.   Cardiac: Regular rate and rhythm. S1, S2 auscultated. No murmurs, rubs, or gallops appreciated.   Lungs: +bi-basilar crackles. +inspiratory wheezes and anterior rhonci.  Chest expansion symmetrical. Regular effort. 4L HF NC   Abdomen: Soft, non-tender, +BS. No hepatosplenomegaly or appreciable masses.   Extremities:  Without clubbing, cyanosis. Peripheral pulses are 2+. Edema none   Neurologic: Motor and sensory nerves grossly intact.   Psych: Appropriate affect   Skin: Warm and dry. No obvious lesions, wounds, or ulcerations. '    MEDICATIONS:  Scheduled Medications[1]  Medication Infusions[2]    ASSESSMENT:    Presented after feeling lightheaded during dialysis. He was found to be in Afib. He has a history of PAF.  While in the emergency department he spontaneously converted to sinus rhythm, and he was subsequently started on an oral amiodarone load to hold sinus rhythm    PAF   - TSH is normal  - Echo pending  - Maintaining sinus rhythm with amiodarone and rate controlled on metoprolol   - Anticoagulation with Eliquis 2.5 mg twice daily due to end-stage renal disease on dialysis    Acute hypoxic respiratory failure failure  - Normally on room air at home currently requiring high flow nasal cannula fluctuating between 4 and 10 L on exam he does appear volume expanded, also having substantial aspiratory wheezing.  Chest x-ray positive for pulmonary edema and possible aspiration pneumonia  -Being worked up by primary    HTN   - Reasonably controlled in the setting of acute illness    ESRD on HD   - Per nephrology  - Spending 20    Type II DM- A1c 6.4%   Multiple Myeloma w/ Chemo   Dementia      PLAN:  - Nephrology consult pending for volume management  - Continue loading with amiodarone 400 mg p.o. twice daily until May 26, then on May 27 we will start daily dosing of 200 mg p.o.  - Further recommendations pending echo results  - BMP and Mag pending; electrolyte replacement per nephrology recommendations appreciated     Plan of care discussed with patient and RN.     Ryann Ward, MSN, FNP-BC, CCK  05/20/25   9:10 AM        =======================================================  Note reviewed and labs reviewed.  Agree with above assessment and plan.  In sinus rhythm, continue maintenance of SR with amiodarone. Continue  AC with apixaban.   In regards to acute hypoxic respiratory failure, pt does appear volume overloaded. Appreciate nephrology management with underlying ESRD on HD. Will obtain TTE for further evaluation.   I have personally performed the medical decision making in its entirety.   Nicolás Rothman DO             [1]    ipratropium-albuterol  3 mL Nebulization 4 times per day    amiodarone  400 mg Oral BID with meals    apixaban  2.5 mg Oral BID    donepezil  10 mg Oral Nightly    latanoprost  1 drop Both Eyes Nightly    metoprolol succinate ER  50 mg Oral Daily    sevelamer carbonate  800 mg Oral TID CC    insulin aspart  1-5 Units Subcutaneous TID CC and HS   [2]    dilTIAZem Stopped (05/20/25 0252)

## 2025-05-21 ENCOUNTER — APPOINTMENT (OUTPATIENT)
Dept: GENERAL RADIOLOGY | Facility: HOSPITAL | Age: 80
End: 2025-05-21
Attending: HOSPITALIST
Payer: MEDICARE

## 2025-05-21 LAB
ALBUMIN SERPL-MCNC: 4 G/DL (ref 3.2–4.8)
ANION GAP SERPL CALC-SCNC: 13 MMOL/L (ref 0–18)
BASOPHILS # BLD AUTO: 0.02 X10(3) UL (ref 0–0.2)
BASOPHILS NFR BLD AUTO: 0.6 %
BUN BLD-MCNC: 62 MG/DL (ref 9–23)
BUN/CREAT SERPL: 6.5 (ref 10–20)
CALCIUM BLD-MCNC: 8.3 MG/DL (ref 8.7–10.4)
CHLORIDE SERPL-SCNC: 89 MMOL/L (ref 98–112)
CO2 SERPL-SCNC: 32 MMOL/L (ref 21–32)
CREAT BLD-MCNC: 9.6 MG/DL (ref 0.7–1.3)
DEPRECATED RDW RBC AUTO: 48.9 FL (ref 35.1–46.3)
EGFRCR SERPLBLD CKD-EPI 2021: 5 ML/MIN/1.73M2 (ref 60–?)
EOSINOPHIL # BLD AUTO: 0.04 X10(3) UL (ref 0–0.7)
EOSINOPHIL NFR BLD AUTO: 1.3 %
ERYTHROCYTE [DISTWIDTH] IN BLOOD BY AUTOMATED COUNT: 17.1 % (ref 11–15)
GLUCOSE BLD-MCNC: 115 MG/DL (ref 70–99)
GLUCOSE BLDC GLUCOMTR-MCNC: 130 MG/DL (ref 70–99)
GLUCOSE BLDC GLUCOMTR-MCNC: 160 MG/DL (ref 70–99)
GLUCOSE BLDC GLUCOMTR-MCNC: 98 MG/DL (ref 70–99)
HAV IGM SER QL: NONREACTIVE
HBV CORE IGM SER QL: NONREACTIVE
HBV SURFACE AG SERPL QL IA: NONREACTIVE
HCT VFR BLD AUTO: 26.6 % (ref 39–53)
HCV AB SERPL QL IA: NONREACTIVE
HGB BLD-MCNC: 8 G/DL (ref 13–17.5)
IMM GRANULOCYTES # BLD AUTO: 0.01 X10(3) UL (ref 0–1)
IMM GRANULOCYTES NFR BLD: 0.3 %
LYMPHOCYTES # BLD AUTO: 0.15 X10(3) UL (ref 1–4)
LYMPHOCYTES NFR BLD AUTO: 4.7 %
MCH RBC QN AUTO: 23.5 PG (ref 26–34)
MCHC RBC AUTO-ENTMCNC: 30.1 G/DL (ref 31–37)
MCV RBC AUTO: 78.2 FL (ref 80–100)
MONOCYTES # BLD AUTO: 0.41 X10(3) UL (ref 0.1–1)
MONOCYTES NFR BLD AUTO: 12.9 %
NEUTROPHILS # BLD AUTO: 2.56 X10 (3) UL (ref 1.5–7.7)
NEUTROPHILS # BLD AUTO: 2.56 X10(3) UL (ref 1.5–7.7)
NEUTROPHILS NFR BLD AUTO: 80.2 %
OSMOLALITY SERPL CALC.SUM OF ELEC: 297 MOSM/KG (ref 275–295)
PHOSPHATE SERPL-MCNC: 7.8 MG/DL (ref 2.4–5.1)
PLATELET # BLD AUTO: 93 10(3)UL (ref 150–450)
PLATELETS.RETICULATED NFR BLD AUTO: 2.9 % (ref 0–7)
POTASSIUM SERPL-SCNC: 3.8 MMOL/L (ref 3.5–5.1)
RBC # BLD AUTO: 3.4 X10(6)UL (ref 3.8–5.8)
SODIUM SERPL-SCNC: 134 MMOL/L (ref 136–145)
WBC # BLD AUTO: 3.2 X10(3) UL (ref 4–11)

## 2025-05-21 PROCEDURE — 71045 X-RAY EXAM CHEST 1 VIEW: CPT | Performed by: HOSPITALIST

## 2025-05-21 PROCEDURE — 99233 SBSQ HOSP IP/OBS HIGH 50: CPT | Performed by: INTERNAL MEDICINE

## 2025-05-21 PROCEDURE — 99233 SBSQ HOSP IP/OBS HIGH 50: CPT | Performed by: HOSPITALIST

## 2025-05-21 PROCEDURE — 5A1D70Z PERFORMANCE OF URINARY FILTRATION, INTERMITTENT, LESS THAN 6 HOURS PER DAY: ICD-10-PCS | Performed by: INTERNAL MEDICINE

## 2025-05-21 RX ORDER — ALBUMIN (HUMAN) 12.5 G/50ML
25 SOLUTION INTRAVENOUS
Status: ACTIVE | OUTPATIENT
Start: 2025-05-23 | End: 2025-05-24

## 2025-05-21 NOTE — PHYSICAL THERAPY NOTE
Attempted to see patient for PT evaluation this morning. Patient currently off the unit for dialysis. Will follow up at later date/time as appropriate and able.

## 2025-05-21 NOTE — PROGRESS NOTES
[facility]  part of [facility]    Progress Note    Balwinder Muniz Patient Status:  Inpatient    1945 MRN [id number]   Location [facility] 3W/SW Attending Al Grijalva MD   Hosp Day # 2 PCP Geovanni Garza MD       Subjective:     Pt denies SOB.  Denies CP or palpitations.  Pt has had productive cough.  Answered families questions.  Awaiting PT eval.    Objective:   Blood pressure 106/66, pulse 108, temperature 98 °F (36.7 °C), temperature source Oral, resp. rate 20, height 67\", weight 184 lb (83.5 kg), SpO2 94%.    Gen:   NAD.  Awake and alert.  CV:   RRR, no m/g/r  Pulm:   CTA bilat  Abd:   +bs, soft, NT, ND  LE:   No c/c/e  Neuro:   nonfocal    Results:     Lab Results   Component Value Date    WBC 3.2 (L) 2025    HGB 8.0 (L) 2025    HCT 26.6 (L) 2025    PLT 93.0 (L) 2025    CREATSERUM 9.60 (H) 2025    BUN 62 (H) 2025     (L) 2025    K 3.8 2025    CL 89 (L) 2025    CO2 32.0 2025     (H) 2025    CA 8.3 (L) 2025    ALB 4.0 2025    ALKPHO 56 2025    BILT 0.6 2025    TP 6.5 2025    AST <8 2025    ALT <7 (L) 2025    INR 1.0 2018    TSH 2.783 2025    PSA 4.80 (H) 2022    MG 2.2 2025    PHOS 7.8 (H) 2025    B12 1,773 (H) 2023       XR CHEST AP PORTABLE  (CPT=71045)  Result Date: 2025  CONCLUSION:  1. Expiratory chest with findings most consistent with moderate CHF/fluid overload including stable pulmonary edema, a new small right pleural effusion and a stable small left pleural effusion. 2. Worsening mild consolidation at the right lung base, which may relate to passive atelectasis although pneumonitis including aspiration pneumonitis cannot be excluded.     Dictated by (CST): Marc Aleman MD on 2025 at 7:32 AM     Finalized by (CST): Marc Aleman MD on 2025 at 7:35 AM          XR CHEST AP PORTABLE   (CPT=71045)  Result Date: 5/20/2025  CONCLUSION:   Hazy bibasilar opacities are new since comparison chest radiograph from March, 2025. Primary differential considerations include pulmonary edema or multifocal pneumonia/aspiration.  Radiographic follow-up to resolution is advised.   Questionable small left pleural effusion.    A preliminary report was issued by the [facility] teleradiology service. There are no major discrepancies.  elm-remote  Dictated by (CST): Eriberto Maddox MD on 5/20/2025 at 8:23 AM     Finalized by (CST): Eriberto Maddox MD on 5/20/2025 at 8:26 AM                Assessment and Plan:     Atrial fibrillation with RVR   - back in sinus rhythm on amiodarone and metoprolol  - Continue chronic anticoagulation with Eliquis  - Telemetry monitoring  - Cardiology on consult, appreciate further recs     Acute hypoxic respiratory failure  Currently on 4L o2  Normally on room air.  Possible aspiration pneumonia vs pulmonary edema  - CXR reviewed - edema vs pna vs both  - cont abx for possible pneumonia  - ST eval  - fluid removal with HD  - incentive spirometry  - mobilize, PT/OT  - wean O2     ESRD on HD  - Nephrology consulted, appreciate recommendations for further HD.     Generalized Weakness  - Unclear etiology  - Likely related to HD and A-fib and PNA and deconditioning as above  - Treating underlying conditions  - PT/OT     DM2  - ISS     MM  - monitor counts  - pt's oncologist to d/w his cardiologist regarding continuing Velcade     Dementia  - monitor mental status    dvt proph:     Eliquis    Code status:    Full       MDM:    High Al Garcia MD  5/21/2025

## 2025-05-21 NOTE — PROGRESS NOTES
[facility]  part of [facility]    Progress Note    Balwinder Muniz Patient Status:  Inpatient    1945 MRN [id number]   Location [facility] 3W/SW Attending Al Grijalva MD   Hosp Day # 2 PCP Geovanni Garza MD       Subjective:   Balwinder Muniz is a(n) 79 year old male who I am seeing for ESRD    Seen on dialysis      Objective:   /66 (BP Location: Right arm)   Pulse 108   Temp 98.4 °F (36.9 °C) (Oral)   Resp 18   Ht 5' 7\" (1.702 m)   Wt 184 lb (83.5 kg)   SpO2 97%   BMI 28.82 kg/m²      Intake/Output Summary (Last 24 hours) at 2025 1123  Last data filed at 2025 0324  Gross per 24 hour   Intake 460 ml   Output 150 ml   Net 310 ml     Wt Readings from Last 1 Encounters:   25 184 lb (83.5 kg)       Exam  Gen: No acute distress, Heent: NC AT, mucous memb clear, neck supple  Pulm: Lungs clear, normal respiratory effort  CV: Heart with regular rate and rhythm, no edema  Abd: Abdomen soft, nontender, nondistended, no organomegaly, bowel sounds quiet  Skin: no symptoms reported  Psych: alert and oriented  Left upper extremity AV fistula accessed  Assessment and Plan:       1 -ESRD  The patient is having HD right now.  He is gone into A-fib.  He is on amiodarone    Will continue dialysis treatments as a life-sustaining modality.    2 - afib  Patient was started on amiodarone.  He will continue Eliquis     3 - multiple myeloma  He receives treatment from [facility]    4 - anemia  He is on Epogen    5 -secondary hyperparathyroidism  On sevelamer  Results:     Recent Labs   Lab 25  1629 25  1135 25  0710   RBC 3.62* 3.28* 3.40*   HGB 8.6* 7.7* 8.0*   HCT 28.7* 25.8* 26.6*   MCV 79.3* 78.7* 78.2*   MCH 23.8* 23.5* 23.5*   MCHC 30.0* 29.8* 30.1*   RDW 17.4* 17.5* 17.1*   NEPRELIM 5.53 2.87 2.56   WBC 6.3 3.5* 3.2*   .0* 96.0* 93.0*         Recent Labs   Lab 25  1629 25  1135 25  0710   * 154* 115*   BUN 30*  45* 62*   CREATSERUM 4.97* 7.54* 9.60*   CA 8.9 8.7 8.3*    136 134*   K 3.2* 3.7 3.8   CL 92* 92* 89*   CO2 34.0* 33.0* 32.0          XR CHEST AP PORTABLE  (CPT=71045)  Result Date: 5/21/2025  CONCLUSION:  1. Expiratory chest with findings most consistent with moderate CHF/fluid overload including stable pulmonary edema, a new small right pleural effusion and a stable small left pleural effusion. 2. Worsening mild consolidation at the right lung base, which may relate to passive atelectasis although pneumonitis including aspiration pneumonitis cannot be excluded.     Dictated by (CST): Marc Aleman MD on 5/21/2025 at 7:32 AM     Finalized by (CST): Marc Aleman MD on 5/21/2025 at 7:35 AM          XR CHEST AP PORTABLE  (CPT=71045)  Result Date: 5/20/2025  CONCLUSION:   Hazy bibasilar opacities are new since comparison chest radiograph from March, 2025. Primary differential considerations include pulmonary edema or multifocal pneumonia/aspiration.  Radiographic follow-up to resolution is advised.   Questionable small left pleural effusion.    A preliminary report was issued by the [facility] teleradiology service. There are no major discrepancies.  elm-remote  Dictated by (CST): Eriberto Maddox MD on 5/20/2025 at 8:23 AM     Finalized by (CST): Eriberto Maddox MD on 5/20/2025 at 8:26 AM                VIVIANA MATTA MD  5/21/2025

## 2025-05-21 NOTE — PROGRESS NOTES
[facility] Progress Note    Balwinder Muniz Patient Status:  Inpatient    1945 MRN [id number]   Location [facility] 3W/SW Attending Al Grijalva MD   Hosp Day # 2 PCP Geovanni Garza MD     Subjective:  Patient seen in HD  Denies chest pain, shortness of breath, orthopnea  In NSR with paroxysms of A-fib this a.m.      Objective:  /77 (BP Location: Right arm)   Pulse 80   Temp 98.4 °F (36.9 °C) (Oral)   Resp 18   Ht 170.2 cm (5' 7\")   Wt 184 lb (83.5 kg)   SpO2 97%   BMI 28.82 kg/m²     Telemetry: NSR, 78 bpm       Intake/Output:    Intake/Output Summary (Last 24 hours) at 2025 1016  Last data filed at 2025 0324  Gross per 24 hour   Intake 820 ml   Output 150 ml   Net 670 ml       Last 3 Weights   25 0500 184 lb (83.5 kg)   25 195 186 lb 9.6 oz (84.6 kg)   25 1613 183 lb (83 kg)   25 1136 188 lb (85.3 kg)   25 1246 190 lb (86.2 kg)       Labs:  Recent Labs   Lab 255 25  0710   * 154* 115*   BUN 30* 45* 62*   CREATSERUM 4.97* 7.54* 9.60*   EGFRCR 11* 7* 5*   CA 8.9 8.7 8.3*    136 134*   K 3.2* 3.7 3.8   CL 92* 92* 89*   CO2 34.0* 33.0* 32.0     Recent Labs   Lab 25  0710   RBC 3.62* 3.28* 3.40*   HGB 8.6* 7.7* 8.0*   HCT 28.7* 25.8* 26.6*   MCV 79.3* 78.7* 78.2*   MCH 23.8* 23.5* 23.5*   MCHC 30.0* 29.8* 30.1*   RDW 17.4* 17.5* 17.1*   NEPRELIM 5.53 2.87 2.56   WBC 6.3 3.5* 3.2*   .0* 96.0* 93.0*         No results for input(s): \"TROP\", \"TROPHS\", \"CK\" in the last 168 hours.    Diagnostics:     25 Echo  1. Left ventricle: The cavity size was normal. Wall thickness was mildly      increased. Systolic function was mildly reduced. The estimated ejection      fraction was 40-45%, by biplane method of disks. Features are consistent      with a pseudonormal left ventricular filling pattern, with concomitant      abnormal relaxation and  increased filling pressure - grade 2 diastolic      dysfunction.   2. Left atrium: The atrium was moderately dilated.   3. Aortic valve: The valve was possibly bicuspid. The leaflets were normal      thickness and moderately to severely calcified. The findings were      consistent with severe stenosis. The peak systolic velocity was      4.25m/sec. The mean systolic gradient was 48mm Hg. The valve area (VTI)      was 0.59cm^2. The valve area (VTI) index was 0.3cm^2/m^2.   4. Mitral valve: The annulus was moderately calcified. The leaflets were      normal thickness. The mean diastolic gradient was 4mm Hg.   Impressions:  This study is compared with previous dated 01/09/2025:     Review of Systems   Respiratory: Negative.     Cardiovascular: Negative.      Physical Exam:    General: Alert and oriented. No apparent distress.   HEENT: Normocephalic, anicteric sclera, neck supple, no thyromegaly or adenopathy.  Neck: No JVD, carotids 2+, no bruits.  Cardiac: Regular rate & rhythm. S1, S2 normal. No pericardial rub, S3, or extra cardiac sounds. +3/6 BECCA   Lungs: Clear without wheezes, rales, rhonchi or dullness.  Normal excursions and effort.  Abdomen: Soft, non-tender. No organosplenomegally, mass or rebound, BS-present.  Extremities: Without clubbing or cyanosis.  No left lower extremity edema, no right lower extremity edema.  Neurologic: Alert and oriented, normal affect. No focal defects  Skin: Warm and dry.       Medications:  Scheduled Medications[1]  Medication Infusions[2]    Assessment:    Acute hypoxic resp failure   Possible aspiration PNA vs pulm edema   - On empiric iv antibiotics   - Volume removal per HD     Paroxysmal afib  - TSH normal   - Off of diltiazem gtt   - On amiodarone load & toprol xl   - Low dose eliquis for stroke prevention     New cardiomyopathy   Acute HFmrEF   - Echo w/ EF 40-45% (58% in 10/2024), grade 2 dd, LA mod dilated, severe AoS  - LHC 12/30/24 at [facility] w/ mild-mod nonobstructive  CAD   - CXR today w/ mod HF , new right pleural effusion, stable left pleural effusion   - Volume removal per HD   - GDMT: toprol xl . Will not initiate acei/arb/arni/mra due to CKD     Severe aortic stenosis   - Echo w/ severe aortic stenosis (mod in 10/2024). Peak systolic velocity was 4.25m/sec. MG 48mmhg. VTI 0.59cm^2. VTI index 0.3cm^2/m^2, mild pHTN   - Consider structural heart clinic evaluation     Generalized weakness - per PMD   Multiple myeloma - on velcade   Anemia - on epogen   ESRD w/ HD MWF - Nephrology following   HTN - stable on toprol xl   T2DM - Hga1c 6.4 .  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  Continue Toprol-XL and amiodarone load at 400 mg po twice daily till 5/26, on 5/27 switch to 200mg po daily   Continue low-dose Eliquis for stroke prevention  Compensated on exam.  Volume removal per HD  Mild reduction in EF noted.  Left heart cath in December 2024 with mild to moderate nonobstructive CAD  Continue GDMT with Toprol-XL. No acei/arb/arni/mra due to CKD   Severe aortic stenosis noted on echo.  . Consider structural heart evaluation     RUBY Chino  5/21/2025  10:16 AM  Ph [phone] (Edward)  Ph [phone] (Kirvin)      ===============================================  Seen/examined patient independently.  Agree with findings, assessment and plan as outlined above.   Intermittent episodes of A-fib.  Continue AAD therapy with amiodarone.  On apixaban for elevated [id number].  TTE reviewed, there is severe aortic stenosis and mildly reduced EF.  Patient follows with [facility], will refer for structural heart evaluation.  Avoid excessive preload reduction dependent cardiac output.  I have personally performed the medical decision making in its entirety.   Nicolás Rothman,          [1]    ipratropium-albuterol  3 mL Nebulization 4 times per day    epoetin rabia  5,000 Units Subcutaneous Once per  day on Monday Wednesday Friday    piperacillin-tazobactam  3.375 g Intravenous Q12H    insulin degludec  10 Units Subcutaneous Nightly    multivitamin  1 tablet Oral Daily    amiodarone  400 mg Oral BID with meals    apixaban  2.5 mg Oral BID    donepezil  10 mg Oral Nightly    latanoprost  1 drop Both Eyes Nightly    metoprolol succinate ER  50 mg Oral Daily    sevelamer carbonate  800 mg Oral TID CC    insulin aspart  1-5 Units Subcutaneous TID CC and HS   [2]    dilTIAZem Stopped (05/20/25 0252)

## 2025-05-21 NOTE — SLP NOTE
ADULT SWALLOWING EVALUATION    ASSESSMENT    ASSESSMENT/OVERALL IMPRESSION:  PPE REQUIRED. THIS THERAPIST WORE GLOVES FOR DURATION OF EVALUATION. HANDS WASHED UPON ENTRANCE/EXIT.    Per MD H&P, \"This is a 79 year oldmale who presented feeling generally weak and lightheaded.  Patient with history of ESRD and went to dialysis on day of admission.  After finishing his session patient again to feel very weak, tired and lightheaded.  Patient was sent to ED for further evaluation.  In the ED he was found to be in atrial fibrillation.  At time of interview, patient was sleepy and mildly confused.  He reported feeling weak.  Denied any new focal weakness or sensory changes.    Otherwise denied current chest pain, shortness of breath, abdominal pain, nausea or vomiting, fevers or chills.\"    SLP BSSE orders received and acknowledged. A swallow evaluation warranted per \"eval/tx\". Pt afebrile with clear vocal quality, on 4L/HF, with oxygen saturation at 97%. Pt with no hx of dysphagia at [facility].   Pt positioned 90 degrees in bed, alert/cooperative/family present. Pt with no complaints of pain. Oral motor skills within functional limits. Pt presented with trials of hard/soft solids and thin liquids via straw. Pt with adequate oral acceptance and bilabial seal across all trials. Pt with intact bite, mastication of solids, and timely A-P transit. Pt's swallow response appears timely with adequate hyolaryngeal elevation/excursion. No clinical signs of aspiration (e.g., immediate/delayed throat clear, immediate/delayed cough, wet vocal quality, increased O2 effort) observed across all trials. 5/21 CXR indicates \"1. Expiratory chest with findings most consistent with moderate CHF/fluid overload including stable pulmonary edema, a new small right pleural effusion and a stable small left pleural effusion. 2. Worsening mild consolidation at the right lung base, which may relate to passive atelectasis although pneumonitis including  aspiration pneumonitis cannot be excluded\". Oxygen status remained stable t/o the entire evaluation.     At this time, pt presents with functional oral and probable pharyngeal swallow stages. Recommend a regular diet and thin liquids with strict adherence to safe swallowing compensatory strategies. Results and recommendations reviewed with RN, pt, and family. Pt/pt's family v/u to all results/recommendations.     PLAN: SLP to f/u x1-2 meal assessments, monitor imaging, and VFSS if clinically indicated       RECOMMENDATIONS   Diet Recommendations - Solids: Regular  Diet Recommendations - Liquids: Thin Liquids                       Aspiration Precautions: Upright position, Slow rate  Medication Administration Recommendations:  (as tolerated)  Treatment Plan/Recommendations: Aspiration precautions    HISTORY   MEDICAL HISTORY  Reason for Referral:  (eval/tx)    Problem List  Principal Problem:    Atrial fibrillation with RVR (HCC)  Active Problems:    Weakness generalized    Past Medical History  Past Medical History[1]    Prior Living Situation: Home with spouse  Diet Prior to Admission: Regular, Thin liquids  Precautions: Aspiration    Patient/Family Goals: did not state    SWALLOWING HISTORY  Current Diet Consistency: Regular, Thin liquids  Dysphagia History: none  Imaging Results: see above    SUBJECTIVE       OBJECTIVE   ORAL MOTOR EXAMINATION  Dentition: Functional  Symmetry: Within Functional Limits  Strength: Within Functional Limits     Range of Motion: Within Functional Limits  Rate of Motion: Within Functional Limits    Voice Quality: Clear  Respiratory Status: Supplemental O2, Nasal cannula  Consistencies Trialed: Thin liquids, Soft solid, Hard solid  Method of Presentation: Self presentation, Straw  Patient Positioned: Upright, Midline    Oral Phase of Swallow: Within Functional Limits                    Pharyngeal Phase of Swallow: Within Functional Limits           (Please note: Silent aspiration cannot  be evaluated clinically. Videofluoroscopic Swallow Study is required to rule-out silent aspiration.)    Esophageal Phase of Swallow: No complaints consistent with possible esophageal involvement  Comments: NA          GOALS  Goal #1 The patient will tolerate regular consistency and thin liquids without overt signs or symptoms of aspiration with 100 % accuracy over 1-2 session(s).  In Progress   Goal #2 The patient/family/caregiver will demonstrate understanding and implementation of aspiration precautions and swallow strategies independently over 1-2 session(s).    In Progress     FOLLOW UP  Treatment Plan/Recommendations: Aspiration precautions  Duration: 1 week  Follow Up Needed (Documentation Required): Yes  SLP Follow-up Date: 05/22/25    Thank you for your referral.   If you have any questions, please contact MORTEZA Jiménez M.S. CCC-SLP  Speech Language Pathologist  Phone Number Ext. [phone]         [1]   Past Medical History:   Atrial fibrillation (HCC)    Back problem    Calculus of kidney    Cancer (HCC)    multiple myloma    Diabetes (HCC)    Dialysis patient    M,W, F    ESRD (end stage renal disease) (HCC)    Essential hypertension    Hearing impairment    bilateral hearing aids    High blood pressure    Multiple myeloma (HCC)    Muscle weakness    Neuropathy    Slight in feet    Osteoarthritis    Renal disorder    Visual impairment    Glasses

## 2025-05-22 LAB
ALBUMIN SERPL-MCNC: 3.9 G/DL (ref 3.2–4.8)
ANION GAP SERPL CALC-SCNC: 10 MMOL/L (ref 0–18)
BASOPHILS # BLD AUTO: 0.01 X10(3) UL (ref 0–0.2)
BASOPHILS NFR BLD AUTO: 0.3 %
BUN BLD-MCNC: 47 MG/DL (ref 9–23)
BUN/CREAT SERPL: 6.5 (ref 10–20)
CALCIUM BLD-MCNC: 8.7 MG/DL (ref 8.7–10.4)
CHLORIDE SERPL-SCNC: 99 MMOL/L (ref 98–112)
CO2 SERPL-SCNC: 32 MMOL/L (ref 21–32)
CREAT BLD-MCNC: 7.28 MG/DL (ref 0.7–1.3)
DEPRECATED RDW RBC AUTO: 50.8 FL (ref 35.1–46.3)
EGFRCR SERPLBLD CKD-EPI 2021: 7 ML/MIN/1.73M2 (ref 60–?)
EOSINOPHIL # BLD AUTO: 0.1 X10(3) UL (ref 0–0.7)
EOSINOPHIL NFR BLD AUTO: 2.9 %
ERYTHROCYTE [DISTWIDTH] IN BLOOD BY AUTOMATED COUNT: 17.2 % (ref 11–15)
GLUCOSE BLD-MCNC: 98 MG/DL (ref 70–99)
GLUCOSE BLDC GLUCOMTR-MCNC: 129 MG/DL (ref 70–99)
GLUCOSE BLDC GLUCOMTR-MCNC: 202 MG/DL (ref 70–99)
GLUCOSE BLDC GLUCOMTR-MCNC: 268 MG/DL (ref 70–99)
GLUCOSE BLDC GLUCOMTR-MCNC: 97 MG/DL (ref 70–99)
HCT VFR BLD AUTO: 27.7 % (ref 39–53)
HGB BLD-MCNC: 8.1 G/DL (ref 13–17.5)
IMM GRANULOCYTES # BLD AUTO: 0.01 X10(3) UL (ref 0–1)
IMM GRANULOCYTES NFR BLD: 0.3 %
LYMPHOCYTES # BLD AUTO: 0.31 X10(3) UL (ref 1–4)
LYMPHOCYTES NFR BLD AUTO: 8.9 %
MCH RBC QN AUTO: 23.6 PG (ref 26–34)
MCHC RBC AUTO-ENTMCNC: 29.2 G/DL (ref 31–37)
MCV RBC AUTO: 80.8 FL (ref 80–100)
MONOCYTES # BLD AUTO: 0.39 X10(3) UL (ref 0.1–1)
MONOCYTES NFR BLD AUTO: 11.2 %
NEUTROPHILS # BLD AUTO: 2.66 X10 (3) UL (ref 1.5–7.7)
NEUTROPHILS # BLD AUTO: 2.66 X10(3) UL (ref 1.5–7.7)
NEUTROPHILS NFR BLD AUTO: 76.4 %
OSMOLALITY SERPL CALC.SUM OF ELEC: 304 MOSM/KG (ref 275–295)
PHOSPHATE SERPL-MCNC: 5.7 MG/DL (ref 2.4–5.1)
PLATELET # BLD AUTO: 109 10(3)UL (ref 150–450)
POTASSIUM SERPL-SCNC: 4.1 MMOL/L (ref 3.5–5.1)
RBC # BLD AUTO: 3.43 X10(6)UL (ref 3.8–5.8)
SODIUM SERPL-SCNC: 141 MMOL/L (ref 136–145)
WBC # BLD AUTO: 3.5 X10(3) UL (ref 4–11)

## 2025-05-22 PROCEDURE — 99232 SBSQ HOSP IP/OBS MODERATE 35: CPT | Performed by: INTERNAL MEDICINE

## 2025-05-22 PROCEDURE — 99233 SBSQ HOSP IP/OBS HIGH 50: CPT | Performed by: HOSPITALIST

## 2025-05-22 RX ORDER — IPRATROPIUM BROMIDE AND ALBUTEROL SULFATE 2.5; .5 MG/3ML; MG/3ML
3 SOLUTION RESPIRATORY (INHALATION)
Status: DISCONTINUED | OUTPATIENT
Start: 2025-05-22 | End: 2025-05-23

## 2025-05-22 RX ORDER — IPRATROPIUM BROMIDE AND ALBUTEROL SULFATE 2.5; .5 MG/3ML; MG/3ML
3 SOLUTION RESPIRATORY (INHALATION) EVERY 6 HOURS PRN
Status: DISCONTINUED | OUTPATIENT
Start: 2025-05-22 | End: 2025-05-23 | Stop reason: ALTCHOICE

## 2025-05-22 RX ORDER — ECHINACEA PURPUREA EXTRACT 125 MG
1 TABLET ORAL
Status: DISCONTINUED | OUTPATIENT
Start: 2025-05-22 | End: 2025-05-27

## 2025-05-22 NOTE — PLAN OF CARE
Problem: CARDIOVASCULAR - ADULT  Goal: Maintains optimal cardiac output and hemodynamic stability  Description: INTERVENTIONS:  - Monitor vital signs, rhythm, and trends  - Monitor for bleeding, hypotension and signs of decreased cardiac output  - Evaluate effectiveness of vasoactive medications to optimize hemodynamic stability  - Monitor arterial and/or venous puncture sites for bleeding and/or hematoma  - Assess quality of pulses, skin color and temperature  - Assess for signs of decreased coronary artery perfusion - ex. Angina  - Evaluate fluid balance, assess for edema, trend weights  Outcome: Progressing  Goal: Absence of cardiac arrhythmias or at baseline  Description: INTERVENTIONS:  - Continuous cardiac monitoring, monitor vital signs, obtain 12 lead EKG if indicated  - Evaluate effectiveness of antiarrhythmic and heart rate control medications as ordered  - Initiate emergency measures for life threatening arrhythmias  - Monitor electrolytes and administer replacement therapy as ordered  Outcome: Progressing     Problem: RESPIRATORY - ADULT  Goal: Achieves optimal ventilation and oxygenation  Description: INTERVENTIONS:  - Assess for changes in respiratory status  - Assess for changes in mentation and behavior  - Position to facilitate oxygenation and minimize respiratory effort  - Oxygen supplementation based on oxygen saturation or ABGs  - Provide Smoking Cessation handout, if applicable  - Encourage broncho-pulmonary hygiene including cough, deep breathe, Incentive Spirometry  - Assess the need for suctioning and perform as needed  - Assess and instruct to report SOB or any respiratory difficulty  - Respiratory Therapy support as indicated  - Manage/alleviate anxiety  - Monitor for signs/symptoms of CO2 retention  Outcome: Progressing     Problem: SAFETY ADULT - FALL  Goal: Free from fall injury  Description: INTERVENTIONS:  - Assess pt frequently for physical needs  - Identify cognitive and physical  deficits and behaviors that affect risk of falls.  - [place] fall precautions as indicated by assessment.  - Educate pt/family on patient safety including physical limitations  - Instruct pt to call for assistance with activity based on assessment  - Modify environment to reduce risk of injury  - Provide assistive devices as appropriate  - Consider OT/PT consult to assist with strengthening/mobility  - Encourage toileting schedule  Outcome: Progressing     Received awake,oriented x 2.IV antibiotics continued. Remains on 2L of oxygen per nasal cannula.Plans for HD on Friday.

## 2025-05-22 NOTE — PROGRESS NOTES
[facility] Progress Note    Balwinder Muniz Patient Status:  Inpatient    1945 MRN [id number]   Location [facility] 3W/SW Attending Al Grijalva MD   Hosp Day # 3 PCP Geovanni Garza MD     Subjective:  Denies chest pain, shortness of breath, orthopnea  More awake, alert. Feeling much better today   Family at bedside       Objective:  /60 (BP Location: Right arm)   Pulse 80   Temp 98.4 °F (36.9 °C) (Oral)   Resp 18   Ht 170.2 cm (5' 7\")   Wt 184 lb (83.5 kg)   SpO2 94%   BMI 28.82 kg/m²     Telemetry: NSR w/ occ PACs, 80 bpm       Intake/Output:    Intake/Output Summary (Last 24 hours) at 2025 1048  Last data filed at 2025 0953  Gross per 24 hour   Intake 1190 ml   Output 3000 ml   Net -1810 ml       Last 3 Weights   25 0500 184 lb (83.5 kg)   25 1955 186 lb 9.6 oz (84.6 kg)   25 1613 183 lb (83 kg)   25 1136 188 lb (85.3 kg)   25 1246 190 lb (86.2 kg)       Labs:  Recent Labs   Lab 25  0710 25  0705   * 115* 98   BUN 45* 62* 47*   CREATSERUM 7.54* 9.60* 7.28*   EGFRCR 7* 5* 7*   CA 8.7 8.3* 8.7    134* 141   K 3.7 3.8 4.1   CL 92* 89* 99   CO2 33.0* 32.0 32.0     Recent Labs   Lab 25  1135 25  0710 25  0705   RBC 3.28* 3.40* 3.43*   HGB 7.7* 8.0* 8.1*   HCT 25.8* 26.6* 27.7*   MCV 78.7* 78.2* 80.8   MCH 23.5* 23.5* 23.6*   MCHC 29.8* 30.1* 29.2*   RDW 17.5* 17.1* 17.2*   NEPRELIM 2.87 2.56 2.66   WBC 3.5* 3.2* 3.5*   PLT 96.0* 93.0* 109.0*         No results for input(s): \"TROP\", \"TROPHS\", \"CK\" in the last 168 hours.    Diagnostics:     25 Echo  1. Left ventricle: The cavity size was normal. Wall thickness was mildly      increased. Systolic function was mildly reduced. The estimated ejection      fraction was 40-45%, by biplane method of disks. Features are consistent      with a pseudonormal left ventricular filling pattern, with concomitant      abnormal  relaxation and increased filling pressure - grade 2 diastolic      dysfunction.   2. Left atrium: The atrium was moderately dilated.   3. Aortic valve: The valve was possibly bicuspid. The leaflets were normal      thickness and moderately to severely calcified. The findings were      consistent with severe stenosis. The peak systolic velocity was      4.25m/sec. The mean systolic gradient was 48mm Hg. The valve area (VTI)      was 0.59cm^2. The valve area (VTI) index was 0.3cm^2/m^2.   4. Mitral valve: The annulus was moderately calcified. The leaflets were      normal thickness. The mean diastolic gradient was 4mm Hg.   Impressions:  This study is compared with previous dated 01/09/2025:     Review of Systems   Respiratory: Negative.     Cardiovascular: Negative.      Physical Exam:    General: Alert and oriented. No apparent distress.   HEENT: Normocephalic, anicteric sclera, neck supple, no thyromegaly or adenopathy.  Neck: No JVD, carotids 2+, no bruits.  Cardiac: Regular rate & rhythm. S1, S2 normal. No pericardial rub, S3, or extra cardiac sounds. +3/6 BECCA   Lungs: Clear without wheezes, rales, rhonchi or dullness.  Normal excursions and effort.  Abdomen: Soft, non-tender. No organosplenomegally, mass or rebound, BS-present.  Extremities: Without clubbing or cyanosis.  No left lower extremity edema, no right lower extremity edema.  Neurologic: Alert and oriented, normal affect. No focal defects  Skin: Warm and dry.       Medications:  Scheduled Medications[1]  Medication Infusions[2]    Assessment:    Acute hypoxic resp failure   Possible aspiration PNA vs pulm edema   - On empiric iv antibiotics   - Volume removal per HD     Paroxysmal afib  - TSH normal   - Off of diltiazem gtt   - On amiodarone load & toprol xl   - Low dose eliquis for stroke prevention     New cardiomyopathy   Acute HFmrEF   - Echo w/ EF 40-45% (58% in 10/2024), grade 2 dd, LA mod dilated, severe AoS  - LHC 12/30/24 at [facility] w/ mild-mod  nonobstructive CAD   - CXR today w/ mod HF , new right pleural effusion, stable left pleural effusion   - Volume removal per HD   - GDMT: toprol xl . Will not initiate acei/arb/arni/mra due to CKD     Severe aortic stenosis   - Echo w/ severe aortic stenosis (mod in 10/2024). Peak systolic velocity was 4.25m/sec. MG 48mmhg. VTI 0.59cm^2. VTI index 0.3cm^2/m^2, mild pHTN   - Consider structural heart clinic evaluation     Generalized weakness - per PMD   Multiple myeloma - on velcade   Anemia - on epogen   ESRD w/ HD MWF - Nephrology following   HTN - stable on toprol xl   T2DM - Hga1c 6.4 .  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 No tele events overnight   Continue Toprol-XL and amiodarone load at 400 mg po twice daily till 5/26, on 5/27 switch to 200mg po daily   Continue low-dose Eliquis for stroke prevention  Compensated on exam.  Volume removal per HD  Mild reduction in EF noted.  Left heart cath in December 2024 with mild to moderate nonobstructive CAD. ? Tachy mediated. Will d/w sharmin WERNER  Continue GDMT with Toprol-XL. No acei/arb/arni/mra due to CKD   Severe aortic stenosis noted on echo. Consult CV Surgery. Recommend structural heart evaluation   Per pt's wife, they're requesting to transfer care to [facility]     Case/ plan d/w Dr. Rothman & pt's wife      Stacey Brito, MSN, FPA-APRN, FNP-BC, CCK   5/22/2025  10:49 AM  Ph [phone] ([place])  Ph [phone] ([place])      ===============================================  Seen/examined patient independently.  Agree with findings, assessment and plan as outlined above.   Had long discussion with patient and wife regarding the severity of the aortic stenosis and next steps, they would like to transition care here and are open to multidisciplinary evaluation for AVR. Will place CV surgery evaluation and potentially structural heart. Likely preload dependent with fixed cardiac output given severity of aortic  stenosis, thus avoid excessive afterload reduction and maintain euvolemia.   In regards to mildly reduced EF, cautious GDMT as above. Recent C with non-obstructive CAD in 12/2024.   I have personally performed the medical decision making in its entirety.   Nicolás Rothman DO           [1]    ipratropium-albuterol  3 mL Nebulization TID    epoetin rabia  5,000 Units Subcutaneous Once per day on Monday Wednesday Friday    piperacillin-tazobactam  3.375 g Intravenous Q12H    insulin degludec  10 Units Subcutaneous Nightly    multivitamin  1 tablet Oral Daily    amiodarone  400 mg Oral BID with meals    apixaban  2.5 mg Oral BID    donepezil  10 mg Oral Nightly    latanoprost  1 drop Both Eyes Nightly    metoprolol succinate ER  50 mg Oral Daily    sevelamer carbonate  800 mg Oral TID CC    insulin aspart  1-5 Units Subcutaneous TID CC and HS   [2]

## 2025-05-22 NOTE — PHYSICAL THERAPY NOTE
PHYSICAL THERAPY EVALUATION - INPATIENT     Room Number: 331/331-A  Evaluation Date: 5/22/2025  Type of Evaluation: Initial   Physician Order: PT Eval and Treat    Presenting Problem: Afib, RVR weakness (ESRD ongoing HD)  Co-Morbidities : PD cath, DM, CKD, DM, HD, Afib, multiple myeloma, anemia, hyperparathyroidism  Reason for Therapy: Mobility Dysfunction and Discharge Planning    PHYSICAL THERAPY ASSESSMENT   Patient is a 79 year old male admitted 5/19/2025 for Afib with RVR.  Prior to admission, patient's baseline is Mod indep with a RW lives at home with spouse.  Patient is currently functioning below baseline with bed mobility, transfers, gait, maintaining seated position, standing prolonged periods, and performing household tasks.  Patient is requiring moderate assist as a result of the following impairments: decreased functional strength, decreased endurance/aerobic capacity, impaired standing balance, impaired coordination, impaired motor planning, decreased muscular endurance, and medical status.  Physical Therapy will continue to follow for duration of hospitalization.    Patient will benefit from continued skilled PT Services to promote return to prior level of function and safety with continuous assistance and gradual rehabilitative therapy .    PLAN DURING HOSPITALIZATION  Nursing Mobility Recommendation : 1 Assist  PT Device Recommendation: Rolling walker  PT Treatment Plan: Bed mobility, Body mechanics, Endurance, Energy conservation, Patient education, Gait training, Strengthening, Range of motion, Transfer training, Balance training  Rehab Potential : Fair  Frequency (Obs): 3-5x/week     PHYSICAL THERAPY MEDICAL/SOCIAL HISTORY   History related to current admission: This is a 79 year oldmale who presented feeling generally weak and lightheaded.  Patient with history of ESRD and went to dialysis on day of admission.  After finishing his session patient again to feel very weak, tired and lightheaded.   Patient was sent to ED for further evaluation.  In the ED he was found to be in atrial fibrillation.  At time of interview, patient was sleepy and mildly confused.  He reported feeling weak.      Problem List  Principal Problem:    Atrial fibrillation with RVR (HCC)  Active Problems:    Weakness generalized      HOME SITUATION  Type of Home: House  Home Layout: One level, Able to live on main level  Stairs to Enter : 3   Railing: Yes              Lives With: Spouse              Prior Level of [place]: Pt lives with spouse mod indep with most mobility and ADL's with a RW in the home. MWF HD ongoing.     SUBJECTIVE  I feel like I would like to get up and walk to the toilet.     PHYSICAL THERAPY EXAMINATION   OBJECTIVE  Precautions: Bed/chair alarm  Fall Risk: Standard fall risk    WEIGHT BEARING RESTRICTION       PAIN ASSESSMENT  Rating: 3  Location: stiff and sore in legs and back  Management Techniques: Activity promotion, Body mechanics, Breathing techniques, Relaxation, Repositioning    COGNITION  Overall Cognitive Status:  WFL - within functional limits  A/O x 2 distractibly   RANGE OF MOTION AND STRENGTH ASSESSMENT  Upper extremity ROM and strength are within functional limits   Lower extremity ROM is within functional limits   Lower extremity strength is within functional limits 4/5    BALANCE  Static Sitting: Fair +  Dynamic Sitting: Fair  Static Standing: Fair -  Dynamic Standing: Poor +           NEUROLOGICAL FINDINGS         Ataxic unsteady gait             ACTIVITY TOLERANCE                         O2 WALK       AM-PAC '6-Clicks' INPATIENT SHORT FORM - BASIC MOBILITY  How much difficulty does the patient currently have...  Patient Difficulty: Turning over in bed (including adjusting bedclothes, sheets and blankets)?: A Little   Patient Difficulty: Sitting down on and standing up from a chair with arms (e.g., wheelchair, bedside commode, etc.): A Little   Patient Difficulty: Moving from lying on back  to sitting on the side of the bed?: A Lot   How much help from another person does the patient currently need...   Help from Another: Moving to and from a bed to a chair (including a wheelchair)?: A Lot   Help from Another: Need to walk in hospital room?: A Little   Help from Another: Climbing 3-5 steps with a railing?: A Lot     AM-PAC Score:  Raw Score: 15   Approx Degree of Impairment: 57.7%   Standardized Score (AM-PAC Scale): 39.45   CMS Modifier (G-Code): CK    FUNCTIONAL ABILITY STATUS  Functional Mobility/Gait Assessment  Gait Assistance: Moderate assistance  Distance (ft): 15' x 2  Assistive Device: Rolling walker  Pattern: Ataxic (unsteady gait decreased balance and step length)  Rolling: minimal assist  Supine to Sit: minimal assist  Sit to Supine: moderate assist  Sit to Stand: minimal assist    Exercise/Education Provided:  Bed mobility  Body mechanics  Functional activity tolerated  Gait training  Posture  Strengthening  Lower therapeutic exercise:  Ankle pumps  Heel slides  LAQ  Transfer training    Skilled Therapy Provided: Pt ed with bed mobility with min A to EOB with PT assisting with scooting and line management pt using bed rail for support in sitting. Pt ed with mod a to stand with a RW. Pt ed with gait progression to bathroom with pt presenting with bowel incontinence. Pt assisted to toilet and cleaned up for comfort with PT and RN assisting. Pt ed with gait progression in room and back to the chair with min A with ataxic unsteady gait and decreased step length and rogerio. Pt ed with therex in the chair as tolerable for ROM and strength. Pt will benefit from GR with PT, OT with ongoing HD to regain his maximal PLOF and safety. Pt's daughter and spouse are receptive to this plan.     The patient's Approx Degree of Impairment: 57.7% has been calculated based on documentation in the [facility] '6 clicks' Inpatient Basic Mobility Short Form.  Research supports that patients with this level of  impairment may benefit from GR with PT, OT.    Final disposition will be made by interdisciplinary medical team.    Patient End of Session: Up in chair, With  staff, Needs met, Call light within reach, RN aware of session/findings, All patient questions and concerns addressed, Alarm set    CURRENT GOALS  Goals to be met by: 6/15/2025  Patient Goal Patient's self-stated goal is: Return home    Goal #1 Patient is able to demonstrate supine - sit EOB @ level: independent     Goal #1   Current Status    Goal #2 Patient is able to demonstrate transfers Sit to/from Stand at assistance level: modified independent with walker - rolling     Goal #2  Current Status    Goal #3 Patient is able to ambulate 300 feet with assist device: walker - rolling at assistance level: modified independent   Goal #3   Current Status    Goal #4 Patient will negotiate 3 stairs/one curb w/ assistive device and supervision   Goal #4   Current Status    Goal #5 Patient to demonstrate independence with home activity/exercise instructions provided to patient in preparation for discharge.   Goal #5   Current Status    Goal #6    Goal #6  Current Status      Patient Evaluation Complexity Level:  History Low - no personal factors and/or co-morbidities   Examination of body systems Low -  addressing 1-2 elements   Clinical Presentation Low- Stable   Clinical Decision Making  Low Complexity     Gait Training: 15 minutes

## 2025-05-22 NOTE — CONSULTS
[facility]  part of [facility]  [facility]  Report of Consultation    Balwinder Muniz Patient Status:  Inpatient    1945 MRN [id number]   Location [facility] 3W/SW Attending Al Grijalva MD   Hosp Day # 3 PCP Geovanni Garza MD     Date of Admission:  2025  Date of Consult:  2025    Reason for Consultation:  Severe aortic stenosis    History of Present Illness:  Balwinder Muniz is a a(n) 79 year old male.  History of end-stage renal disease on dialysis multiple myeloma on longstanding chemotherapy, paroxysmal atrial fibrillation and dementia, diabetes and hypertension presented to the hospital with symptoms of lightheadedness and palpitations during dialysis and brought to the emergency room.  He had 2 previous episodes of self-limited atrial fibrillation.  He was admitted to the hospital for further workup and evaluation after converting spontaneously normal sinus rhythm.  He was started on Eliquis for stroke prevention.  He was loaded with amiodarone and echocardiogram was ordered to further evaluate his symptoms.  Echocardiogram demonstrates mildly reduced ejection fraction around 40 to 45% but also severe aortic stenosis with peak velocity 4.2 m/s mean gradient 48 mmHg and aortic valve area 0.59 cm².  We are asked to evaluate for surgical versus transcatheter aortic valve replacement.    History:  Past Medical History[1]  Past Surgical History[2]  Family History[3]   reports that he has never smoked. He has never been exposed to tobacco smoke. He has never used smokeless tobacco. He reports that he does not currently use alcohol after a past usage of about 1.0 standard drink of alcohol per week. He reports that he does not use drugs.    Allergies:  Allergies[4]    Medications:  Current Hospital Medications[5]    Review of Systems:  Pertinent items are noted in HPI.    Physical Exam:  Blood pressure 121/60, pulse 80, temperature 98.4 °F (36.9  °C), temperature source Oral, resp. rate 18, height 5' 7\" (1.702 m), weight 184 lb (83.5 kg), SpO2 94%.    GENERAL: No acute distress.  VITAL SIGNS: See above.  HEENT: Trachea midline.  No jugular venous distention.  No carotid bruit.  CHEST: Chest wall is nontender.  HEART: irregular rate and rhythm, 3/6 BECCA LUSB  LUNGS: Clear to auscultation bilaterally.  ABDOMEN: Soft, nontender nondistended.  VASCULAR: Palpable radial artery pulses 2+ bilateral.  SKIN: No rash, no excessive bruising, petechiae, or purpura.  NEUROLOGIC: Cranial nerves II-XII intact without motor/sensory deficit.      Laboratory Data:  Recent Labs   Lab 25  11325  0710 25  0705   RBC 3.28* 3.40* 3.43*   HGB 7.7* 8.0* 8.1*   HCT 25.8* 26.6* 27.7*   MCV 78.7* 78.2* 80.8   MCH 23.5* 23.5* 23.6*   MCHC 29.8* 30.1* 29.2*   RDW 17.5* 17.1* 17.2*   NEPRELIM 2.87 2.56 2.66   WBC 3.5* 3.2* 3.5*   PLT 96.0* 93.0* 109.0*       Recent Labs   Lab 25  1135 25  0710 25  0705   * 115* 98   BUN 45* 62* 47*   CREATSERUM 7.54* 9.60* 7.28*   EGFRCR 7* 5* 7*   CA 8.7 8.3* 8.7    134* 141   K 3.7 3.8 4.1   CL 92* 89* 99   CO2 33.0* 32.0 32.0     Transthoracic Echocardiogram     Name:Balwinder Muniz     Date: 2025 :  1945 Ht:  (67in)  BP: 131 / 74   MRN:  [id number]    Age:  79years    Wt:  (184lb) HR: 81bpm   Loc:  [facility]       Gndr: M          BSA: 1.95m^2   Sonographer: Harriet RDCS     Ordering:    Teofilo Johnson   Consulting:  Emily Bishop     ----------------------------------------------------------------------------   History/Indications:  Murmur of Aortic Stenosis, Afib.     ----------------------------------------------------------------------------   Procedure information:  A transthoracic complete 2D study was performed.   Additional evaluation included M-mode, complete spectral Doppler, and color   Doppler.  Patient status:  Inpatient.  Location:  Bedside.    Comparison was   made to the study of  01/09/2025.    This was a routine study. Transthoracic   echocardiography for diagnosis. Image quality was adequate. The study was   technically limited due to poor acoustic window availability and body   habitus. Intravenous contrast (Definity) was administered to evaluate left   ventricular function and enhance regional wall motion assessment.     ECG rhythm:   Normal sinus     ----------------------------------------------------------------------------     Conclusions:     1. Left ventricle: The cavity size was normal. Wall thickness was mildly      increased. Systolic function was mildly reduced. The estimated ejection      fraction was 40-45%, by biplane method of disks. Features are consistent      with a pseudonormal left ventricular filling pattern, with concomitant      abnormal relaxation and increased filling pressure - grade 2 diastolic      dysfunction.   2. Left atrium: The atrium was moderately dilated.   3. Aortic valve: The valve was possibly bicuspid. The leaflets were normal      thickness and moderately to severely calcified. The findings were      consistent with severe stenosis. The peak systolic velocity was      4.25m/sec. The mean systolic gradient was 48mm Hg. The valve area (VTI)      was 0.59cm^2. The valve area (VTI) index was 0.3cm^2/m^2.   4. Mitral valve: The annulus was moderately calcified. The leaflets were      normal thickness. The mean diastolic gradient was 4mm Hg.   Impressions:  This study is compared with previous dated 01/09/2025:   *     ----------------------------------------------------------------------------   *   Findings:   Left ventricle:  The cavity size was normal. Wall thickness was mildly   increased. Systolic function was mildly reduced. The estimated ejection   fraction was 40-45%, by biplane method of disks. No diagnostic evidence for   diffuse regional wall motion abnormalities. Features are consistent with a   pseudonormal left ventricular filling pattern,  with concomitant abnormal   relaxation and increased filling pressure - grade 2 diastolic dysfunction.   Left atrium:  Well visualized. The atrium was moderately dilated.   Right ventricle:  The cavity size was normal. Systolic function was normal.   Right atrium:  Well visualized. The atrium was normal in size.   Mitral valve:  Well visualized. The annulus was moderately calcified. The   leaflets were normal thickness. Leaflet separation was normal. No evidence   for prolapse.  Doppler:  Transvalvular velocity was within the normal range.   There was no evidence for stenosis. There was no significant regurgitation.     The valve area by pressure half-time was 3.14cm^2. The valve area index by   pressure half-time was 1.61cm^2/m^2. The valve area (LVOT continuity) was   1.78cm^2. The valve area index (LVOT continuity) was 0.91cm^2/m^2.    The   mean diastolic gradient was 4mm Hg.   Aortic valve:  Well visualized.  The valve was possibly bicuspid. The   leaflets were normal thickness and moderately to severely calcified. Cusp   separation was normal.  Doppler:   The findings were consistent with severe   stenosis.   There was no significant regurgitation.    The valve area (VTI)   was 0.59cm^2. The valve area (VTI) index was 0.3cm^2/m^2.    The mean   systolic gradient was 48mm Hg. The peak systolic gradient was 75mm Hg.   Tricuspid valve:  Well visualized. The annulus is normal-sized. The valve is   structurally normal. The leaflets are normal thickness. Leaflet separation   was normal. No echocardiographic evidence for tricuspid prolapse.  Doppler:   Transvalvular velocity was within the normal range. There was no evidence   for stenosis. There was trace regurgitation.   Pulmonic valve:   Well visualized. The annulus is normal-sized. The valve is   structurally normal. The leaflets are normal thickness. Cusp separation was   normal. No evidence for prolapse.  Doppler:  Transvalvular velocity was   within the normal  range. There was no evidence for stenosis. There was no   significant regurgitation.   Pericardium:   There was no pericardial effusion.   Pleura:  No evidence of pleural fluid accumulation.   Aorta:   Aortic root: The aortic root was normal-sized. The aortic root was normal.   Ascending aorta: The ascending aorta was normal. The ascending aorta was   normal.   Pulmonary arteries:   The main pulmonary artery was normal-sized. Mild pulmonary hypertension was   present.   Systemic veins:  Central venous respirophasic diameter changes are blunted   (< 50%).   Inferior vena cava: The IVC was normally collapsible and normal-sized. The   IVC was dilated.     Impression and Plan:  79 year old male with severe, symptomatic aortic stenosis    Recommend valve replacement.  I had a long discussion with the patient regarding indications for valve replacement.  We discussed SAVR and TAVR.  In regards to SAVR, we discussed the operative approach, debridement of valve, hospital stay 3-5 days, total recovery time 6-8 weeks and complications of surgery including but not limited to bleeding, infection, arrhythmia, organ dysfunction and death.  We discussed bioprosthetic and mechanical valves, anticoagulation with each and longevity rates, need for possible reintervention in the future.  In regards to TAVR, we discussed approach, hospital stay 1-2 days, recovery time 2 weeks and complications of TAVR including but not limited to: femoral artery injury needing repair, aortic complication requiring sternotomy, heart block that my or may not require pacemaker.  We discussed longevity rates of valve and anticoagulation.     I believe the patient is an extremely high risk candidate for open heart surgery given his medical comorbidities and age of 79.  If and when the aortic valve replacement is indicated, I believe TAVR would be a very reasonable approach to treatment of his valve disease.  At this point recommend outpatient evaluation  with the structural heart team at [facility] for TAVR consideration.  If expedited TAVR is felt to be warranted, recommend transfer.    Many the patient's and his family's excellent questions were answered to their satisfaction.    Laz Claudio MD  Cardiothoracic Surgery  [facility]        Time spent on counseling/coordination of care:  [id number]- 80 min    Total time spent with patient:  1 Hour    Laz Claudio MD  5/22/2025  11:47 AM       [1]   Past Medical History:   Atrial fibrillation (HCC)    Back problem    Calculus of kidney    Cancer (HCC)    multiple myloma    Diabetes (HCC)    Dialysis patient    M,W, F    ESRD (end stage renal disease) (HCC)    Essential hypertension    Hearing impairment    bilateral hearing aids    High blood pressure    Multiple myeloma (HCC)    Muscle weakness    Neuropathy    Slight in feet    Osteoarthritis    Renal disorder    Visual impairment    Glasses   [2]   Past Surgical History:  Procedure Laterality Date    Colonoscopy  2004    Other surgical history  1994    Other surgical history  2014    stem cell transplant     Other surgical history  11/12/2019    removal parathyroid adenoma    Other surgical history  08/18/2022    Arthroscopy   [3]   Family History  Problem Relation Age of Onset    Cancer Father         lung cancer     Hypertension Father     Heart Disorder Mother     Depression Mother     Psychiatric Mother     Heart Disorder Maternal Grandfather     Heart Disorder Brother         passed away with heart attack     Hypertension Brother     Hypertension Brother     Kidney Disease Brother     Kidney Disease Brother     Hypertension Brother    [4] No Known Allergies  [5]   Current Facility-Administered Medications:     ipratropium-albuterol (Duoneb) 0.5-2.5 (3) MG/3ML inhalation solution 3 mL, 3 mL, Nebulization, TID    ipratropium-albuterol (Duoneb) 0.5-2.5 (3) MG/3ML inhalation solution 3 mL, 3 mL, Nebulization, Q6H PRN    [START ON 5/23/2025] sodium  chloride 0.9 % IV bolus 100 mL, 100 mL, Intravenous, Q30 Min PRN **AND** [START ON 5/23/2025] albumin human (Albumin) 25% injection 25 g, 25 g, Intravenous, PRN Dialysis    sodium chloride 0.9 % IV bolus 100 mL, 100 mL, Intravenous, Q30 Min PRN **AND** albumin human (Albumin) 25% injection 25 g, 25 g, Intravenous, PRN Dialysis    epoetin rabia (Epogen, Procrit) 5,000 Units injection, 5,000 Units, Subcutaneous, Once per day on Monday Wednesday Friday    piperacillin-tazobactam (Zosyn) 3.375 g in dextrose 5% 100 mL IVPB-ADDV, 3.375 g, Intravenous, Q12H    insulin degludec (Tresiba) 100 units/mL flextouch 10 Units, 10 Units, Subcutaneous, Nightly    multivitamin (Tab-A-Negin/Beta Carotene) tab 1 tablet, 1 tablet, Oral, Daily    amiodarone (Pacerone) tab 400 mg, 400 mg, Oral, BID with meals    apixaban (Eliquis) tab 2.5 mg, 2.5 mg, Oral, BID    donepezil (Aricept) tab 10 mg, 10 mg, Oral, Nightly    latanoprost (Xalatan) 0.005 % ophthalmic solution 1 drop, 1 drop, Both Eyes, Nightly    metoprolol succinate ER (Toprol XL) 24 hr tab 50 mg, 50 mg, Oral, Daily    sevelamer carbonate (Renvela) tab 800 mg, 800 mg, Oral, TID CC    glucose (Dex4) 15 GM/59ML oral liquid 15 g, 15 g, Oral, Q15 Min PRN **OR** glucose (Glutose) 40% oral gel 15 g, 15 g, Oral, Q15 Min PRN **OR** glucose-vitamin C (Dex-4) chewable tab 4 tablet, 4 tablet, Oral, Q15 Min PRN **OR** dextrose 50% injection 50 mL, 50 mL, Intravenous, Q15 Min PRN **OR** glucose (Dex4) 15 GM/59ML oral liquid 30 g, 30 g, Oral, Q15 Min PRN **OR** glucose (Glutose) 40% oral gel 30 g, 30 g, Oral, Q15 Min PRN **OR** glucose-vitamin C (Dex-4) chewable tab 8 tablet, 8 tablet, Oral, Q15 Min PRN    acetaminophen (Tylenol Extra Strength) tab 500 mg, 500 mg, Oral, Q4H PRN    acetaminophen (Tylenol) tab 650 mg, 650 mg, Oral, Q4H PRN **OR** [DISCONTINUED] HYDROcodone-acetaminophen (Norco) 5-325 MG per tab 1 tablet, 1 tablet, Oral, Q4H PRN **OR** [DISCONTINUED] HYDROcodone-acetaminophen  (Norco) 5-325 MG per tab 2 tablet, 2 tablet, Oral, Q4H PRN    polyethylene glycol (PEG 3350) (Miralax) 17 g oral packet 17 g, 17 g, Oral, Daily PRN    sennosides (Senokot) tab 17.2 mg, 17.2 mg, Oral, Nightly PRN    bisacodyl (Dulcolax) 10 MG rectal suppository 10 mg, 10 mg, Rectal, Daily PRN    insulin aspart (NovoLOG) 100 Units/mL FlexPen 1-5 Units, 1-5 Units, Subcutaneous, TID CC and HS

## 2025-05-22 NOTE — PROGRESS NOTES
[facility]  part of [facility]    Progress Note    Balwinder Muniz Patient Status:  Inpatient    1945 MRN [id number]   Location [facility] 3W/SW Attending Al Grijalva MD   Hosp Day # 3 PCP Geovanni Garza MD       Subjective:     Feeling better.  No SOB.  Wants to move more and get up to chair.    Objective:   Blood pressure 121/60, pulse 80, temperature 98.4 °F (36.9 °C), temperature source Oral, resp. rate 18, height 67\", weight 184 lb (83.5 kg), SpO2 94%.    Gen:   NAD.  Awake and alert.  CV:   RRR, no m/g/r  Pulm:   CTA bilat  Abd:   +bs, soft, NT, ND  LE:   No c/c/e  Neuro:   nonfocal    Results:     Lab Results   Component Value Date    WBC 3.5 (L) 2025    HGB 8.1 (L) 2025    HCT 27.7 (L) 2025    .0 (L) 2025    CREATSERUM 7.28 (H) 2025    BUN 47 (H) 2025     2025    K 4.1 2025    CL 99 2025    CO2 32.0 2025    GLU 98 2025    CA 8.7 2025    ALB 3.9 2025    ALKPHO 56 2025    BILT 0.6 2025    TP 6.5 2025    AST <8 2025    ALT <7 (L) 2025    INR 1.0 2018    TSH 2.783 2025    PSA 4.80 (H) 2022    MG 2.2 2025    PHOS 5.7 (H) 2025    B12 1,773 (H) 2023       XR CHEST AP PORTABLE  (CPT=71045)  Result Date: 2025  CONCLUSION:  1. Expiratory chest with findings most consistent with moderate CHF/fluid overload including stable pulmonary edema, a new small right pleural effusion and a stable small left pleural effusion. 2. Worsening mild consolidation at the right lung base, which may relate to passive atelectasis although pneumonitis including aspiration pneumonitis cannot be excluded.     Dictated by (CST): Marc Aleman MD on 2025 at 7:32 AM     Finalized by (CST): Marc Aleman MD on 2025 at 7:35 AM                Assessment and Plan:     Atrial fibrillation with RVR   - back in sinus rhythm on  amiodarone and metoprolol  - Continue chronic anticoagulation with Eliquis  - Telemetry monitoring  - Cardiology on consult      Acute hypoxic respiratory failure - resolved  Currently on room air  Possible aspiration pneumonia vs pulmonary edema  - CXR reviewed - edema vs pna vs both  - cont abx for possible pneumonia  - ST eval  - fluid removal with HD  - incentive spirometry  - mobilize, PT/OT eval     ESRD on HD  - Nephrology consulted, appreciate recommendations for further HD.     Generalized Weakness  - Likely related to HD and A-fib and PNA and deconditioning as above  - Treating underlying conditions  - PT/OT     DM2  - ISS     MM  - monitor counts  - pt's oncologist to d/w his cardiologist regarding continuing Velcade     Dementia  - monitor mental status     dvt proph:     Eliquis     Code status:    Full        MDM:    High Al Garcia MD  5/22/2025

## 2025-05-22 NOTE — CM/SW NOTE
05/22/25 1500   CM/SW Referral Data   Referral Source Social Work (self-referral)   Reason for Referral Discharge planning   Informant Patient;Daughter  (Pavithra)   Medical Hx   Does patient have an established PCP? Yes   Significant Past Medical/Mental Health Hx Arthritis, Afib, DM, ESRD, HTN,   Patient Info   Patient's Current Mental Status at Time of Assessment Alert;Oriented   Patient's Home Environment Condo/Apt no elevator   Number of Levels in Home 1   Number of Stair in Home 0   Patient lives with Spouse/Significant other   Patient Status Prior to Admission   Independent with ADLs and Mobility Yes   Services in place prior to admission Dialysis;DME/Supplies at home   Type of DME/Supplies Wheeled Walker   Dialysis Clinic [facility]   Scheduled Dialysis days M-W-F   Discharge Needs   Anticipated D/C needs Subacute rehab   Services Requested   Submitted to [facility] Yes   PASRR Level 1 Submitted Yes     Social work was able to meet with the patient and his daughter Pavithra at bedside to discuss discharge planning.    The patient lives with his wife in a 1st floor condo.  The patient is independent with all ADLs at baseline.  The patient uses a walker.    The patient is current at [facility] for HD MWF.    Social work advised the patient and his daughter Pavithra that the Anticipated therapy need: Gradual Rehabilitative Therapy.  The patient and his daughter would like to consult with the patient's spouse and daughter Mariza who is a speech therapist.    Social work will provide the FREDA list to the patient's spouse and Mariza once options are available.    The patient has no questions or concerns at this time.    SW/CM to remain available for support and/or discharge planning.     Magali Trinh MSW, LSW  Discharge Planner [id number]

## 2025-05-22 NOTE — PLAN OF CARE
CHASITY Afebrile. PT session today recommending FREDA. IV abx. Call light within reach. Safety precautions in place. Plan: Dialysis tomorrow.  Problem: Patient Centered Care  Goal: Patient preferences are identified and integrated in the patient's plan of care  Description: Interventions:  - Provide timely, complete, and accurate information to patient/family  - Incorporate patient and family knowledge, values, beliefs, and cultural backgrounds into the planning and delivery of care  - Encourage patient/family to participate in care and decision-making at the level they choose  - Honor patient and family perspectives and choices  Outcome: Progressing     Problem: CARDIOVASCULAR - ADULT  Goal: Maintains optimal cardiac output and hemodynamic stability  Description: INTERVENTIONS:  - Monitor vital signs, rhythm, and trends  - Monitor for bleeding, hypotension and signs of decreased cardiac output  - Evaluate effectiveness of vasoactive medications to optimize hemodynamic stability  - Monitor arterial and/or venous puncture sites for bleeding and/or hematoma  - Assess quality of pulses, skin color and temperature  - Assess for signs of decreased coronary artery perfusion - ex. Angina  - Evaluate fluid balance, assess for edema, trend weights  Outcome: Progressing  Goal: Absence of cardiac arrhythmias or at baseline  Description: INTERVENTIONS:  - Continuous cardiac monitoring, monitor vital signs, obtain 12 lead EKG if indicated  - Evaluate effectiveness of antiarrhythmic and heart rate control medications as ordered  - Initiate emergency measures for life threatening arrhythmias  - Monitor electrolytes and administer replacement therapy as ordered  Outcome: Progressing     Problem: RESPIRATORY - ADULT  Goal: Achieves optimal ventilation and oxygenation  Description: INTERVENTIONS:  - Assess for changes in respiratory status  - Assess for changes in mentation and behavior  - Position to facilitate oxygenation and minimize  respiratory effort  - Oxygen supplementation based on oxygen saturation or ABGs  - Provide Smoking Cessation handout, if applicable  - Encourage broncho-pulmonary hygiene including cough, deep breathe, Incentive Spirometry  - Assess the need for suctioning and perform as needed  - Assess and instruct to report SOB or any respiratory difficulty  - Respiratory Therapy support as indicated  - Manage/alleviate anxiety  - Monitor for signs/symptoms of CO2 retention  Outcome: Progressing     Problem: SAFETY ADULT - FALL  Goal: Free from fall injury  Description: INTERVENTIONS:  - Assess pt frequently for physical needs  - Identify cognitive and physical deficits and behaviors that affect risk of falls.  - [place] fall precautions as indicated by assessment.  - Educate pt/family on patient safety including physical limitations  - Instruct pt to call for assistance with activity based on assessment  - Modify environment to reduce risk of injury  - Provide assistive devices as appropriate  - Consider OT/PT consult to assist with strengthening/mobility  - Encourage toileting schedule  Outcome: Progressing     Problem: DISCHARGE PLANNING  Goal: Discharge to home or other facility with appropriate resources  Description: INTERVENTIONS:  - Identify barriers to discharge w/pt and caregiver  - Include patient/family/discharge partner in discharge planning  - Arrange for needed discharge resources and transportation as appropriate  - Identify discharge learning needs (meds, wound care, etc)  - Arrange for interpreters to assist at discharge as needed  - Consider post-discharge preferences of patient/family/discharge partner  - Complete POLST form as appropriate  - Assess patient's ability to be responsible for managing their own health  - Refer to Case Management Department for coordinating discharge planning if the patient needs post-hospital services based on physician/LIP order or complex needs related to functional status,  cognitive ability or social support system  Outcome: Progressing

## 2025-05-22 NOTE — OCCUPATIONAL THERAPY NOTE
OCCUPATIONAL THERAPY EVALUATION - INPATIENT     Room Number: 331/331-A  Evaluation Date: 5/22/2025  Type of Evaluation: Initial  Presenting Problem: Weakness, Afib (hx of Alzheimers, hemodialysis)    Physician Order: IP Consult to Occupational Therapy  Reason for Therapy: ADL/IADL Dysfunction and Discharge Planning    OCCUPATIONAL THERAPY ASSESSMENT   Patient is a 79 year old male admitted 5/19/2025 for Afib with RVR, KAYLEIGH, Weakness.  Prior to admission, patient's baseline is mod I with RW and spouse assistance with ADLs and IADLs PRN.  Patient is currently functioning below baseline with toileting, bathing, upper body dressing, lower body dressing, grooming, eating, bed mobility, transfers, and functional standing tolerance.  Patient is requiring contact guard assist, minimal assist, and moderate assist as a result of the following impairments: decreased functional strength, decreased functional reach, decreased endurance, impaired standing/sitting balance, decreased muscular endurance, and cognitive deficits (hx of Alzheimers,increased cuing, problem solving, execuive functioning, memory impairments). Occupational Therapy will continue to follow for duration of hospitalization.    Patient will benefit from continued skilled OT Services to promote return to prior level of function and safety with continuous assistance and gradual rehabilitative therapy.    PLAN DURING HOSPITALIZATION     OT Treatment Plan: Balance activities, Energy conservation/work simplification techniques, ADL training, Functional transfer training, UE strengthening/ROM, Endurance training, Patient/Family education, Patient/Family training, Equipment eval/education, Compensatory technique education, Continued evaluation     OCCUPATIONAL THERAPY MEDICAL/SOCIAL HISTORY   Problem List  Principal Problem:    Atrial fibrillation with RVR (HCC)  Active Problems:    Weakness generalized    HOME SITUATION  Type of Home: House  Home Layout: One level;  Elevator  Lives With: Spouse  Toilet and Equipment: Standard height toilet  Shower/Tub and Equipment: Walk-in shower; Shower chair  Patient Regularly Uses: Reading glasses; Rolling walker; Hearing aides    Stairs in Home: none  Use of Assistive Device(s): RW     Prior Level of [place]: mod I with RW, spouse provided assistance PRN with ADLs and IADLs    SUBJECTIVE  \"I can walk a little\"    OCCUPATIONAL THERAPY EXAMINATION      OBJECTIVE  Precautions: Bed/chair alarm  Fall Risk: High fall risk    PAIN ASSESSMENT  [place]    COGNITION  Memory:  decreased recall of biographical information  Following Commands:  follows one step commands with increased time and follows one step commands with repetition  Initiation: hand over hand to initiate tasks at times  Motor Planning: impaired  Problem Solving:  assistance required to identify errors made, assistance required to generate solutions, and assistance required to implement solutions  Increased time for processing    Communication: Able to clearly communicate needs    RANGE OF MOTION   Upper extremity ROM is within functional limits     STRENGTH ASSESSMENT  Upper extremity strength is within functional limits     COORDINATION  Gross Motor: WFL   Fine Motor: WFL     ACTIVITIES OF DAILY LIVING ASSESSMENT  AM-PAC ‘6-Clicks’ Inpatient Daily Activity Short Form  How much help from another person does the patient currently need…  -   Putting on and taking off regular lower body clothing?: A Lot  -   Bathing (including washing, rinsing, drying)?: A Lot  -   Toileting, which includes using toilet, bedpan or urinal? : A Little  -   Putting on and taking off regular upper body clothing?: A Little  -   Taking care of personal grooming such as brushing teeth?: A Little  -   Eating meals?: None    AM-PAC Score:  Score: 17  Approx Degree of Impairment: 50.11%  Standardized Score (AM-PAC Scale): 37.26  CMS Modifier (G-Code): CK    FUNCTIONAL TRANSFER ASSESSMENT  Sit to Stand:  Chair  Chair: Moderate Assist (x2)  Toilet Transfer: Moderate Assist  Functional mobility bathroom distances with mod A initially, progressing to CGA-min A, cues for RW safety/positioning    BALANCE ASSESSMENT  Static Sitting: Contact Guard Assist  Static Standing: Contact Guard Assist    FUNCTIONAL ADL ASSESSMENT  Toileting Seated: Moderate Assist    Skilled Therapy Provided: RN approved session. Pt received in bedside chair agreeable to session. Family present. Pt does not c/o pain. Pt with hx of early onset Alzheimer's poor historian, family present to answer questions. Requires up to mod A x2 for STS from chair; unsteady gait at first; stabilized once pt cued to focus on object ahead. Hand over hand cues required for safety with STS to RW level. Pt tolerated about 2 minutes of standing with CGA. Pt required up to mod A for toilet t/f, able to toilet with mod A for hyun care and clothing management. Pt ambulates with CGA able to complete sit to supine with SBA. Pt benefits from increased time and cues to complete tasks. Cues for safety with walker management. Pt left in bed with needs met and call light within reach. Handoff to RN.    The patient's Approx Degree of Impairment: 50.11% has been calculated based on documentation in the [facility] '6 clicks' Inpatient Daily Activity Short Form.  Research supports that patients with this level of impairment may benefit from rehab.  Final disposition will be made by interdisciplinary medical team.     Patient End of Session: In bed, Needs met, Call light within reach, Alarm set, Family present    OT Goals  Patients self stated goal is: none stated     Patient will complete functional transfer with mod I  Comment:     Patient will complete toileting with mod I  Comment:     Patient will tolerate standing for 3 minutes in prep for adls with mod I   Comment:          Goals  on: 06/10/25  Frequency: 3-5x/week    Patient Evaluation Complexity Level:   Occupational  Profile/Medical History MODERATE - Expanded review of history including review of medical or therapy record   Specific performance deficits impacting engagement in ADL/IADL MODERATE  3 - 5 performance deficits   Client Assessment/Performance Deficits MODERATE - Comorbidities and min to mod modifications of tasks    Clinical Decision Making MODERATE - Analysis of occupational profile, detailed assessments, several treatment options    Overall Complexity MODERATE     OT Session Time: 30 minutes  Self-Care Home Management: 23 minutes  Evaluation     FERNANDO Quintanilla, OTR/L

## 2025-05-22 NOTE — SLP NOTE
SPEECH DAILY NOTE - INPATIENT    ASSESSMENT & PLAN   ASSESSMENT      Proper PPE worn. Hands sanitized upon entrance/exit Pt room.         Pt alert, afebrile and now on room air (during BSE on 4L/min 02 via NC). Pt seen for swallow analysis per BSE recommendations (after consulting with RN). Spouse present. Pt agreed to participate. Pt's preferred method of learning verbal. Pt upright in chair; observed with current diet of solid/thin liquids for monitoring diet tolerance. Swallowing precautions/strategies discussed; Pt self-cued for execution of strategies. Functional bite reflex/mastication/lingual skills on solids. No significant oral retention. Pharyngeal response appeared to trigger within 1-2 sec per hyolaryngeal elevation to completion (functional rise/strength per palpation). No overt CSA on solid nor thin liquids. Collaborated with RN regarding Pt's swallowing plan of care. RN present in room to administer medication; no difficulty observed. No new CXR completed. Sp02 ~95% during this session. Call light within Pt's reach upon SLP discharge from room.        CXR 5/21/25:  CONCLUSION:   1. Expiratory chest with findings most consistent with moderate CHF/fluid overload including stable pulmonary edema, a new small right pleural effusion and a stable small left pleural effusion.   2. Worsening mild consolidation at the right lung base, which may relate to passive atelectasis although pneumonitis including aspiration pneumonitis cannot be excluded.     PLAN: Pt is discharged from skilled swallowing intervention secondary to functional swallowing skills on least restrictive safest diet, family education completed and goal achievement.         Diet Recommendations - Solids: Regular  Diet Recommendations - Liquids: Thin Liquids    Aspiration Precautions: Upright position, Slow rate  Medication Administration Recommendations:  (as tolerated)    Patient Experiencing Pain: No  Treatment Plan  Treatment  Plan/Recommendations: Aspiration precautions  Interdisciplinary Communication: Discussed with RN  Plan posted at bedside      GOALS  Goal #1 The patient will tolerate regular consistency and thin liquids without overt signs or symptoms of aspiration with 100 % accuracy over 1-2 session(s).    No CSA on current diet. Adequate oral skills.        GOAL MET     Goal #2 The patient/family/caregiver will demonstrate understanding and implementation of aspiration precautions and swallow strategies independently over 1-2 session(s).    Swallowing precautions posted in room. Discussed with spouse; v/u. Pt self-cued for use of all swallow precautions.     GOAL MET     FOLLOW UP  Follow Up Needed (Documentation Required): No  SLP Follow-up Date: 05/22/25  Duration: 1 week    Session: 1    If you have any questions, please contact   Ivy Stack M.S. AKILA/SLP  Speech-Language Pathologist  [facility]  #[phone]

## 2025-05-22 NOTE — PROGRESS NOTES
.,[facility]  part of [facility]    Progress Note    Balwinder Muniz Patient Status:  Inpatient    1945 MRN [id number]   Location [facility] 3W/SW Attending Al Grijalva MD   Hosp Day # 3 PCP Geovanni Garza MD       Subjective:   Balwinder Muniz is a(n) 79 year old male who I am seeing for ESRD      Resting    Objective:   /60 (BP Location: Right arm)   Pulse 80   Temp 98.4 °F (36.9 °C) (Oral)   Resp 18   Ht 5' 7\" (1.702 m)   Wt 184 lb (83.5 kg)   SpO2 94%   BMI 28.82 kg/m²      Intake/Output Summary (Last 24 hours) at 2025 1023  Last data filed at 2025 0953  Gross per 24 hour   Intake 1190 ml   Output 3000 ml   Net -1810 ml     Wt Readings from Last 1 Encounters:   25 184 lb (83.5 kg)       Exam  Gen: No acute distress, Heent: NC AT, mucous memb clear, neck supple  Pulm: Lungs clear, normal respiratory effort  CV: Heart with regular rate and rhythm, no edema  Abd: Abdomen soft, nontender, nondistended, no organomegaly, bowel sounds present  Skin: no symptoms reported  Psych: alert and oriented    Assessment and Plan:       1 -ESRD  Plan Hd tomorrow     2 - afib  Patient was started on amiodarone.  He will continue Eliquis     3 - multiple myeloma  He receives treatment from [facility]     4 - anemia  He is on Epogen     5 -secondary hyperparathyroidism  On sevelamer            Results:     Recent Labs   Lab 25  1135 25  0710 25  0705   RBC 3.28* 3.40* 3.43*   HGB 7.7* 8.0* 8.1*   HCT 25.8* 26.6* 27.7*   MCV 78.7* 78.2* 80.8   MCH 23.5* 23.5* 23.6*   MCHC 29.8* 30.1* 29.2*   RDW 17.5* 17.1* 17.2*   NEPRELIM 2.87 2.56 2.66   WBC 3.5* 3.2* 3.5*   PLT 96.0* 93.0* 109.0*         Recent Labs   Lab 25  1135 25  0710 25  0705   * 115* 98   BUN 45* 62* 47*   CREATSERUM 7.54* 9.60* 7.28*   CA 8.7 8.3* 8.7    134* 141   K 3.7 3.8 4.1   CL 92* 89* 99   CO2 33.0* 32.0 32.0          XR CHEST AP PORTABLE   (CPT=71045)  Result Date: 5/21/2025  CONCLUSION:  1. Expiratory chest with findings most consistent with moderate CHF/fluid overload including stable pulmonary edema, a new small right pleural effusion and a stable small left pleural effusion. 2. Worsening mild consolidation at the right lung base, which may relate to passive atelectasis although pneumonitis including aspiration pneumonitis cannot be excluded.     Dictated by (CST): Marc Aleman MD on 5/21/2025 at 7:32 AM     Finalized by (CST): Marc Aleman MD on 5/21/2025 at 7:35 AM                VIVIANA MATTA MD  5/22/2025

## 2025-05-23 LAB
ANION GAP SERPL CALC-SCNC: 13 MMOL/L (ref 0–18)
BASOPHILS # BLD AUTO: 0.01 X10(3) UL (ref 0–0.2)
BASOPHILS NFR BLD AUTO: 0.3 %
BUN BLD-MCNC: 67 MG/DL (ref 9–23)
BUN/CREAT SERPL: 7.2 (ref 10–20)
CALCIUM BLD-MCNC: 8.5 MG/DL (ref 8.7–10.4)
CHLORIDE SERPL-SCNC: 95 MMOL/L (ref 98–112)
CO2 SERPL-SCNC: 28 MMOL/L (ref 21–32)
CREAT BLD-MCNC: 9.3 MG/DL (ref 0.7–1.3)
DEPRECATED RDW RBC AUTO: 48.3 FL (ref 35.1–46.3)
EGFRCR SERPLBLD CKD-EPI 2021: 5 ML/MIN/1.73M2 (ref 60–?)
EOSINOPHIL # BLD AUTO: 0.14 X10(3) UL (ref 0–0.7)
EOSINOPHIL NFR BLD AUTO: 3.6 %
ERYTHROCYTE [DISTWIDTH] IN BLOOD BY AUTOMATED COUNT: 17 % (ref 11–15)
GLUCOSE BLD-MCNC: 86 MG/DL (ref 70–99)
GLUCOSE BLDC GLUCOMTR-MCNC: 184 MG/DL (ref 70–99)
GLUCOSE BLDC GLUCOMTR-MCNC: 200 MG/DL (ref 70–99)
GLUCOSE BLDC GLUCOMTR-MCNC: 86 MG/DL (ref 70–99)
GLUCOSE BLDC GLUCOMTR-MCNC: 97 MG/DL (ref 70–99)
HCT VFR BLD AUTO: 25.3 % (ref 39–53)
HGB BLD-MCNC: 7.8 G/DL (ref 13–17.5)
IMM GRANULOCYTES # BLD AUTO: 0.02 X10(3) UL (ref 0–1)
IMM GRANULOCYTES NFR BLD: 0.5 %
LYMPHOCYTES # BLD AUTO: 0.38 X10(3) UL (ref 1–4)
LYMPHOCYTES NFR BLD AUTO: 9.7 %
MCH RBC QN AUTO: 24.1 PG (ref 26–34)
MCHC RBC AUTO-ENTMCNC: 30.8 G/DL (ref 31–37)
MCV RBC AUTO: 78.1 FL (ref 80–100)
MONOCYTES # BLD AUTO: 0.34 X10(3) UL (ref 0.1–1)
MONOCYTES NFR BLD AUTO: 8.7 %
NEUTROPHILS # BLD AUTO: 3.02 X10 (3) UL (ref 1.5–7.7)
NEUTROPHILS # BLD AUTO: 3.02 X10(3) UL (ref 1.5–7.7)
NEUTROPHILS NFR BLD AUTO: 77.2 %
OSMOLALITY SERPL CALC.SUM OF ELEC: 301 MOSM/KG (ref 275–295)
PLATELET # BLD AUTO: 113 10(3)UL (ref 150–450)
POTASSIUM SERPL-SCNC: 4.1 MMOL/L (ref 3.5–5.1)
RBC # BLD AUTO: 3.24 X10(6)UL (ref 3.8–5.8)
SODIUM SERPL-SCNC: 136 MMOL/L (ref 136–145)
WBC # BLD AUTO: 3.9 X10(3) UL (ref 4–11)

## 2025-05-23 PROCEDURE — 90935 HEMODIALYSIS ONE EVALUATION: CPT | Performed by: INTERNAL MEDICINE

## 2025-05-23 PROCEDURE — 99233 SBSQ HOSP IP/OBS HIGH 50: CPT | Performed by: HOSPITALIST

## 2025-05-23 RX ORDER — IPRATROPIUM BROMIDE AND ALBUTEROL SULFATE 2.5; .5 MG/3ML; MG/3ML
3 SOLUTION RESPIRATORY (INHALATION)
Status: DISCONTINUED | OUTPATIENT
Start: 2025-05-23 | End: 2025-05-23

## 2025-05-23 RX ORDER — IPRATROPIUM BROMIDE AND ALBUTEROL SULFATE 2.5; .5 MG/3ML; MG/3ML
3 SOLUTION RESPIRATORY (INHALATION) 2 TIMES DAILY PRN
Status: DISCONTINUED | OUTPATIENT
Start: 2025-05-23 | End: 2025-05-27

## 2025-05-23 NOTE — PROGRESS NOTES
[facility] Cardiology Progress Note    Balwinder Muniz Patient Status:  Inpatient    1945 MRN [id number]   Location [facility] 3W/SW Attending Al Grijalva MD   Hosp Day # 4 PCP Geovanni Garza MD     Subjective:  Pt seen in HD. Denies chest pain, shortness of breath, orthopnea  Feels good today     Objective:  /83 (BP Location: Right arm)   Pulse 84   Temp 97.5 °F (36.4 °C) (Axillary)   Resp 18   Ht 170.2 cm (5' 7\")   Wt 178 lb 12.8 oz (81.1 kg)   SpO2 96%   BMI 28.00 kg/m²     Telemetry: NSR w/ occ PACs, 84 bpm       Intake/Output:    Intake/Output Summary (Last 24 hours) at 2025 1659  Last data filed at 2025 1538  Gross per 24 hour   Intake 650 ml   Output 3000 ml   Net -2350 ml       Last 3 Weights   25 0302 178 lb 12.8 oz (81.1 kg)   25 0500 184 lb (83.5 kg)   25 1955 186 lb 9.6 oz (84.6 kg)   25 1613 183 lb (83 kg)   25 1136 188 lb (85.3 kg)   25 1246 190 lb (86.2 kg)       Labs:  Recent Labs   Lab 25  0710 25  0705 25  0657   * 98 86   BUN 62* 47* 67*   CREATSERUM 9.60* 7.28* 9.30*   EGFRCR 5* 7* 5*   CA 8.3* 8.7 8.5*   * 141 136   K 3.8 4.1 4.1   CL 89* 99 95*   CO2 32.0 32.0 28.0     Recent Labs   Lab 25  0710 25  0705 25  0657   RBC 3.40* 3.43* 3.24*   HGB 8.0* 8.1* 7.8*   HCT 26.6* 27.7* 25.3*   MCV 78.2* 80.8 78.1*   MCH 23.5* 23.6* 24.1*   MCHC 30.1* 29.2* 30.8*   RDW 17.1* 17.2* 17.0*   NEPRELIM 2.56 2.66 3.02   WBC 3.2* 3.5* 3.9*   PLT 93.0* 109.0* 113.0*         No results for input(s): \"TROP\", \"TROPHS\", \"CK\" in the last 168 hours.    Diagnostics:     25 Echo  1. Left ventricle: The cavity size was normal. Wall thickness was mildly      increased. Systolic function was mildly reduced. The estimated ejection      fraction was 40-45%, by biplane method of disks. Features are consistent      with a pseudonormal left ventricular filling pattern, with concomitant       abnormal relaxation and increased filling pressure - grade 2 diastolic      dysfunction.   2. Left atrium: The atrium was moderately dilated.   3. Aortic valve: The valve was possibly bicuspid. The leaflets were normal      thickness and moderately to severely calcified. The findings were      consistent with severe stenosis. The peak systolic velocity was      4.25m/sec. The mean systolic gradient was 48mm Hg. The valve area (VTI)      was 0.59cm^2. The valve area (VTI) index was 0.3cm^2/m^2.   4. Mitral valve: The annulus was moderately calcified. The leaflets were      normal thickness. The mean diastolic gradient was 4mm Hg.   Impressions:  This study is compared with previous dated 01/09/2025:     Review of Systems   Respiratory: Negative.     Cardiovascular: Negative.      Physical Exam:    General: Alert and oriented. No apparent distress.   HEENT: Normocephalic, anicteric sclera, neck supple, no thyromegaly or adenopathy.  Neck: No JVD, carotids 2+, no bruits.  Cardiac: Regular rate & rhythm. S1, S2 normal. No pericardial rub, S3, or extra cardiac sounds. +3/6 BECCA   Lungs: Clear without wheezes, rales, rhonchi or dullness.  Normal excursions and effort.  Abdomen: Soft, non-tender. No organosplenomegally, mass or rebound, BS-present.  Extremities: Without clubbing or cyanosis.  No left lower extremity edema, no right lower extremity edema.  Neurologic: Alert and oriented, normal affect. No focal defects  Skin: Warm and dry.       Medications:  Scheduled Medications[1]  Medication Infusions[2]    Assessment:    Acute hypoxic resp failure   Possible aspiration PNA vs pulm edema   - On empiric iv antibiotics   - Volume removal per HD     Paroxysmal afib  - TSH normal   - Off of diltiazem gtt   - On amiodarone load & toprol xl   - Low dose eliquis for stroke prevention     New cardiomyopathy   Acute HFmrEF   - Echo w/ EF 40-45% (58% in 10/2024), grade 2 dd, LA mod dilated, severe AoS  - LHC 12/30/24 at [facility] w/  mild-mod nonobstructive CAD   - CXR w/ mod HF , new right pleural effusion, stable left pleural effusion   - Volume removal per HD   - GDMT: toprol xl . Will not initiate acei/arb/arni/mra due to CKD     Severe aortic stenosis   - Echo w/ severe aortic stenosis (mod in 10/2024). Peak systolic velocity was 4.25m/sec. MG 48mmhg. VTI 0.59cm^2. VTI index 0.3cm^2/m^2, mild pHTN   - Not a candidate for SAVR per CVT Surgery. Recommend OP structural heart clinic evaluation     Generalized weakness - per PMD   Multiple myeloma - on velcade   Anemia - on epogen   ESRD w/ HD MWF - Nephrology following   HTN - stable on toprol xl   T2DM - Hga1c 6.4 .  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 Converted back to NSR   Continue Toprol-XL and amiodarone load at 400 mg po twice daily till 5/26, on 5/27 switch to 200mg po daily   Continue low-dose Eliquis for stroke prevention  Compensated on exam.  Volume removal per HD  Continue GDMT with Toprol-XL. No acei/arb/arni/mra due to CKD   Severe aortic stenosis noted on echo.  Not a SAVR candidate. Recommend OP structural heart clinic evaluation for TAVR  Per pt's wife, they're requesting to transfer care to RAUL Brito, HIPOLITO, FPA-APRN, FNP-BC, CCK   5/23/2025  12:17 PM  Ph [phone] ([place])  Ph [phone] ([place])      Addendum @ 1330 - S/w Shara at [facility].  Appointment scheduled for Thursday, June 19, 2025 @ 9:15AM. Dr. Rothman to discuss plan of care & appointment w/ pt / family     ===============================================  Seen/examined patient independently.  Agree with findings, assessment and plan as outlined above.   Had long discussion with patient and wife regarding reason for TAVR referral. Pt was set up with appointment at the [facility] on 6/19 - this was conveyed to them.   Likely preload dependent with fixed cardiac output given severity of aortic  stenosis, thus avoid excessive afterload reduction and maintain euvolemia.   In regards to mildly reduced EF, cautious GDMT as above. Recent C with non-obstructive CAD in 12/2024.  I have personally performed the medical decision making in its entirety.   Nicolás Rothman DO           [1]    ipratropium-albuterol  3 mL Nebulization 2 times daily    epoetin rabia  5,000 Units Subcutaneous Once per day on Monday Wednesday Friday    piperacillin-tazobactam  3.375 g Intravenous Q12H    insulin degludec  10 Units Subcutaneous Nightly    multivitamin  1 tablet Oral Daily    amiodarone  400 mg Oral BID with meals    apixaban  2.5 mg Oral BID    donepezil  10 mg Oral Nightly    latanoprost  1 drop Both Eyes Nightly    metoprolol succinate ER  50 mg Oral Daily    sevelamer carbonate  800 mg Oral TID CC    insulin aspart  1-5 Units Subcutaneous TID CC and HS   [2]

## 2025-05-23 NOTE — PLAN OF CARE
Problem: CARDIOVASCULAR - ADULT  Goal: Maintains optimal cardiac output and hemodynamic stability  Description: INTERVENTIONS:  - Monitor vital signs, rhythm, and trends  - Monitor for bleeding, hypotension and signs of decreased cardiac output  - Evaluate effectiveness of vasoactive medications to optimize hemodynamic stability  - Monitor arterial and/or venous puncture sites for bleeding and/or hematoma  - Assess quality of pulses, skin color and temperature  - Assess for signs of decreased coronary artery perfusion - ex. Angina  - Evaluate fluid balance, assess for edema, trend weights  Outcome: Progressing  Goal: Absence of cardiac arrhythmias or at baseline  Description: INTERVENTIONS:  - Continuous cardiac monitoring, monitor vital signs, obtain 12 lead EKG if indicated  - Evaluate effectiveness of antiarrhythmic and heart rate control medications as ordered  - Initiate emergency measures for life threatening arrhythmias  - Monitor electrolytes and administer replacement therapy as ordered  Outcome: Progressing     Problem: RESPIRATORY - ADULT  Goal: Achieves optimal ventilation and oxygenation  Description: INTERVENTIONS:  - Assess for changes in respiratory status  - Assess for changes in mentation and behavior  - Position to facilitate oxygenation and minimize respiratory effort  - Oxygen supplementation based on oxygen saturation or ABGs  - Provide Smoking Cessation handout, if applicable  - Encourage broncho-pulmonary hygiene including cough, deep breathe, Incentive Spirometry  - Assess the need for suctioning and perform as needed  - Assess and instruct to report SOB or any respiratory difficulty  - Respiratory Therapy support as indicated  - Manage/alleviate anxiety  - Monitor for signs/symptoms of CO2 retention  Outcome: Progressing     Problem: SAFETY ADULT - FALL  Goal: Free from fall injury  Description: INTERVENTIONS:  - Assess pt frequently for physical needs  - Identify cognitive and physical  deficits and behaviors that affect risk of falls.  - [place] fall precautions as indicated by assessment.  - Educate pt/family on patient safety including physical limitations  - Instruct pt to call for assistance with activity based on assessment  - Modify environment to reduce risk of injury  - Provide assistive devices as appropriate  - Consider OT/PT consult to assist with strengthening/mobility  - Encourage toileting schedule  Outcome: Progressing     Received awake,oriented x 2. IV antibiotics continued. Minimal nose bleeding after blowing his nose noted during the night. Plans for hemodialysis today.

## 2025-05-23 NOTE — PROGRESS NOTES
[facility]  part of [facility]    Progress Note    Balwinder Muniz Patient Status:  Inpatient    1945 MRN [id number]   Location [facility] 3W/SW Attending Al Grijalva MD   Hosp Day # 4 PCP Geovanni Garza MD       Subjective:     Pt seen on HD.  No complaints.  No SOB.    Objective:   Blood pressure 156/83, pulse 84, temperature 97.5 °F (36.4 °C), temperature source Axillary, resp. rate 18, height 67\", weight 178 lb 12.8 oz (81.1 kg), SpO2 96%.    Gen:   NAD.  Awake and alert.  CV:   RRR, no m/g/r  Pulm:   CTA bilat  Abd:   +bs, soft, NT, ND  LE:   No c/c/e  Neuro:   nonfocal    Results:     Lab Results   Component Value Date    WBC 3.9 (L) 2025    HGB 7.8 (L) 2025    HCT 25.3 (L) 2025    .0 (L) 2025    CREATSERUM 9.30 (H) 2025    BUN 67 (H) 2025     2025    K 4.1 2025    CL 95 (L) 2025    CO2 28.0 2025    GLU 86 2025    CA 8.5 (L) 2025    ALB 3.9 2025    ALKPHO 56 2025    BILT 0.6 2025    TP 6.5 2025    AST <8 2025    ALT <7 (L) 2025    INR 1.0 2018    TSH 2.783 2025    PSA 4.80 (H) 2022    MG 2.2 2025    PHOS 5.7 (H) 2025    B12 1,773 (H) 2023       No results found.        Assessment and Plan:     Atrial fibrillation with RVR - resolved  - back in sinus rhythm on amiodarone and metoprolol  - Continue chronic anticoagulation with Eliquis  - Telemetry monitoring  - Cardiology on consult      Acute hypoxic respiratory failure - resolved  Currently on room air  Possible aspiration pneumonia vs pulmonary edema  - CXR reviewed - edema vs pna vs both  - cont abx for possible pneumonia  - ST eval  - fluid removal with HD  - incentive spirometry  - mobilize, PT/OT eval     ESRD on HD  - Nephrology consulted, appreciate recommendations for further HD.     Generalized Weakness  - Likely related to HD and A-fib and PNA and  deconditioning as above  - Treating underlying conditions  - PT/OT  DC planning for rehab on dc.     DM2  - ISS     MM  - monitor counts  - pt's oncologist to d/w his cardiologist regarding continuing Velcade     Dementia  - monitor mental status     dvt proph:     Eliquis     Code status:    Full       MDM:    High Al Garcia MD  5/23/2025

## 2025-05-23 NOTE — PLAN OF CARE
RA. Afebrile. Dialysis today, 3L removed. Pt flipped into afib during dialysis, currently in sinus. Call light within reach. Safety precautions in place. Plan: SNF pending choice and approval.   Problem: Patient Centered Care  Goal: Patient preferences are identified and integrated in the patient's plan of care  Description: Interventions:  - Provide timely, complete, and accurate information to patient/family  - Incorporate patient and family knowledge, values, beliefs, and cultural backgrounds into the planning and delivery of care  - Encourage patient/family to participate in care and decision-making at the level they choose  - Honor patient and family perspectives and choices  Outcome: Progressing     Problem: CARDIOVASCULAR - ADULT  Goal: Maintains optimal cardiac output and hemodynamic stability  Description: INTERVENTIONS:  - Monitor vital signs, rhythm, and trends  - Monitor for bleeding, hypotension and signs of decreased cardiac output  - Evaluate effectiveness of vasoactive medications to optimize hemodynamic stability  - Monitor arterial and/or venous puncture sites for bleeding and/or hematoma  - Assess quality of pulses, skin color and temperature  - Assess for signs of decreased coronary artery perfusion - ex. Angina  - Evaluate fluid balance, assess for edema, trend weights  Outcome: Progressing  Goal: Absence of cardiac arrhythmias or at baseline  Description: INTERVENTIONS:  - Continuous cardiac monitoring, monitor vital signs, obtain 12 lead EKG if indicated  - Evaluate effectiveness of antiarrhythmic and heart rate control medications as ordered  - Initiate emergency measures for life threatening arrhythmias  - Monitor electrolytes and administer replacement therapy as ordered  Outcome: Progressing     Problem: RESPIRATORY - ADULT  Goal: Achieves optimal ventilation and oxygenation  Description: INTERVENTIONS:  - Assess for changes in respiratory status  - Assess for changes in mentation and  behavior  - Position to facilitate oxygenation and minimize respiratory effort  - Oxygen supplementation based on oxygen saturation or ABGs  - Provide Smoking Cessation handout, if applicable  - Encourage broncho-pulmonary hygiene including cough, deep breathe, Incentive Spirometry  - Assess the need for suctioning and perform as needed  - Assess and instruct to report SOB or any respiratory difficulty  - Respiratory Therapy support as indicated  - Manage/alleviate anxiety  - Monitor for signs/symptoms of CO2 retention  Outcome: Progressing     Problem: SAFETY ADULT - FALL  Goal: Free from fall injury  Description: INTERVENTIONS:  - Assess pt frequently for physical needs  - Identify cognitive and physical deficits and behaviors that affect risk of falls.  - [place] fall precautions as indicated by assessment.  - Educate pt/family on patient safety including physical limitations  - Instruct pt to call for assistance with activity based on assessment  - Modify environment to reduce risk of injury  - Provide assistive devices as appropriate  - Consider OT/PT consult to assist with strengthening/mobility  - Encourage toileting schedule  Outcome: Progressing     Problem: DISCHARGE PLANNING  Goal: Discharge to home or other facility with appropriate resources  Description: INTERVENTIONS:  - Identify barriers to discharge w/pt and caregiver  - Include patient/family/discharge partner in discharge planning  - Arrange for needed discharge resources and transportation as appropriate  - Identify discharge learning needs (meds, wound care, etc)  - Arrange for interpreters to assist at discharge as needed  - Consider post-discharge preferences of patient/family/discharge partner  - Complete POLST form as appropriate  - Assess patient's ability to be responsible for managing their own health  - Refer to Case Management Department for coordinating discharge planning if the patient needs post-hospital services based on  physician/LIP order or complex needs related to functional status, cognitive ability or social support system  Outcome: Progressing

## 2025-05-23 NOTE — PROGRESS NOTES
[facility]  part of [facility]    Progress Note    Balwinder Muniz Patient Status:  Inpatient    1945 MRN [id number]   Location [facility] 3W/SW Attending Al Grijalva MD   Hosp Day # 4 PCP Geovanni Garza MD       Subjective:   Balwinder Muniz is a(n) 79 year old male who I am seeing for ESRD    Seen on dialysis.  No A-fib at this time      Objective:   /73 (BP Location: Right arm)   Pulse 81   Temp 97.7 °F (36.5 °C) (Oral)   Resp 18   Ht 5' 7\" (1.702 m)   Wt 178 lb 12.8 oz (81.1 kg)   SpO2 93%   BMI 28.00 kg/m²      Intake/Output Summary (Last 24 hours) at 2025 1105  Last data filed at 2025 0640  Gross per 24 hour   Intake 785 ml   Output --   Net 785 ml     Wt Readings from Last 1 Encounters:   25 178 lb 12.8 oz (81.1 kg)       Exam  Gen: No acute distress, Heent: NC AT, mucous memb clear, neck supple  Pulm: Lungs clear, normal respiratory effort  CV: Heart with regular rate and rhythm, no edema  Abd: Abdomen soft, nontender, nondistended, no organomegaly, bowel sounds present  Skin: no symptoms reported  Psych: alert and oriented  Left upper extremity AV fistula accessed  Assessment and Plan:         1 -ESRD  Plan Hd today on a  schedule.  He should continue dialysis as it is a life-sustaining modality     2 - afib/severe AS  Patient was started on amiodarone.  He will continue Eliquis  He was evaluated by CV surgery for TAVR     3 - multiple myeloma  He receives treatment from [facility]     4 - anemia  He is on Epogen     5 -secondary hyperparathyroidism  On sevelamer            Results:     Recent Labs   Lab 25  0710 25  0705 25  0657   RBC 3.40* 3.43* 3.24*   HGB 8.0* 8.1* 7.8*   HCT 26.6* 27.7* 25.3*   MCV 78.2* 80.8 78.1*   MCH 23.5* 23.6* 24.1*   MCHC 30.1* 29.2* 30.8*   RDW 17.1* 17.2* 17.0*   NEPRELIM 2.56 2.66 3.02   WBC 3.2* 3.5* 3.9*   PLT 93.0* 109.0* 113.0*         Recent Labs   Lab  05/21/25  0710 05/22/25  0705 05/23/25  0657   * 98 86   BUN 62* 47* 67*   CREATSERUM 9.60* 7.28* 9.30*   CA 8.3* 8.7 8.5*   * 141 136   K 3.8 4.1 4.1   CL 89* 99 95*   CO2 32.0 32.0 28.0          No results found.        VIVIANA MATTA MD  5/23/2025

## 2025-05-23 NOTE — CONGREGATE LIVING REVIEW
Critical access hospital Living Authorization    The [facility] Review Committee has reviewed this case and the patient IS APPROVED for discharge to a facility for Short Term Skilled once the following procedure is followed:     - The physician discharge instructions (contained within the RADHA note for SNF) must inlcude the below appropriate and approved COVID instructions to the facility    For questions regarding CLRC approval process, please contact the CM assigned to the case.  For questions regarding RN discharge workflow, please contact the unit Clinical Leader.

## 2025-05-23 NOTE — CM/SW NOTE
Met with patient, provided list of SNF options. Patient and family to review. [facility] approved. SW/CM to f/u regarding choice.     POLLY Selby [id number]

## 2025-05-24 PROBLEM — N18.6 ESRD (END STAGE RENAL DISEASE) (HCC): Status: ACTIVE | Noted: 2022-12-01

## 2025-05-24 LAB
ALBUMIN SERPL-MCNC: 4 G/DL (ref 3.2–4.8)
ANION GAP SERPL CALC-SCNC: 11 MMOL/L (ref 0–18)
BASOPHILS # BLD AUTO: 0.03 X10(3) UL (ref 0–0.2)
BASOPHILS NFR BLD AUTO: 0.7 %
BUN BLD-MCNC: 43 MG/DL (ref 9–23)
BUN/CREAT SERPL: 6 (ref 10–20)
CALCIUM BLD-MCNC: 9 MG/DL (ref 8.7–10.4)
CHLORIDE SERPL-SCNC: 98 MMOL/L (ref 98–112)
CO2 SERPL-SCNC: 30 MMOL/L (ref 21–32)
CREAT BLD-MCNC: 7.11 MG/DL (ref 0.7–1.3)
DEPRECATED RDW RBC AUTO: 49.1 FL (ref 35.1–46.3)
EGFRCR SERPLBLD CKD-EPI 2021: 7 ML/MIN/1.73M2 (ref 60–?)
EOSINOPHIL # BLD AUTO: 0.14 X10(3) UL (ref 0–0.7)
EOSINOPHIL NFR BLD AUTO: 3.5 %
ERYTHROCYTE [DISTWIDTH] IN BLOOD BY AUTOMATED COUNT: 17.2 % (ref 11–15)
GLUCOSE BLD-MCNC: 94 MG/DL (ref 70–99)
GLUCOSE BLDC GLUCOMTR-MCNC: 126 MG/DL (ref 70–99)
GLUCOSE BLDC GLUCOMTR-MCNC: 145 MG/DL (ref 70–99)
GLUCOSE BLDC GLUCOMTR-MCNC: 182 MG/DL (ref 70–99)
GLUCOSE BLDC GLUCOMTR-MCNC: 94 MG/DL (ref 70–99)
HCT VFR BLD AUTO: 28.2 % (ref 39–53)
HGB BLD-MCNC: 8.4 G/DL (ref 13–17.5)
IMM GRANULOCYTES # BLD AUTO: 0.01 X10(3) UL (ref 0–1)
IMM GRANULOCYTES NFR BLD: 0.2 %
LYMPHOCYTES # BLD AUTO: 0.55 X10(3) UL (ref 1–4)
LYMPHOCYTES NFR BLD AUTO: 13.6 %
MCH RBC QN AUTO: 23.3 PG (ref 26–34)
MCHC RBC AUTO-ENTMCNC: 29.8 G/DL (ref 31–37)
MCV RBC AUTO: 78.3 FL (ref 80–100)
MONOCYTES # BLD AUTO: 0.36 X10(3) UL (ref 0.1–1)
MONOCYTES NFR BLD AUTO: 8.9 %
NEUTROPHILS # BLD AUTO: 2.95 X10 (3) UL (ref 1.5–7.7)
NEUTROPHILS # BLD AUTO: 2.95 X10(3) UL (ref 1.5–7.7)
NEUTROPHILS NFR BLD AUTO: 73.1 %
OSMOLALITY SERPL CALC.SUM OF ELEC: 299 MOSM/KG (ref 275–295)
PHOSPHATE SERPL-MCNC: 5.8 MG/DL (ref 2.4–5.1)
PLATELET # BLD AUTO: 137 10(3)UL (ref 150–450)
POTASSIUM SERPL-SCNC: 4.4 MMOL/L (ref 3.5–5.1)
RBC # BLD AUTO: 3.6 X10(6)UL (ref 3.8–5.8)
SODIUM SERPL-SCNC: 139 MMOL/L (ref 136–145)
WBC # BLD AUTO: 4 X10(3) UL (ref 4–11)

## 2025-05-24 PROCEDURE — 99232 SBSQ HOSP IP/OBS MODERATE 35: CPT | Performed by: INTERNAL MEDICINE

## 2025-05-24 PROCEDURE — 99233 SBSQ HOSP IP/OBS HIGH 50: CPT | Performed by: HOSPITALIST

## 2025-05-24 RX ORDER — AMIODARONE HYDROCHLORIDE 200 MG/1
TABLET ORAL
Qty: 64 TABLET | Refills: 0 | Status: SHIPPED | OUTPATIENT
Start: 2025-05-24 | End: 2025-05-27

## 2025-05-24 NOTE — PLAN OF CARE
Problem: Patient Centered Care  Goal: Patient preferences are identified and integrated in the patient's plan of care  Description: Interventions:  - What would you like us to know as we care for you?   Problem: CARDIOVASCULAR - ADULT  Goal: Maintains optimal cardiac output and hemodynamic stability  Description: INTERVENTIONS:  - Monitor vital signs, rhythm, and trends  - Monitor for bleeding, hypotension and signs of decreased cardiac output  - Evaluate effectiveness of vasoactive medications to optimize hemodynamic stability  - Monitor arterial and/or venous puncture sites for bleeding and/or hematoma  - Assess quality of pulses, skin color and temperature  - Assess for signs of decreased coronary artery perfusion - ex. Angina  - Evaluate fluid balance, assess for edema, trend weights  Outcome: Progressing  Goal: Absence of cardiac arrhythmias or at baseline  Description: INTERVENTIONS:  - Continuous cardiac monitoring, monitor vital signs, obtain 12 lead EKG if indicated  - Evaluate effectiveness of antiarrhythmic and heart rate control medications as ordered  - Initiate emergency measures for life threatening arrhythmias  - Monitor electrolytes and administer replacement therapy as ordered  Outcome: Progressing     Problem: RESPIRATORY - ADULT  Goal: Achieves optimal ventilation and oxygenation  Description: INTERVENTIONS:  - Assess for changes in respiratory status  - Assess for changes in mentation and behavior  - Position to facilitate oxygenation and minimize respiratory effort  - Oxygen supplementation based on oxygen saturation or ABGs  - Provide Smoking Cessation handout, if applicable  - Encourage broncho-pulmonary hygiene including cough, deep breathe, Incentive Spirometry  - Assess the need for suctioning and perform as needed  - Assess and instruct to report SOB or any respiratory difficulty  - Respiratory Therapy support as indicated  - Manage/alleviate anxiety  - Monitor for signs/symptoms of CO2  retention  Outcome: Progressing     Problem: SAFETY ADULT - FALL  Goal: Free from fall injury  Description: INTERVENTIONS:  - Assess pt frequently for physical needs  - Identify cognitive and physical deficits and behaviors that affect risk of falls.  - [place] fall precautions as indicated by assessment.  - Educate pt/family on patient safety including physical limitations  - Instruct pt to call for assistance with activity based on assessment  - Modify environment to reduce risk of injury  - Provide assistive devices as appropriate  - Consider OT/PT consult to assist with strengthening/mobility  - Encourage toileting schedule  Outcome: Progressing     Problem: DISCHARGE PLANNING  Goal: Discharge to home or other facility with appropriate resources  Description: INTERVENTIONS:  - Identify barriers to discharge w/pt and caregiver  - Include patient/family/discharge partner in discharge planning  - Arrange for needed discharge resources and transportation as appropriate  - Identify discharge learning needs (meds, wound care, etc)  - Arrange for interpreters to assist at discharge as needed  - Consider post-discharge preferences of patient/family/discharge partner  - Complete POLST form as appropriate  - Assess patient's ability to be responsible for managing their own health  - Refer to Case Management Department for coordinating discharge planning if the patient needs post-hospital services based on physician/LIP order or complex needs related to functional status, cognitive ability or social support system  Outcome: Progressing     - Provide timely, complete, and accurate information to patient/family  - Incorporate patient and family knowledge, values, beliefs, and cultural backgrounds into the planning and delivery of care  - Encourage patient/family to participate in care and decision-making at the level they choose  - Honor patient and family perspectives and choices  Outcome: Progressing

## 2025-05-24 NOTE — PROGRESS NOTES
[facility]  part of [facility]    Progress Note    Balwinder Muniz Patient Status:  Inpatient    1945 MRN [id number]   Location [facility] 3W/SW Attending Al Grijalva MD   Hosp Day # 5 PCP Geovanni Garza MD       Subjective:   Balwinder Muniz is a(n) 79 year old male who I am seeing for ESRD      No new issues  No shortness of breath  Dialyzed yesterday with 3 L UF    Objective:   /69 (BP Location: Right arm)   Pulse 66   Temp 98.1 °F (36.7 °C) (Oral)   Resp 18   Ht 5' 7\" (1.702 m)   Wt 178 lb 12.8 oz (81.1 kg)   SpO2 96%   BMI 28.00 kg/m²      Intake/Output Summary (Last 24 hours) at 2025 0738  Last data filed at 2025 0314  Gross per 24 hour   Intake 210 ml   Output 3000 ml   Net -2790 ml     Wt Readings from Last 1 Encounters:   25 178 lb 12.8 oz (81.1 kg)       Exam  Vital signs: Blood pressure 138/69, pulse 66, temperature 98.1 °F (36.7 °C), temperature source Oral, resp. rate 18, height 5' 7\" (1.702 m), weight 178 lb 12.8 oz (81.1 kg), SpO2 96%.    General: No acute distress. Alert and oriented x 3.  HEENT: Moist mucous membranes. EOMI. PERRLA  Neck:  No JVD.   Respiratory: Clear to auscultation bilaterally.    Cardiovascular: S1, S2.  Regular rate and rhythm.   Abdomen: Soft, nontender, nondistended.    Neurologic: No focal neurological deficits.  Musculoskeletal: Full range of motion of all extremities.  No swelling noted.  Access: AV fistula    Results:     Recent Labs   Lab 25  0705 25  0657 25  0549   RBC 3.43* 3.24* 3.60*   HGB 8.1* 7.8* 8.4*   HCT 27.7* 25.3* 28.2*   MCV 80.8 78.1* 78.3*   MCH 23.6* 24.1* 23.3*   MCHC 29.2* 30.8* 29.8*   RDW 17.2* 17.0* 17.2*   NEPRELIM 2.66 3.02 2.95   WBC 3.5* 3.9* 4.0   .0* 113.0* 137.0*         Recent Labs   Lab 25  0705 25  0657 25  0549   GLU 98 86 94   BUN 47* 67* 43*   CREATSERUM 7.28* 9.30* 7.11*   CA 8.7 8.5* 9.0    136 139   K 4.1 4.1 4.4    CL 99 95* 98   CO2 32.0 28.0 30.0          No results found.    Assessment and Plan:     Impression:    ESRD, HD Monday Wednesday Fridays schedule  A-fib- Cardiology evaluating.  Started on amiodarone.  On metoprolol and Eliquis  severe aortic stenosis. ( New dx)  .  Evaluation for TAVR  Anemia, on Epogen  Multiple myeloma, on treatment at [facility]  Secondary hyperparathyroidism, on sevelamer  PNA on antibiotic      Plan:    No acute indication for dialysis today  Next HD on Monday if still in hospital      Thank you very much for allowing me to participate in the care of your patient.  If you have any questions, please do not hesitate to contact me.     Emily Bishop MD  5/24/2025

## 2025-05-24 NOTE — PROGRESS NOTES
Progress Note  Balwinder Muniz Patient Status:  Observation    1945 MRN [id number]   Location [facility] 3W/SW Attending Anatoliy Haque MD   Hosp Day # 5 PCP Geovanni Garza MD     Attending Cardiologist: Jayson     SUBJECTIVE:    No complaints, hoping to go home soon.     VITALS:  /69 (BP Location: Right arm)   Pulse 66   Temp 98.1 °F (36.7 °C) (Oral)   Resp 18   Ht 5' 7\" (1.702 m)   Wt 178 lb 12.8 oz (81.1 kg)   SpO2 96%   BMI 28.00 kg/m²   INTAKE/OUTPUT:    Intake/Output Summary (Last 24 hours) at 2025 0730  Last data filed at 2025 0314  Gross per 24 hour   Intake 210 ml   Output 3000 ml   Net -2790 ml     Last 3 Weights   25 0302 178 lb 12.8 oz (81.1 kg)   25 0500 184 lb (83.5 kg)   25 1955 186 lb 9.6 oz (84.6 kg)   25 1613 183 lb (83 kg)   25 1136 188 lb (85.3 kg)   25 1246 190 lb (86.2 kg)     LABS:  Recent Labs   Lab 25  0705 25  0657 25  0549   GLU 98 86 94   BUN 47* 67* 43*   CREATSERUM 7.28* 9.30* 7.11*   EGFRCR 7* 5* 7*   CA 8.7 8.5* 9.0    136 139   K 4.1 4.1 4.4   CL 99 95* 98   CO2 32.0 28.0 30.0     Recent Labs   Lab 25  0705 25  0657 25  0549   RBC 3.43* 3.24* 3.60*   HGB 8.1* 7.8* 8.4*   HCT 27.7* 25.3* 28.2*   MCV 80.8 78.1* 78.3*   MCH 23.6* 24.1* 23.3*   MCHC 29.2* 30.8* 29.8*   RDW 17.2* 17.0* 17.2*   NEPRELIM 2.66 3.02 2.95   WBC 3.5* 3.9* 4.0   .0* 113.0* 137.0*     No results for input(s): \"TROP\", \"CK\" in the last 168 hours.  DIAGNOSTICS:  TELEMETRY: ST    ROS: Negative unless noted above   PHYSICAL EXAM:  General: Alert and oriented x 2. No apparent distress.  HEENT: Normocephalic, sclera are nonicteric. Hearing appropriate bilaterally.  Neck: No JVD or Carotid bruits. Trachea midline.   Cardiac: Regular rate and rhythm. S1, S2 auscultated. +murmur  Lungs: faint posterior wheezes.  Chest expansion symmetrical. Regular effort.   Abdomen: Soft, non-tender, +BS. No  hepatosplenomegaly or appreciable masses.   Extremities: Without clubbing, cyanosis. Peripheral pulses are 2+. Edema none   Neurologic: Motor and sensory nerves grossly intact.   Psych: Appropriate affect   Skin: Warm and dry. No obvious lesions, wounds, or ulcerations. '    MEDICATIONS:  Scheduled Medications[1]  Medication Infusions[2]    ASSESSMENT:    Presented after feeling lightheaded during dialysis. He was found to be in Afib. He has a history of PAF.  While in the emergency department he spontaneously converted to sinus rhythm, and he was subsequently started on an oral amiodarone load to hold sinus rhythm    PAF   - TSH is normal  - Intermittent/ self resolving paroxysms of Afib,  amiodarone and rate controlled on metoprolol   - Anticoagulation with Eliquis 2.5 mg twice daily due to end-stage renal disease on dialysis    Mild LV Dysfunction, LVEF 40-45%  Severe AoS  - Peak Velocity 2.4 m/s, MG 48, YOANDY 0.59   - Surgical turndown, o/p TAVR eval   - Volume per neprho     Acute hypoxic respiratory failure/ Poss PNA  - ABX per primary     HTN   - Reasonably controlled in the setting of acute illness    ESRD on HD   Chronic Anemia   - Per nephrology    Type II DM- A1c 6.4%   Multiple Myeloma w/ Chemo   Dementia      PLAN:  - Continue loading with amiodarone 400 mg p.o. twice daily until May 26, then on May 27 we will start daily dosing of 200 mg p.o.  - Will see [facility] on 6/19/25 @ 9:15 AM   - Ok for discharge from a cardiology standpoint    Plan of care discussed with patient and RN.     Ryann Ward, MSN, FNP-BC, CCK  05/24/25   7:44 AM               [1]    epoetin rabia  5,000 Units Subcutaneous Once per day on Monday Wednesday Friday    piperacillin-tazobactam  3.375 g Intravenous Q12H    insulin degludec  10 Units Subcutaneous Nightly    multivitamin  1 tablet Oral Daily    amiodarone  400 mg Oral BID with meals    apixaban  2.5 mg Oral BID    donepezil  10 mg Oral Nightly     latanoprost  1 drop Both Eyes Nightly    metoprolol succinate ER  50 mg Oral Daily    sevelamer carbonate  800 mg Oral TID CC    insulin aspart  1-5 Units Subcutaneous TID CC and HS   [2]

## 2025-05-24 NOTE — PROGRESS NOTES
[facility]  part of [facility]    Progress Note    Balwinder Muniz Patient Status:  Inpatient    1945 MRN [id number]   Location [facility] 3W/SW Attending Al Grijalva MD   Hosp Day # 5 PCP Geovanni Garza MD       Subjective:     Pt sleeping.    Objective:   Blood pressure 131/69, pulse 66, temperature 98.2 °F (36.8 °C), temperature source Oral, resp. rate 18, height 67\", weight 178 lb 12.8 oz (81.1 kg), SpO2 96%.    Gen:   NAD.  Awake and alert.  CV:   RRR, no m/g/r  Pulm:   CTA bilat  Abd:   +bs, soft, NT, ND  LE:   No c/c/e  Neuro:   nonfocal    Results:     Lab Results   Component Value Date    WBC 4.0 2025    HGB 8.4 (L) 2025    HCT 28.2 (L) 2025    .0 (L) 2025    CREATSERUM 7.11 (H) 2025    BUN 43 (H) 2025     2025    K 4.4 2025    CL 98 2025    CO2 30.0 2025    GLU 94 2025    CA 9.0 2025    ALB 4.0 2025    ALKPHO 56 2025    BILT 0.6 2025    TP 6.5 2025    AST <8 2025    ALT <7 (L) 2025    INR 1.0 2018    TSH 2.783 2025    PSA 4.80 (H) 2022    MG 2.2 2025    PHOS 5.8 (H) 2025    B12 1,773 (H) 2023       No results found.        Assessment and Plan:     Atrial fibrillation with RVR - resolved  - back in sinus rhythm on amiodarone and metoprolol  - Continue chronic anticoagulation with Eliquis  - Telemetry monitoring  - Cardiology on consult      Acute hypoxic respiratory failure - resolved  Currently on room air  Possible aspiration pneumonia vs pulmonary edema  - CXR reviewed - edema vs pna vs both  - cont abx for possible pneumonia  - ST eval  - fluid removal with HD  - incentive spirometry  - mobilize, PT/OT eval    Severe aortic stenoss  - pt has f/u in valve clinic     ESRD on HD  - Nephrology consulted, appreciate recommendations for further HD.     Generalized Weakness  - Likely related to HD and A-fib and PNA  and deconditioning as above  - Treating underlying conditions  - PT/OT  DC planning for rehab on dc.     DM2  - ISS     MM  - monitor counts  - pt's oncologist to d/w his cardiologist regarding continuing Velcade     Dementia  - monitor mental status     dvt proph:     Eliquis     Code status:    Full        MDM:    High Al Garcia MD  5/24/2025

## 2025-05-25 LAB
ALBUMIN SERPL-MCNC: 4 G/DL (ref 3.2–4.8)
ANION GAP SERPL CALC-SCNC: 11 MMOL/L (ref 0–18)
BUN BLD-MCNC: 60 MG/DL (ref 9–23)
BUN/CREAT SERPL: 6.5 (ref 10–20)
CALCIUM BLD-MCNC: 9.2 MG/DL (ref 8.7–10.4)
CHLORIDE SERPL-SCNC: 101 MMOL/L (ref 98–112)
CO2 SERPL-SCNC: 27 MMOL/L (ref 21–32)
CREAT BLD-MCNC: 9.18 MG/DL (ref 0.7–1.3)
EGFRCR SERPLBLD CKD-EPI 2021: 5 ML/MIN/1.73M2 (ref 60–?)
GLUCOSE BLD-MCNC: 93 MG/DL (ref 70–99)
GLUCOSE BLDC GLUCOMTR-MCNC: 149 MG/DL (ref 70–99)
GLUCOSE BLDC GLUCOMTR-MCNC: 161 MG/DL (ref 70–99)
GLUCOSE BLDC GLUCOMTR-MCNC: 182 MG/DL (ref 70–99)
GLUCOSE BLDC GLUCOMTR-MCNC: 95 MG/DL (ref 70–99)
OSMOLALITY SERPL CALC.SUM OF ELEC: 305 MOSM/KG (ref 275–295)
PHOSPHATE SERPL-MCNC: 6.7 MG/DL (ref 2.4–5.1)
POTASSIUM SERPL-SCNC: 4.7 MMOL/L (ref 3.5–5.1)
SODIUM SERPL-SCNC: 139 MMOL/L (ref 136–145)

## 2025-05-25 PROCEDURE — 99232 SBSQ HOSP IP/OBS MODERATE 35: CPT | Performed by: INTERNAL MEDICINE

## 2025-05-25 PROCEDURE — 99233 SBSQ HOSP IP/OBS HIGH 50: CPT | Performed by: HOSPITALIST

## 2025-05-25 RX ORDER — ALBUMIN (HUMAN) 12.5 G/50ML
25 SOLUTION INTRAVENOUS
Status: ACTIVE | OUTPATIENT
Start: 2025-05-25 | End: 2025-05-27

## 2025-05-25 RX ORDER — LOPERAMIDE HYDROCHLORIDE 2 MG/1
2 CAPSULE ORAL 4 TIMES DAILY PRN
Status: DISCONTINUED | OUTPATIENT
Start: 2025-05-25 | End: 2025-05-27

## 2025-05-25 NOTE — PLAN OF CARE
Problem: Patient Centered Care  Goal: Patient preferences are identified and integrated in the patient's plan of care  Description: Interventions:  - What would you like us to know as we care for you? From home w/ wife  - Provide timely, complete, and accurate information to patient/family  - Incorporate patient and family knowledge, values, beliefs, and cultural backgrounds into the planning and delivery of care  - Encourage patient/family to participate in care and decision-making at the level they choose  - Honor patient and family perspectives and choices  Outcome: Progressing     Problem: CARDIOVASCULAR - ADULT  Goal: Maintains optimal cardiac output and hemodynamic stability  Description: INTERVENTIONS:  - Monitor vital signs, rhythm, and trends  - Monitor for bleeding, hypotension and signs of decreased cardiac output  - Evaluate effectiveness of vasoactive medications to optimize hemodynamic stability  - Monitor arterial and/or venous puncture sites for bleeding and/or hematoma  - Assess quality of pulses, skin color and temperature  - Assess for signs of decreased coronary artery perfusion - ex. Angina  - Evaluate fluid balance, assess for edema, trend weights  Outcome: Progressing  Goal: Absence of cardiac arrhythmias or at baseline  Description: INTERVENTIONS:  - Continuous cardiac monitoring, monitor vital signs, obtain 12 lead EKG if indicated  - Evaluate effectiveness of antiarrhythmic and heart rate control medications as ordered  - Initiate emergency measures for life threatening arrhythmias  - Monitor electrolytes and administer replacement therapy as ordered  Outcome: Progressing     Problem: RESPIRATORY - ADULT  Goal: Achieves optimal ventilation and oxygenation  Description: INTERVENTIONS:  - Assess for changes in respiratory status  - Assess for changes in mentation and behavior  - Position to facilitate oxygenation and minimize respiratory effort  - Oxygen supplementation based on oxygen  saturation or ABGs  - Provide Smoking Cessation handout, if applicable  - Encourage broncho-pulmonary hygiene including cough, deep breathe, Incentive Spirometry  - Assess the need for suctioning and perform as needed  - Assess and instruct to report SOB or any respiratory difficulty  - Respiratory Therapy support as indicated  - Manage/alleviate anxiety  - Monitor for signs/symptoms of CO2 retention  Outcome: Progressing     Problem: SAFETY ADULT - FALL  Goal: Free from fall injury  Description: INTERVENTIONS:  - Assess pt frequently for physical needs  - Identify cognitive and physical deficits and behaviors that affect risk of falls.  - [place] fall precautions as indicated by assessment.  - Educate pt/family on patient safety including physical limitations  - Instruct pt to call for assistance with activity based on assessment  - Modify environment to reduce risk of injury  - Provide assistive devices as appropriate  - Consider OT/PT consult to assist with strengthening/mobility  - Encourage toileting schedule  Outcome: Progressing     Problem: DISCHARGE PLANNING  Goal: Discharge to home or other facility with appropriate resources  Description: INTERVENTIONS:  - Identify barriers to discharge w/pt and caregiver  - Include patient/family/discharge partner in discharge planning  - Arrange for needed discharge resources and transportation as appropriate  - Identify discharge learning needs (meds, wound care, etc)  - Arrange for interpreters to assist at discharge as needed  - Consider post-discharge preferences of patient/family/discharge partner  - Complete POLST form as appropriate  - Assess patient's ability to be responsible for managing their own health  - Refer to Case Management Department for coordinating discharge planning if the patient needs post-hospital services based on physician/LIP order or complex needs related to functional status, cognitive ability or social support system  Outcome:  Progressing

## 2025-05-25 NOTE — PLAN OF CARE
Problem: Patient Centered Care  Goal: Patient preferences are identified and integrated in the patient's plan of care  Description: Interventions:  - What would you like us to know as we care for you? From home w/ wife  - Provide timely, complete, and accurate information to patient/family  - Incorporate patient and family knowledge, values, beliefs, and cultural backgrounds into the planning and delivery of care  - Encourage patient/family to participate in care and decision-making at the level they choose  - Honor patient and family perspectives and choices  Outcome: Progressing     Problem: CARDIOVASCULAR - ADULT  Goal: Maintains optimal cardiac output and hemodynamic stability  Description: INTERVENTIONS:  - Monitor vital signs, rhythm, and trends  - Monitor for bleeding, hypotension and signs of decreased cardiac output  - Evaluate effectiveness of vasoactive medications to optimize hemodynamic stability  - Monitor arterial and/or venous puncture sites for bleeding and/or hematoma  - Assess quality of pulses, skin color and temperature  - Assess for signs of decreased coronary artery perfusion - ex. Angina  - Evaluate fluid balance, assess for edema, trend weights  Outcome: Progressing  Goal: Absence of cardiac arrhythmias or at baseline  Description: INTERVENTIONS:  - Continuous cardiac monitoring, monitor vital signs, obtain 12 lead EKG if indicated  - Evaluate effectiveness of antiarrhythmic and heart rate control medications as ordered  - Initiate emergency measures for life threatening arrhythmias  - Monitor electrolytes and administer replacement therapy as ordered  Outcome: Progressing     Problem: RESPIRATORY - ADULT  Goal: Achieves optimal ventilation and oxygenation  Description: INTERVENTIONS:  - Assess for changes in respiratory status  - Assess for changes in mentation and behavior  - Position to facilitate oxygenation and minimize respiratory effort  - Oxygen supplementation based on oxygen  saturation or ABGs  - Provide Smoking Cessation handout, if applicable  - Encourage broncho-pulmonary hygiene including cough, deep breathe, Incentive Spirometry  - Assess the need for suctioning and perform as needed  - Assess and instruct to report SOB or any respiratory difficulty  - Respiratory Therapy support as indicated  - Manage/alleviate anxiety  - Monitor for signs/symptoms of CO2 retention  Outcome: Progressing     Problem: SAFETY ADULT - FALL  Goal: Free from fall injury  Description: INTERVENTIONS:  - Assess pt frequently for physical needs  - Identify cognitive and physical deficits and behaviors that affect risk of falls.  - [place] fall precautions as indicated by assessment.  - Educate pt/family on patient safety including physical limitations  - Instruct pt to call for assistance with activity based on assessment  - Modify environment to reduce risk of injury  - Provide assistive devices as appropriate  - Consider OT/PT consult to assist with strengthening/mobility  - Encourage toileting schedule  Outcome: Progressing     Problem: DISCHARGE PLANNING  Goal: Discharge to home or other facility with appropriate resources  Description: INTERVENTIONS:  - Identify barriers to discharge w/pt and caregiver  - Include patient/family/discharge partner in discharge planning  - Arrange for needed discharge resources and transportation as appropriate  - Identify discharge learning needs (meds, wound care, etc)  - Arrange for interpreters to assist at discharge as needed  - Consider post-discharge preferences of patient/family/discharge partner  - Complete POLST form as appropriate  - Assess patient's ability to be responsible for managing their own health  - Refer to Case Management Department for coordinating discharge planning if the patient needs post-hospital services based on physician/LIP order or complex needs related to functional status, cognitive ability or social support system  Outcome:  Progressing

## 2025-05-25 NOTE — CM/SW NOTE
05/25/25 1100   Choice of Post-Acute Provider   Informed patient of right to choose their preferred provider Yes   List of appropriate post-acute services provided to patient/family with quality data Yes   Patient/family choice [facility]   Information given to Patient;Daughter;Son;Spouse/Significant other     Social work received a phone call from the patient's daughter Mariza stating that the choice is [facility].    [facility] is reserved in [place].    The patient will need HD approval at [facility] before discharge.    SW/CM to remain available for support and/or discharge planning.     Magali Trinh MSW, LSW  Discharge Planner [id number]

## 2025-05-25 NOTE — PROGRESS NOTES
[facility]  part of [facility]    Progress Note    Balwinder Muniz Patient Status:  Inpatient    1945 MRN [id number]   Location [facility] 3W/SW Attending Al Grijalva MD   Hosp Day # 6 PCP Geovanni Garza MD       Subjective:     No complaints.  No SOB.    Objective:   Blood pressure 146/73, pulse 57, temperature 98.3 °F (36.8 °C), temperature source Oral, resp. rate 18, height 67\", weight 178 lb 12.8 oz (81.1 kg), SpO2 97%.    Gen:   NAD.  Awake and alert.  CV:   RRR, no m/g/r  Pulm:   CTA bilat  Abd:   +bs, soft, NT, ND  LE:   No c/c/e  Neuro:   nonfocal    Results:     Lab Results   Component Value Date    WBC 4.0 2025    HGB 8.4 (L) 2025    HCT 28.2 (L) 2025    .0 (L) 2025    CREATSERUM 9.18 (H) 2025    BUN 60 (H) 2025     2025    K 4.7 2025     2025    CO2 27.0 2025    GLU 93 2025    CA 9.2 2025    ALB 4.0 2025    ALKPHO 56 2025    BILT 0.6 2025    TP 6.5 2025    AST <8 2025    ALT <7 (L) 2025    INR 1.0 2018    TSH 2.783 2025    PSA 4.80 (H) 2022    MG 2.2 2025    PHOS 6.7 (H) 2025    B12 1,773 (H) 2023       No results found.        Assessment and Plan:     Atrial fibrillation with RVR - resolved  - back in sinus rhythm on amiodarone and metoprolol  - Continue chronic anticoagulation with Eliquis  - Telemetry monitoring  - Cardiology on consult      Acute hypoxic respiratory failure - resolved  Currently on room air  Possible aspiration pneumonia vs pulmonary edema  - CXR reviewed - edema vs pna vs both  - cont abx for possible pneumonia  - ST eval  - fluid removal with HD  - incentive spirometry  - mobilize, PT/OT eval     Severe aortic stenoss  - pt has f/u in valve clinic     ESRD on HD  - Nephrology consulted, appreciate recommendations for further HD.     Generalized Weakness  - Likely related to HD and  A-fib and PNA and deconditioning as above  - Treating underlying conditions  - PT/OT  DC planning for rehab on dc.     DM2  - ISS     MM  - monitor counts  - pt's oncologist to d/w his cardiologist regarding continuing Velcade     Dementia  - monitor mental status     dvt proph:     Eliquis     Code status:    Full       MDM:    High Al Garcia MD  5/25/2025

## 2025-05-25 NOTE — PROGRESS NOTES
[facility]  part of [facility]    Progress Note    Balwinder Muniz Patient Status:  Inpatient    1945 MRN [id number]   Location [facility] 3W/SW Attending Al Grijalva MD   Hosp Day # 6 PCP Geovanni Garza MD       Subjective:   Balwinder Muniz is a(n) 79 year old male who I am seeing for ESRD      No new issues  No shortness of breath      Objective:   /73 (BP Location: Right arm)   Pulse 57   Temp 98.3 °F (36.8 °C) (Oral)   Resp 18   Ht 5' 7\" (1.702 m)   Wt 178 lb 12.8 oz (81.1 kg)   SpO2 97%   BMI 28.00 kg/m²      Intake/Output Summary (Last 24 hours) at 2025 1012  Last data filed at 2025 0938  Gross per 24 hour   Intake 440 ml   Output --   Net 440 ml     Wt Readings from Last 1 Encounters:   25 178 lb 12.8 oz (81.1 kg)       Exam  Vital signs: Blood pressure 146/73, pulse 57, temperature 98.3 °F (36.8 °C), temperature source Oral, resp. rate 18, height 5' 7\" (1.702 m), weight 178 lb 12.8 oz (81.1 kg), SpO2 97%.    General: No acute distress. Alert and oriented x 3.  HEENT: Moist mucous membranes. EOMI. PERRLA  Neck:  No JVD.   Respiratory: Clear to auscultation bilaterally.    Cardiovascular: S1, S2.  Regular rate and rhythm.   Abdomen: Soft, nontender, nondistended.    Neurologic: No focal neurological deficits.  Musculoskeletal: Full range of motion of all extremities.  No swelling noted.  Access: AV fistula    Results:     Recent Labs   Lab 25  0705 25  0657 25  0549   RBC 3.43* 3.24* 3.60*   HGB 8.1* 7.8* 8.4*   HCT 27.7* 25.3* 28.2*   MCV 80.8 78.1* 78.3*   MCH 23.6* 24.1* 23.3*   MCHC 29.2* 30.8* 29.8*   RDW 17.2* 17.0* 17.2*   NEPRELIM 2.66 3.02 2.95   WBC 3.5* 3.9* 4.0   .0* 113.0* 137.0*         Recent Labs   Lab 25  0657 25  0549 25  0639   GLU 86 94 93   BUN 67* 43* 60*   CREATSERUM 9.30* 7.11* 9.18*   CA 8.5* 9.0 9.2    139 139   K 4.1 4.4 4.7   CL 95* 98 101   CO2 28.0 30.0 27.0           No results found.    Assessment and Plan:     Impression:    ESRD, HD Monday Wednesday Fridays schedule  A-fib- Cardiology evaluating.  Started on amiodarone.  On metoprolol and Eliquis  severe aortic stenosis. ( New dx)  .  Evaluation for TAVR  Anemia, on Epogen  Multiple myeloma, on treatment at [facility]  Secondary hyperparathyroidism, on sevelamer  PNA on antibiotic      Plan:    No acute indication for dialysis ,   Next HD on Monday if still in hospital  Plan for rehab    Thank you very much for allowing me to participate in the care of your patient.  If you have any questions, please do not hesitate to contact me.     Emily Bishop MD

## 2025-05-26 LAB
GLUCOSE BLDC GLUCOMTR-MCNC: 105 MG/DL (ref 70–99)
GLUCOSE BLDC GLUCOMTR-MCNC: 121 MG/DL (ref 70–99)
GLUCOSE BLDC GLUCOMTR-MCNC: 201 MG/DL (ref 70–99)
GLUCOSE BLDC GLUCOMTR-MCNC: 91 MG/DL (ref 70–99)

## 2025-05-26 PROCEDURE — 99233 SBSQ HOSP IP/OBS HIGH 50: CPT | Performed by: INTERNAL MEDICINE

## 2025-05-26 PROCEDURE — 99232 SBSQ HOSP IP/OBS MODERATE 35: CPT | Performed by: INTERNAL MEDICINE

## 2025-05-26 NOTE — PLAN OF CARE
Pt on room air. Pt is alert and oriented x4 but forgetful at times. Pt is up with one assist with walker, no complaints at the moment. HD  2L.       Problem: Patient Centered Care  Goal: Patient preferences are identified and integrated in the patient's plan of care  Description: Interventions:  - What would you like us to know as we care for you? From home w/ wife  - Provide timely, complete, and accurate information to patient/family  - Incorporate patient and family knowledge, values, beliefs, and cultural backgrounds into the planning and delivery of care  - Encourage patient/family to participate in care and decision-making at the level they choose  - Honor patient and family perspectives and choices  Outcome: Progressing     Problem: CARDIOVASCULAR - ADULT  Goal: Maintains optimal cardiac output and hemodynamic stability  Description: INTERVENTIONS:  - Monitor vital signs, rhythm, and trends  - Monitor for bleeding, hypotension and signs of decreased cardiac output  - Evaluate effectiveness of vasoactive medications to optimize hemodynamic stability  - Monitor arterial and/or venous puncture sites for bleeding and/or hematoma  - Assess quality of pulses, skin color and temperature  - Assess for signs of decreased coronary artery perfusion - ex. Angina  - Evaluate fluid balance, assess for edema, trend weights  Outcome: Progressing  Goal: Absence of cardiac arrhythmias or at baseline  Description: INTERVENTIONS:  - Continuous cardiac monitoring, monitor vital signs, obtain 12 lead EKG if indicated  - Evaluate effectiveness of antiarrhythmic and heart rate control medications as ordered  - Initiate emergency measures for life threatening arrhythmias  - Monitor electrolytes and administer replacement therapy as ordered  Outcome: Progressing     Problem: RESPIRATORY - ADULT  Goal: Achieves optimal ventilation and oxygenation  Description: INTERVENTIONS:  - Assess for changes in respiratory status  - Assess for  changes in mentation and behavior  - Position to facilitate oxygenation and minimize respiratory effort  - Oxygen supplementation based on oxygen saturation or ABGs  - Provide Smoking Cessation handout, if applicable  - Encourage broncho-pulmonary hygiene including cough, deep breathe, Incentive Spirometry  - Assess the need for suctioning and perform as needed  - Assess and instruct to report SOB or any respiratory difficulty  - Respiratory Therapy support as indicated  - Manage/alleviate anxiety  - Monitor for signs/symptoms of CO2 retention  Outcome: Progressing     Problem: SAFETY ADULT - FALL  Goal: Free from fall injury  Description: INTERVENTIONS:  - Assess pt frequently for physical needs  - Identify cognitive and physical deficits and behaviors that affect risk of falls.  - [place] fall precautions as indicated by assessment.  - Educate pt/family on patient safety including physical limitations  - Instruct pt to call for assistance with activity based on assessment  - Modify environment to reduce risk of injury  - Provide assistive devices as appropriate  - Consider OT/PT consult to assist with strengthening/mobility  - Encourage toileting schedule  Outcome: Progressing     Problem: DISCHARGE PLANNING  Goal: Discharge to home or other facility with appropriate resources  Description: INTERVENTIONS:  - Identify barriers to discharge w/pt and caregiver  - Include patient/family/discharge partner in discharge planning  - Arrange for needed discharge resources and transportation as appropriate  - Identify discharge learning needs (meds, wound care, etc)  - Arrange for interpreters to assist at discharge as needed  - Consider post-discharge preferences of patient/family/discharge partner  - Complete POLST form as appropriate  - Assess patient's ability to be responsible for managing their own health  - Refer to Case Management Department for coordinating discharge planning if the patient needs post-hospital  services based on physician/LIP order or complex needs related to functional status, cognitive ability or social support system  Outcome: Progressing

## 2025-05-26 NOTE — PROGRESS NOTES
[facility]  part of [facility]    Progress Note    Balwinder Muniz Patient Status:  Inpatient    1945 MRN [id number]   Location [facility] 3W/SW Attending Al Grijalva MD   Hosp Day # 7 PCP Geovanni Garza MD       Subjective:   Balwinder Muniz is a(n) 79 year old male who I am seeing for ESRD      No new issues  No shortness of breath  Doing well  Plan for HD      Objective:   BP (!) 183/78 (BP Location: Right arm)   Pulse 58   Temp 97.5 °F (36.4 °C) (Oral)   Resp 19   Ht 5' 7\" (1.702 m)   Wt 181 lb (82.1 kg)   SpO2 98%   BMI 28.35 kg/m²      Intake/Output Summary (Last 24 hours) at 2025 1051  Last data filed at 2025 0840  Gross per 24 hour   Intake 580 ml   Output --   Net 580 ml     Wt Readings from Last 1 Encounters:   25 181 lb (82.1 kg)       Exam  Vital signs: Blood pressure (!) 183/78, pulse 58, temperature 97.5 °F (36.4 °C), temperature source Oral, resp. rate 19, height 5' 7\" (1.702 m), weight 181 lb (82.1 kg), SpO2 98%.    General: No acute distress. Alert and oriented x 3.  HEENT: Moist mucous membranes. EOMI. PERRLA  Neck:  No JVD.   Respiratory: Clear to auscultation bilaterally.    Cardiovascular: S1, S2.  Regular rate and rhythm.   Abdomen: Soft, nontender, nondistended.    Neurologic: No focal neurological deficits.  Musculoskeletal: Full range of motion of all extremities.  No swelling noted.  Access: AV fistula    Results:     Recent Labs   Lab 25  0705 25  0657 25  0549   RBC 3.43* 3.24* 3.60*   HGB 8.1* 7.8* 8.4*   HCT 27.7* 25.3* 28.2*   MCV 80.8 78.1* 78.3*   MCH 23.6* 24.1* 23.3*   MCHC 29.2* 30.8* 29.8*   RDW 17.2* 17.0* 17.2*   NEPRELIM 2.66 3.02 2.95   WBC 3.5* 3.9* 4.0   .0* 113.0* 137.0*         Recent Labs   Lab 25  0657 25  0549 25  0639   GLU 86 94 93   BUN 67* 43* 60*   CREATSERUM 9.30* 7.11* 9.18*   CA 8.5* 9.0 9.2    139 139   K 4.1 4.4 4.7   CL 95* 98 101   CO2 28.0 30.0  27.0          No results found.    Assessment and Plan:     Impression:    ESRD, HD Monday Wednesday Fridays schedule  A-fib- Cardiology evaluating.  Started on amiodarone.  On metoprolol and Eliquis  severe aortic stenosis. ( New dx)  .  Evaluation for TAVR  Anemia, on Epogen  Multiple myeloma, on treatment at [facility]  Secondary hyperparathyroidism, on sevelamer  PNA on antibiotic      Plan:    HD today as per his schedule for vol management and metabolic clearance  Plan for rehab    Thank you very much for allowing me to participate in the care of your patient.  If you have any questions, please do not hesitate to contact me.     Emily Bishop MD

## 2025-05-26 NOTE — PLAN OF CARE
Problem: Patient Centered Care  Goal: Patient preferences are identified and integrated in the patient's plan of care  Description: Interventions:  - What would you like us to know as we care for you? From home w/ wife  - Provide timely, complete, and accurate information to patient/family  - Incorporate patient and family knowledge, values, beliefs, and cultural backgrounds into the planning and delivery of care  - Encourage patient/family to participate in care and decision-making at the level they choose  - Honor patient and family perspectives and choices  Outcome: Progressing     Problem: CARDIOVASCULAR - ADULT  Goal: Maintains optimal cardiac output and hemodynamic stability  Description: INTERVENTIONS:  - Monitor vital signs, rhythm, and trends  - Monitor for bleeding, hypotension and signs of decreased cardiac output  - Evaluate effectiveness of vasoactive medications to optimize hemodynamic stability  - Monitor arterial and/or venous puncture sites for bleeding and/or hematoma  - Assess quality of pulses, skin color and temperature  - Assess for signs of decreased coronary artery perfusion - ex. Angina  - Evaluate fluid balance, assess for edema, trend weights  Outcome: Progressing  Goal: Absence of cardiac arrhythmias or at baseline  Description: INTERVENTIONS:  - Continuous cardiac monitoring, monitor vital signs, obtain 12 lead EKG if indicated  - Evaluate effectiveness of antiarrhythmic and heart rate control medications as ordered  - Initiate emergency measures for life threatening arrhythmias  - Monitor electrolytes and administer replacement therapy as ordered  Outcome: Progressing     Problem: RESPIRATORY - ADULT  Goal: Achieves optimal ventilation and oxygenation  Description: INTERVENTIONS:  - Assess for changes in respiratory status  - Assess for changes in mentation and behavior  - Position to facilitate oxygenation and minimize respiratory effort  - Oxygen supplementation based on oxygen  saturation or ABGs  - Provide Smoking Cessation handout, if applicable  - Encourage broncho-pulmonary hygiene including cough, deep breathe, Incentive Spirometry  - Assess the need for suctioning and perform as needed  - Assess and instruct to report SOB or any respiratory difficulty  - Respiratory Therapy support as indicated  - Manage/alleviate anxiety  - Monitor for signs/symptoms of CO2 retention  Outcome: Progressing     Problem: SAFETY ADULT - FALL  Goal: Free from fall injury  Description: INTERVENTIONS:  - Assess pt frequently for physical needs  - Identify cognitive and physical deficits and behaviors that affect risk of falls.  - [place] fall precautions as indicated by assessment.  - Educate pt/family on patient safety including physical limitations  - Instruct pt to call for assistance with activity based on assessment  - Modify environment to reduce risk of injury  - Provide assistive devices as appropriate  - Consider OT/PT consult to assist with strengthening/mobility  - Encourage toileting schedule  Outcome: Progressing     Problem: DISCHARGE PLANNING  Goal: Discharge to home or other facility with appropriate resources  Description: INTERVENTIONS:  - Identify barriers to discharge w/pt and caregiver  - Include patient/family/discharge partner in discharge planning  - Arrange for needed discharge resources and transportation as appropriate  - Identify discharge learning needs (meds, wound care, etc)  - Arrange for interpreters to assist at discharge as needed  - Consider post-discharge preferences of patient/family/discharge partner  - Complete POLST form as appropriate  - Assess patient's ability to be responsible for managing their own health  - Refer to Case Management Department for coordinating discharge planning if the patient needs post-hospital services based on physician/LIP order or complex needs related to functional status, cognitive ability or social support system  Outcome:  Progressing

## 2025-05-26 NOTE — PROGRESS NOTES
[facility]  part of [facility] Hospitalist Progress Note     Balwinder Muniz Patient Status:  Inpatient    1945 MRN [id number]   Location [facility] 3W/SW Attending Daniel Guthrie MD   Hosp Day # 7 PCP Geovanni Garza MD     Subjective:     Patient seen and examined.  No acute overnight events.  No active complaints      Objective:    Review of Systems:   ROS completed; pertinent positive and negatives stated in subjective.      Vital signs:  Temp:  [97.5 °F (36.4 °C)-98.3 °F (36.8 °C)] 97.5 °F (36.4 °C)  Pulse:  [56-59] 58  Resp:  [18-20] 19  BP: (138-183)/(68-87) 183/78  SpO2:  [96 %-98 %] 98 %      Physical Exam:    Gen: NAD AO x3  Chest: good air entry CTABL  CVS: normal s1 and s2 RR  Abd: NABS soft NT ND  Neuro: CN 2-12 grossly intact  Ext: no edema in bilateral LE      Diagnostic Data:    Labs:  Recent Labs   Lab 25  1135 25  0710 25  0705 25  0657 25  0549   WBC 3.5* 3.2* 3.5* 3.9* 4.0   HGB 7.7* 8.0* 8.1* 7.8* 8.4*   MCV 78.7* 78.2* 80.8 78.1* 78.3*   PLT 96.0* 93.0* 109.0* 113.0* 137.0*       Recent Labs   Lab 25  1629 25  1135 25  0705 25  0657 25  0549 25  0639   *   < > 98 86 94 93   BUN 30*   < > 47* 67* 43* 60*   CREATSERUM 4.97*   < > 7.28* 9.30* 7.11* 9.18*   CA 8.9   < > 8.7 8.5* 9.0 9.2   ALB 4.5   < > 3.9  --  4.0 4.0      < > 141 136 139 139   K 3.2*   < > 4.1 4.1 4.4 4.7   CL 92*   < > 99 95* 98 101   CO2 34.0*   < > 32.0 28.0 30.0 27.0   ALKPHO 56  --   --   --   --   --    AST <8  --   --   --   --   --    ALT <7*  --   --   --   --   --    BILT 0.6  --   --   --   --   --    TP 6.5  --   --   --   --   --     < > = values in this interval not displayed.       Estimated Creatinine Clearance: 6.2 mL/min (A) (based on SCr of 9.18 mg/dL (H)).    No results for input(s): \"PTP\", \"INR\" in the last 168 hours.           Imaging: Imaging data reviewed in Epic.    Medications:  Scheduled Medications[1]    Assessment & Plan:     Atrial fibrillation with RVR - resolved  back in sinus rhythm on amiodarone and metoprolol  Continue chronic anticoagulation with Eliquis  Telemetry monitoring  Cardiology on consult      Acute hypoxic respiratory failure - resolved  Currently on room air  Possible aspiration pneumonia vs pulmonary edema  CXR reviewed - edema vs pna vs both  cont abx for possible pneumonia  fluid removal with HD  incentive spirometry   mobilize, PT/OT eval     Severe aortic stenoss  pt has f/u in valve clinic     ESRD on HD  Nephrology consulted, appreciate recommendations for further HD.     Generalized Weakness  Likely related to HD and A-fib and PNA and deconditioning as above  Treating underlying conditions  PT/OT  DC planning for rehab on dc.     DM2  -SS     MM  monitor counts  pt's oncologist to d/w his cardiologist regarding continuing Velcade     Dementia  monitor mental status      Plan of care discussed with patient or family at bedside.      Supplementary Documentation:     Quality:  DVT Prophylaxis: Eliquis  CODE status: Full  Haque: No  Central line: No      Estimated date of discharge: TBD  Discharge is dependent on: clinical stability  At this point Mr. Muniz is expected to be discharge to: home                   Daniel Guthrie MD  Hospitalist    MDM: High, I personally reviewed the available laboratories, imaging including CBC, CMp. I discussed the case with patient. I ordered laboratories, studies including CBC. I adjusted medications  Medical decision making high, risk is high.         The 21st Century Cures Act makes medical notes like these available to patients in the interest of transparency. Please be advised this is a medical document. Medical documents are intended to carry relevant information, facts as evident, and the clinical opinion of the practitioner. The medical note is intended as peer to peer communication and may appear blunt or direct. It is  written in medical language and may contain abbreviations or verbiage that are unfamiliar.        [1]    epoetin rabia  5,000 Units Subcutaneous Once per day on Monday Wednesday Friday    piperacillin-tazobactam  3.375 g Intravenous Q12H    insulin degludec  10 Units Subcutaneous Nightly    multivitamin  1 tablet Oral Daily    amiodarone  400 mg Oral BID with meals    apixaban  2.5 mg Oral BID    donepezil  10 mg Oral Nightly    latanoprost  1 drop Both Eyes Nightly    metoprolol succinate ER  50 mg Oral Daily    sevelamer carbonate  800 mg Oral TID CC    insulin aspart  1-5 Units Subcutaneous TID CC and HS

## 2025-05-26 NOTE — SPIRITUAL CARE NOTE
Spiritual Care Visit Note    Patient Name: Balwinder Muniz Date of Spiritual Care Visit: 25   : 1945 Primary Dx: Atrial fibrillation with RVR (HCC)       Referred By: Referral From:     Spiritual Care Taxonomy:    Intended Effects: Demonstrate caring and concern    Methods: Offer emotional support, Encourage sharing of feelings    Interventions: Acknowledge current situation, Acknowledge response to difficult experience, Ask questions to bring forth feelings, Ask guided questions, [place], Provide hospitality, Explain  role, Identify supportive relationship(s)    Visit Type/Summary:     - Spiritual Care: Responded to a request for spiritual care and assessed patient for spiritual care needs. Consulted with RN prior to visit. Offered empathic listening and emotional support. Family expressed appreciation for  visit. Patient is a [place].  remains available as needed for follow up.    Spiritual Care support can be requested via an Epic consult. For urgent/immediate needs, please contact the On Call  at: [facility]: ext [phone]    Chaplain Resident, Dior Ch, PhD

## 2025-05-27 ENCOUNTER — TELEPHONE (OUTPATIENT)
Dept: INTERNAL MEDICINE CLINIC | Facility: CLINIC | Age: 80
End: 2025-05-27

## 2025-05-27 ENCOUNTER — TELEPHONE (OUTPATIENT)
Dept: NEUROLOGY | Facility: CLINIC | Age: 80
End: 2025-05-27

## 2025-05-27 VITALS
SYSTOLIC BLOOD PRESSURE: 159 MMHG | OXYGEN SATURATION: 97 % | RESPIRATION RATE: 20 BRPM | HEART RATE: 65 BPM | BODY MASS INDEX: 27.78 KG/M2 | WEIGHT: 177 LBS | TEMPERATURE: 99 F | DIASTOLIC BLOOD PRESSURE: 77 MMHG | HEIGHT: 67 IN

## 2025-05-27 LAB
ALBUMIN SERPL-MCNC: 4 G/DL (ref 3.2–4.8)
ALBUMIN/GLOB SERPL: 2.1 {RATIO} (ref 1–2)
ALP LIVER SERPL-CCNC: 43 U/L (ref 45–117)
ALT SERPL-CCNC: 58 U/L (ref 10–49)
ANION GAP SERPL CALC-SCNC: 9 MMOL/L (ref 0–18)
AST SERPL-CCNC: 28 U/L (ref ?–34)
BASOPHILS # BLD AUTO: 0.03 X10(3) UL (ref 0–0.2)
BASOPHILS NFR BLD AUTO: 0.7 %
BILIRUB SERPL-MCNC: 0.5 MG/DL (ref 0.2–1.1)
BUN BLD-MCNC: 43 MG/DL (ref 9–23)
BUN/CREAT SERPL: 5.3 (ref 10–20)
CALCIUM BLD-MCNC: 9.2 MG/DL (ref 8.7–10.4)
CHLORIDE SERPL-SCNC: 97 MMOL/L (ref 98–112)
CO2 SERPL-SCNC: 30 MMOL/L (ref 21–32)
CREAT BLD-MCNC: 8.13 MG/DL (ref 0.7–1.3)
DEPRECATED RDW RBC AUTO: 47.9 FL (ref 35.1–46.3)
EGFRCR SERPLBLD CKD-EPI 2021: 6 ML/MIN/1.73M2 (ref 60–?)
EOSINOPHIL # BLD AUTO: 0.14 X10(3) UL (ref 0–0.7)
EOSINOPHIL NFR BLD AUTO: 3.3 %
ERYTHROCYTE [DISTWIDTH] IN BLOOD BY AUTOMATED COUNT: 16.9 % (ref 11–15)
GLOBULIN PLAS-MCNC: 1.9 G/DL (ref 2–3.5)
GLUCOSE BLD-MCNC: 123 MG/DL (ref 70–99)
GLUCOSE BLDC GLUCOMTR-MCNC: 101 MG/DL (ref 70–99)
GLUCOSE BLDC GLUCOMTR-MCNC: 95 MG/DL (ref 70–99)
HCT VFR BLD AUTO: 26.2 % (ref 39–53)
HGB BLD-MCNC: 7.9 G/DL (ref 13–17.5)
IMM GRANULOCYTES # BLD AUTO: 0.01 X10(3) UL (ref 0–1)
IMM GRANULOCYTES NFR BLD: 0.2 %
LYMPHOCYTES # BLD AUTO: 0.59 X10(3) UL (ref 1–4)
LYMPHOCYTES NFR BLD AUTO: 13.9 %
MAGNESIUM SERPL-MCNC: 2.4 MG/DL (ref 1.6–2.6)
MCH RBC QN AUTO: 23.4 PG (ref 26–34)
MCHC RBC AUTO-ENTMCNC: 30.2 G/DL (ref 31–37)
MCV RBC AUTO: 77.5 FL (ref 80–100)
MONOCYTES # BLD AUTO: 0.4 X10(3) UL (ref 0.1–1)
MONOCYTES NFR BLD AUTO: 9.4 %
NEUTROPHILS # BLD AUTO: 3.07 X10 (3) UL (ref 1.5–7.7)
NEUTROPHILS # BLD AUTO: 3.07 X10(3) UL (ref 1.5–7.7)
NEUTROPHILS NFR BLD AUTO: 72.5 %
OSMOLALITY SERPL CALC.SUM OF ELEC: 294 MOSM/KG (ref 275–295)
PHOSPHATE SERPL-MCNC: 6.1 MG/DL (ref 2.4–5.1)
PLATELET # BLD AUTO: 144 10(3)UL (ref 150–450)
POTASSIUM SERPL-SCNC: 4.4 MMOL/L (ref 3.5–5.1)
PROT SERPL-MCNC: 5.9 G/DL (ref 5.7–8.2)
RBC # BLD AUTO: 3.38 X10(6)UL (ref 3.8–5.8)
SODIUM SERPL-SCNC: 136 MMOL/L (ref 136–145)
WBC # BLD AUTO: 4.2 X10(3) UL (ref 4–11)

## 2025-05-27 PROCEDURE — 99239 HOSP IP/OBS DSCHRG MGMT >30: CPT | Performed by: INTERNAL MEDICINE

## 2025-05-27 PROCEDURE — 99232 SBSQ HOSP IP/OBS MODERATE 35: CPT | Performed by: INTERNAL MEDICINE

## 2025-05-27 RX ORDER — AMIODARONE HYDROCHLORIDE 200 MG/1
200 TABLET ORAL DAILY
Status: DISCONTINUED | OUTPATIENT
Start: 2025-05-28 | End: 2025-05-27

## 2025-05-27 RX ORDER — AMIODARONE HYDROCHLORIDE 200 MG/1
200 TABLET ORAL DAILY
Qty: 60 TABLET | Refills: 0 | Status: SHIPPED | OUTPATIENT
Start: 2025-05-27 | End: 2025-07-26

## 2025-05-27 NOTE — DISCHARGE SUMMARY
[facility]  part of [facility]    DISCHARGE SUMMARY     Balwinder Muniz Patient Status:  Inpatient    1945 MRN [id number]   Location [facility] 3W/SW Attending Elena Zamorano MD   Hosp Day # 8 PCP Geovanni Garza MD     Date of Admission: 2025  Date of Discharge:  2025    Discharge Disposition: Home or Self Care    Discharge Diagnosis:     Atrial fibrillation with RVR - resolved  Acute hypoxic respiratory failure - resolved  Severe aortic stenoss  ESRD on HD  Generalized Weakness  DM2  MM  Dementia    History of Present Illness:     This is a 79 year oldmale who presented feeling generally weak and lightheaded.  Patient with history of ESRD and went to dialysis on day of admission.  After finishing his session patient again to feel very weak, tired and lightheaded.  Patient was sent to ED for further evaluation.  In the ED he was found to be in atrial fibrillation.  At time of interview, patient was sleepy and mildly confused.  He reported feeling weak.  Denied any new focal weakness or sensory changes.    Otherwise denied current chest pain, shortness of breath, abdominal pain, nausea or vomiting, fevers or chills.     Brief Synopsis:     Atrial fibrillation with RVR - resolved  back in sinus rhythm on amiodarone and metoprolol  Continue chronic anticoagulation with Eliquis  Telemetry monitoring  Cardiology on consult                Discharge on metoprolol and amiodarone               Close opt follow up     Acute hypoxic respiratory failure - resolved  Currently on room air  Possible aspiration pneumonia vs pulmonary edema  CXR reviewed - edema vs pna vs both  Completed 8 days of IV abx. Will hold off on abx at the time of discharge  fluid removal with HD  Incentive spirometry  Mobilize, PT/OT eval     Severe aortic stenoss  pt has f/u in valve clinic     ESRD on HD  Nephrology consulted, appreciate recommendations for further HD.     Generalized Weakness  Likely related  to HD and A-fib and PNA and deconditioning as above  Treating underlying conditions  PT/OT  DC planning for rehab on dc.     DM2  -SS     MM  monitor counts  pt's oncologist to d/w his cardiologist regarding continuing Velcade     Dementia  monitor mental status    Patient is to follow up with PCP, Cardiology and Nephrology as opt for further care.  Patient is to remain compliant with all discharge medications and instructions and to follow up as advised.   Patient encouraged to make healthy lifestyle and dietary changes.    Lace+ Score: 86  59-90 High Risk  29-58 Medium Risk  0-28   Low Risk       TCM Follow-Up Recommendation:  LACE > 58: High Risk of readmission after discharge from the hospital.      Procedures during hospitalization:   None    Incidental or significant findings and recommendations (brief descriptions):  None    Lab/Test results pending at Discharge:   None    Consultants:  Consultants         Provider   Role Specialty     Laz Claudio MD      Consulting Physician Surgery, Thoracic     Chapo Perez MD      Consulting Physician CARDIOLOGY     Emily Bishop MD      Consulting Physician NEPHROLOGY            Discharge Medication List:     Discharge Medications        START taking these medications        Instructions Prescription details   amiodarone 200 MG Tabs  Commonly known as: Pacerone      Take 1 tablet (200 mg total) by mouth daily.   Stop taking on: July 26, 2025  Quantity: 60 tablet  Refills: 0            CONTINUE taking these medications        Instructions Prescription details   acetaminophen 325 MG Tabs  Commonly known as: Tylenol      Take 1-2 tablets (325-650 mg total) by mouth every 6 (six) hours as needed.   Refills: 0     acyclovir 400 MG Tabs  Commonly known as: Zovirax      Take 1 tablet (400 mg total) by mouth daily.   Refills: 11     Basaglar KwikPen 100 UNIT/ML Sopn  Generic drug: insulin glargine      Take Basaglar 10 units at night; On Night of chemo patient should take 22  of Basaglar; On the next 2 days,  he should take 20 units; on day 4, he should take 15 units on day 4 and then back down to usual 10 units from the 5 day onwards.   Quantity: 24 mL  Refills: 3     BD Pen Needle Micro U/F 32G X 6 MM Misc  Generic drug: Insulin Pen Needle      Use with insulin daily   Quantity: 100 each  Refills: 3     donepezil 10 MG Tabs  Commonly known as: Aricept      Take 1 tablet (10 mg total) by mouth nightly.   Quantity: 90 tablet  Refills: 3     Eliquis 2.5 MG Tabs  Generic drug: apixaban      Take 1 tablet (2.5 mg total) by mouth 2 (two) times daily   Quantity: 60 tablet  Refills: 11     Folbic 2.5-25-2 MG Tabs  Generic drug: folacin-pyridoxine-cyancobalamin      Take 1 tablet by mouth daily.   Refills: 10     latanoprost 0.005 % Soln  Commonly known as: Xalatan      Place 1 drop into both eyes nightly.   Refills: 0     LiquaCel Liqd      Take 30 mL by mouth daily.   Refills: 0     Loratadine 5 MG Chew      Chew 1 tablet (5 mg total) by mouth every other day.   Refills: 0     metoprolol succinate ER 50 MG Tb24  Commonly known as: Toprol XL      Take 1 tablet (50 mg total) by mouth daily.   Refills: 0     mometasone furoate 50 MCG/ACT Susp  Commonly known as: Nasonex      1 spray by Nasal route daily as needed.   Refills: 0     Ocuvite Adult 50+ Caps      Take 1 tablet by mouth daily.   Refills: 0     sevelamer carbonate 800 MG Tabs  Commonly known as: Renvela      Take 1 tablet (800 mg total) by mouth 3 (three) times daily with meals.   Refills: 0     Vitamin D 25 MCG (1000 UT) Tabs      Take 2 tablets by mouth in the morning.   Refills: 0               Where to Get Your Medications        These medications were sent to [id number] - [place], [place] - [address] [phone], [phone]  [address] [address]      Phone: [phone]   amiodarone 200 MG Tabs         [place]    Follow-up appointment:   [facility], Structural Heart  [address]  [facility] [address]    Go on 6/19/2025  9:15 TAVR consult at [facility].  Park in [facility] and check in at [place] reg desk.  You can eat, drink, take AM meds- no caffiene.  Please expect 4-6 hr day for consults, education, and testing. Call [phone] with any questions.    Geovanni Garza MD  [address] [facility] [address]  [phone]    Follow up in 1 week(s)      Nicolás Mesa DO  [address] [facility] [address]  [facility] [address]  [phone]    Follow up in 1 week(s)      Emily Bishop MD  [address] [facility] [address]  [facility] [address]  [phone]    Follow up in 1 week(s)      Appointments for Next 30 Days 5/27/2025 - 6/26/2025        Date Arrival Time Visit Type Length Department Provider     6/19/2025  9:15 AM  CONSULT - STRUCTURAL HEART [6139] 30 min [facility] Rn, Structural Heart, RN    Patient Instructions:         Location Instructions:     Your appointment is scheduled at [facility]. The address is&nbsp; [address]. To reach Registration, park in the [facility]. Enter through the revolving doors located on the ground floor. Veer left past the Information Desk and proceed to the [facility] Registration desk.  Masks are optional for all patients and visitors, unless otherwise indicated.                      Vital signs:  Temp:  [97.6 °F (36.4 °C)-97.8 °F (36.6 °C)] 97.6 °F (36.4 °C)  Pulse:  [56-62] 59  Resp:  [19-22] 20  BP: (120-182)/(61-71) 120/61  SpO2:  [96 %-100 %] 97 %    Physical Exam:    Gen: NAD AO x3  Chest: good air entry CTABL  CVS: normal s1 and s2 RR  Abd: NABS soft NT ND  Neuro: CN 2-12 grossly intact  Ext: no edema in bilateral LE    -----------------------------------------------------------------------------------------------  PATIENT DISCHARGE INSTRUCTIONS: See electronic chart    Elena Zamorano MD  Hospitalist    Time spent:  > 30 minutes    The 21st Century Cures Act  makes medical notes like these available to patients in the interest of transparency. Please be advised this is a medical document. Medical documents are intended to carry relevant information, facts as evident, and the clinical opinion of the practitioner. The medical note is intended as peer to peer communication and may appear blunt or direct. It is written in medical language and may contain abbreviations or verbiage that are unfamiliar.

## 2025-05-27 NOTE — PROGRESS NOTES
[facility]  part of [facility]     Hospitalist Progress Note     Balwinder Muniz Patient Status:  Inpatient    1945 MRN [id number]   Location [facility] 3W/SW Attending Daniel Guthrie MD   Hosp Day # 8 PCP Geovanni Garza MD     Subjective:     Patient seen and examined.  No acute overnight events.  No active complaints.  Currently waiting for approval for dialysis at the facility.   Looking forward to discharging soon.       Objective:    Review of Systems:   ROS completed; pertinent positive and negatives stated in subjective.      Vital signs:  Temp:  [97.6 °F (36.4 °C)-97.8 °F (36.6 °C)] 97.6 °F (36.4 °C)  Pulse:  [56-62] 59  Resp:  [19-22] 20  BP: (120-182)/(61-71) 120/61  SpO2:  [96 %-100 %] 97 %      Physical Exam:    Gen: NAD AO x3  Chest: good air entry CTABL  CVS: normal s1 and s2 RR  Abd: NABS soft NT ND  Neuro: CN 2-12 grossly intact  Ext: no edema in bilateral LE      Diagnostic Data:    Labs:  Recent Labs   Lab 25  0710 25  0705 25  0657 25  0549 25  1029   WBC 3.2* 3.5* 3.9* 4.0 4.2   HGB 8.0* 8.1* 7.8* 8.4* 7.9*   MCV 78.2* 80.8 78.1* 78.3* 77.5*   PLT 93.0* 109.0* 113.0* 137.0* 144.0*       Recent Labs   Lab 25  0549 25  0639 25  1028   GLU 94 93 123*   BUN 43* 60* 43*   CREATSERUM 7.11* 9.18* 8.13*   CA 9.0 9.2 9.2   ALB 4.0 4.0 4.0    139 136   K 4.4 4.7 4.4   CL 98 101 97*   CO2 30.0 27.0 30.0   ALKPHO  --   --  43*   AST  --   --  28   ALT  --   --  58*   BILT  --   --  0.5   TP  --   --  5.9       Estimated Creatinine Clearance: 7 mL/min (A) (based on SCr of 8.13 mg/dL (H)).    No results for input(s): \"PTP\", \"INR\" in the last 168 hours.           Imaging: Imaging data reviewed in Epic.    Medications: Scheduled Medications[1]    Assessment & Plan:     Atrial fibrillation with RVR - resolved  back in sinus rhythm on amiodarone and metoprolol  Continue chronic anticoagulation with EliquMaestro  Telemetry  monitoring  Cardiology on consult    Discharge on metoprolol and amiodarone   Close opt follow up     Acute hypoxic respiratory failure - resolved  Currently on room air  Possible aspiration pneumonia vs pulmonary edema  CXR reviewed - edema vs pna vs both  Completed 8 days of IV abx. Will hold off on abx at the time of discharge  fluid removal with HD  Incentive spirometry  Mobilize, PT/OT eval     Severe aortic stenoss  pt has f/u in valve clinic     ESRD on HD  Nephrology consulted, appreciate recommendations for further HD.     Generalized Weakness  Likely related to HD and A-fib and PNA and deconditioning as above  Treating underlying conditions  PT/OT  DC planning for rehab on dc.     DM2  -SS     MM  monitor counts  pt's oncologist to d/w his cardiologist regarding continuing Velcade     Dementia  monitor mental status      Plan of care discussed with patient or family at bedside.      Supplementary Documentation:     Quality:  DVT Prophylaxis: Eliquis  CODE status: Full  Haque: No  Central line: No      Estimated date of discharge: TBD  Discharge is dependent on: clinical stability  At this point Mr. Muniz is expected to be discharge to: home             MDM: High, I personally reviewed the available laboratories, imaging including CBC, CMp. I discussed the case with patient. I ordered laboratories, studies including CBC. I adjusted medications  Medical decision making high, risk is high.         The 21st Century Cures Act makes medical notes like these available to patients in the interest of transparency. Please be advised this is a medical document. Medical documents are intended to carry relevant information, facts as evident, and the clinical opinion of the practitioner. The medical note is intended as peer to peer communication and may appear blunt or direct. It is written in medical language and may contain abbreviations or verbiage that are unfamiliar.        [1]    [START ON 5/28/2025] amiodarone   200 mg Oral Daily    epoetin rabia  5,000 Units Subcutaneous Once per day on Monday Wednesday Friday    piperacillin-tazobactam  3.375 g Intravenous Q12H    insulin degludec  10 Units Subcutaneous Nightly    multivitamin  1 tablet Oral Daily    apixaban  2.5 mg Oral BID    donepezil  10 mg Oral Nightly    latanoprost  1 drop Both Eyes Nightly    metoprolol succinate ER  50 mg Oral Daily    sevelamer carbonate  800 mg Oral TID CC    insulin aspart  1-5 Units Subcutaneous TID CC and HS

## 2025-05-27 NOTE — PROGRESS NOTES
[facility]  part of [facility]    Progress Note    Balwinder Muniz Patient Status:  Inpatient    1945 MRN [id number]   Location [facility] 3W/SW Attending Al Grijalva MD   Hosp Day # 8 PCP Geovanni Garza MD       Subjective:   Balwinder Muniz is a(n) 79 year old male who I am seeing for ESRD      No new issues  No shortness of breath    Wife at bedside  Plan for FREDA      Objective:   /61 (BP Location: Right arm)   Pulse 59   Temp 97.6 °F (36.4 °C) (Oral)   Resp 20   Ht 5' 7\" (1.702 m)   Wt 177 lb (80.3 kg)   SpO2 97%   BMI 27.72 kg/m²      Intake/Output Summary (Last 24 hours) at 2025 1256  Last data filed at 2025 0825  Gross per 24 hour   Intake 1509 ml   Output --   Net 1509 ml     Wt Readings from Last 1 Encounters:   25 177 lb (80.3 kg)       Exam  Vital signs: Blood pressure 120/61, pulse 59, temperature 97.6 °F (36.4 °C), temperature source Oral, resp. rate 20, height 5' 7\" (1.702 m), weight 177 lb (80.3 kg), SpO2 97%.    General: No acute distress. Alert and oriented x 3.  HEENT: Moist mucous membranes. EOMI. PERRLA  Neck:  No JVD.   Respiratory: Clear to auscultation bilaterally.    Cardiovascular: S1, S2.  Regular rate and rhythm.   Abdomen: Soft, nontender, nondistended.    Neurologic: No focal neurological deficits.  Musculoskeletal: Full range of motion of all extremities.  No swelling noted.  Access: AV fistula    Results:     Recent Labs   Lab 25  0657 25  0549 25  1029   RBC 3.24* 3.60* 3.38*   HGB 7.8* 8.4* 7.9*   HCT 25.3* 28.2* 26.2*   MCV 78.1* 78.3* 77.5*   MCH 24.1* 23.3* 23.4*   MCHC 30.8* 29.8* 30.2*   RDW 17.0* 17.2* 16.9*   NEPRELIM 3.02 2.95 3.07   WBC 3.9* 4.0 4.2   .0* 137.0* 144.0*         Recent Labs   Lab 25  0549 25  0639 25  1028   GLU 94 93 123*   BUN 43* 60* 43*   CREATSERUM 7.11* 9.18* 8.13*   CA 9.0 9.2 9.2    139 136   K 4.4 4.7 4.4   CL 98 101 97*   CO2 30.0  27.0 30.0          No results found.    Assessment and Plan:     Impression:    ESRD, HD Monday Wednesday Fridays schedule  A-fib- Cardiology evaluating.  Started on amiodarone.  On metoprolol and Eliquis  severe aortic stenosis. ( New dx)  .  Evaluation for TAVR  Anemia, on Epogen  Multiple myeloma, on treatment at [facility]  Secondary hyperparathyroidism, on sevelamer  PNA on antibiotic      Plan:    HD MWF  Plan for rehab    Thank you very much for allowing me to participate in the care of your patient.  If you have any questions, please do not hesitate to contact me.     Emily Bishop MD

## 2025-05-27 NOTE — PLAN OF CARE
Pt is A/O x4 but forgetful at times. Pt on room air. No complaints at the moment. Medically cleared to be discharge to [facility]. IV and Telebox removed. Pt transferred by ambulance with no issues.       Problem: Patient Centered Care  Goal: Patient preferences are identified and integrated in the patient's plan of care  Description: Interventions:  - What would you like us to know as we care for you? From home w/ wife  - Provide timely, complete, and accurate information to patient/family  - Incorporate patient and family knowledge, values, beliefs, and cultural backgrounds into the planning and delivery of care  - Encourage patient/family to participate in care and decision-making at the level they choose  - Honor patient and family perspectives and choices  Outcome: Adequate for Discharge     Problem: CARDIOVASCULAR - ADULT  Goal: Maintains optimal cardiac output and hemodynamic stability  Description: INTERVENTIONS:  - Monitor vital signs, rhythm, and trends  - Monitor for bleeding, hypotension and signs of decreased cardiac output  - Evaluate effectiveness of vasoactive medications to optimize hemodynamic stability  - Monitor arterial and/or venous puncture sites for bleeding and/or hematoma  - Assess quality of pulses, skin color and temperature  - Assess for signs of decreased coronary artery perfusion - ex. Angina  - Evaluate fluid balance, assess for edema, trend weights  Outcome: Adequate for Discharge  Goal: Absence of cardiac arrhythmias or at baseline  Description: INTERVENTIONS:  - Continuous cardiac monitoring, monitor vital signs, obtain 12 lead EKG if indicated  - Evaluate effectiveness of antiarrhythmic and heart rate control medications as ordered  - Initiate emergency measures for life threatening arrhythmias  - Monitor electrolytes and administer replacement therapy as ordered  Outcome: Adequate for Discharge     Problem: RESPIRATORY - ADULT  Goal: Achieves optimal ventilation and  oxygenation  Description: INTERVENTIONS:  - Assess for changes in respiratory status  - Assess for changes in mentation and behavior  - Position to facilitate oxygenation and minimize respiratory effort  - Oxygen supplementation based on oxygen saturation or ABGs  - Provide Smoking Cessation handout, if applicable  - Encourage broncho-pulmonary hygiene including cough, deep breathe, Incentive Spirometry  - Assess the need for suctioning and perform as needed  - Assess and instruct to report SOB or any respiratory difficulty  - Respiratory Therapy support as indicated  - Manage/alleviate anxiety  - Monitor for signs/symptoms of CO2 retention  Outcome: Adequate for Discharge     Problem: SAFETY ADULT - FALL  Goal: Free from fall injury  Description: INTERVENTIONS:  - Assess pt frequently for physical needs  - Identify cognitive and physical deficits and behaviors that affect risk of falls.  - [place] fall precautions as indicated by assessment.  - Educate pt/family on patient safety including physical limitations  - Instruct pt to call for assistance with activity based on assessment  - Modify environment to reduce risk of injury  - Provide assistive devices as appropriate  - Consider OT/PT consult to assist with strengthening/mobility  - Encourage toileting schedule  Outcome: Adequate for Discharge     Problem: DISCHARGE PLANNING  Goal: Discharge to home or other facility with appropriate resources  Description: INTERVENTIONS:  - Identify barriers to discharge w/pt and caregiver  - Include patient/family/discharge partner in discharge planning  - Arrange for needed discharge resources and transportation as appropriate  - Identify discharge learning needs (meds, wound care, etc)  - Arrange for interpreters to assist at discharge as needed  - Consider post-discharge preferences of patient/family/discharge partner  - Complete POLST form as appropriate  - Assess patient's ability to be responsible for managing their  own health  - Refer to Case Management Department for coordinating discharge planning if the patient needs post-hospital services based on physician/LIP order or complex needs related to functional status, cognitive ability or social support system  Outcome: Adequate for Discharge

## 2025-05-27 NOTE — CM/SW NOTE
05/27/25 1427   Discharge disposition   Expected discharge disposition subacute   Post Acute Care Provider Other  ([facility])   Discharge transportation [facility]     The patient received a MDO for discharge.    The patient will be transported to [facility] via [facility] at 4pm.  PCS complete.    The number to call report is [phone].  The  [facility] liaison is aware of transport time.    RN to inform patient and family.    SW/CM to remain available for support and/or discharge planning.     Magali Trinh MSW, LSW  Discharge Planner [id number]

## 2025-05-27 NOTE — PLAN OF CARE
Problem: Patient Centered Care  Goal: Patient preferences are identified and integrated in the patient's plan of care  Description: Interventions:  - What would you like us to know as we care for you? From home w/ wife  - Provide timely, complete, and accurate information to patient/family  - Incorporate patient and family knowledge, values, beliefs, and cultural backgrounds into the planning and delivery of care  - Encourage patient/family to participate in care and decision-making at the level they choose  - Honor patient and family perspectives and choices  Outcome: Progressing     Problem: CARDIOVASCULAR - ADULT  Goal: Maintains optimal cardiac output and hemodynamic stability  Description: INTERVENTIONS:  - Monitor vital signs, rhythm, and trends  - Monitor for bleeding, hypotension and signs of decreased cardiac output  - Evaluate effectiveness of vasoactive medications to optimize hemodynamic stability  - Monitor arterial and/or venous puncture sites for bleeding and/or hematoma  - Assess quality of pulses, skin color and temperature  - Assess for signs of decreased coronary artery perfusion - ex. Angina  - Evaluate fluid balance, assess for edema, trend weights  Outcome: Progressing  Goal: Absence of cardiac arrhythmias or at baseline  Description: INTERVENTIONS:  - Continuous cardiac monitoring, monitor vital signs, obtain 12 lead EKG if indicated  - Evaluate effectiveness of antiarrhythmic and heart rate control medications as ordered  - Initiate emergency measures for life threatening arrhythmias  - Monitor electrolytes and administer replacement therapy as ordered  Outcome: Progressing     Problem: RESPIRATORY - ADULT  Goal: Achieves optimal ventilation and oxygenation  Description: INTERVENTIONS:  - Assess for changes in respiratory status  - Assess for changes in mentation and behavior  - Position to facilitate oxygenation and minimize respiratory effort  - Oxygen supplementation based on oxygen  saturation or ABGs  - Provide Smoking Cessation handout, if applicable  - Encourage broncho-pulmonary hygiene including cough, deep breathe, Incentive Spirometry  - Assess the need for suctioning and perform as needed  - Assess and instruct to report SOB or any respiratory difficulty  - Respiratory Therapy support as indicated  - Manage/alleviate anxiety  - Monitor for signs/symptoms of CO2 retention  Outcome: Progressing     Problem: SAFETY ADULT - FALL  Goal: Free from fall injury  Description: INTERVENTIONS:  - Assess pt frequently for physical needs  - Identify cognitive and physical deficits and behaviors that affect risk of falls.  - [place] fall precautions as indicated by assessment.  - Educate pt/family on patient safety including physical limitations  - Instruct pt to call for assistance with activity based on assessment  - Modify environment to reduce risk of injury  - Provide assistive devices as appropriate  - Consider OT/PT consult to assist with strengthening/mobility  - Encourage toileting schedule  Outcome: Progressing     Problem: DISCHARGE PLANNING  Goal: Discharge to home or other facility with appropriate resources  Description: INTERVENTIONS:  - Identify barriers to discharge w/pt and caregiver  - Include patient/family/discharge partner in discharge planning  - Arrange for needed discharge resources and transportation as appropriate  - Identify discharge learning needs (meds, wound care, etc)  - Arrange for interpreters to assist at discharge as needed  - Consider post-discharge preferences of patient/family/discharge partner  - Complete POLST form as appropriate  - Assess patient's ability to be responsible for managing their own health  - Refer to Case Management Department for coordinating discharge planning if the patient needs post-hospital services based on physician/LIP order or complex needs related to functional status, cognitive ability or social support system  Outcome:  Progressing

## 2025-05-29 ENCOUNTER — TELEPHONE (OUTPATIENT)
Facility: CLINIC | Age: 80
End: 2025-05-29

## 2025-05-29 ENCOUNTER — SNF DISCHARGE (OUTPATIENT)
Facility: SKILLED NURSING FACILITY | Age: 80
End: 2025-05-29

## 2025-05-29 VITALS
HEART RATE: 95 BPM | BODY MASS INDEX: 29 KG/M2 | SYSTOLIC BLOOD PRESSURE: 120 MMHG | OXYGEN SATURATION: 95 % | WEIGHT: 184.38 LBS | TEMPERATURE: 99 F | RESPIRATION RATE: 16 BRPM | DIASTOLIC BLOOD PRESSURE: 61 MMHG

## 2025-05-29 DIAGNOSIS — I48.91 ATRIAL FIBRILLATION, UNSPECIFIED TYPE (HCC): ICD-10-CM

## 2025-05-29 DIAGNOSIS — E11.9 TYPE 2 DIABETES MELLITUS WITHOUT COMPLICATION, WITHOUT LONG-TERM CURRENT USE OF INSULIN (HCC): Primary | ICD-10-CM

## 2025-05-29 DIAGNOSIS — N18.6 ESRD ON HEMODIALYSIS (HCC): ICD-10-CM

## 2025-05-29 DIAGNOSIS — I48.91 NEW ONSET A-FIB (HCC): ICD-10-CM

## 2025-05-29 DIAGNOSIS — M62.81 GENERALIZED MUSCLE WEAKNESS: ICD-10-CM

## 2025-05-29 DIAGNOSIS — Z99.2 ESRD ON HEMODIALYSIS (HCC): ICD-10-CM

## 2025-05-29 DIAGNOSIS — R60.0 EDEMA OF LEFT UPPER ARM: ICD-10-CM

## 2025-05-29 PROCEDURE — 99310 SBSQ NF CARE HIGH MDM 45: CPT | Performed by: NURSE PRACTITIONER

## 2025-05-29 NOTE — PROGRESS NOTES
529   Initial APN Encounter and Discharge Summary        11am  Balwinder MunizAddie    1945\    Met with patient and wife for follow up of ESRD on dialysis, DM, A-fib, and left arm edema.  Patient and wife both indicated they want to discharge home today. Wife reports she will agree with   [facility].  She will transport patient home and to and from dialysis. Patient is set up with dialysis with  [facility] in [place] tomorrow 25.    Patient is awake, alert, orientated x 2-3; forgetful.  Patient states his wife has been taking care of him at home for years; has been on dialysis for 3 years.    Patient arrived from [facility] on 25 approximately 4 pm and will discharge gaby 25 this afternoon.    Balwinder Muniz  : 1945  Age 79 year old  male patient is admitted to [facility] for dialysis,  rehabilitation and strengthening.      [facility] Admission:  25 to 25.  Admitted to [facility] 25  Discharge Home:  25    Chief complaint:  left arm edema and tenderness s/p dialysis 25.    HPI per hospital note.     79 year oldmale who presented feeling generally weak and lightheaded.  Patient with history of ESRD and went to dialysis on day of admission.  After finishing his session patient again to feel very weak, tired and lightheaded.  Patient was sent to ED for further evaluation.  In the ED he was found to be in atrial fibrillation.  At time of interview, patient was sleepy and mildly confused.  He reported feeling weak.  Denied any new focal weakness or sensory changes.    Otherwise denied current chest pain, shortness of breath, abdominal pain, nausea or vomiting, fevers or chills    Discharge Diagnosis:    Atrial fibrillation with RVR - resolved  Acute hypoxic respiratory failure - resolved  Severe aortic stenoss  ESRD on HD  Generalized Weakness  DM2  MM  Dementia    Past Medical History[1]  Past Surgical  History[2]  Family History[3]  Short Social Hx on File[4]    Immunization History   Administered Date(s) Administered    >=3 YRS TRI  MULTIDOSE VIAL (13894) FLU CLINIC 11/06/2015    Covid-19 Vaccine Moderna 100 mcg/0.5 ml 02/10/2021, 03/10/2021, 10/25/2021    Covid-19 Vaccine Moderna 50 Mcg/0.25 Ml 06/06/2022    Covid-19 Vaccine Pfizer 30 mcg/0.3 ml 09/15/2022    FLU VAC High Dose 65 YRS & Older PRSV Free (63686) 10/09/2018, 10/08/2021, 09/15/2022    FLUAD High Dose 65 yr and older (60481) 09/29/2017    Flublok Quad Influenza Vaccine (90063) 10/06/2023    Fluvirin, 3 Years & >, Im 11/01/2016, 09/21/2020    Fluzone Vaccine Medicare () 10/10/2016, 10/09/2018, 10/24/2019, 09/21/2020, 10/08/2021    HEP B 04/05/2021, 05/17/2021, 10/06/2021    HEP B, Adult 04/06/2022, 05/05/2022, 06/07/2022    High Dose Fluzone Influenza Vaccine, 65yr+ PF 0.5mL (13032) 10/17/2024    Influenza 11/08/2015, 09/01/2023    Moderna Covid-19 Vaccine 50mcg/0.5ml 12yrs+ 04/17/2024, 09/26/2024    Pfizer Covid-19 Vaccine 30mcg/0.3ml 12yrs+ 10/15/2023    Pneumococcal (Prevnar 13) 09/29/2017    Pneumovax 23 08/23/2021    RSV, recombinant, RSVpreF, adjuvanted (Arexvy) 12/12/2023    Zoster Vaccine Recombinant Adjuvanted (Shingrix) 02/11/2019, 06/14/2019     ALLERGIES:NKA  CODE STATUS:  Full Code  ADVANCED CARE PLANNING TEAM: Patient and family declining rehab; discharge 5/29/25    CURRENT MEDICATIONS - reviewed and updated on SNF EMAR  Tylenol 650 q 6 hrs prn  Acyclovir 400 mg daily  Basaglar Kwik Pen - 10 units nightly; on chemo nights follow assigned schedule  Aricept 0 mg nightly  Eliquis 2.5mg 2 x daily  Folbic 2.5 / 25-2 daily  Latanoprost 0.005% solution 1 gtt both eyes nightly  Liquacel 30 ml daily  Loratadine 5 mg daily  Metoprolol Succ 50 mg daily  Nasonex 1 spray nasally daily prn  Ocuvite Adult 59+ one daily  Sevelamer carbonate 800 mg tid  Vitamin D 2000 daily     SUBJECTIVE:  C/O left arm edema post dialysis.  Denies chest pain, SOB,  palpitations.  Denies chills, fevers.  Denies n/v.    PHYSICAL EXAM: 120/61. P95.  R16. Pox 95%. 98.6.  wght  184.5 lb  GENERAL HEALTH:well developed, well nourished, in no apparent distress in no acute distress  LINES, TUBES, DRAINS:   left arm fistula  SKIN: warm, dry  WOUND: see wound assessment,   EYES: EOM I. KYLE.  conjunctiva normal; no nystagmus, no drainage from eyes  NECK: supple  RESPIRATORY: clear, diminished at bases  CARDIOVASCULAR: A-Fib. Rate 95  ABDOMEN: BS+, soft, nondistended; no guarding  : deferred; on dialysis.  LYMPHATIC:no lymphedema  MUSCULOSKELETAL:  generalized weakness  EXTREMITIES/VASCULAR:no edema  NEUROLOGIC:follows commands  PSYCHIATRIC: alert and oriented x 3; affect appropriate      MEDICAL DECISION MAKING: wife assist with medical decisions.    Lab Results:   529/25  Wbc 4.0. Hgb 7.9. Plts 133.  Na 142. K 4.7. Cl 99. Co2 28. BUN 68. Creat 9.75    Patient and wife requested to discharge home today.  Patient will f/u with Dr. Redd at Dialysis 5/30/25.  Message left with Dr. Redd's office regarding left arm edema and tenderness; wife declining ultrasound of L arm prior to discharge.  Patient will f/u with dialysis staff at [facility] tomorrow.    Assessment  / Plan    Atrial Fibrillation with RVR - resolved  Continue Amiodarone 200 mg daily  Metoprolol Succ 50 mg daily  Home with Home Health RN to follow  F/U with cardiologist    Left upper edema w/ tenderness  Ice Pack intermittently  Elevate extremity  F/U with dialysis team 5/30/25 at [facility] to evaluate  Patient and wife declined ultrasound of L arm prior to discharge    Aortic Stenosis  F/U with cardiologist [facility]    ESRD on Dialysis  Continue dialysis post discharge   [facility] every M/W/F in [place]  Wife will transport patient to and from dialysis    Weakness / [id number]  Previous PNA completed antibiotics  Home PT / OT with [facility]    DM type 2 - noted  Continue Basaglar long acting  insulin as taken at home    Multiple Myeloma  F/U with oncologist at [facility]  Continue Velcade chemotherapy  Wife will transport patient to MD appointments.    Disposition:  Patient to discharge today 5/29/25 home with wife and [facility].  F/U with Dialysis 5/30/25  *Follow-Up with PCP within 1-2 weeks following [facility] discharge.   *Follow-Up with specialists as recommended.    Future Appointments   Date Time Provider Department Center   6/19/2025  9:15 AM Rn, Structural Heart, RN [facility]   7/8/2025  2:20 PM Lillian Lynn MD [facility]     *Greater than 45 minutes spent w/ patient and staff, including but not limited to/ reviewing present status, needs, abilities with disciplines, reviewing medical records, vital signs, labs, completing med rec and entering orders to establish plan of care in [facility].       Note to patient: The 21st Century Cures Act makes medical notes like these available to patients in the interest of transparency. However, this is a medical document intended as peer to peer communication. It is written in medical language and may contain abbreviations or verbiage that are unfamiliar. It may appear blunt or direct. Medical documents are intended to carry relevant information, facts as evident, and the clinical opinion of the practitioner who signs the document.    RUBY Jones  05/29/25                 [1]   Past Medical History:   Atrial fibrillation (HCC)    Back problem    Calculus of kidney    Cancer (HCC)    multiple myloma    Diabetes (HCC)    Dialysis patient    M,W, F    ESRD (end stage renal disease) (HCC)    Essential hypertension    Hearing impairment    bilateral hearing aids    High blood pressure    Multiple myeloma (HCC)    Muscle weakness    Neuropathy    Slight in feet    Osteoarthritis    Renal disorder    Visual impairment    Glasses   [2]   Past Surgical History:  Procedure Laterality Date    Colonoscopy  2004    Other surgical  history  1994    Other surgical history  2014    stem cell transplant     Other surgical history  11/12/2019    removal parathyroid adenoma    Other surgical history  08/18/2022    Arthroscopy   [3]   Family History  Problem Relation Age of Onset    Cancer Father         lung cancer     Hypertension Father     Heart Disorder Mother     Depression Mother     Psychiatric Mother     Heart Disorder Maternal Grandfather     Heart Disorder Brother         passed away with heart attack     Hypertension Brother     Hypertension Brother     Kidney Disease Brother     Kidney Disease Brother     Hypertension Brother    [4]   Social History  Socioeconomic History    Marital status:    Tobacco Use    Smoking status: Never     Passive exposure: Never    Smokeless tobacco: Never   Vaping Use    Vaping status: Never Used   Substance and Sexual Activity    Alcohol use: Not Currently     Alcohol/week: 1.0 standard drink of alcohol     Types: 1 Glasses of wine per week     Comment: social    Drug use: Never     Social Drivers of Health     Food Insecurity: No Food Insecurity (5/19/2025)    NCSS - Food Insecurity     Worried About Running Out of Food in the Last Year: No     Ran Out of Food in the Last Year: No   Transportation Needs: No Transportation Needs (5/19/2025)    NCSS - Transportation     Lack of Transportation: No   Housing Stability: Not At Risk (5/19/2025)    NCSS - Housing/Utilities     Has Housing: Yes     Worried About Losing Housing: No     Unable to Get Utilities: No

## 2025-05-29 NOTE — TELEPHONE ENCOUNTER
Dr Bishop patient of Dr Redd spoke to Kayla BAILON from [facility] that patient was admitted there for Rehab with in house dialysis but patient wife was not happy and decided have pt discharged from the facility ,is concerned patient has left arm edema and tenderness ,ecchymotic.bruised,wanted to do Ultrasound but refused.Rn called patient and talked to patient wife Noemi JUANJO verified reported that left arm still swollen and bruised ,tender applied ice pack to the site,will give pt Tylenol for pain .patient is on blood thinner pt is scheduled for dialysis tomorrow 5/30/25 at 11am.Advised if symptoms worse to go to ER /urgent care ,pt wife verbalized understanding.

## 2025-05-29 NOTE — TELEPHONE ENCOUNTER
Fide from nursing home states patient is having complications and would like to speak to Rn attempting Rn now please call

## 2025-06-01 ENCOUNTER — PATIENT MESSAGE (OUTPATIENT)
Dept: INTERNAL MEDICINE CLINIC | Facility: CLINIC | Age: 80
End: 2025-06-01

## 2025-06-02 RX ORDER — PEN NEEDLE, DIABETIC 32GX 5/32"
NEEDLE, DISPOSABLE MISCELLANEOUS
Qty: 100 EACH | Refills: 3 | Status: SHIPPED | OUTPATIENT
Start: 2025-06-02

## 2025-06-03 ENCOUNTER — TELEPHONE (OUTPATIENT)
Facility: CLINIC | Age: 80
End: 2025-06-03

## 2025-06-03 ENCOUNTER — OFFICE VISIT (OUTPATIENT)
Dept: OTOLARYNGOLOGY | Facility: CLINIC | Age: 80
End: 2025-06-03

## 2025-06-03 DIAGNOSIS — H74.01 TYMPANOSCLEROSIS OF RIGHT EAR: ICD-10-CM

## 2025-06-03 DIAGNOSIS — H61.23 BILATERAL IMPACTED CERUMEN: Primary | ICD-10-CM

## 2025-06-03 DIAGNOSIS — H90.6 MIXED CONDUCTIVE AND SENSORINEURAL HEARING LOSS OF BOTH EARS: ICD-10-CM

## 2025-06-03 PROCEDURE — 99213 OFFICE O/P EST LOW 20 MIN: CPT | Performed by: STUDENT IN AN ORGANIZED HEALTH CARE EDUCATION/TRAINING PROGRAM

## 2025-06-03 PROCEDURE — 69210 REMOVE IMPACTED EAR WAX UNI: CPT | Performed by: STUDENT IN AN ORGANIZED HEALTH CARE EDUCATION/TRAINING PROGRAM

## 2025-06-03 NOTE — TELEPHONE ENCOUNTER
Patient's spouse calling regards patients low blood sugar levels. States unsure what to do. Please call.

## 2025-06-03 NOTE — PROGRESS NOTES
Balwinder Muniz is a 79 year old male.   Chief Complaint   Patient presents with    Ear Problem     Ear cleaning      HPI:   79-year-old man with chronic hearing loss.  History of eustachian tube dysfunction.  Presents for ear evaluation    Current Medications[1]   Past Medical History[2]   Social History:  Short Social Hx on File[3]   Past Surgical History[4]      EXAM:   There were no vitals taken for this visit.    System Details   Skin Inspection - Normal.   Constitutional Overall appearance - Normal.   Head/Face Symmetric, TMJ tenderness not present    Eyes EOMI, PERRL   Right ear:  Canal with cerumen, TM with tympanosclerosis, no DAY   Left ear:  Canal with cerumen, TM intact, no DAY   Nose: Septum midline, inferior turbinates not enlarged, nasal valves without collapse    Oral cavity/Oropharynx: No lesions or masses on inspection or palpation, tonsils symmetric    Neck: Soft without LAD, thyroid not enlarged  Voice clear/ no stridor   Other:      SCOPES AND PROCEDURES:     Canals:  Left: Canal with cerumen preventing adequate view of TM, debrided with instrumentation  Right: Canal with cerumen preventing adequate view of TM, debrided with instrumentation    Tympanic Membranes:  Left: Normal tympanic membrane.   Right: Normal tympanic membrane.     TM Visualized Method:   Left TM examined via otomicroscopy.    Right TM examined via otomicroscopy.      PROCEDURE:   Removal of cerumen impaction   The cerumen impaction was completely removed on the left and right sides using microscopy as necessary.   Removal was completed by using a curette and suction.     AUDIOGRAM AND IMAGING:         IMPRESSION:   1. Bilateral impacted cerumen    2. Mixed conductive and sensorineural hearing loss of both ears    3. Tympanosclerosis of right ear       Recommendations:  - Ears cleaned of cerumen.  Otherwise no obvious concerns such as an effusion  - He will continue his hearing aids for his chronic hearing loss.  Working with  his audiologist to have his hearing aids readjusted.  Can see me back as needed for ear evaluation or hearing testing    The patient indicates understanding of these issues and agrees to the plan.      Carlos Harper MD  6/3/2025  11:27 AM       [1]   Current Outpatient Medications   Medication Sig Dispense Refill    Insulin Pen Needle (BD PEN NEEDLE DIONISIO 2ND GEN) 32G X 4 MM Does not apply Misc Use with insulin daily 100 each 3    amiodarone 200 MG Oral Tab Take 1 tablet (200 mg total) by mouth daily. 60 tablet 0    acetaminophen 325 MG Oral Tab Take 1-2 tablets (325-650 mg total) by mouth every 6 (six) hours as needed.      Multiple Vitamins-Minerals (OCUVITE ADULT 50+) Oral Cap Take 1 tablet by mouth daily.      mometasone furoate 50 MCG/ACT Nasal Suspension 1 spray by Nasal route daily as needed.      Loratadine 5 MG Oral Chew Tab Chew 1 tablet (5 mg total) by mouth every other day.      sevelamer carbonate 800 MG Oral Tab Take 1 tablet (800 mg total) by mouth 3 (three) times daily with meals.      metoprolol succinate ER 50 MG Oral Tablet 24 Hr Take 1 tablet (50 mg total) by mouth daily.      donepezil 10 MG Oral Tab Take 1 tablet (10 mg total) by mouth nightly. 90 tablet 3    Insulin Pen Needle (BD PEN NEEDLE MICRO U/F) 32G X 6 MM Does not apply Misc Use with insulin daily 100 each 3    insulin glargine (BASAGLAR KWIKPEN) 100 UNIT/ML Subcutaneous Solution Pen-injector Take Basaglar 10 units at night; On Night of chemo patient should take 22 of Basaglar; On the next 2 days,  he should take 20 units; on day 4, he should take 15 units on day 4 and then back down to usual 10 units from the 5 day onwards. 24 mL 3    apixaban (ELIQUIS) 2.5 MG Oral Tab Take 1 tablet (2.5 mg total) by mouth 2 (two) times daily 60 tablet 11    Amino Acids (LIQUACEL) Oral Liquid Take 30 mL by mouth daily.      latanoprost 0.005 % Ophthalmic Solution Place 1 drop into both eyes nightly.      Cholecalciferol (VITAMIN D) 25 MCG (1000 UT) Oral  Tab Take 2 tablets by mouth in the morning.      FOLBIC 2.5-25-2 MG Oral Tab Take 1 tablet by mouth daily.  10    acyclovir (ZOVIRAX) 400 MG Oral Tab Take 1 tablet (400 mg total) by mouth daily.  11   [2]   Past Medical History:   Atrial fibrillation (HCC)    Back problem    Calculus of kidney    Cancer (HCC)    multiple myloma    Diabetes (HCC)    Dialysis patient    M,W, F    ESRD (end stage renal disease) (HCC)    Essential hypertension    Hearing impairment    bilateral hearing aids    High blood pressure    Multiple myeloma (HCC)    Muscle weakness    Neuropathy    Slight in feet    Osteoarthritis    Renal disorder    Visual impairment    Glasses   [3]   Social History  Socioeconomic History    Marital status:    Tobacco Use    Smoking status: Never     Passive exposure: Never    Smokeless tobacco: Never   Vaping Use    Vaping status: Never Used   Substance and Sexual Activity    Alcohol use: Not Currently     Alcohol/week: 1.0 standard drink of alcohol     Types: 1 Glasses of wine per week     Comment: social    Drug use: Never     Social Drivers of Health     Food Insecurity: No Food Insecurity (5/19/2025)    NCSS - Food Insecurity     Worried About Running Out of Food in the Last Year: No     Ran Out of Food in the Last Year: No   Transportation Needs: No Transportation Needs (5/19/2025)    NCSS - Transportation     Lack of Transportation: No   Housing Stability: Not At Risk (5/19/2025)    NCSS - Housing/Utilities     Has Housing: Yes     Worried About Losing Housing: No     Unable to Get Utilities: No   [4]   Past Surgical History:  Procedure Laterality Date    Colonoscopy  2004    Other surgical history  1994    Other surgical history  2014    stem cell transplant     Other surgical history  11/12/2019    removal parathyroid adenoma    Other surgical history  08/18/2022    Arthroscopy

## 2025-06-04 NOTE — TELEPHONE ENCOUNTER
Hi!    Please ask her if there is any change in his blood sugars depending upon whether he has dialysis or not?

## 2025-06-04 NOTE — TELEPHONE ENCOUNTER
Patient experienced hypoglycemia yesterday morning. Seeking further advice if insulin dose needs to be adjusted.     Returned call to the patient's spouse. Verified name and Date of birth. Balwinder experienced a low blood sugar level yesterday morning.     Hypoglycemia    Onset of hypoglycemia: yesterday morning was the first low sugar.     BG levels: checks via fingerstick fasting. Yesterday checked more levels due to low sugar in the morning.      Dates Before Breakfast  Before Lunch  Before Dinner  Before Bed Comments   6/4/25 140       6/3/25 72 91 119   193 (10 units lantus) 10 units of lantus at 10:30pm   6/2/25 101    This was dialysis day. Dialysis at 11:30am-3:30pm. Protien bar during dialysis. After dialysis 1/2 turkely sandwich and fruit cup and cookie (3:30pm). Supper (7pm) half roasted turkey breast sandwich, cauliflower and string beans. Didn't each much. No bedtime snack. 10 units lantus at 10:30pm   6/1/25 90    10 units of lantus at 10:30pm   5/31/26 91    10 units of lantus at 10:30pm   5/30/25 139    10 units of lantus at 10:30pm             Pattern of hypoglycemia: wife states his fasting Blood glucose goal is 120. Lately fasting Blood glucose has been in the 90s.     Symptoms: she states he woke up and did not feel well so she checked his sugar.     Acute illness: admitted to [facility] from 5/19/25-5/27/25 due to afib during dialysis. Was discharged to rehab but left after 2 days to home due to poor care. Working on setting up home health. He will be undergoing further cardiac testing at [facility] on 6/19/25: EKG, blood work, CT angiogram, carotid US. He is also on scheduled dialysis and chemotherapy once per month. He missed his last chemo session on 5/29/25 and is nect scheduled 6/26/25.     Hypoglycemia Mx/Rule of 15: she was unaware of the rule of 15s. Discussed in detail and sent in writing to [facility].     Change in Diet: eats 3 meals per day but typically no bedtime snack.     List  Medications/Compliance: Lantus  Lantus 10 units at night  Patient is on dialysis and chemotherapy once a month  On Night of chemo patient should take 22  of Lantus  Next 2 days , he should take 20 units, then, 15 units on day 4 and then back down to usual 10 units

## 2025-06-04 NOTE — TELEPHONE ENCOUNTER
Spoke with the patient's wife.     Currently his hemodilysis schedule is not expected to change. He is doing dialysis on Mondays, Wednesdays and Fridays from 11:30 am - 3:30pm.     He typically receives chemo monthly. However he missed his last session due to his recent hospitalization. He was scheduled on 5/29/25 but missed. He is next scheduled to receive chemo on 6/26/25. She called the cancer center today and they advised her to keep the appointment as is for now and call 4-5 days ahead of time to see if the appointment needs to be changed due to his upcoming TAVR procedure. He receives chemo monthly.

## 2025-06-04 NOTE — TELEPHONE ENCOUNTER
HI!    I am very sorry about these low blood sugars. Can the patient's wife give us a new schedule of the chemotherapy and dialysis? Then I will write a new insulin schedule. Thank you!

## 2025-06-04 NOTE — TELEPHONE ENCOUNTER
Dr. Ledesma,  Wife is concerned about possible effects of dialysis (M/W/F) on BG and insulin intake. Please advise.    Spoke with pt's wife, Noemi. She thinks pt's chemo schedule will eventually be changed, but as of now he is still sked for 6/26. Noemi says she only changes pt's insulin regimen when he has chemo. Post-chemo, pt's highest BG was 239. Pt is having dialysis on M/W/F, and Noemi is wondering if that will affect his insulin/BG, because he has to delay dialysis after chemo to avoid flushing out the chemo. Pt says the night before his low BG (72), he was tired after dialysis and had a snack, no carbs, rather than dinner. Pt is currently sked for TAVR on 6/19.

## 2025-06-04 NOTE — TELEPHONE ENCOUNTER
Hi!    Please ask patient's wife if patient's regular schedule of chemotherapy has been changed due to the admission for atrial fibrillation (meaning that he did not receive steroids as usual)?    Thank you.

## 2025-06-05 NOTE — TELEPHONE ENCOUNTER
Dr. Ledesma,  Pt's wife says there's no difference in BG on dialysis vs non-dialysis days. OK to continue current tx? Please advise.      Spoke with pt's wife, Noemi. Pt's BG was 97 this morning. Noemi says she doesn't see a BG difference between dialysis vs non-dialysis days. Noemi says she has been having problems with the Basaglar pens, and she is reaching out to the  to troubleshoot and get replacements. Told pt to call the office if pt's BG goes low. Noemi verbalized understanding.

## 2025-06-06 NOTE — TELEPHONE ENCOUNTER
Hi!    I think that it would be a good idea to be cognizant of when Mr. Muniz is not having as much to eat, especially when he has already taken his insulin, and anticipate that his blood sugar may go down.     If we can find a pattern, then we can make changes in his insulin dose.     Thank you!

## 2025-06-09 NOTE — TELEPHONE ENCOUNTER
Patient called verified  and name. Let chad know of MD advice below, she verbalized understanding and will watch Blood glucose patterns if they arise.

## 2025-06-11 ENCOUNTER — TELEPHONE (OUTPATIENT)
Dept: INTERNAL MEDICINE CLINIC | Facility: CLINIC | Age: 80
End: 2025-06-11

## 2025-06-11 DIAGNOSIS — E11.9 TYPE 2 DIABETES MELLITUS WITHOUT COMPLICATION, WITHOUT LONG-TERM CURRENT USE OF INSULIN (HCC): ICD-10-CM

## 2025-06-11 DIAGNOSIS — M62.81 GENERALIZED MUSCLE WEAKNESS: ICD-10-CM

## 2025-06-11 DIAGNOSIS — N18.6 ESRD ON HEMODIALYSIS (HCC): ICD-10-CM

## 2025-06-11 DIAGNOSIS — R53.81 PHYSICAL DECONDITIONING: Primary | ICD-10-CM

## 2025-06-11 DIAGNOSIS — Z99.2 ESRD ON HEMODIALYSIS (HCC): ICD-10-CM

## 2025-06-11 NOTE — TELEPHONE ENCOUNTER
Spouse called    Requests order for out patient physical therapy at [facility] [address] in [place]   Please send to fax# [phone]    Please call Noemi to confirm     Patient has an appointment with Dr Lynn on   July 8 but would like to get physical therapy scheduled as soon as he finishes with [facility]

## 2025-06-12 NOTE — TELEPHONE ENCOUNTER
Spoke to patient's wife Noemi (OK per HIPAA) and relayed MD message.  Provided Noemi with phone number to call and schedule patient.  Noemi asked if we can mail a copy the referral, address verified.   Noemi verbalized understanding and agrees with plan.

## 2025-06-16 ENCOUNTER — TELEPHONE (OUTPATIENT)
Dept: NEUROLOGY | Facility: CLINIC | Age: 80
End: 2025-06-16

## 2025-06-16 NOTE — TELEPHONE ENCOUNTER
Returned Noemi (spouse) call for more information. Pt is having tests done before the procedure. Pt has aortic stenosis and was recommended to have a TAVR.   Patient is scheduled to have testing CT angiogram, carotid ultrasound, EKG and blood tests (CBC, BMP) on 6/19 as recommended by Dr. Edge at [facility] office #[phone]. Noemi said pt would be under anesthesia for 90 minutes and she wanted to notify neurology since the pt has history of cognitive changes.  The TAVR is not scheduled yet.     Pt's spouse wants Dr. Evans to know and wants doctor to doctor to speak because she is not a cardiologist.

## 2025-06-16 NOTE — TELEPHONE ENCOUNTER
Pt wife cb advising that we called back. Apologized to pt as nothing documented other than message taken this am. Pls advise.

## 2025-06-17 NOTE — TELEPHONE ENCOUNTER
I spoke with the wife.  At her request I have called cardiology office and informed them about patient's diagnosis of Alzheimer's and possibility of delirium associated with a hospital stay and the procedure.

## 2025-06-19 ENCOUNTER — HOSPITAL ENCOUNTER (OUTPATIENT)
Dept: CT IMAGING | Facility: HOSPITAL | Age: 80
Discharge: HOME | End: 2025-06-19
Attending: INTERNAL MEDICINE
Payer: MEDICARE

## 2025-06-19 ENCOUNTER — HOSPITAL ENCOUNTER (OUTPATIENT)
Dept: CARDIOLOGY CLINIC | Facility: HOSPITAL | Age: 80
Discharge: HOME | End: 2025-06-19
Attending: INTERNAL MEDICINE
Payer: MEDICARE

## 2025-06-19 ENCOUNTER — HOSPITAL ENCOUNTER (OUTPATIENT)
Dept: ULTRASOUND IMAGING | Facility: HOSPITAL | Age: 80
Discharge: HOME | End: 2025-06-19
Attending: INTERNAL MEDICINE
Payer: MEDICARE

## 2025-06-19 ENCOUNTER — HOSPITAL ENCOUNTER (OUTPATIENT)
Dept: LAB | Facility: HOSPITAL | Age: 80
Discharge: HOME | End: 2025-06-19
Attending: INTERNAL MEDICINE
Payer: MEDICARE

## 2025-06-19 ENCOUNTER — HOSPITAL ENCOUNTER (OUTPATIENT)
Dept: CV DIAGNOSTICS | Facility: HOSPITAL | Age: 80
Discharge: HOME | End: 2025-06-19
Attending: INTERNAL MEDICINE
Payer: MEDICARE

## 2025-06-19 VITALS — HEART RATE: 60 BPM | SYSTOLIC BLOOD PRESSURE: 160 MMHG | DIASTOLIC BLOOD PRESSURE: 100 MMHG

## 2025-06-19 DIAGNOSIS — E11.9 DM (DIABETES MELLITUS) (HCC): ICD-10-CM

## 2025-06-19 DIAGNOSIS — I35.0 AORTIC STENOSIS: ICD-10-CM

## 2025-06-19 DIAGNOSIS — Z99.2 DIALYSIS PATIENT: ICD-10-CM

## 2025-06-19 DIAGNOSIS — I10 HTN (HYPERTENSION): ICD-10-CM

## 2025-06-19 DIAGNOSIS — C90.00 MULTIPLE MYELOMA (HCC): ICD-10-CM

## 2025-06-19 DIAGNOSIS — I48.91 A-FIB (HCC): ICD-10-CM

## 2025-06-19 LAB
ANION GAP SERPL CALC-SCNC: 9 MMOL/L (ref 0–18)
BUN BLD-MCNC: 42 MG/DL (ref 9–23)
CALCIUM BLD-MCNC: 9.5 MG/DL (ref 8.7–10.6)
CHLORIDE SERPL-SCNC: 98 MMOL/L (ref 98–112)
CO2 SERPL-SCNC: 33 MMOL/L (ref 21–32)
CREAT BLD-MCNC: 6.49 MG/DL (ref 0.7–1.3)
EGFRCR SERPLBLD CKD-EPI 2021: 8 ML/MIN/1.73M2 (ref 60–?)
GLUCOSE BLD-MCNC: 92 MG/DL (ref 70–99)
OSMOLALITY SERPL CALC.SUM OF ELEC: 300 MOSM/KG (ref 275–295)
POTASSIUM SERPL-SCNC: 3.9 MMOL/L (ref 3.5–5.1)
SODIUM SERPL-SCNC: 140 MMOL/L (ref 136–145)

## 2025-06-19 PROCEDURE — 74174 CTA ABD&PLVS W/CONTRAST: CPT | Performed by: INTERNAL MEDICINE

## 2025-06-19 PROCEDURE — 99212 OFFICE O/P EST SF 10 MIN: CPT

## 2025-06-19 PROCEDURE — 71275 CT ANGIOGRAPHY CHEST: CPT | Performed by: INTERNAL MEDICINE

## 2025-06-19 PROCEDURE — 36415 COLL VENOUS BLD VENIPUNCTURE: CPT | Performed by: INTERNAL MEDICINE

## 2025-06-19 PROCEDURE — 93880 EXTRACRANIAL BILAT STUDY: CPT | Performed by: INTERNAL MEDICINE

## 2025-06-19 PROCEDURE — 80048 BASIC METABOLIC PNL TOTAL CA: CPT | Performed by: INTERNAL MEDICINE

## 2025-06-19 NOTE — CONSULTS
[facility]    PATIENT'S NAME: NAI DING   ATTENDING PHYSICIAN: Yosef Gan M.D.   CONSULTING PHYSICIAN: Torin Marinelli M.D.   PATIENT ACCOUNT#:   [id number]    LOCATION:  [facility]  MEDICAL RECORD #:   [id number]       YOB: 1945  ADMISSION DATE:       06/19/2025      CONSULT DATE:  06/19/2025    REPORT OF CONSULTATION    REASON FOR CONSULTATION:  Here in the valve clinic is aortic stenosis.    HISTORY OF PRESENT ILLNESS:  We are asked to assess the patient by Dr. Claudio for the above.  The patient is a 79-year-old gentleman with a history of previous multiple myeloma status post stem cell transplant, who has been in remission for the last 10 years, end-stage renal disease, now on dialysis for the last year, nonobstructive coronary artery disease, neuropathy, atrial fibrillation, who was admitted to [facility] recently with atrial fibrillation during dialysis.  During the course of the admission, patient was noted to have severe aortic stenosis with a mean gradient of 48 mmHg with mild LV dysfunction.  The patient was seen by Dr. Claudio of Cardiovascular Surgery during that admission and was not felt to be a good candidate for SAVR.  TAVR was recommended.  He has now been sent for assessment for this.    The patient currently feels weak.  Since his hospitalization, patient's functional capacity has decreased.  He always uses a walker due to issues with his neuropathy in his feet, but feels like his energy level is significantly decreased.  He has no chest discomfort.    The patient has known that he has aortic stenosis and/or a murmur for some time now.  He has been evaluated at [facility] as best as I can tell based on minimal records available to me that included a cardiac catheterization in December 2024 that revealed mild to moderate disease in the LAD.  Mean gradient apparently was 37 mmHg at the time.    PAST MEDICAL HISTORY:  Atrial fibrillation as above.  The  patient now is in sinus rhythm with the addition of amiodarone and beta-blockers to his regimen.  He is also on Eliquis.  History of multiple myeloma with stem cell transplant 10 years ago and suppressive therapy thereafter.  History of end-stage renal disease, initially on peritoneal dialysis, now on hemodialysis.  Neuropathy.    MEDICATIONS:  Currently include amiodarone, sevelamer carbonate, metoprolol, benazepril, insulin, apixaban, and acyclovir.      ALLERGIES:  Patient has no known drug allergies.      SOCIAL HISTORY:  Patient is not abusing ethanol or tobacco.      FAMILY HISTORY:  Not significant for premature valve disease.      REVIEW OF SYSTEMS:  Negative other than as described above.      PHYSICAL EXAMINATION:    GENERAL:  He is a pleasant man who is in no distress.    VITAL SIGNS:  He is afebrile.    HEENT:  Normocephalic.    NECK:  Supple.   LUNGS:  Diminished breath sounds at the right base compared to the left base.    HEART:  Regular rhythm with a 3/6 systolic murmur.    ABDOMEN:  Soft.   EXTREMITIES:  Trace edema.    NEUROLOGIC:  Patient is alert and oriented.    LABORATORY DATA:  The patient's echo was reviewed.  The patient has severe aortic stenosis in the setting of normal LV function.  He has significant mitral annular calcification as well.    Cardiac catheterization:  The report was reviewed.  Films are not available for me to review.    DECISION-MAKING:  The patient is a 79-year-old gentleman who appears to have severe symptomatic aortic stenosis.  The patient has a mean gradient of over 40 mmHg in the setting of normal LV function.  By echo, the valve appears heavily calcified and has minimal movement.  The patient's main symptom at this point, though nonspecific, is fatigue and lack of energy.  Today, we talked about his options including medical therapy versus SAVR versus TAVR.  With an STS score of 9.9, age 79, on chronic dialysis, the patient, I think, will be better served with  TAVR.  We talked about the risks and benefits of TAVR.  Risks discussed include, but not limited to, death, MI, CVA, aortic and/or vascular disruption requiring emergency surgery, and permanent pacemaker placement.  The patient and his wife understand the situation at hand.  Today, patient will undergo CTs of his chest, abdomen, and pelvis.  We will discuss the patient in our multidisciplinary valve conference when information becomes available.  They will call with questions or concerns    Dictated By Torin Marinelli M.D.  d: 06/19/2025 11:29:47  t: 06/19/2025 12:27:18  [facility] [id number]  AR/

## 2025-06-19 NOTE — PROGRESS NOTES
TAVR procedure and process explained to pt and wife.  KCCQ and 5M walk performed. Pt to have PATs at [place] d/t dialysis.  All questions answered.

## 2025-06-19 NOTE — IMAGING NOTE
Patient to CT Rm 4, GE. Reviewed patient's name,  and allergies. Procedure explained and questions answered. GFR = 8. [facility] aware of this. Pt states he is scheduled to have dialysis tomorrow 25 at 1100.    Patient's BP elevated, see flowsheet documentation. Pt states this is not abnormal for him and is asymptomatic. Leyla from structural heart made aware of patient's elevated BP.    Contrast injected followed by saline flush at 1437.  Contrast injection = 100ml  0.9 NS flush = 100ml  Average HR = 60    Patient tolerated the procedure without complication. Denies any contrast reaction. Discontinued IV saline lock. Escorted pt back to radiology holding. Radha escorted patient back to structural [facility] via wheelchair.

## 2025-06-25 NOTE — TELEPHONE ENCOUNTER
Kwan Redd.  Below I copy and pasted a [employer] message that I sent family of Balwinder as it relates to Aeroseb.  It appears from UpToDate, no dosage adjustment is needed but I thought I would check with you for your thoughts.  Thank you in advance.  Anatoly.      Copy and pasted from a [employer] message below.    Thank you for the update.  Thank you for the question about the Aricept as it relates to Balwinder's dialysis.    There is a good question.    When I did look it up in a resource that I have it would appear that the 5 mg is \"acceptable\" in dialysis patients.    However please check with Dr. Redd to see if she agrees taking the 5 mg dose daily is acceptable from her point of view.    Thank you.   Low mag, Ca, K on admission 2/2 ETOH and poor PO intake  Monitor and replete as needed

## 2025-06-26 ENCOUNTER — TELEPHONE (OUTPATIENT)
Dept: ENDOCRINOLOGY CLINIC | Facility: CLINIC | Age: 80
End: 2025-06-26

## 2025-06-26 ENCOUNTER — TELEPHONE (OUTPATIENT)
Dept: CARDIOLOGY CLINIC | Facility: HOSPITAL | Age: 80
End: 2025-06-26

## 2025-06-26 NOTE — TELEPHONE ENCOUNTER
Noemi states patient's doesn't have much of an appetite after Dialysis on Mondays, Wednesdays and Fridays - he is given 11 U evenings and days without Dialysis he injects 10 U evenings.  Noticed that blood sugar the following day register low,    6/20/2025 fasting was at 86 mg/dL and then at 130pm 169 mg/dL - this is after breakfast.  Today, ,6/26/2025, fasting was at 82 mg/dL and now it is at 168 mg/dL - this is after breakfast.    Should insulin unite be adjusted?  Please call.  Thank you.

## 2025-06-27 NOTE — TELEPHONE ENCOUNTER
Hi!    Please let them know that they should decrease the Basaglar dose from 11 to 9 units the night of dialysis, and keep the other dose at 10 units. Thank you!

## 2025-06-27 NOTE — TELEPHONE ENCOUNTER
Spoke with pt's wife, Noemi. Provided Dr. Ledesma's instructions (see below). Told Noemi to call the office if pt continues to have trouble with his BG. She verbalized understanding.

## 2025-07-02 ENCOUNTER — TELEPHONE (OUTPATIENT)
Dept: INTERNAL MEDICINE CLINIC | Facility: CLINIC | Age: 80
End: 2025-07-02

## 2025-07-02 ENCOUNTER — MED REC SCAN ONLY (OUTPATIENT)
Dept: INTERNAL MEDICINE CLINIC | Facility: CLINIC | Age: 80
End: 2025-07-02

## 2025-07-02 NOTE — TELEPHONE ENCOUNTER
Order [id number] as been signed and successfully faxed to [facility].     Fax [phone]    Order form has been submitted for scanning.

## 2025-07-02 NOTE — TELEPHONE ENCOUNTER
Order [id number] has been signed and successfully faxed to [facility].     Fax [phone]    Order form has been submitted for scanning.

## 2025-07-02 NOTE — TELEPHONE ENCOUNTER
Order [id number] has been signed and successfully faxed to [facility].    Fax [phone]    Order form has been submitted for scanning.

## 2025-07-10 ENCOUNTER — TELEPHONE (OUTPATIENT)
Dept: CARDIOLOGY CLINIC | Facility: HOSPITAL | Age: 80
End: 2025-07-10

## 2025-07-14 PROBLEM — R52 PAIN, UNSPECIFIED: Status: ACTIVE | Noted: 2022-08-22

## 2025-07-14 PROBLEM — R94.39 ABNORMAL CARDIOVASCULAR STRESS TEST: Status: ACTIVE | Noted: 2024-12-12

## 2025-07-14 PROBLEM — I47.19 PAROXYSMAL ATRIAL TACHYCARDIA (HCC): Status: ACTIVE | Noted: 2024-10-01

## 2025-07-14 PROBLEM — F03.90 DEMENTIA (HCC): Status: ACTIVE | Noted: 2025-05-27

## 2025-07-14 PROBLEM — D50.9 IRON DEFICIENCY ANEMIA, UNSPECIFIED: Status: ACTIVE | Noted: 2022-08-22

## 2025-07-14 PROBLEM — E83.52 HYPERCALCEMIA: Status: ACTIVE | Noted: 2022-08-22

## 2025-07-14 PROBLEM — A49.9 BACTERIAL INFECTION, UNSPECIFIED: Status: ACTIVE | Noted: 2022-10-14

## 2025-07-14 PROBLEM — I12.0 HYPERTENSIVE CHRONIC KIDNEY DISEASE WITH STAGE 5 CHRONIC KIDNEY DISEASE OR END STAGE RENAL DISEASE (HCC): Status: ACTIVE | Noted: 2025-03-12

## 2025-07-14 PROBLEM — I49.1 PREMATURE ATRIAL CONTRACTIONS: Status: ACTIVE | Noted: 2024-10-01

## 2025-07-14 PROBLEM — I35.0 AORTIC VALVE STENOSIS: Status: ACTIVE | Noted: 2024-10-01

## 2025-07-14 PROBLEM — Z99.2 DEPENDENCE ON RENAL DIALYSIS: Status: ACTIVE | Noted: 2021-05-03

## 2025-07-14 PROBLEM — N25.81 SECONDARY HYPERPARATHYROIDISM OF RENAL ORIGIN (HCC): Status: ACTIVE | Noted: 2021-05-03

## 2025-07-14 PROBLEM — E83.39 OTHER DISORDERS OF PHOSPHORUS METABOLISM: Status: ACTIVE | Noted: 2025-01-09

## 2025-07-14 PROBLEM — R06.02 SHORTNESS OF BREATH: Status: ACTIVE | Noted: 2022-08-22

## 2025-07-14 PROBLEM — I25.10 LAD STENOSIS: Status: ACTIVE | Noted: 2024-12-12

## 2025-07-14 PROBLEM — E66.9 OBESITY (BMI 30-39.9): Status: ACTIVE | Noted: 2019-10-24

## 2025-07-14 PROBLEM — B96.5 PSEUDOMONAS (AERUGINOSA) (MALLEI) (PSEUDOMALLEI) AS THE CAUSE OF DISEASES CLASSIFIED ELSEWHERE: Status: ACTIVE | Noted: 2022-10-18

## 2025-07-14 PROBLEM — E46 PROTEIN-CALORIE MALNUTRITION (HCC): Status: ACTIVE | Noted: 2021-05-03

## 2025-07-14 PROBLEM — K40.00 NON-RECURRENT BILATERAL INGUINAL HERNIA WITH OBSTRUCTION WITHOUT GANGRENE: Status: ACTIVE | Noted: 2022-09-30

## 2025-07-14 PROBLEM — E78.5 HYPERLIPIDEMIA: Status: ACTIVE | Noted: 2025-05-27

## 2025-07-14 PROBLEM — T78.40XA ALLERGY, UNSPECIFIED, INITIAL ENCOUNTER: Status: ACTIVE | Noted: 2021-05-03

## 2025-07-14 PROBLEM — I50.32 HEART FAILURE WITH RECOVERED EJECTION FRACTION (HFRECEF) (HCC): Status: ACTIVE | Noted: 2025-01-14

## 2025-07-14 PROBLEM — T78.2XXA ANAPHYLACTIC SHOCK, UNSPECIFIED, INITIAL ENCOUNTER: Status: ACTIVE | Noted: 2021-05-03

## 2025-07-14 PROBLEM — E87.8 OTHER DISORDERS OF ELECTROLYTE AND FLUID BALANCE, NOT ELSEWHERE CLASSIFIED: Status: ACTIVE | Noted: 2021-05-03

## 2025-07-14 PROBLEM — R93.89 ABNORMAL COMPUTED TOMOGRAPHY ANGIOGRAPHY (CTA): Status: ACTIVE | Noted: 2024-12-12

## 2025-07-15 ENCOUNTER — OFFICE VISIT (OUTPATIENT)
Dept: INTERNAL MEDICINE CLINIC | Facility: CLINIC | Age: 80
End: 2025-07-15
Payer: MEDICARE

## 2025-07-15 VITALS
DIASTOLIC BLOOD PRESSURE: 70 MMHG | WEIGHT: 185.69 LBS | BODY MASS INDEX: 29.15 KG/M2 | OXYGEN SATURATION: 96 % | HEART RATE: 62 BPM | SYSTOLIC BLOOD PRESSURE: 129 MMHG | TEMPERATURE: 98 F | HEIGHT: 67 IN

## 2025-07-15 DIAGNOSIS — C90.00 MULTIPLE MYELOMA NOT HAVING ACHIEVED REMISSION (HCC): ICD-10-CM

## 2025-07-15 DIAGNOSIS — E11.9 TYPE 2 DIABETES MELLITUS WITHOUT COMPLICATION, WITHOUT LONG-TERM CURRENT USE OF INSULIN (HCC): ICD-10-CM

## 2025-07-15 DIAGNOSIS — G30.1 MODERATE LATE ONSET ALZHEIMER'S DEMENTIA WITHOUT BEHAVIORAL DISTURBANCE, PSYCHOTIC DISTURBANCE, MOOD DISTURBANCE, OR ANXIETY (HCC): ICD-10-CM

## 2025-07-15 DIAGNOSIS — Z99.2 ESRD ON HEMODIALYSIS (HCC): Primary | ICD-10-CM

## 2025-07-15 DIAGNOSIS — I25.10 CORONARY ARTERY DISEASE INVOLVING NATIVE CORONARY ARTERY OF NATIVE HEART WITHOUT ANGINA PECTORIS: ICD-10-CM

## 2025-07-15 DIAGNOSIS — F02.B0 MODERATE LATE ONSET ALZHEIMER'S DEMENTIA WITHOUT BEHAVIORAL DISTURBANCE, PSYCHOTIC DISTURBANCE, MOOD DISTURBANCE, OR ANXIETY (HCC): ICD-10-CM

## 2025-07-15 DIAGNOSIS — I48.91 ATRIAL FIBRILLATION, UNSPECIFIED TYPE (HCC): ICD-10-CM

## 2025-07-15 DIAGNOSIS — N18.6 ESRD ON HEMODIALYSIS (HCC): Primary | ICD-10-CM

## 2025-07-15 DIAGNOSIS — I35.0 NONRHEUMATIC AORTIC VALVE STENOSIS: ICD-10-CM

## 2025-07-15 DIAGNOSIS — I48.0 PAROXYSMAL ATRIAL FIBRILLATION (HCC): ICD-10-CM

## 2025-07-15 DIAGNOSIS — N28.1 BILATERAL RENAL CYSTS: ICD-10-CM

## 2025-07-15 PROCEDURE — 99214 OFFICE O/P EST MOD 30 MIN: CPT | Performed by: INTERNAL MEDICINE

## 2025-07-15 NOTE — PROGRESS NOTES
The following individual(s) verbally consented to be recorded using ambient AI listening technology and understand that they can each withdraw their consent to this listening technology at any point by asking the clinician to turn off or pause the recording:    Patient name: Balwinder Muniz  Additional names:  Noemi Muniz

## 2025-07-15 NOTE — PROGRESS NOTES
Balwinder Muniz is a 79 year old male with complaints of:  Chief Complaint: Establish [facility]    HPI     Balwinder Muniz is a(n) 79 year old male with a history of acute atrial fibrillation, severe aortic stenosis, ESRD on hemodialysis, diabetes, multiple myeloma, dementia, who presents to establish care.     Patient was admitted to [facility] from 5/19/2025 to 5/27/2025.  He presented with generalized weakness and lightheadedness following a session of dialysis.  In the ER, he was found to be in atrial fibrillation.  Seen by cardiology.  He was discharged on amiodarone 200 mg daily, with end of therapy on July 26, 2025.  He was also started on metoprolol 50 mg daily, and Eliquis 2.5 mg twice daily.  Furthermore, throughout the hospital stay, he had acute hypoxic respiratory failure, thought to be due to aspiration pneumonia versus pulmonary edema versus both.  He completed 8 days of IV antibiotics.  He had hemodialysis for fluid removal.  He was recommended to follow-up in the valve clinic for the severe aortic stenosis; of note, he was seen by Dr Claudio of cardiothoracic surgery and thought to be a poor candidate for aortic valve replacement.  The generalized weakness was thought to be related to hemodialysis, atrial fibrillation, pneumonia, and deconditioning.  He was eventually discharged to rehab.    Since being discharged, he has followed up with nephrology.  Hemodialysis continues.  He is started having health care with [facility].    Patient has a history of diabetes.  He is following with endocrinology, Dr. Ledesma.  He continues on insulin.    Patient has a history of multiple myeloma status post stem cell transplant.  He has continued on the Velcade which is given with Solu-Medrol.  This has been on hold for the last 2 months.  He has predictable sugar spikes with the Solu-Medrol.  He has discussed this with Dr. Ledesma, who has been adjusting the insulin accordingly.    Patient has a history  of nonobstructive coronary artery disease in addition to the atrial fibrillation as above.    Patient has a history of end-stage renal disease, now on hemodialysis. He undergoes dialysis every Monday, Wednesday, and Friday. His nephrologist is Dr. Reagan.     With regards to the aortic stenosis, he was seen by Dr. Claudio in the hospital, and thought to be a poor candidate for aortic valve replacement.  He followed up with Dr. Marinelli on 6/19/2025 for further discussion.  He did endorse severely decreased functional capacity and exercise tolerance.  At some point, he was evaluated at [facility]; at evaluation included cardiac catheterization in December 2024 that revealed mild to moderate disease in the LAD.  Patient was recommended to undergo a TAVR.  He has had CTs of the chest abdomen and pelvis.    CTs of the chest abdomen pelvis were notable for bilateral pulmonary edema with bilateral pleural effusions, heterogenous densities in the bones consistent with a history of multiple myeloma, L1 compression fracture (stable compared to 8/17/2022); most concerning, there was possibly an enhancing right renal neoplasm.  More specifically, with regard to the kidneys, the patient has bilateral mildly atrophic kidneys.  Both kidneys have multiple renal cysts.  The largest cyst within the right kidney, which measures 10.4 x 7.9 cm.  There are other lesions present, for example a 1.9 cm left exophytic renal lesion, which likely represents a hemorrhagic/proteinaceous cyst.  However, most concerning, there may be an enhancing 1.1 cm lesion within the right kidney.    Patient has a history of dementia.  He continues on donepezil 10 mg daily.  He follows with neurology Dr. Kincaid.           Past Medical History     Past Medical History[1]     Past Surgical History     Past Surgical History[2]     Family History     Family History[3]    Social History     Short Social Hx on File[4]    Allergies     Allergies[5]    Current  Medications     Current Outpatient Medications   Medication Sig Dispense Refill    Insulin Pen Needle (BD PEN NEEDLE DIONISIO 2ND GEN) 32G X 4 MM Does not apply Misc Use with insulin daily 100 each 3    amiodarone 200 MG Oral Tab Take 1 tablet (200 mg total) by mouth daily. 60 tablet 0    acetaminophen 325 MG Oral Tab Take 1-2 tablets (325-650 mg total) by mouth every 6 (six) hours as needed.      Multiple Vitamins-Minerals (OCUVITE ADULT 50+) Oral Cap Take 1 tablet by mouth daily.      mometasone furoate 50 MCG/ACT Nasal Suspension 1 spray by Nasal route daily as needed.      sevelamer carbonate 800 MG Oral Tab Take 1 tablet (800 mg total) by mouth 3 (three) times daily with meals.      metoprolol succinate ER 50 MG Oral Tablet 24 Hr Take 1 tablet (50 mg total) by mouth daily.      donepezil 10 MG Oral Tab Take 1 tablet (10 mg total) by mouth nightly. 90 tablet 3    Insulin Pen Needle (BD PEN NEEDLE MICRO U/F) 32G X 6 MM Does not apply Misc Use with insulin daily 100 each 3    insulin glargine (BASAGLAR KWIKPEN) 100 UNIT/ML Subcutaneous Solution Pen-injector Take Basaglar 10 units at night; On Night of chemo patient should take 22 of Basaglar; On the next 2 days,  he should take 20 units; on day 4, he should take 15 units on day 4 and then back down to usual 10 units from the 5 day onwards. 24 mL 3    apixaban (ELIQUIS) 2.5 MG Oral Tab Take 1 tablet (2.5 mg total) by mouth 2 (two) times daily 60 tablet 11    Amino Acids (LIQUACEL) Oral Liquid Take 30 mL by mouth daily.      latanoprost 0.005 % Ophthalmic Solution Place 1 drop into both eyes nightly.      Cholecalciferol (VITAMIN D) 25 MCG (1000 UT) Oral Tab Take 2 tablets by mouth in the morning.      FOLBIC 2.5-25-2 MG Oral Tab Take 1 tablet by mouth daily.  10    acyclovir (ZOVIRAX) 400 MG Oral Tab Take 1 tablet (400 mg total) by mouth daily.  11     No current facility-administered medications for this visit.       Review of Systems     GENERAL HEALTH: feels well  otherwise  SKIN: denies any unusual skin lesions or rashes  RESPIRATORY: denies shortness of breath with exertion  CARDIOVASCULAR: denies chest pain on exertion  GI: denies abdominal pain and denies heartburn  : denies any burning with urination, urinary frequency or urgency  NEURO: denies headaches, numbness or tingling, mental status changes  PSYCH: denies depressed mood, anxiety  MUSC: denies muscle aches, joint pain    Physical Exam     /70   Pulse 62   Temp 97.9 °F (36.6 °C) (Temporal)   Ht 5' 7\" (1.702 m)   Wt 185 lb 11.2 oz (84.2 kg)   SpO2 96%   BMI 29.08 kg/m²     GENERAL: well developed, well nourished,in no apparent distress  SKIN: no rashes,no suspicious lesions  HEENT: atraumatic, normocephalic,ears and throat are clear  NECK: supple,no adenopathy,no bruits  LUNGS: clear to auscultation  CARDIO: RRR without murmur  GI: good BS's,no masses, HSM or tenderness  EXTREMITIES: no cyanosis, clubbing or edema    Assessment and Plan     Assessment & Plan  ESRD on hemodialysis (HCC)  Following with nephrology.  Continues on hemodialysis.       Type 2 diabetes mellitus without complication, without long-term current use of insulin (HCC)  Following with endocrinology.  Endocrinology adjusted his insulin regimen.  He has required less insulin the further he gets from being on chemotherapy.       Atrial fibrillation, unspecified type (HCC)  Paroxysmal atrial fibrillation (HCC)  Close treated in the hospital for this.  He continues on amiodarone and metoprolol.  Now in normal sinus rhythm.         Multiple myeloma not having achieved remission (HCC)  Status post stem cell treatment.  Has received many many treatments of Velcade.  Following with oncology at [facility].  His usual oncologist is Dr. Bernard, who is on maternity leave, so her service is being covered by Dr. Wharton.        Coronary artery disease involving native coronary artery of native heart without angina pectoris  Had a coronary angiogram  in the fall 2024 that showed nonobstructive coronary artery disease.  She he is following with cardio oncology at [facility].       Moderate late onset Alzheimer's dementia without behavioral disturbance, psychotic disturbance, mood disturbance, or anxiety (HCC)  Continues on donepezil 10 mg daily.  Following with Dr. Kincaid.        Nonrheumatic aortic valve stenosis  Planning to undergo a TAVR August 21, 2025.  Workup overall has been completed per the patient's wife.  Will reach out to Gamal Claudio etc to see if there is anything that I can help with.       Bilateral renal cysts  Was noted to have a particular 1.0 cm enhancing renal cysts in the background of atrophic kidneys and multiple cysts on recent CT scan of abdomen.  Per the patient's wife, they have reviewed the scan with the patient's current (covering?) oncologist Dr. Wharton at [facility].  For now, would recommend that the patient undergo the TAVR, and in the aftermath, follow-up with oncology at [facility] for further evaluation of this particular lesion if needed.            Current Medications[6]    Requested Prescriptions      No prescriptions requested or ordered in this encounter       No orders of the defined types were placed in this encounter.      No follow-ups on file.    The patient indicates understanding of these issues and agrees to the plan.    Electronically signed by Lillian Lynn MD 07/15/25             [1]   Past Medical History:   Atrial fibrillation (HCC)    Back problem    Calculus of kidney    Cancer (HCC)    multiple myloma    Diabetes (HCC)    Dialysis patient    M,W, F    ESRD (end stage renal disease) (HCC)    Essential hypertension    Hearing impairment    bilateral hearing aids    High blood pressure    Hyperlipidemia    Multiple myeloma (HCC)    Muscle weakness    Neuropathy    Slight in feet    Osteoarthritis    Renal disorder    Visual impairment    Glasses   [2]   Past Surgical History:  Procedure Laterality Date     Colonoscopy  2004    Other surgical history  1994    Other surgical history  2014    stem cell transplant     Other surgical history  11/12/2019    removal parathyroid adenoma    Other surgical history  08/18/2022    Arthroscopy   [3]   Family History  Problem Relation Age of Onset    Cancer Father         lung cancer     Hypertension Father     Heart Disorder Mother     Depression Mother     Psychiatric Mother     Heart Disorder Maternal Grandfather     Heart Disorder Brother         passed away with heart attack     Hypertension Brother     Hypertension Brother     Kidney Disease Brother     Kidney Disease Brother     Hypertension Brother    [4]   Social History  Socioeconomic History    Marital status:    Tobacco Use    Smoking status: Never     Passive exposure: Never    Smokeless tobacco: Never   Vaping Use    Vaping status: Never Used   Substance and Sexual Activity    Alcohol use: Not Currently     Alcohol/week: 1.0 standard drink of alcohol     Types: 1 Glasses of wine per week     Comment: social    Drug use: Never     Social Drivers of Health     Food Insecurity: No Food Insecurity (5/19/2025)    NCSS - Food Insecurity     Worried About Running Out of Food in the Last Year: No     Ran Out of Food in the Last Year: No   Transportation Needs: No Transportation Needs (5/19/2025)    NCSS - Transportation     Lack of Transportation: No   Housing Stability: Not At Risk (5/19/2025)    NCSS - Housing/Utilities     Has Housing: Yes     Worried About Losing Housing: No     Unable to Get Utilities: No   [5] No Known Allergies  [6]    Insulin Pen Needle (BD PEN NEEDLE DIONISIO 2ND GEN) 32G X 4 MM Does not apply Misc Use with insulin daily 100 each 3    amiodarone 200 MG Oral Tab Take 1 tablet (200 mg total) by mouth daily. 60 tablet 0    acetaminophen 325 MG Oral Tab Take 1-2 tablets (325-650 mg total) by mouth every 6 (six) hours as needed.      Multiple Vitamins-Minerals (OCUVITE ADULT 50+) Oral Cap Take 1 tablet  by mouth daily.      mometasone furoate 50 MCG/ACT Nasal Suspension 1 spray by Nasal route daily as needed.      sevelamer carbonate 800 MG Oral Tab Take 1 tablet (800 mg total) by mouth 3 (three) times daily with meals.      metoprolol succinate ER 50 MG Oral Tablet 24 Hr Take 1 tablet (50 mg total) by mouth daily.      donepezil 10 MG Oral Tab Take 1 tablet (10 mg total) by mouth nightly. 90 tablet 3    Insulin Pen Needle (BD PEN NEEDLE MICRO U/F) 32G X 6 MM Does not apply Misc Use with insulin daily 100 each 3    insulin glargine (BASAGLAR KWIKPEN) 100 UNIT/ML Subcutaneous Solution Pen-injector Take Basaglar 10 units at night; On Night of chemo patient should take 22 of Basaglar; On the next 2 days,  he should take 20 units; on day 4, he should take 15 units on day 4 and then back down to usual 10 units from the 5 day onwards. 24 mL 3    apixaban (ELIQUIS) 2.5 MG Oral Tab Take 1 tablet (2.5 mg total) by mouth 2 (two) times daily 60 tablet 11    Amino Acids (LIQUACEL) Oral Liquid Take 30 mL by mouth daily.      latanoprost 0.005 % Ophthalmic Solution Place 1 drop into both eyes nightly.      Cholecalciferol (VITAMIN D) 25 MCG (1000 UT) Oral Tab Take 2 tablets by mouth in the morning.      FOLBIC 2.5-25-2 MG Oral Tab Take 1 tablet by mouth daily.  10    acyclovir (ZOVIRAX) 400 MG Oral Tab Take 1 tablet (400 mg total) by mouth daily.  11

## 2025-07-15 NOTE — ASSESSMENT & PLAN NOTE
Planning to undergo a TAVR August 21, 2025.  Workup overall has been completed per the patient's wife.  Will reach out to Gamal Claudio etc to see if there is anything that I can help with.

## 2025-07-24 ENCOUNTER — TELEPHONE (OUTPATIENT)
Dept: ENDOCRINOLOGY CLINIC | Facility: CLINIC | Age: 80
End: 2025-07-24

## 2025-07-24 NOTE — TELEPHONE ENCOUNTER
Noemi calling to discuss treatment plan and update Endocrinologist on changes in medical plans.  Please call.  Thank you.

## 2025-07-28 NOTE — TELEPHONE ENCOUNTER
Dr. Guerrero -- please advise on behalf of Dr. Ledesma.     Spoke to pt's wife, Noemi (per JUANJO on file).  Not getting cancer treatment for 3 months, LO golden on 8/21 so steroid treatments have been on hold. She wanted to provide BG update. Ok for proceed to at current insulin doses?     Dialysis days: 9 units -> 7 units basaglar  Non dialysis days: 10 units -> 6 units basaglar, now 5 units as of last night     Fasting   7/28 130   7/27 111-5 units   7/26 98- 6 units

## 2025-07-29 ENCOUNTER — TELEPHONE (OUTPATIENT)
Dept: PHYSICAL THERAPY | Facility: HOSPITAL | Age: 80
End: 2025-07-29

## 2025-07-29 ENCOUNTER — OFFICE VISIT (OUTPATIENT)
Dept: PHYSICAL THERAPY | Facility: HOSPITAL | Age: 80
End: 2025-07-29
Attending: INTERNAL MEDICINE
Payer: MEDICARE

## 2025-07-29 DIAGNOSIS — M62.81 GENERALIZED MUSCLE WEAKNESS: ICD-10-CM

## 2025-07-29 DIAGNOSIS — E11.9 TYPE 2 DIABETES MELLITUS WITHOUT COMPLICATION, WITHOUT LONG-TERM CURRENT USE OF INSULIN (HCC): ICD-10-CM

## 2025-07-29 DIAGNOSIS — N18.6 ESRD ON HEMODIALYSIS (HCC): ICD-10-CM

## 2025-07-29 DIAGNOSIS — Z99.2 ESRD ON HEMODIALYSIS (HCC): ICD-10-CM

## 2025-07-29 DIAGNOSIS — R53.81 PHYSICAL DECONDITIONING: Primary | ICD-10-CM

## 2025-07-29 PROCEDURE — 97162 PT EVAL MOD COMPLEX 30 MIN: CPT

## 2025-07-29 PROCEDURE — 97110 THERAPEUTIC EXERCISES: CPT

## 2025-07-31 ENCOUNTER — TELEPHONE (OUTPATIENT)
Dept: CARDIOLOGY CLINIC | Facility: HOSPITAL | Age: 80
End: 2025-07-31

## 2025-07-31 ENCOUNTER — OFFICE VISIT (OUTPATIENT)
Dept: PHYSICAL THERAPY | Facility: HOSPITAL | Age: 80
End: 2025-07-31
Attending: INTERNAL MEDICINE
Payer: MEDICARE

## 2025-07-31 PROCEDURE — 97112 NEUROMUSCULAR REEDUCATION: CPT

## 2025-07-31 PROCEDURE — 97110 THERAPEUTIC EXERCISES: CPT

## 2025-08-01 ENCOUNTER — TELEPHONE (OUTPATIENT)
Dept: INTERNAL MEDICINE CLINIC | Facility: CLINIC | Age: 80
End: 2025-08-01

## 2025-08-05 ENCOUNTER — OFFICE VISIT (OUTPATIENT)
Dept: PHYSICAL THERAPY | Facility: HOSPITAL | Age: 80
End: 2025-08-05
Attending: INTERNAL MEDICINE

## 2025-08-05 PROCEDURE — 97110 THERAPEUTIC EXERCISES: CPT

## 2025-08-07 ENCOUNTER — OFFICE VISIT (OUTPATIENT)
Dept: PHYSICAL THERAPY | Facility: HOSPITAL | Age: 80
End: 2025-08-07
Attending: INTERNAL MEDICINE

## 2025-08-07 PROCEDURE — 97110 THERAPEUTIC EXERCISES: CPT

## 2025-08-09 ENCOUNTER — HOSPITAL ENCOUNTER (OUTPATIENT)
Dept: GENERAL RADIOLOGY | Facility: HOSPITAL | Age: 80
Discharge: HOME | End: 2025-08-09
Attending: INTERNAL MEDICINE

## 2025-08-09 ENCOUNTER — LAB ENCOUNTER (OUTPATIENT)
Dept: LAB | Facility: HOSPITAL | Age: 80
End: 2025-08-09
Attending: INTERNAL MEDICINE

## 2025-08-09 DIAGNOSIS — E11.9 DM (DIABETES MELLITUS) (HCC): ICD-10-CM

## 2025-08-09 DIAGNOSIS — I48.91 AF (ATRIAL FIBRILLATION) (HCC): ICD-10-CM

## 2025-08-09 DIAGNOSIS — I50.9 CHF (CONGESTIVE HEART FAILURE) (HCC): ICD-10-CM

## 2025-08-09 DIAGNOSIS — C90.01 MULTIPLE MYELOMA IN REMISSION (HCC): ICD-10-CM

## 2025-08-09 DIAGNOSIS — I35.0 AORTIC STENOSIS: ICD-10-CM

## 2025-08-09 DIAGNOSIS — I42.9 CARDIOMYOPATHY (HCC): ICD-10-CM

## 2025-08-09 DIAGNOSIS — Z99.2 DIALYSIS PATIENT: ICD-10-CM

## 2025-08-09 LAB
ALBUMIN SERPL-MCNC: 4.4 G/DL (ref 3.2–4.8)
ALBUMIN/GLOB SERPL: 1.9 (ref 1–2)
ALP LIVER SERPL-CCNC: 60 U/L (ref 45–117)
ALT SERPL-CCNC: 13 U/L (ref 10–49)
ANION GAP SERPL CALC-SCNC: 8 MMOL/L (ref 0–18)
ANTIBODY SCREEN: NEGATIVE
AST SERPL-CCNC: 12 U/L (ref ?–34)
ATRIAL RATE: 72 BPM
BILIRUB SERPL-MCNC: 0.6 MG/DL (ref 0.2–1.1)
BUN BLD-MCNC: 56 MG/DL (ref 9–23)
BUN/CREAT SERPL: 7.9 (ref 10–20)
CALCIUM BLD-MCNC: 9.6 MG/DL (ref 8.7–10.4)
CHLORIDE SERPL-SCNC: 95 MMOL/L (ref 98–112)
CO2 SERPL-SCNC: 34 MMOL/L (ref 21–32)
CREAT BLD-MCNC: 7.07 MG/DL (ref 0.7–1.3)
DEPRECATED RDW RBC AUTO: 70.3 FL (ref 35.1–46.3)
EGFRCR SERPLBLD CKD-EPI 2021: 7 ML/MIN/1.73M2 (ref 60–?)
ERYTHROCYTE [DISTWIDTH] IN BLOOD BY AUTOMATED COUNT: 20 % (ref 11–15)
FASTING STATUS PATIENT QL REPORTED: NO
GLOBULIN PLAS-MCNC: 2.3 G/DL (ref 2–3.5)
GLUCOSE BLD-MCNC: 97 MG/DL (ref 70–99)
HCT VFR BLD AUTO: 34.4 % (ref 39–53)
HGB BLD-MCNC: 11.1 G/DL (ref 13–17.5)
MCH RBC QN AUTO: 30.8 PG (ref 26–34)
MCHC RBC AUTO-ENTMCNC: 32.3 G/DL (ref 31–37)
MCV RBC AUTO: 95.6 FL (ref 80–100)
OSMOLALITY SERPL CALC.SUM OF ELEC: 299 MOSM/KG (ref 275–295)
P AXIS: 52 DEGREES
P-R INTERVAL: 176 MS
PLATELET # BLD AUTO: 96 10(3)UL (ref 150–450)
PLATELET MORPHOLOGY: NORMAL
PLATELETS.RETICULATED NFR BLD AUTO: 2.8 % (ref 0–7)
POTASSIUM SERPL-SCNC: 3.9 MMOL/L (ref 3.5–5.1)
PROT SERPL-MCNC: 6.7 G/DL (ref 5.7–8.2)
Q-T INTERVAL: 456 MS
QRS DURATION: 114 MS
QTC CALCULATION (BEZET): 499 MS
R AXIS: 16 DEGREES
RBC # BLD AUTO: 3.6 X10(6)UL (ref 3.8–5.8)
RH BLOOD TYPE: POSITIVE
SODIUM SERPL-SCNC: 137 MMOL/L (ref 136–145)
T AXIS: 40 DEGREES
VENTRICULAR RATE: 72 BPM
WBC # BLD AUTO: 4.5 X10(3) UL (ref 4–11)

## 2025-08-09 PROCEDURE — 85027 COMPLETE CBC AUTOMATED: CPT

## 2025-08-09 PROCEDURE — 36415 COLL VENOUS BLD VENIPUNCTURE: CPT

## 2025-08-09 PROCEDURE — 80053 COMPREHEN METABOLIC PANEL: CPT

## 2025-08-09 PROCEDURE — 86901 BLOOD TYPING SEROLOGIC RH(D): CPT

## 2025-08-09 PROCEDURE — 86900 BLOOD TYPING SEROLOGIC ABO: CPT

## 2025-08-09 PROCEDURE — 86850 RBC ANTIBODY SCREEN: CPT

## 2025-08-09 PROCEDURE — 93005 ELECTROCARDIOGRAM TRACING: CPT

## 2025-08-09 PROCEDURE — 93010 ELECTROCARDIOGRAM REPORT: CPT | Performed by: STUDENT IN AN ORGANIZED HEALTH CARE EDUCATION/TRAINING PROGRAM

## 2025-08-09 PROCEDURE — 71046 X-RAY EXAM CHEST 2 VIEWS: CPT | Performed by: INTERNAL MEDICINE

## 2025-08-12 ENCOUNTER — OFFICE VISIT (OUTPATIENT)
Dept: PHYSICAL THERAPY | Facility: HOSPITAL | Age: 80
End: 2025-08-12
Attending: INTERNAL MEDICINE

## 2025-08-12 PROCEDURE — 97110 THERAPEUTIC EXERCISES: CPT

## 2025-08-13 ENCOUNTER — OFFICE VISIT (OUTPATIENT)
Dept: ENDOCRINOLOGY CLINIC | Facility: CLINIC | Age: 80
End: 2025-08-13

## 2025-08-13 VITALS
WEIGHT: 186 LBS | DIASTOLIC BLOOD PRESSURE: 81 MMHG | SYSTOLIC BLOOD PRESSURE: 140 MMHG | HEIGHT: 67 IN | BODY MASS INDEX: 29.19 KG/M2 | HEART RATE: 74 BPM

## 2025-08-13 DIAGNOSIS — E11.65 TYPE 2 DIABETES MELLITUS WITH HYPERGLYCEMIA, WITH LONG-TERM CURRENT USE OF INSULIN (HCC): Primary | ICD-10-CM

## 2025-08-13 DIAGNOSIS — Z79.4 TYPE 2 DIABETES MELLITUS WITH HYPERGLYCEMIA, WITH LONG-TERM CURRENT USE OF INSULIN (HCC): Primary | ICD-10-CM

## 2025-08-13 LAB
CARTRIDGE EXPIRATION DATE: NORMAL DATE
GLUCOSE BLOOD: 179
HEMOGLOBIN A1C: 5.1 % (ref 4.3–5.6)
TEST STRIP LOT #: NORMAL NUMERIC

## 2025-08-13 PROCEDURE — 83036 HEMOGLOBIN GLYCOSYLATED A1C: CPT | Performed by: INTERNAL MEDICINE

## 2025-08-13 PROCEDURE — 82947 ASSAY GLUCOSE BLOOD QUANT: CPT | Performed by: INTERNAL MEDICINE

## 2025-08-13 PROCEDURE — 99214 OFFICE O/P EST MOD 30 MIN: CPT | Performed by: INTERNAL MEDICINE

## 2025-08-14 ENCOUNTER — OFFICE VISIT (OUTPATIENT)
Dept: PHYSICAL THERAPY | Facility: HOSPITAL | Age: 80
End: 2025-08-14
Attending: INTERNAL MEDICINE

## 2025-08-14 PROCEDURE — 97110 THERAPEUTIC EXERCISES: CPT

## 2025-08-15 RX ORDER — SODIUM CHLORIDE 9 MG/ML
INJECTION, SOLUTION INTRAVENOUS CONTINUOUS
Status: CANCELLED | OUTPATIENT
Start: 2025-08-15

## 2025-08-19 ENCOUNTER — OFFICE VISIT (OUTPATIENT)
Dept: PHYSICAL THERAPY | Facility: HOSPITAL | Age: 80
End: 2025-08-19
Attending: INTERNAL MEDICINE

## 2025-08-19 ENCOUNTER — MED REC SCAN ONLY (OUTPATIENT)
Dept: INTERNAL MEDICINE CLINIC | Facility: CLINIC | Age: 80
End: 2025-08-19

## 2025-08-19 PROCEDURE — 97110 THERAPEUTIC EXERCISES: CPT

## 2025-08-21 ENCOUNTER — ANESTHESIA (OUTPATIENT)
Dept: CARDIAC SURGERY | Facility: HOSPITAL | Age: 80
End: 2025-08-21

## 2025-08-21 ENCOUNTER — APPOINTMENT (OUTPATIENT)
Dept: PHYSICAL THERAPY | Facility: HOSPITAL | Age: 80
End: 2025-08-21
Attending: INTERNAL MEDICINE

## 2025-08-21 ENCOUNTER — HOSPITAL ENCOUNTER (INPATIENT)
Facility: HOSPITAL | Age: 80
LOS: 4 days | Discharge: NURSING, REHAB OR LONG-TERM CARE | End: 2025-08-25
Attending: INTERNAL MEDICINE | Admitting: INTERNAL MEDICINE

## 2025-08-21 ENCOUNTER — HOSPITAL ENCOUNTER (INPATIENT)
Facility: HOSPITAL | Age: 80
LOS: 4 days | Discharge: ANOTHER HOSPITAL | End: 2025-08-25
Attending: INTERNAL MEDICINE | Admitting: INTERNAL MEDICINE

## 2025-08-21 ENCOUNTER — APPOINTMENT (OUTPATIENT)
Dept: ULTRASOUND IMAGING | Facility: HOSPITAL | Age: 80
End: 2025-08-21

## 2025-08-21 ENCOUNTER — ANESTHESIA EVENT (OUTPATIENT)
Dept: CARDIAC SURGERY | Facility: HOSPITAL | Age: 80
End: 2025-08-21

## 2025-08-21 ENCOUNTER — APPOINTMENT (OUTPATIENT)
Dept: CV DIAGNOSTICS | Facility: HOSPITAL | Age: 80
End: 2025-08-21
Attending: INTERNAL MEDICINE

## 2025-08-21 ENCOUNTER — APPOINTMENT (OUTPATIENT)
Dept: GENERAL RADIOLOGY | Facility: HOSPITAL | Age: 80
End: 2025-08-21

## 2025-08-21 DIAGNOSIS — I35.0 AORTIC STENOSIS: Primary | ICD-10-CM

## 2025-08-21 PROBLEM — Z99.2 DIABETES MELLITUS DUE TO UNDERLYING CONDITION WITH CHRONIC KIDNEY DISEASE ON CHRONIC DIALYSIS, WITH LONG-TERM CURRENT USE OF INSULIN (HCC): Status: ACTIVE | Noted: 2025-08-21

## 2025-08-21 PROBLEM — Z79.4 DIABETES MELLITUS DUE TO UNDERLYING CONDITION WITH CHRONIC KIDNEY DISEASE ON CHRONIC DIALYSIS, WITH LONG-TERM CURRENT USE OF INSULIN (HCC): Status: ACTIVE | Noted: 2025-08-21

## 2025-08-21 PROBLEM — N18.6 ANEMIA IN CHRONIC KIDNEY DISEASE, ON CHRONIC DIALYSIS (HCC): Status: ACTIVE | Noted: 2018-01-28

## 2025-08-21 PROBLEM — Z99.2 ANEMIA IN CHRONIC KIDNEY DISEASE, ON CHRONIC DIALYSIS (HCC): Status: ACTIVE | Noted: 2018-01-28

## 2025-08-21 PROBLEM — N18.6 DIABETES MELLITUS DUE TO UNDERLYING CONDITION WITH CHRONIC KIDNEY DISEASE ON CHRONIC DIALYSIS, WITH LONG-TERM CURRENT USE OF INSULIN (HCC): Status: ACTIVE | Noted: 2025-08-21

## 2025-08-21 PROBLEM — D63.1 ANEMIA IN CHRONIC KIDNEY DISEASE, ON CHRONIC DIALYSIS (HCC): Status: ACTIVE | Noted: 2018-01-28

## 2025-08-21 PROBLEM — Z95.2 S/P TAVR (TRANSCATHETER AORTIC VALVE REPLACEMENT): Status: ACTIVE | Noted: 2025-08-21

## 2025-08-21 PROBLEM — E08.22 DIABETES MELLITUS DUE TO UNDERLYING CONDITION WITH CHRONIC KIDNEY DISEASE ON CHRONIC DIALYSIS, WITH LONG-TERM CURRENT USE OF INSULIN (HCC): Status: ACTIVE | Noted: 2025-08-21

## 2025-08-21 LAB
ALBUMIN SERPL-MCNC: 3.4 G/DL (ref 3.2–4.8)
ALBUMIN/GLOB SERPL: 2.1 (ref 1–2)
ALP LIVER SERPL-CCNC: 48 U/L (ref 45–117)
ALT SERPL-CCNC: 8 U/L (ref 10–49)
ANION GAP SERPL CALC-SCNC: 12 MMOL/L (ref 0–18)
ANION GAP SERPL CALC-SCNC: 8 MMOL/L (ref 0–18)
APTT PPP: >240 SECONDS (ref 23–36)
AST SERPL-CCNC: 8 U/L (ref ?–34)
BASE EXCESS BLD CALC-SCNC: 6 MMOL/L
BILIRUB SERPL-MCNC: 0.5 MG/DL (ref 0.2–1.1)
BUN BLD-MCNC: 40 MG/DL (ref 9–23)
BUN BLD-MCNC: 40 MG/DL (ref 9–23)
CA-I BLD-SCNC: 1.08 MMOL/L (ref 1.12–1.32)
CALCIUM BLD-MCNC: 10.1 MG/DL (ref 8.7–10.6)
CALCIUM BLD-MCNC: 8.3 MG/DL (ref 8.7–10.6)
CHLORIDE SERPL-SCNC: 100 MMOL/L (ref 98–112)
CHLORIDE SERPL-SCNC: 94 MMOL/L (ref 98–112)
CO2 BLD-SCNC: 30 MMOL/L (ref 22–32)
CO2 SERPL-SCNC: 30 MMOL/L (ref 21–32)
CO2 SERPL-SCNC: 33 MMOL/L (ref 21–32)
CREAT BLD-MCNC: 5.86 MG/DL (ref 0.7–1.3)
CREAT BLD-MCNC: 6.25 MG/DL (ref 0.7–1.3)
EGFRCR SERPLBLD CKD-EPI 2021: 8 ML/MIN/1.73M2 (ref 60–?)
EGFRCR SERPLBLD CKD-EPI 2021: 9 ML/MIN/1.73M2 (ref 60–?)
ERYTHROCYTE [DISTWIDTH] IN BLOOD BY AUTOMATED COUNT: 18.6 %
GLOBULIN PLAS-MCNC: 1.6 G/DL (ref 2–3.5)
GLUCOSE BLD-MCNC: 100 MG/DL (ref 70–99)
GLUCOSE BLD-MCNC: 104 MG/DL (ref 70–99)
GLUCOSE BLD-MCNC: 134 MG/DL (ref 70–99)
GLUCOSE BLD-MCNC: 141 MG/DL (ref 70–99)
GLUCOSE BLD-MCNC: 172 MG/DL (ref 70–99)
GLUCOSE BLD-MCNC: 174 MG/DL (ref 70–99)
GLUCOSE BLD-MCNC: 85 MG/DL (ref 70–99)
GLUCOSE BLD-MCNC: 97 MG/DL (ref 70–99)
HCO3 BLD-SCNC: 29 MEQ/L
HCT VFR BLD AUTO: 25.7 % (ref 39–53)
HCT VFR BLD CALC: 26 % (ref 37–53)
HGB BLD-MCNC: 8.7 G/DL (ref 13–17.5)
INR BLD: 1.5 (ref 0.8–1.2)
ISTAT ACTIVATED CLOTTING TIME: 153 SECONDS (ref 74–137)
ISTAT ACTIVATED CLOTTING TIME: 527 SECONDS (ref 74–137)
MAGNESIUM SERPL-MCNC: 2 MG/DL (ref 1.6–2.6)
MCH RBC QN AUTO: 31 PG (ref 26–34)
MCHC RBC AUTO-ENTMCNC: 33.9 G/DL (ref 31–37)
MCV RBC AUTO: 91.5 FL (ref 80–100)
NT-PROBNP SERPL-MCNC: ABNORMAL PG/ML (ref ?–450)
OSMOLALITY SERPL CALC.SUM OF ELEC: 296 MOSM/KG (ref 275–295)
OSMOLALITY SERPL CALC.SUM OF ELEC: 298 MOSM/KG (ref 275–295)
PCO2 BLD: 38.6 MMHG
PH BLD: 7.48
PLATELET # BLD AUTO: 60 10(3)UL (ref 150–450)
PLATELETS.RETICULATED NFR BLD AUTO: 1.3 % (ref 0–7)
PO2 BLD: 270 MMHG
POTASSIUM BLD-SCNC: 3.4 MMOL/L (ref 3.6–5.1)
POTASSIUM SERPL-SCNC: 3.4 MMOL/L (ref 3.5–5.1)
POTASSIUM SERPL-SCNC: 3.7 MMOL/L (ref 3.5–5.1)
PROT SERPL-MCNC: 5 G/DL (ref 5.7–8.2)
PROTHROMBIN TIME: 18.6 SECONDS (ref 11.6–14.8)
RBC # BLD AUTO: 2.81 X10(6)UL (ref 3.8–5.8)
RH BLOOD TYPE: POSITIVE
SAO2 % BLD: 100 %
SODIUM BLD-SCNC: 137 MMOL/L (ref 136–145)
SODIUM SERPL-SCNC: 138 MMOL/L (ref 136–145)
SODIUM SERPL-SCNC: 139 MMOL/L (ref 136–145)
WBC # BLD AUTO: 3.5 X10(3) UL (ref 4–11)

## 2025-08-21 PROCEDURE — 76882 US LMTD JT/FCL EVL NVASC XTR: CPT

## 2025-08-21 PROCEDURE — 3E053GC INTRODUCTION OF OTHER THERAPEUTIC SUBSTANCE INTO PERIPHERAL ARTERY, PERCUTANEOUS APPROACH: ICD-10-PCS | Performed by: RADIOLOGY

## 2025-08-21 PROCEDURE — 02RF38Z REPLACEMENT OF AORTIC VALVE WITH ZOOPLASTIC TISSUE, PERCUTANEOUS APPROACH: ICD-10-PCS | Performed by: INTERNAL MEDICINE

## 2025-08-21 PROCEDURE — B2151ZZ FLUOROSCOPY OF LEFT HEART USING LOW OSMOLAR CONTRAST: ICD-10-PCS | Performed by: INTERNAL MEDICINE

## 2025-08-21 PROCEDURE — 71045 X-RAY EXAM CHEST 1 VIEW: CPT

## 2025-08-21 PROCEDURE — B41G1ZZ FLUOROSCOPY OF LEFT LOWER EXTREMITY ARTERIES USING LOW OSMOLAR CONTRAST: ICD-10-PCS | Performed by: INTERNAL MEDICINE

## 2025-08-21 PROCEDURE — 90935 HEMODIALYSIS ONE EVALUATION: CPT | Performed by: INTERNAL MEDICINE

## 2025-08-21 PROCEDURE — 76942 ECHO GUIDE FOR BIOPSY: CPT

## 2025-08-21 PROCEDURE — 99223 1ST HOSP IP/OBS HIGH 75: CPT | Performed by: HOSPITALIST

## 2025-08-21 PROCEDURE — B41F1ZZ FLUOROSCOPY OF RIGHT LOWER EXTREMITY ARTERIES USING LOW OSMOLAR CONTRAST: ICD-10-PCS | Performed by: INTERNAL MEDICINE

## 2025-08-21 PROCEDURE — 99497 ADVNCD CARE PLAN 30 MIN: CPT | Performed by: HOSPITALIST

## 2025-08-21 PROCEDURE — B24BZZ4 ULTRASONOGRAPHY OF HEART WITH AORTA, TRANSESOPHAGEAL: ICD-10-PCS | Performed by: INTERNAL MEDICINE

## 2025-08-21 PROCEDURE — 36002 PSEUDOANEURYSM INJECTION TRT: CPT

## 2025-08-21 PROCEDURE — 93355 ECHO TRANSESOPHAGEAL (TEE): CPT | Performed by: INTERNAL MEDICINE

## 2025-08-21 DEVICE — IMPLANTABLE DEVICE: Type: IMPLANTABLE DEVICE | Site: HEART | Status: FUNCTIONAL

## 2025-08-21 RX ORDER — LIDOCAINE AND PRILOCAINE 25; 25 MG/G; MG/G
CREAM TOPICAL AS NEEDED
Status: DISCONTINUED | OUTPATIENT
Start: 2025-08-22 | End: 2025-08-25

## 2025-08-21 RX ORDER — NICOTINE POLACRILEX 4 MG
15 LOZENGE BUCCAL
Status: DISCONTINUED | OUTPATIENT
Start: 2025-08-21 | End: 2025-08-25

## 2025-08-21 RX ORDER — FAMOTIDINE 10 MG/ML
20 INJECTION, SOLUTION INTRAVENOUS DAILY
Status: DISCONTINUED | OUTPATIENT
Start: 2025-08-21 | End: 2025-08-25

## 2025-08-21 RX ORDER — ALBUMIN HUMAN 50 G/1000ML
12.5 SOLUTION INTRAVENOUS ONCE AS NEEDED
Status: ACTIVE | OUTPATIENT
Start: 2025-08-21 | End: 2025-08-21

## 2025-08-21 RX ORDER — NALOXONE HYDROCHLORIDE 0.4 MG/ML
0.08 INJECTION, SOLUTION INTRAMUSCULAR; INTRAVENOUS; SUBCUTANEOUS AS NEEDED
Status: ACTIVE | OUTPATIENT
Start: 2025-08-21 | End: 2025-08-21

## 2025-08-21 RX ORDER — HYDROCODONE BITARTRATE AND ACETAMINOPHEN 5; 325 MG/1; MG/1
2 TABLET ORAL ONCE AS NEEDED
Status: ACTIVE | OUTPATIENT
Start: 2025-08-21 | End: 2025-08-21

## 2025-08-21 RX ORDER — ONDANSETRON 2 MG/ML
INJECTION INTRAMUSCULAR; INTRAVENOUS AS NEEDED
Status: DISCONTINUED | OUTPATIENT
Start: 2025-08-21 | End: 2025-08-21

## 2025-08-21 RX ORDER — PROTAMINE SULFATE 10 MG/ML
INJECTION, SOLUTION INTRAVENOUS AS NEEDED
Status: DISCONTINUED | OUTPATIENT
Start: 2025-08-21 | End: 2025-08-21 | Stop reason: SURG

## 2025-08-21 RX ORDER — ROCURONIUM BROMIDE 10 MG/ML
INJECTION, SOLUTION INTRAVENOUS AS NEEDED
Status: DISCONTINUED | OUTPATIENT
Start: 2025-08-21 | End: 2025-08-21 | Stop reason: SURG

## 2025-08-21 RX ORDER — HYDRALAZINE HYDROCHLORIDE 20 MG/ML
10 INJECTION INTRAMUSCULAR; INTRAVENOUS EVERY 2 HOUR PRN
Status: DISCONTINUED | OUTPATIENT
Start: 2025-08-21 | End: 2025-08-25

## 2025-08-21 RX ORDER — ALBUMIN (HUMAN) 12.5 G/50ML
25 SOLUTION INTRAVENOUS
Status: ACTIVE | OUTPATIENT
Start: 2025-08-21 | End: 2025-08-23

## 2025-08-21 RX ORDER — ONDANSETRON 2 MG/ML
4 INJECTION INTRAMUSCULAR; INTRAVENOUS AS NEEDED
Status: DISCONTINUED | OUTPATIENT
Start: 2025-08-21 | End: 2025-08-21

## 2025-08-21 RX ORDER — MORPHINE SULFATE 2 MG/ML
INJECTION, SOLUTION INTRAMUSCULAR; INTRAVENOUS
Status: COMPLETED
Start: 2025-08-21 | End: 2025-08-21

## 2025-08-21 RX ORDER — DEXAMETHASONE SODIUM PHOSPHATE 4 MG/ML
4 VIAL (ML) INJECTION AS NEEDED
Status: ACTIVE | OUTPATIENT
Start: 2025-08-21 | End: 2025-08-21

## 2025-08-21 RX ORDER — LATANOPROST 50 UG/ML
1 SOLUTION/ DROPS OPHTHALMIC NIGHTLY
Status: DISCONTINUED | OUTPATIENT
Start: 2025-08-21 | End: 2025-08-25

## 2025-08-21 RX ORDER — METOCLOPRAMIDE HYDROCHLORIDE 5 MG/ML
5 INJECTION INTRAMUSCULAR; INTRAVENOUS EVERY 6 HOURS PRN
Status: DISCONTINUED | OUTPATIENT
Start: 2025-08-21 | End: 2025-08-21

## 2025-08-21 RX ORDER — SEVELAMER CARBONATE 800 MG/1
800 TABLET, FILM COATED ORAL
Status: DISCONTINUED | OUTPATIENT
Start: 2025-08-21 | End: 2025-08-25

## 2025-08-21 RX ORDER — NITROGLYCERIN 20 MG/100ML
INJECTION INTRAVENOUS
Status: COMPLETED
Start: 2025-08-21 | End: 2025-08-21

## 2025-08-21 RX ORDER — ACYCLOVIR 400 MG/1
400 TABLET ORAL DAILY
Status: DISCONTINUED | OUTPATIENT
Start: 2025-08-22 | End: 2025-08-25

## 2025-08-21 RX ORDER — MORPHINE SULFATE 2 MG/ML
1 INJECTION, SOLUTION INTRAMUSCULAR; INTRAVENOUS ONCE
Status: COMPLETED | OUTPATIENT
Start: 2025-08-21 | End: 2025-08-21

## 2025-08-21 RX ORDER — HEPARIN SODIUM 1000 [USP'U]/ML
INJECTION, SOLUTION INTRAVENOUS; SUBCUTANEOUS AS NEEDED
Status: DISCONTINUED | OUTPATIENT
Start: 2025-08-21 | End: 2025-08-21 | Stop reason: SURG

## 2025-08-21 RX ORDER — PHENYLEPHRINE HCL 10 MG/ML
VIAL (ML) INJECTION AS NEEDED
Status: DISCONTINUED | OUTPATIENT
Start: 2025-08-21 | End: 2025-08-21 | Stop reason: SURG

## 2025-08-21 RX ORDER — METOCLOPRAMIDE HYDROCHLORIDE 5 MG/ML
5 INJECTION INTRAMUSCULAR; INTRAVENOUS EVERY 8 HOURS PRN
Status: DISCONTINUED | OUTPATIENT
Start: 2025-08-21 | End: 2025-08-25

## 2025-08-21 RX ORDER — BISACODYL 10 MG
10 SUPPOSITORY, RECTAL RECTAL
Status: DISCONTINUED | OUTPATIENT
Start: 2025-08-21 | End: 2025-08-25

## 2025-08-21 RX ORDER — HYDROCODONE BITARTRATE AND ACETAMINOPHEN 5; 325 MG/1; MG/1
1 TABLET ORAL ONCE AS NEEDED
Status: ACTIVE | OUTPATIENT
Start: 2025-08-21 | End: 2025-08-21

## 2025-08-21 RX ORDER — SODIUM PHOSPHATE, DIBASIC AND SODIUM PHOSPHATE, MONOBASIC 7; 19 G/230ML; G/230ML
1 ENEMA RECTAL ONCE AS NEEDED
Status: DISCONTINUED | OUTPATIENT
Start: 2025-08-21 | End: 2025-08-25

## 2025-08-21 RX ORDER — SENNOSIDES 8.6 MG
17.2 TABLET ORAL NIGHTLY PRN
Status: DISCONTINUED | OUTPATIENT
Start: 2025-08-21 | End: 2025-08-25

## 2025-08-21 RX ORDER — HYDROMORPHONE HYDROCHLORIDE 1 MG/ML
0.4 INJECTION, SOLUTION INTRAMUSCULAR; INTRAVENOUS; SUBCUTANEOUS EVERY 5 MIN PRN
Status: ACTIVE | OUTPATIENT
Start: 2025-08-21 | End: 2025-08-21

## 2025-08-21 RX ORDER — SODIUM CHLORIDE 9 MG/ML
INJECTION, SOLUTION INTRAVENOUS CONTINUOUS PRN
Status: DISCONTINUED | OUTPATIENT
Start: 2025-08-21 | End: 2025-08-21 | Stop reason: SURG

## 2025-08-21 RX ORDER — ONDANSETRON 2 MG/ML
4 INJECTION INTRAMUSCULAR; INTRAVENOUS EVERY 6 HOURS PRN
Status: DISCONTINUED | OUTPATIENT
Start: 2025-08-21 | End: 2025-08-22

## 2025-08-21 RX ORDER — DEXAMETHASONE SODIUM PHOSPHATE 4 MG/ML
VIAL (ML) INJECTION AS NEEDED
Status: DISCONTINUED | OUTPATIENT
Start: 2025-08-21 | End: 2025-08-21

## 2025-08-21 RX ORDER — SODIUM CHLORIDE 9 MG/ML
INJECTION, SOLUTION INTRAVENOUS CONTINUOUS
Status: CANCELLED | OUTPATIENT
Start: 2025-08-21

## 2025-08-21 RX ORDER — DEXTROSE MONOHYDRATE 25 G/50ML
50 INJECTION, SOLUTION INTRAVENOUS
Status: DISCONTINUED | OUTPATIENT
Start: 2025-08-21 | End: 2025-08-25

## 2025-08-21 RX ORDER — NICOTINE POLACRILEX 4 MG
30 LOZENGE BUCCAL
Status: DISCONTINUED | OUTPATIENT
Start: 2025-08-21 | End: 2025-08-25

## 2025-08-21 RX ORDER — NITROGLYCERIN 20 MG/100ML
INJECTION INTRAVENOUS CONTINUOUS PRN
Status: DISCONTINUED | OUTPATIENT
Start: 2025-08-21 | End: 2025-08-25

## 2025-08-21 RX ORDER — LIDOCAINE HYDROCHLORIDE 10 MG/ML
INJECTION, SOLUTION EPIDURAL; INFILTRATION; INTRACAUDAL; PERINEURAL AS NEEDED
Status: DISCONTINUED | OUTPATIENT
Start: 2025-08-21 | End: 2025-08-21 | Stop reason: SURG

## 2025-08-21 RX ORDER — POLYETHYLENE GLYCOL 3350 17 G/17G
17 POWDER, FOR SOLUTION ORAL DAILY PRN
Status: DISCONTINUED | OUTPATIENT
Start: 2025-08-21 | End: 2025-08-25

## 2025-08-21 RX ORDER — FAMOTIDINE 20 MG/1
20 TABLET, FILM COATED ORAL DAILY
Status: DISCONTINUED | OUTPATIENT
Start: 2025-08-21 | End: 2025-08-25

## 2025-08-21 RX ADMIN — DEXAMETHASONE SODIUM PHOSPHATE 4 MG: 4 MG/ML VIAL (ML) INJECTION at 07:42:00

## 2025-08-21 RX ADMIN — ROCURONIUM BROMIDE 50 MG: 10 INJECTION, SOLUTION INTRAVENOUS at 07:25:00

## 2025-08-21 RX ADMIN — PHENYLEPHRINE HCL 50 MCG: 10 MG/ML VIAL (ML) INJECTION at 07:25:00

## 2025-08-21 RX ADMIN — SODIUM CHLORIDE: 9 INJECTION, SOLUTION INTRAVENOUS at 08:52:00

## 2025-08-21 RX ADMIN — SODIUM CHLORIDE: 9 INJECTION, SOLUTION INTRAVENOUS at 07:18:00

## 2025-08-21 RX ADMIN — ONDANSETRON 4 MG: 2 INJECTION INTRAMUSCULAR; INTRAVENOUS at 07:42:00

## 2025-08-21 RX ADMIN — LIDOCAINE HYDROCHLORIDE 50 MG: 10 INJECTION, SOLUTION EPIDURAL; INFILTRATION; INTRACAUDAL; PERINEURAL at 07:25:00

## 2025-08-21 RX ADMIN — PROTAMINE SULFATE 50 MG: 10 INJECTION, SOLUTION INTRAVENOUS at 08:19:00

## 2025-08-21 RX ADMIN — HEPARIN SODIUM 17000 UNITS: 1000 INJECTION, SOLUTION INTRAVENOUS; SUBCUTANEOUS at 07:59:00

## 2025-08-22 ENCOUNTER — APPOINTMENT (OUTPATIENT)
Dept: GENERAL RADIOLOGY | Facility: HOSPITAL | Age: 80
End: 2025-08-22

## 2025-08-22 ENCOUNTER — APPOINTMENT (OUTPATIENT)
Dept: ULTRASOUND IMAGING | Facility: HOSPITAL | Age: 80
End: 2025-08-22
Attending: NURSE PRACTITIONER

## 2025-08-22 ENCOUNTER — APPOINTMENT (OUTPATIENT)
Dept: CV DIAGNOSTICS | Facility: HOSPITAL | Age: 80
End: 2025-08-22

## 2025-08-22 PROBLEM — R09.02 HYPOXIA: Status: ACTIVE | Noted: 2025-08-22

## 2025-08-22 LAB
ANION GAP SERPL CALC-SCNC: 14 MMOL/L (ref 0–18)
ATRIAL RATE: 57 BPM
ATRIAL RATE: 71 BPM
BUN BLD-MCNC: 18 MG/DL (ref 9–23)
CALCIUM BLD-MCNC: 9.1 MG/DL (ref 8.7–10.6)
CHLORIDE SERPL-SCNC: 100 MMOL/L (ref 98–112)
CO2 SERPL-SCNC: 27 MMOL/L (ref 21–32)
CREAT BLD-MCNC: 3.17 MG/DL (ref 0.7–1.3)
EGFRCR SERPLBLD CKD-EPI 2021: 19 ML/MIN/1.73M2 (ref 60–?)
ERYTHROCYTE [DISTWIDTH] IN BLOOD BY AUTOMATED COUNT: 18.5 %
GLUCOSE BLD-MCNC: 102 MG/DL (ref 70–99)
GLUCOSE BLD-MCNC: 113 MG/DL (ref 70–99)
GLUCOSE BLD-MCNC: 113 MG/DL (ref 70–99)
GLUCOSE BLD-MCNC: 187 MG/DL (ref 70–99)
HBV SURFACE AG SER-ACNC: <0.1
HBV SURFACE AG SERPL QL IA: NONREACTIVE
HCT VFR BLD AUTO: 24.9 % (ref 39–53)
HGB BLD-MCNC: 8.5 G/DL (ref 13–17.5)
INR BLD: 1.1 (ref 0.8–1.2)
MAGNESIUM SERPL-MCNC: 1.9 MG/DL (ref 1.6–2.6)
MCH RBC QN AUTO: 31.4 PG (ref 26–34)
MCHC RBC AUTO-ENTMCNC: 34.1 G/DL (ref 31–37)
MCV RBC AUTO: 91.9 FL (ref 80–100)
MRSA DNA SPEC QL NAA+PROBE: NEGATIVE
OSMOLALITY SERPL CALC.SUM OF ELEC: 294 MOSM/KG (ref 275–295)
P AXIS: 35 DEGREES
P AXIS: 53 DEGREES
P-R INTERVAL: 168 MS
P-R INTERVAL: 200 MS
PLATELET # BLD AUTO: 73 10(3)UL (ref 150–450)
PLATELETS.RETICULATED NFR BLD AUTO: 1.9 % (ref 0–7)
POTASSIUM SERPL-SCNC: 3.7 MMOL/L (ref 3.5–5.1)
PROTHROMBIN TIME: 13.8 SECONDS (ref 11.6–14.8)
Q-T INTERVAL: 466 MS
Q-T INTERVAL: 606 MS
QRS DURATION: 118 MS
QRS DURATION: 174 MS
QTC CALCULATION (BEZET): 506 MS
QTC CALCULATION (BEZET): 589 MS
R AXIS: -21 DEGREES
R AXIS: 0 DEGREES
RBC # BLD AUTO: 2.71 X10(6)UL (ref 3.8–5.8)
SODIUM SERPL-SCNC: 141 MMOL/L (ref 136–145)
T AXIS: -4 DEGREES
T AXIS: 124 DEGREES
VENTRICULAR RATE: 57 BPM
VENTRICULAR RATE: 71 BPM
WBC # BLD AUTO: 6.7 X10(3) UL (ref 4–11)

## 2025-08-22 PROCEDURE — 76882 US LMTD JT/FCL EVL NVASC XTR: CPT | Performed by: NURSE PRACTITIONER

## 2025-08-22 PROCEDURE — 93308 TTE F-UP OR LMTD: CPT

## 2025-08-22 PROCEDURE — 93325 DOPPLER ECHO COLOR FLOW MAPG: CPT

## 2025-08-22 PROCEDURE — 5A1D70Z PERFORMANCE OF URINARY FILTRATION, INTERMITTENT, LESS THAN 6 HOURS PER DAY: ICD-10-PCS | Performed by: INTERNAL MEDICINE

## 2025-08-22 PROCEDURE — 93321 DOPPLER ECHO F-UP/LMTD STD: CPT

## 2025-08-22 PROCEDURE — 71045 X-RAY EXAM CHEST 1 VIEW: CPT

## 2025-08-22 PROCEDURE — 99232 SBSQ HOSP IP/OBS MODERATE 35: CPT | Performed by: HOSPITALIST

## 2025-08-22 RX ORDER — AMIODARONE HYDROCHLORIDE 200 MG/1
200 TABLET ORAL DAILY
Status: DISCONTINUED | OUTPATIENT
Start: 2025-08-22 | End: 2025-08-25

## 2025-08-22 RX ORDER — METOPROLOL SUCCINATE 25 MG/1
25 TABLET, EXTENDED RELEASE ORAL DAILY
Status: DISCONTINUED | OUTPATIENT
Start: 2025-08-22 | End: 2025-08-25

## 2025-08-23 LAB
AMMONIA PLAS-MCNC: 20 UMOL/L (ref 11–32)
ANION GAP SERPL CALC-SCNC: 12 MMOL/L (ref 0–18)
BASOPHILS # BLD AUTO: 0.02 X10(3) UL (ref 0–0.2)
BASOPHILS NFR BLD AUTO: 0.4 %
BUN BLD-MCNC: 30 MG/DL (ref 9–23)
CALCIUM BLD-MCNC: 9.3 MG/DL (ref 8.7–10.6)
CHLORIDE SERPL-SCNC: 100 MMOL/L (ref 98–112)
CO2 SERPL-SCNC: 28 MMOL/L (ref 21–32)
CREAT BLD-MCNC: 5.1 MG/DL (ref 0.7–1.3)
EGFRCR SERPLBLD CKD-EPI 2021: 11 ML/MIN/1.73M2 (ref 60–?)
EOSINOPHIL # BLD AUTO: 0.07 X10(3) UL (ref 0–0.7)
EOSINOPHIL NFR BLD AUTO: 1.3 %
ERYTHROCYTE [DISTWIDTH] IN BLOOD BY AUTOMATED COUNT: 18.8 %
GLUCOSE BLD-MCNC: 132 MG/DL (ref 70–99)
GLUCOSE BLD-MCNC: 146 MG/DL (ref 70–99)
GLUCOSE BLD-MCNC: 167 MG/DL (ref 70–99)
GLUCOSE BLD-MCNC: 173 MG/DL (ref 70–99)
GLUCOSE BLD-MCNC: 96 MG/DL (ref 70–99)
HCT VFR BLD AUTO: 22.7 % (ref 39–53)
HGB BLD-MCNC: 7.5 G/DL (ref 13–17.5)
IMM GRANULOCYTES # BLD AUTO: 0.03 X10(3) UL (ref 0–1)
IMM GRANULOCYTES NFR BLD: 0.5 %
LYMPHOCYTES # BLD AUTO: 0.37 X10(3) UL (ref 1–4)
LYMPHOCYTES NFR BLD AUTO: 6.6 %
MAGNESIUM SERPL-MCNC: 2 MG/DL (ref 1.6–2.6)
MCH RBC QN AUTO: 31.4 PG (ref 26–34)
MCHC RBC AUTO-ENTMCNC: 33 G/DL (ref 31–37)
MCV RBC AUTO: 95 FL (ref 80–100)
MONOCYTES # BLD AUTO: 0.63 X10(3) UL (ref 0.1–1)
MONOCYTES NFR BLD AUTO: 11.3 %
NEUTROPHILS # BLD AUTO: 4.47 X10 (3) UL (ref 1.5–7.7)
NEUTROPHILS # BLD AUTO: 4.47 X10(3) UL (ref 1.5–7.7)
NEUTROPHILS NFR BLD AUTO: 79.9 %
OSMOLALITY SERPL CALC.SUM OF ELEC: 299 MOSM/KG (ref 275–295)
PLATELET # BLD AUTO: 57 10(3)UL (ref 150–450)
PLATELETS.RETICULATED NFR BLD AUTO: 1.7 % (ref 0–7)
POTASSIUM SERPL-SCNC: 4.3 MMOL/L (ref 3.5–5.1)
RBC # BLD AUTO: 2.39 X10(6)UL (ref 3.8–5.8)
SODIUM SERPL-SCNC: 140 MMOL/L (ref 136–145)
WBC # BLD AUTO: 5.6 X10(3) UL (ref 4–11)

## 2025-08-23 PROCEDURE — 99232 SBSQ HOSP IP/OBS MODERATE 35: CPT | Performed by: HOSPITALIST

## 2025-08-24 LAB
ANION GAP SERPL CALC-SCNC: 13 MMOL/L (ref 0–18)
ANTIBODY SCREEN: NEGATIVE
BASOPHILS # BLD AUTO: 0.03 X10(3) UL (ref 0–0.2)
BASOPHILS NFR BLD AUTO: 0.5 %
BUN BLD-MCNC: 37 MG/DL (ref 9–23)
CALCIUM BLD-MCNC: 9.3 MG/DL (ref 8.7–10.6)
CHLORIDE SERPL-SCNC: 96 MMOL/L (ref 98–112)
CO2 SERPL-SCNC: 28 MMOL/L (ref 21–32)
CREAT BLD-MCNC: 6.13 MG/DL (ref 0.7–1.3)
EGFRCR SERPLBLD CKD-EPI 2021: 9 ML/MIN/1.73M2 (ref 60–?)
EOSINOPHIL # BLD AUTO: 0.08 X10(3) UL (ref 0–0.7)
EOSINOPHIL NFR BLD AUTO: 1.4 %
ERYTHROCYTE [DISTWIDTH] IN BLOOD BY AUTOMATED COUNT: 18.4 %
GLUCOSE BLD-MCNC: 100 MG/DL (ref 70–99)
GLUCOSE BLD-MCNC: 111 MG/DL (ref 70–99)
GLUCOSE BLD-MCNC: 160 MG/DL (ref 70–99)
HCT VFR BLD AUTO: 23.6 % (ref 39–53)
HGB BLD-MCNC: 7.8 G/DL (ref 13–17.5)
IMM GRANULOCYTES # BLD AUTO: 0.03 X10(3) UL (ref 0–1)
IMM GRANULOCYTES NFR BLD: 0.5 %
LYMPHOCYTES # BLD AUTO: 0.48 X10(3) UL (ref 1–4)
LYMPHOCYTES NFR BLD AUTO: 8.3 %
MAGNESIUM SERPL-MCNC: 2.2 MG/DL (ref 1.6–2.6)
MCH RBC QN AUTO: 31.8 PG (ref 26–34)
MCHC RBC AUTO-ENTMCNC: 33.1 G/DL (ref 31–37)
MCV RBC AUTO: 96.3 FL (ref 80–100)
MONOCYTES # BLD AUTO: 0.6 X10(3) UL (ref 0.1–1)
MONOCYTES NFR BLD AUTO: 10.4 %
NEUTROPHILS # BLD AUTO: 4.55 X10 (3) UL (ref 1.5–7.7)
NEUTROPHILS # BLD AUTO: 4.55 X10(3) UL (ref 1.5–7.7)
NEUTROPHILS NFR BLD AUTO: 78.9 %
OSMOLALITY SERPL CALC.SUM OF ELEC: 293 MOSM/KG (ref 275–295)
PLATELET # BLD AUTO: 58 10(3)UL (ref 150–450)
PLATELETS.RETICULATED NFR BLD AUTO: 1.5 % (ref 0–7)
POTASSIUM SERPL-SCNC: 4.3 MMOL/L (ref 3.5–5.1)
RBC # BLD AUTO: 2.45 X10(6)UL (ref 3.8–5.8)
RH BLOOD TYPE: POSITIVE
SODIUM SERPL-SCNC: 137 MMOL/L (ref 136–145)
WBC # BLD AUTO: 5.8 X10(3) UL (ref 4–11)

## 2025-08-24 PROCEDURE — 99232 SBSQ HOSP IP/OBS MODERATE 35: CPT | Performed by: HOSPITALIST

## 2025-08-25 VITALS
TEMPERATURE: 98 F | BODY MASS INDEX: 27 KG/M2 | OXYGEN SATURATION: 98 % | DIASTOLIC BLOOD PRESSURE: 91 MMHG | HEART RATE: 72 BPM | RESPIRATION RATE: 18 BRPM | SYSTOLIC BLOOD PRESSURE: 130 MMHG | WEIGHT: 171.75 LBS

## 2025-08-25 PROBLEM — Z99.2 ESRD ON HEMODIALYSIS (HCC): Status: ACTIVE | Noted: 2025-03-08

## 2025-08-25 PROBLEM — N18.6 ANEMIA IN ESRD (END-STAGE RENAL DISEASE) (HCC): Status: ACTIVE | Noted: 2018-01-28

## 2025-08-25 PROBLEM — D63.1 ANEMIA IN ESRD (END-STAGE RENAL DISEASE) (HCC): Status: ACTIVE | Noted: 2018-01-28

## 2025-08-25 PROBLEM — N18.6 ESRD ON HEMODIALYSIS (HCC): Status: ACTIVE | Noted: 2025-03-08

## 2025-08-25 LAB
ANION GAP SERPL CALC-SCNC: 16 MMOL/L (ref 0–18)
BLOOD TYPE BARCODE: 6200
BUN BLD-MCNC: 60 MG/DL (ref 9–23)
CALCIUM BLD-MCNC: 9.5 MG/DL (ref 8.7–10.6)
CHLORIDE SERPL-SCNC: 94 MMOL/L (ref 98–112)
CO2 SERPL-SCNC: 24 MMOL/L (ref 21–32)
CREAT BLD-MCNC: 8.4 MG/DL (ref 0.7–1.3)
EGFRCR SERPLBLD CKD-EPI 2021: 6 ML/MIN/1.73M2 (ref 60–?)
ERYTHROCYTE [DISTWIDTH] IN BLOOD BY AUTOMATED COUNT: 18.5 %
GLUCOSE BLD-MCNC: 101 MG/DL (ref 70–99)
GLUCOSE BLD-MCNC: 115 MG/DL (ref 70–99)
GLUCOSE BLD-MCNC: 127 MG/DL (ref 70–99)
GLUCOSE BLD-MCNC: 129 MG/DL (ref 70–99)
GLUCOSE BLD-MCNC: 155 MG/DL (ref 70–99)
HCT VFR BLD AUTO: 21.1 % (ref 39–53)
HGB BLD-MCNC: 7.4 G/DL (ref 13–17.5)
MCH RBC QN AUTO: 31.8 PG (ref 26–34)
MCHC RBC AUTO-ENTMCNC: 35.1 G/DL (ref 31–37)
MCV RBC AUTO: 90.6 FL (ref 80–100)
OSMOLALITY SERPL CALC.SUM OF ELEC: 295 MOSM/KG (ref 275–295)
PLATELET # BLD AUTO: 56 10(3)UL (ref 150–450)
PLATELETS.RETICULATED NFR BLD AUTO: 2.1 % (ref 0–7)
POTASSIUM SERPL-SCNC: 4.6 MMOL/L (ref 3.5–5.1)
RBC # BLD AUTO: 2.33 X10(6)UL (ref 3.8–5.8)
SODIUM SERPL-SCNC: 134 MMOL/L (ref 136–145)
UNIT VOLUME: 350 ML
WBC # BLD AUTO: 5.9 X10(3) UL (ref 4–11)

## 2025-08-25 PROCEDURE — 99232 SBSQ HOSP IP/OBS MODERATE 35: CPT | Performed by: HOSPITALIST

## 2025-08-25 RX ORDER — AMIODARONE HYDROCHLORIDE 200 MG/1
200 TABLET ORAL DAILY
Qty: 60 TABLET | Refills: 0 | Status: SHIPPED | OUTPATIENT
Start: 2025-08-25 | End: 2025-10-24

## 2025-08-25 RX ORDER — METOPROLOL SUCCINATE 50 MG/1
50 TABLET, EXTENDED RELEASE ORAL
Status: DISCONTINUED | OUTPATIENT
Start: 2025-08-25 | End: 2025-08-25

## 2025-08-25 RX ORDER — METOPROLOL SUCCINATE 50 MG/1
50 TABLET, EXTENDED RELEASE ORAL DAILY
Status: DISCONTINUED | OUTPATIENT
Start: 2025-08-26 | End: 2025-08-25

## 2025-08-26 ENCOUNTER — APPOINTMENT (OUTPATIENT)
Dept: PHYSICAL THERAPY | Facility: HOSPITAL | Age: 80
End: 2025-08-26
Attending: INTERNAL MEDICINE

## 2025-08-28 ENCOUNTER — APPOINTMENT (OUTPATIENT)
Dept: PHYSICAL THERAPY | Facility: HOSPITAL | Age: 80
End: 2025-08-28
Attending: INTERNAL MEDICINE

## (undated) DIAGNOSIS — E11.65 TYPE 2 DIABETES MELLITUS WITH HYPERGLYCEMIA, WITH LONG-TERM CURRENT USE OF INSULIN (HCC): Primary | ICD-10-CM

## (undated) DIAGNOSIS — Z79.4 TYPE 2 DIABETES MELLITUS WITH HYPERGLYCEMIA, WITH LONG-TERM CURRENT USE OF INSULIN (HCC): ICD-10-CM

## (undated) DIAGNOSIS — J18.9 PNEUMONITIS: Primary | ICD-10-CM

## (undated) DIAGNOSIS — E11.65 TYPE 2 DIABETES MELLITUS WITH HYPERGLYCEMIA, WITH LONG-TERM CURRENT USE OF INSULIN (HCC): ICD-10-CM

## (undated) DIAGNOSIS — E11.9 TYPE 2 DIABETES MELLITUS WITHOUT COMPLICATION, WITHOUT LONG-TERM CURRENT USE OF INSULIN (HCC): ICD-10-CM

## (undated) DIAGNOSIS — Z79.4 TYPE 2 DIABETES MELLITUS WITH HYPERGLYCEMIA, WITH LONG-TERM CURRENT USE OF INSULIN (HCC): Primary | ICD-10-CM

## (undated) DEVICE — DRAPE SHEET TRANSVERSE LAP

## (undated) DEVICE — GAMMEX® PI HYBRID SIZE 7.5, STERILE POWDER-FREE SURGICAL GLOVE, POLYISOPRENE AND NEOPRENE BLEND: Brand: GAMMEX

## (undated) DEVICE — ADHESIVE MASTISOL 2/3ML

## (undated) DEVICE — SUCTION SARNS MICRO SUCKER

## (undated) DEVICE — Device

## (undated) DEVICE — INDICATED FOR SURGICAL CLAMPING DURING CARDIOVASCULAR PERIPHERAL VASCULAR, AND GENERAL SURGERY.: Brand: SOFT/FIBRA® SPRING CLIP

## (undated) DEVICE — SUT ETHILON 3-0 FS-1 669H

## (undated) DEVICE — SOLUTION  .9 1000ML BTL

## (undated) DEVICE — KIT MAINFOLD 3 PORT NAMIC CUST

## (undated) DEVICE — SUT SILK 2-0 SA65H

## (undated) DEVICE — LAP CHOLE: Brand: MEDLINE INDUSTRIES, INC.

## (undated) DEVICE — COVER SGL STRL LGHT HNDL BLU

## (undated) DEVICE — GAMMEX® PI HYBRID SIZE 8.5, STERILE POWDER-FREE SURGICAL GLOVE, POLYISOPRENE AND NEOPRENE BLEND: Brand: GAMMEX

## (undated) DEVICE — OUTPATIENT: Brand: MEDLINE INDUSTRIES, INC.

## (undated) DEVICE — 6 ML SYRINGE LUER-LOCK TIP: Brand: MONOJECT

## (undated) DEVICE — WIRE NAMIC STR 035X150

## (undated) DEVICE — SKIN PREP TRAY 4 COMPARTM TRAY: Brand: MEDLINE INDUSTRIES, INC.

## (undated) DEVICE — SUT PROLENE 7-0 BV-1 8703H

## (undated) DEVICE — 20 ML SYRINGE LUER-LOCK TIP: Brand: MONOJECT

## (undated) DEVICE — VIOLET BRAIDED (POLYGLACTIN 910), SYNTHETIC ABSORBABLE SUTURE: Brand: COATED VICRYL

## (undated) DEVICE — SUT GORETEX CV-5 PH-13 5M16A

## (undated) DEVICE — MINOR GENERAL: Brand: MEDLINE INDUSTRIES, INC.

## (undated) DEVICE — INSUFFLATION NEEDLE TO ESTABLISH PNEUMOPERITONEUM.: Brand: INSUFFLATION NEEDLE

## (undated) DEVICE — SOL  .9 1000ML BAG

## (undated) DEVICE — MEGADYNE ELECTRODE ADULT PT RT

## (undated) DEVICE — BLANKET WRM PED W36XL84IN UND BODY FULL ACCS

## (undated) DEVICE — SUCTION CANISTER, 3000CC,SAFELINER: Brand: DEROYAL

## (undated) DEVICE — DISPOSABLE SUCTION/IRRIGATOR TUBE SET: Brand: AHTO

## (undated) DEVICE — SUT VICRYL 0 UR-6 J603H

## (undated) DEVICE — CONTAINER GENT-L-KARE 4OZ GRAY

## (undated) DEVICE — SUT VICRYL 3-0 SH J416H

## (undated) DEVICE — SPONGE SUR 4X4IN WVN 16 PLY PREM RADPQ VISTEC

## (undated) DEVICE — SUT PROLENE 6-0 BV-1 8709H

## (undated) DEVICE — 3M(TM) TEGADERM(TM) TRANSPARENT FILM DRESSING FRAME STYLE 1628: Brand: 3M™ TEGADERM™

## (undated) DEVICE — SYRINGE MED 30ML STD CLR PLAS LL TIP N CTRL

## (undated) DEVICE — STAY.SAFE® CATHETER EXTENSION SET WITH LUER-LOCK, 18 INCH: Brand: STAY.SAFE®

## (undated) DEVICE — PACK UNIVERSAL DRAPE

## (undated) DEVICE — PACK ANGIOGRAPHY CUSTOM

## (undated) DEVICE — SUT VICRYL 2-0 CT-1 J945H

## (undated) DEVICE — SUT PROLENE 2-0 SH 8833H

## (undated) DEVICE — CV: Brand: MEDLINE INDUSTRIES, INC.

## (undated) DEVICE — Device: Brand: JELCO

## (undated) DEVICE — UNDYED BRAIDED (POLYGLACTIN 910), SYNTHETIC ABSORBABLE SUTURE: Brand: COATED VICRYL

## (undated) DEVICE — 3M™ IOBAN™ 2 ANTIMICROBIAL INCISE DRAPE 6650EZ: Brand: IOBAN™ 2

## (undated) DEVICE — TROCAR: Brand: KII® SLEEVE

## (undated) DEVICE — ELECTRODE ES AD CRD L9FT COND ADH HYDRGEL REM

## (undated) DEVICE — SOLUTION DURAPREP 26ML APPLICATOR

## (undated) DEVICE — ABSORBABLE HEMOSTAT (OXIDIZED REGENERATED CELLULOSE, U.S.P.): Brand: SURGICEL

## (undated) DEVICE — CLIP SM W INTNL HRZN TI TPE LF

## (undated) DEVICE — 3M™ TEGADERM™ TRANSPARENT FILM DRESSING, 1626W, 4 IN X 4-3/4 IN (10 CM X 12 CM), 50 EACH/CARTON, 4 CARTON/CASE: Brand: 3M™ TEGADERM™

## (undated) DEVICE — SUT PERMA- 0 18IN FSL NABSRB BLK L30MM 3/8

## (undated) DEVICE — STAY.SAFE® CAP: Brand: STAY.SAFE®

## (undated) DEVICE — TOWEL SURG OR 17X26IN BLUE

## (undated) DEVICE — SUT SILK 4-0 SA63H

## (undated) DEVICE — DEVICE PORT SITE CLOSURE

## (undated) DEVICE — COVER TBL W44XL90IN POLYETH UNIV DISP

## (undated) DEVICE — [HIGH FLOW INSUFFLATOR,  DO NOT USE IF PACKAGE IS DAMAGED,  KEEP DRY,  KEEP AWAY FROM SUNLIGHT,  PROTECT FROM HEAT AND RADIOACTIVE SOURCES.]: Brand: PNEUMOSURE

## (undated) DEVICE — DRAPE,EXTREMITY,89X128,STERILE: Brand: MEDLINE

## (undated) DEVICE — GAUZE SPONGES,12 PLY: Brand: CURITY

## (undated) DEVICE — TROCAR: Brand: KII FIOS FIRST ENTRY

## (undated) DEVICE — NEEDLE HPO 18GA 1.5IN ECLPS

## (undated) DEVICE — SUT PROLENE 0 CT-1 8424H

## (undated) DEVICE — SOLUTION  .9 3000ML

## (undated) DEVICE — SOL NACL IRRIG 0.9% 1000ML BTL

## (undated) DEVICE — X-RAY DETECTABLE SPONGES,16 PLY: Brand: VISTEC

## (undated) DEVICE — STERILE TETRA-FLEX CF, ELASTIC BANDAGE, 4" X 5.5YD: Brand: TETRA-FLEX™CF

## (undated) DEVICE — GEL AQUASONIC 100 20GR

## (undated) DEVICE — ELECTRODE DEFIB AD SLD GEL MULTFUNC RADLUC

## (undated) DEVICE — CONNECTOR DLYS PERITONEUM 2

## (undated) DEVICE — APPLICATOR CHLORAPREP 26ML

## (undated) DEVICE — WIRESAVVY X-SMALL 2.9 X 3.2CM

## (undated) DEVICE — CLOSURE EXOFIN 1.0ML

## (undated) DEVICE — BANDAGE,GAUZE,BULKEE II,4.5"X4.1YD,STRL: Brand: MEDLINE

## (undated) NOTE — MR AVS SNAPSHOT
[address] [facility] [address] [facility] [id number] [phone]               Thank you for choosing us for your health care visit with Dennise Trevino MD.  We are glad to serve you and happy to provide you with [facility] Order:  Nephrology - Internal    Arslan Stoner MD   [address] [address]   Phone:  [phone]   Fax:  [phone]         Referral Orders      Normal Orders This Visit    NEPHROLOGY - INTERNAL [[id number] CPT(R)]  Order #:  [id number] Current Medications          This list is accurate as of: 5/2/17  1:56 PM.  Always use your most recent med list.                Acetylcysteine 600 MG Tbcr   Take 600 mg by mouth 3 (three) times daily. acyclovir 400 MG Tabs   400 mg.    Commonly k Support Staff. Remember, MyChart is NOT to be used for urgent needs. For medical emergencies, dial 911. Educational Information     Your blood pressure indicates you may be at-risk for Hypertension.    Please consider the following Lifestyle Modific

## (undated) NOTE — LETTER
[address], [place]  Authorization for Invasive Procedures  1. I hereby authorize Dr. Trinidad Franz , my physician and whomever may be designated as the doctor's assistant, to perform the following operation and/or procedure: Tunneled dialysis catheter insertion on Naresh Escamilla at [facility].    2. My physician has explained to me the nature and purpose of the operation or other procedure, possible alternative methods of treatment, the risks involved and the possibility of complications to me. I understand the probable consequences of declining the recommended procedure and the alternative methods of treatment. I acknowledge that no guarantee has been made as to the result that may be obtained. 3. I recognize that during the course of this operation or other procedure, unforeseen conditions may necessitate additional or different procedures than those listed above. I, therefore, further authorize and request that the above-named physician, his/her physician assistants, or designees perform such procedures as are, in his/her professional opinion, necessary and desirable. If I have a Do Not Attempt Resuscitation (DNAR) order in place, that status will be suspended while in the operating room, procedural suite, and during the recovery period unless otherwise explicitly stated by me (or a person authorized to consent on my behalf). The surgeon or my attending physician will determine when the applicable recovery period ends for purposes of reinstating the DNAR order. 4. Should the need arise during my operation or immediate post-operative period; I also consent to the administration of blood and/or blood products.  Further, I understand that despite careful testing and screening of blood and blood products, I may still be subject to ill effects as a result of recieving a blood transfusion an/or blood producst. The following are some, but not all, of the potential risks that can occur: fever and allergic reactions, hemolytic reactions, transmission of disease such as hepatitis, AIDS, cytomegalovirus (CMV), and flluid overload. In the event that I wish to have autologous transfusions of my own blood, or a directed donor transfusion, I will discuss this with my physician. 5. I consent to the photographing of the operations or procedures to be performed for the purposes of advancing medicine, science, and/or education, provided my identity is not revealed. If the procedure has been videotaped, the physician/surgeon will obtain the original videotape. The hospital will not be responsible for storage or maintenance of this tape. 6. I consent to the presence of a  or observer as deemed necessary by my physician or his designee. 7. Any tissues or organs removed in the operation or other procedure may be disposed of by and at the discretion of [facility].    8. I understand that the physician and his/her physician assistants may not be employees or agents of [facility], [facility], nor [facility], but are independent medical practitioners who have been permitted to use its facilities for the care and treatment of their patients. 9. Patients having a sterilization procedure: I understand that if the procedure is successful the results will be permanent and it will therefore be impossible for me to inseminate, conceive or bear children. I also understand that the procedure is intended to result in sterility, although the result has not been guaranteed. 10. I CERTIFY THAT I HAVE READ AND FULLY UNDERSTAND THE ABOVE CONSENT TO OPERATION and/or OTHER PROCEDURE. 11. I acknowledge that my physician has explained sedation/analgesia administration to me including the risks and benefits.  I consent to the administration of sedation/analgesia as may be necessary or desirable in the judgment of my physician. Signature of Patient:  ________________________________________________ Date: _________Time: _________    Responsible person in case of minor or unconscious: _____________________________Relationship: ____________     Witness Signature: ____________________________________________ Date: __________ Time: ___________    Statement of Physician  My signature below affirms that prior to the time of the procedure, I have explained to the patient and/or his legal representative, the risks and benefits involved in the proposed treatment and any reasonable alternative to the proposed treatment. I have also explained the risks and benefits involved in the refusal of the proposed treatment and have answered the patient's questions. If I have a significant financial interest in this procedure/surgery, I have disclosed this and had a discussion with my patient.     Signature of Physician:   ________________________________________Date: _________Time:_______ Patient Name: Danyelle Mccall  : 1945   Printed: 2022    Medical Record #: [id number]

## (undated) NOTE — MR AVS SNAPSHOT
[address]  [address]  [phone]               Thank you for choosing us for your health care visit with Diego Cano.   We are glad to serve you and happy to provide you with this summar If you have questions, you can call [phone] to talk to our [facility] Staff. Remember, [facility] is NOT to be used for urgent needs. For medical emergencies, dial 911.         Educational Information     Healthy Diet and Regular Exercise  [facility]

## (undated) NOTE — LETTER
Hospital Discharge Documentation    From: [facility] Hospitalist's Office  Phone: [phone]    Patient discharged time/date: 2025  4:28 PM  Patient discharge disposition:  SNF Subacute Rehab, [facility]       Discharge Summary - D/C Summary        Discharge Summary signed by Elena Zamorano MD at 2025 12:52 PM  Version 1 of 1      Author: Elena Zamorano MD Service: Hospitalist Author Type: Physician    Filed: 2025 12:52 PM Date of Service: 2025 12:51 PM Status: Signed    : Elena Zamorano MD (Physician)           [facility]  part of [facility]    DISCHARGE SUMMARY     Balwinder Muniz Patient Status:  Inpatient    1945 MRN [id number]   Location [facility] 3W/SW Attending Elena Zamorano MD   Hosp Day # 8 PCP Geovanni Garza MD     Date of Admission: 2025  Date of Discharge:  2025    Discharge Disposition: Home or Self Care    Discharge Diagnosis:     Atrial fibrillation with RVR - resolved  Acute hypoxic respiratory failure - resolved  Severe aortic stenoss  ESRD on HD  Generalized Weakness  DM2  MM  Dementia    History of Present Illness:     This is a 79 year oldmale who presented feeling generally weak and lightheaded.  Patient with history of ESRD and went to dialysis on day of admission.  After finishing his session patient again to feel very weak, tired and lightheaded.  Patient was sent to ED for further evaluation.  In the ED he was found to be in atrial fibrillation.  At time of interview, patient was sleepy and mildly confused.  He reported feeling weak.  Denied any new focal weakness or sensory changes.    Otherwise denied current chest pain, shortness of breath, abdominal pain, nausea or vomiting, fevers or chills.     Brief Synopsis:     Atrial fibrillation with RVR - resolved  back in sinus rhythm on amiodarone and metoprolol  Continue chronic anticoagulation with Eliquis  Telemetry monitoring  Cardiology on consult                 Discharge on metoprolol and amiodarone               Close opt follow up     Acute hypoxic respiratory failure - resolved  Currently on room air  Possible aspiration pneumonia vs pulmonary edema  CXR reviewed - edema vs pna vs both  Completed 8 days of IV abx. Will hold off on abx at the time of discharge  fluid removal with HD  Incentive spirometry  Mobilize, PT/OT eval     Severe aortic stenoss  pt has f/u in valve clinic     ESRD on HD  Nephrology consulted, appreciate recommendations for further HD.     Generalized Weakness  Likely related to HD and A-fib and PNA and deconditioning as above  Treating underlying conditions  PT/OT  DC planning for rehab on dc.     DM2  -SS     MM  monitor counts  pt's oncologist to d/w his cardiologist regarding continuing Velcade     Dementia  monitor mental status    Patient is to follow up with PCP, Cardiology and Nephrology as opt for further care.  Patient is to remain compliant with all discharge medications and instructions and to follow up as advised.   Patient encouraged to make healthy lifestyle and dietary changes.    Lace+ Score: 86  59-90 High Risk  29-58 Medium Risk  0-28   Low Risk       TCM Follow-Up Recommendation:  LACE > 58: High Risk of readmission after discharge from the hospital.      Procedures during hospitalization:   None    Incidental or significant findings and recommendations (brief descriptions):  None    Lab/Test results pending at Discharge:   None    Consultants:  Consultants         Provider   Role Specialty     Laz Claudio MD      Consulting Physician Surgery, Thoracic     Chapo Perez MD      Consulting Physician CARDIOLOGY     Emily Bishop MD      Consulting Physician NEPHROLOGY            Discharge Medication List:     Discharge Medications        START taking these medications        Instructions Prescription details   amiodarone 200 MG Tabs  Commonly known as: Pacerone      Take 1 tablet (200 mg total) by mouth daily.   Stop  taking on: July 26, 2025  Quantity: 60 tablet  Refills: 0            CONTINUE taking these medications        Instructions Prescription details   acetaminophen 325 MG Tabs  Commonly known as: Tylenol      Take 1-2 tablets (325-650 mg total) by mouth every 6 (six) hours as needed.   Refills: 0     acyclovir 400 MG Tabs  Commonly known as: Zovirax      Take 1 tablet (400 mg total) by mouth daily.   Refills: 11     Basaglar KwikPen 100 UNIT/ML Sopn  Generic drug: insulin glargine      Take Basaglar 10 units at night; On Night of chemo patient should take 22 of Basaglar; On the next 2 days,  he should take 20 units; on day 4, he should take 15 units on day 4 and then back down to usual 10 units from the 5 day onwards.   Quantity: 24 mL  Refills: 3     BD Pen Needle Micro U/F 32G X 6 MM Misc  Generic drug: Insulin Pen Needle      Use with insulin daily   Quantity: 100 each  Refills: 3     donepezil 10 MG Tabs  Commonly known as: Aricept      Take 1 tablet (10 mg total) by mouth nightly.   Quantity: 90 tablet  Refills: 3     Eliquis 2.5 MG Tabs  Generic drug: apixaban      Take 1 tablet (2.5 mg total) by mouth 2 (two) times daily   Quantity: 60 tablet  Refills: 11     Folbic 2.5-25-2 MG Tabs  Generic drug: folacin-pyridoxine-cyancobalamin      Take 1 tablet by mouth daily.   Refills: 10     latanoprost 0.005 % Soln  Commonly known as: Xalatan      Place 1 drop into both eyes nightly.   Refills: 0     LiquaCel Liqd      Take 30 mL by mouth daily.   Refills: 0     Loratadine 5 MG Chew      Chew 1 tablet (5 mg total) by mouth every other day.   Refills: 0     metoprolol succinate ER 50 MG Tb24  Commonly known as: Toprol XL      Take 1 tablet (50 mg total) by mouth daily.   Refills: 0     mometasone furoate 50 MCG/ACT Susp  Commonly known as: Nasonex      1 spray by Nasal route daily as needed.   Refills: 0     Ocuvite Adult 50+ Caps      Take 1 tablet by mouth daily.   Refills: 0     sevelamer carbonate 800 MG Tabs  Commonly  known as: Renvela      Take 1 tablet (800 mg total) by mouth 3 (three) times daily with meals.   Refills: 0     Vitamin D 25 MCG (1000 UT) Tabs      Take 2 tablets by mouth in the morning.   Refills: 0               Where to Get Your Medications        These medications were sent to [id number] - [place], [place] - [address] [phone], [phone]  [address], [facility] [address]      Phone: [phone]   amiodarone 200 MG Tabs         [place]    Follow-up appointment:   Apn, Structural Heart  [address] [place] [id number]    Go on 6/19/2025  9:15 TAVR consult at [facility].  Park in [facility] and check in at [facility] reg desk.  You can eat, drink, take AM meds- no caffiene.  Please expect 4-6 hr day for consults, education, and testing. Call [phone] with any questions.    Geovanni Garza MD  [address] [facility] [address]  [phone]    Follow up in 1 week(s)      Nicolás Mesa DO  [address] [facility] [address]  [facility] [address]  [phone]    Follow up in 1 week(s)      Emily Bishop MD  [address] [facility] [address]  [facility] [address]  [phone]    Follow up in 1 week(s)      Appointments for Next 30 Days 5/27/2025 - 6/26/2025        Date Arrival Time Visit Type Length Department Provider     6/19/2025  9:15 AM  CONSULT - STRUCTURAL HEART [6139] 30 min [facility] Rn, Structural Heart, RN    Patient Instructions:         Location Instructions:     Your appointment is scheduled at [facility]. The address is&nbsp; [address] [place] [address]. To reach Registration, [facility]. Enter through the revolving doors located on the ground floor. Veer left past the Information Desk and proceed to the [facility] Registration desk.  Masks are optional for all patients and visitors, unless otherwise indicated.                      Vital signs:  Temp:  [97.6 °F (36.4 °C)-97.8 °F  (36.6 °C)] 97.6 °F (36.4 °C)  Pulse:  [56-62] 59  Resp:  [19-22] 20  BP: (120-182)/(61-71) 120/61  SpO2:  [96 %-100 %] 97 %    Physical Exam:    Gen: NAD AO x3  Chest: good air entry CTABL  CVS: normal s1 and s2 RR  Abd: NABS soft NT ND  Neuro: CN 2-12 grossly intact  Ext: no edema in bilateral LE    -----------------------------------------------------------------------------------------------  PATIENT DISCHARGE INSTRUCTIONS: See electronic chart    Elena Zamorano MD  Hospitalist    Time spent:  > 30 minutes    The 21st Century Cures Act makes medical notes like these available to patients in the interest of transparency. Please be advised this is a medical document. Medical documents are intended to carry relevant information, facts as evident, and the clinical opinion of the practitioner. The medical note is intended as peer to peer communication and may appear blunt or direct. It is written in medical language and may contain abbreviations or verbiage that are unfamiliar.     Electronically signed by Elena Zamorano MD on 5/27/2025 12:52 PM

## (undated) NOTE — LETTER
Hospital Discharge Documentation  Please phone to schedule a hospital follow up appointment. From: [address] Hospitalist's Office  Phone: [phone]    Patient discharged time/date: 8/19/2022  5:02 PM  Patient discharge disposition:  Home or Self Care       Discharge Summary - D/C Summary      Discharge Summary signed by Neelima Aragon MD at 8/20/2022  7:32 AM   Version 1 of 1    Author: Neelima Aragon MD Service: Hospitalist Author Type: Physician    Filed: 8/20/2022  7:32 AM Status: Signed    : Neelima Aragon MD (Physician)         [facility]    PATIENT'S NAME: Josias ALBARADO   ATTENDING PHYSICIAN: Jennifer Perry MD   PATIENT ACCOUNT#:   [id number]    LOCATION:  [address] [place] [address] RECORD #:   [id number]       YOB: 1945  ADMISSION DATE:       08/18/2022      DISCHARGE DATE:  08/19/2022    DISCHARGE SUMMARY 45 min spent on dc      DISCHARGE DIAGNOSIS:  Peritoneal dialysis catheter dysfunction. HISTORY AND HOSPITAL COURSE:  This is a very pleasant 69-year-old white male who presents with a history of having endstage chronic kidney disease and came in and had a revision of a peritoneal dialysis catheter with a laparoscopic omentopexy by Dr. Calvin Devine. He also had a hemodialysis catheter placed as well by Dr. Sury Hansen and was admitted to the hospital to be watched overnight and have dialysis today. He felt well. Discussed with Dr. Alejandrina Vasquez and with the dialysis nurse, and the patient's first dialysis here went well. He was deemed stable to be discharged home by Dr. Alejandrina Vasquez, and I concurred. PHYSICAL EXAMINATION:    VITAL SIGNS:  Temperature 97.7, pulse 94, respiratory rate 20, blood pressure 141/81, 97%. LUNGS:  Crackles in both bases. HEART:  Normal S1 and S2. No S3.   ABDOMEN:  Soft. EXTREMITIES:  Without edema.   NEUROLOGICAL:  The patient's wife states that he is forgetful, but to me he seems okay, friendly and cooperative, with no focal deficits. LABORATORY STUDIES:  Please see chart. ASSESSMENT AND PLAN:    1. Nonfunctioning peritoneal dialysis catheter, now been revised. We will flush as per Dr. Lexx North. Hemodialysis as per Dr. Murtaza Downey. We will see how he does. 2.   History of multiple myeloma. Treatment on August 29 scheduled or to be adjusted by oncologist.  3.   Obesity. BMI 34.29. May need obstructive sleep apnea workup. 4.   Essential hypertension. Continue medications. 5.   Type 2 diabetes with hyperglycemia. Continue insulin. Patient states that he follows diet at home, and he will eat meals regularly. 6.   End-stage renal disease, as per Dr. Murtaza Downey. 7.   Hernia. Repair with Dr. Lexx North as an outpatient. 8.   Conjunctivitis. Dr. Socorro Flowers started Cipro eye drops. I recommended that they continue. Family may be unwilling to continue, but script was given. CONDITION ON DISCHARGE:  Stable. CODE STATUS:  Full Code. DIET:  A 2000 calorie ADA, 70 g protein, low-salt cardiac. ACTIVITY:  As tolerated. FOLLOWUP:  With Dr. Murtaza Downey in 1 week for dialysis. Follow up with Dr. Lexx North postoperatively for hernia repair. Follow up with Dr. Simon Marin on Monday for blood pressure, followup advice, etc.  Resume multiple myeloma treatments, etc., as scheduled. I believe that their next treatment will be August 29. DISCHARGE MEDICATIONS:    1. Acyclovir 400 mg daily. 2.   Vitamin D 1000 units daily. 3.   Mometasone 1 spray in each nostril daily. 4.   Ocuvite 1 tablet daily. 5.   Coreg 25 mg twice a day. 6.   Folbic 2.5/25/2 mg daily. 7.   Hydralazine 50 mg 3 times a day. 8.   Xalatan 0.005% one drop both eyes nightly. 9.   Losartan 50 mg daily. 10.   Sodium bicarbonate 650 mg twice a day. 11.   Torsemide 40 mg twice a day. 12.   Calcium carbonate 1250 mg twice a day. 13.   Lantus 12 units at bedtime. 14.   Lanthanum 1000 mg 3 times a day. 15.   Tramadol 50 mg q.6 h. as needed. Use sparingly as needed for pain. 16.   Tylenol 1000 mg q.8 h. as needed. Watch total daily Tylenol, limit to 3 g. 17.   Ciprofloxacin 0.3% one drop in both eyes q.2-4 h. for 7 days while awake. 18.   Miconazole powder twice a day as needed. 19.   Colace 100 mg p.o. b.i.d. p.r.n. constipation. RISK OF READMISSION:  High. TCM followup recommended. Dictated By Cornelia Santiago.  MD Vicky  d: 08/19/2022 17:18:34  t: 08/19/2022 22:26:33  Job [id number]  [facility]/      Electronically signed by Ivana Edgar MD on 8/20/2022  7:32 AM

## (undated) NOTE — LETTER
AUTHORIZATION FOR SURGICAL OPERATION OR OTHER PROCEDURE    1. I hereby authorize Dr. Leroy Gonzalez, and [facility] staff assigned to my case to perform the following operation and/or procedure at the [facility]:    _______________________________________________________________________________________________    Right ear myringotomy   _______________________________________________________________________________________________    2.  My physician has explained the nature and purpose of the operation or other procedure, possible alternative methods of treatment, the risks involved, and the possibility of complication to me.  I acknowledge that no guarantee has been made as to the result that may be obtained.  3.  I recognize that, during the course of this operation, or other procedure, unforseen conditions may necessitate additional or different procedure than those listed above.  I, therefore, further authorize and request that the above named physician, his/her physician assistants or designees perform such procedures as are, in his/her professional opinion, necessary and desirable.  4.  Any tissue or organs removed in the operation or other procedure may be disposed of by and at the discretion of the [facility] and [facility].  5.  I understand that in the event of a medical emergency, I will be transported by local paramedics to [facility] or other hospital emergency department.  6.  I certify that I have read and fully understand the above consent to operation and/or other procedure.    7.  I acknowledge that my physician has explained sedation/analgesia administration to me including the risks and benefits.  I consent to the administration of sedation/analgesia as may be necessary or desirable in the judgement of my physician.    Witness signature: ___________________________________________________ Date:  ______/______/_____                    Time:   ________ A.M.  P.M.       Patient Name:  ______________________________________________________  (please print)      Patient signature:  ___________________________________________________             Relationship to Patient:           []  Parent    Responsible person                          []  Spouse  In case of minor or                    [] Other  _____________   Incompetent name:  __________________________________________________                               (please print)      _____________      Responsible person  In case of minor or  Incompetent signature:  _______________________________________________    Statement of Physician  My signature below affirms that prior to the time of the procedure, I have explained to the patient and/or his/her guardian, the risks and benefits involved in the proposed treatment and any reasonable alternative to the proposed treatment.  I have also explained the risks and benefits involved in the refusal of the proposed treatment and have answered the patient's questions.                        Date:  ______/______/_______  Provider                      Signature:  __________________________________________________________       Time:  ___________ A.M    P.M.

## (undated) NOTE — MR AVS SNAPSHOT
Erin  [address]  [phone]               Thank you for choosing us for your [facility] visit with [facility].   We are glad to serve you and happy to provide you with this summar Your unique [employer] Access Code: [email or web address]  Expires: 4/11/2017  4:23 PM    If you have questions, you can call [phone] to talk to our [facility] Staff. Remember, [employer] is NOT to be used for urgent needs. For medical emergencies, dial 911.

## (undated) NOTE — MR AVS SNAPSHOT
Erin  [address]  [phone]               Thank you for choosing us for [facility].   We are glad to serve you and happy to provide you with this summar Your unique [employer] Access Code: [email or web address]  Expires: 4/11/2017  4:23 PM    If you have questions, you can call [phone] to talk to our [facility] Staff. Remember, [employer] is NOT to be used for urgent needs.   For medical emergencies, dial 911

## (undated) NOTE — LETTER
1/15/2024              Balwinder Muniz        [address] [facility] [address]         Dear Balwinder and Noemi,    I hope you are doing well.  Enclosed is a follow-up test for your urine and to make sure there is no bacteria in the urine along with your PSA.  Hopefully you can give these orders to any lab that you will be having labs for other doctors and they will honor the order.    If you have any questions.  Please let me know.  Thank you.      Sincerely,    Geovanni Garaz MD  [facility], [facility]  [address] [facility] [facility] [address]  [phone]        Document electronically generated by:  Geovanni Garza MD

## (undated) NOTE — LETTER
[address], [place]  Authorization for Invasive Procedures  1. I hereby authorize Dr. Trinidad Franz , my physician and whomever may be designated as the doctor's assistant, to perform the following operation and/or procedure: Tunneled dialysis catheter insertion on Naresh Escamilla at [facility].    2. My physician has explained to me the nature and purpose of the operation or other procedure, possible alternative methods of treatment, the risks involved and the possibility of complications to me. I understand the probable consequences of declining the recommended procedure and the alternative methods of treatment. I acknowledge that no guarantee has been made as to the result that may be obtained. 3. I recognize that during the course of this operation or other procedure, unforeseen conditions may necessitate additional or different procedures than those listed above. I, therefore, further authorize and request that the above-named physician, his/her physician assistants, or designees perform such procedures as are, in his/her professional opinion, necessary and desirable. If I have a Do Not Attempt Resuscitation (DNAR) order in place, that status will be suspended while in the operating room, procedural suite, and during the recovery period unless otherwise explicitly stated by me (or a person authorized to consent on my behalf). The surgeon or my attending physician will determine when the applicable recovery period ends for purposes of reinstating the DNAR order. 4. Should the need arise during my operation or immediate post-operative period; I also consent to the administration of blood and/or blood products.  Further, I understand that despite careful testing and screening of blood and blood products, I may still be subject to ill effects as a result of recieving a blood transfusion an/or blood producst. The following are some, but not all, of the potential risks that can occur: fever and allergic reactions, hemolytic reactions, transmission of disease such as hepatitis, AIDS, cytomegalovirus (CMV), and flluid overload. In the event that I wish to have autologous transfusions of my own blood, or a directed donor transfusion, I will discuss this with my physician. 5. I consent to the photographing of the operations or procedures to be performed for the purposes of advancing medicine, science, and/or education, provided my identity is not revealed. If the procedure has been videotaped, the physician/surgeon will obtain the original videotape. The hospital will not be responsible for storage or maintenance of this tape. 6. I consent to the presence of a  or observer as deemed necessary by my physician or his designee. 7. Any tissues or organs removed in the operation or other procedure may be disposed of by and at the discretion of [facility].    8. I understand that the physician and his/her physician assistants may not be employees or agents of [facility], [facility], nor [facility], but are independent medical practitioners who have been permitted to use its facilities for the care and treatment of their patients. 9. Patients having a sterilization procedure: I understand that if the procedure is successful the results will be permanent and it will therefore be impossible for me to inseminate, conceive or bear children. I also understand that the procedure is intended to result in sterility, although the result has not been guaranteed. 10. I CERTIFY THAT I HAVE READ AND FULLY UNDERSTAND THE ABOVE CONSENT TO OPERATION and/or OTHER PROCEDURE. 11. I acknowledge that my physician has explained sedation/analgesia administration to me including the risks and benefits.  I consent to the administration of sedation/analgesia as may be necessary or desirable in the judgment of my physician. Signature of Patient:  ________________________________________________ Date: _________Time: _________    Responsible person in case of minor or unconscious: _____________________________Relationship: ____________     Witness Signature: ____________________________________________ Date: __________ Time: ___________    Statement of Physician  My signature below affirms that prior to the time of the procedure, I have explained to the patient and/or his legal representative, the risks and benefits involved in the proposed treatment and any reasonable alternative to the proposed treatment. I have also explained the risks and benefits involved in the refusal of the proposed treatment and have answered the patient's questions. If I have a significant financial interest in this procedure/surgery, I have disclosed this and had a discussion with my patient.     Signature of Physician:   ________________________________________Date: _________Time:_______ Patient Name: Kristin Kee  : 1945   Printed: 2022    Medical Record #: [id number]

## (undated) NOTE — MR AVS SNAPSHOT
[address] [facility]  [phone]               Thank you for choosing us for your health care visit with Carolyn Anderson MD.  We are glad to serve you and happy to provide you with this summar Enter your [employer] Activation Code exactly as it appears below along with your Zip Code and Date of Birth to complete the sign-up process. If you do not sign up before the expiration date, you must request a new code.     Your unique [employer] Access Code: [email or web address]

## (undated) NOTE — LETTER
Hospital Discharge Documentation    From: [facility] Hospitalist's Office  Phone: [phone]    Patient discharged time/date: 3/9/2025 12:33 PM  Patient discharge disposition:  Home or Self Care       Discharge Summary - D/C Summary        Discharge Summary signed by Rg Mclean MD at 3/10/2025  9:23 AM  Version 1 of 1      Author: Rg Mclean MD Service: Internal Medicine Author Type: Physician    Filed: 3/10/2025  9:23 AM Date of Service: 3/9/2025 11:26 AM Status: Signed    : Rg Mclean MD (Physician)         [facility]  part of [facility]    Discharge Summary    Balwinder Muniz Patient Status:  Inpatient    1945 MRN [id number]   Location [facility] 3W/SW Attending Rg Mclean MD   Hosp Day # 1 PCP Geovanni Garza MD     Date of Admission: 3/8/2025     Date of Discharge: 25      Lace+ Score: 67  59-90 High Risk  29-58 Medium Risk  0-28   Low Risk.    TCM Follow-Up Recommendation:  LACE > 58: High Risk of readmission after discharge from the hospital.    DISCHARGE DX: Principal Problem:    Atrial fibrillation with RVR (HCC)  Active Problems:    ESRD (end stage renal disease) on dialysis (HCC)       The patient was seen and examined on day of discharge and this discharge summary is in conjunction with any daily progress note from day of discharge.    HPI per admitting physician: \"This is a 79 year oldmale who was sent in by  After noting irregular rhythm and tachycardia.  Patient with history of multiple myeloma undergoing chemotherapy.  Due to his chemotherapy sessions his hemodialysis sessions are intermittently changed.  Patient was receiving HD today and towards the end of his session was noted that he went into atrial fibrillation with RVR.  Patient was sent to ED for further evaluation.  Patient denied subjective feelings of palpitations, shortness of breath, chest pain, abdominal pain, nausea vomiting, fevers or chills.   Patient does note he has history of atrial fibrillation in the past.  Patient has been on anticoagulation with Eliquis. \"    Hospital Course:      Atrial fibrillation with RVR (HCC)  -Occurred at end of session of HD.  -In ED, patient was noted to have grossly elevated heart rates into the 130s to 150s.  -Patient was started on IV Cardizem gtt.   -After several hours patient converted to sinus rhythm.  -Heart rates improved.  -Patient weaned off Cardizem.  -Continue anticoagulation.  -Cardiology on consult, recommended change to metoprolol from coreg.  Cleared for dc with follow up as ouptpt     ESRD on HD  -Completed HD session on day of admission.  -Nephrology consulted, rec resuming regularly scheduled HD      History of multiple myeloma  -Undergoing chemotherapy sessions.  -Continue to monitor     Type 2 DM   - Monitoring Blood glucose with POC checks  - Continue home regimen      Physical Exam:    Vitals:    03/09/25 0101 03/09/25 0500 03/09/25 0523 03/09/25 0857   BP: 140/74  137/69 141/75   BP Location: Right arm  Right arm Right arm   Pulse: 92  72 88   Resp: 20  18 18   Temp:    97.7 °F (36.5 °C)   TempSrc:    Oral   SpO2: 96%  94% 94%   Weight:  192 lb 7.4 oz (87.3 kg)     Height:         Patient Weight for the past 72 hrs:   Weight   03/08/25 1443 184 lb (83.5 kg)   03/08/25 1801 191 lb 5.8 oz (86.8 kg)   03/09/25 0500 192 lb 7.4 oz (87.3 kg)       Intake/Output Summary (Last 24 hours) at 3/9/2025 1126  Last data filed at 3/9/2025 0815  Gross per 24 hour   Intake 640 ml   Output 2 ml   Net 638 ml       GENERAL:  Awake and alert, in no acute distress.  HEART:  Regular rhythm.  Regular rate   LUNGS:  Air entry was minimally decreased.  No increased work of breathing or wheezes   ABDOMEN: Soft and non-tender.    PSYCHIATRIC: Normal mood    CULTURE:   No results found for this visit on 03/08/25.    IMAGING STUDIES: SOME MAY NEED FOLLOW UP WITH PCP   XR CHEST AP PORTABLE  (CPT=71045)    Result Date:  3/8/2025  CONCLUSION: No acute cardiopulmonary abnormality.   Dictated by (CST): Jose Martin Ledbetter MD on 3/08/2025 at 4:17 PM     Finalized by (CST): Jose Martin Ledbetter MD on 3/08/2025 at 4:17 PM           LABS :     Lab Results   Component Value Date    WBC 4.7 03/09/2025    HGB 8.3 (L) 03/09/2025    HCT 27.0 (L) 03/09/2025    .0 (L) 03/09/2025    CREATSERUM 6.49 (H) 03/09/2025    BUN 54 (H) 03/09/2025     03/09/2025    K 4.2 03/09/2025    CL 95 (L) 03/09/2025    CO2 32.0 03/09/2025    GLU 79 03/09/2025    CA 8.4 (L) 03/09/2025    ALB 4.0 03/08/2025    ALKPHO 50 03/08/2025    BILT 0.3 03/08/2025    TP 5.8 03/08/2025    AST 17 03/08/2025    ALT 8 (L) 03/08/2025    INR 1.0 11/28/2018    TSH 2.010 09/27/2023    PSA 4.80 (H) 04/25/2022    MG 2.1 03/09/2025    PHOS 3.7 08/19/2022    B12 1,773 (H) 09/27/2023       Recent Labs   Lab 03/08/25  1500 03/09/25  0637   RBC 3.91 3.44*   HGB 9.5* 8.3*   HCT 30.5* 27.0*   MCV 78.0* 78.5*   MCH 24.3* 24.1*   MCHC 31.1 30.7*   RDW 19.3* 19.1*   NEPRELIM 4.56 3.38   WBC 5.7 4.7   .0* 116.0*     Recent Labs   Lab 03/08/25  1500 03/09/25  0637   * 79   BUN 33* 54*   CREATSERUM 4.27* 6.49*   CA 8.5* 8.4*   ALB 4.0  --    * 136   K 4.0 4.2   CL 96* 95*   CO2 33.0* 32.0   ALKPHO 50  --    AST 17  --    ALT 8*  --    BILT 0.3  --    TP 5.8  --      Lab Results   Component Value Date    INR 1.0 11/28/2018       Disposition: Discharge to Home    Condition at Discharge: Stable     Discharge Medications:      Discharge Medications        START taking these medications        Instructions Prescription details   metoprolol succinate ER 50 MG Tb24  Commonly known as: Toprol XL  Start taking on: March 10, 2025      Take 1 tablet (50 mg total) by mouth Daily Beta Blocker.   Stop taking on: April 9, 2025  Quantity: 30 tablet  Refills: 0            CONTINUE taking these medications        Instructions Prescription details   acetaminophen 500 MG Tabs  Commonly known as: Tylenol  Extra Strength      Take 325 mg by mouth every 8 (eight) hours.   Quantity: 1 tablet  Refills: 0     acyclovir 400 MG Tabs  Commonly known as: Zovirax      Take 1 tablet (400 mg total) by mouth daily.   Refills: 11     Basaglar KwikPen 100 UNIT/ML Sopn  Generic drug: insulin glargine      Take Basaglar 10 units at night; On Night of chemo patient should take 22 of Basaglar; On the next 2 days,  he should take 20 units; on day 4, he should take 15 units on day 4 and then back down to usual 10 units from the 5 day onwards.   Quantity: 24 mL  Refills: 3     BD Pen Needle Micro U/F 32G X 6 MM Misc  Generic drug: Insulin Pen Needle      Use with insulin daily.   Quantity: 100 each  Refills: 3     donepezil 5 MG Tabs  Commonly known as: Aricept      Take 1 tablet (5 mg total) by mouth nightly.   Quantity: 90 tablet  Refills: 3     Eliquis 2.5 MG Tabs  Generic drug: apixaban      Take 1 tablet (2.5 mg total) by mouth 2 (two) times daily   Quantity: 60 tablet  Refills: 11     Folbic 2.5-25-2 MG Tabs  Generic drug: folacin-pyridoxine-cyancobalamin      Take 1 tablet by mouth daily.   Refills: 10     lanthanum 500 MG Chew  Commonly known as: Fosrenol      Chew 1 tablet (500 mg total) by mouth 2 (two) times daily with meals.   Refills: 0     latanoprost 0.005 % Soln  Commonly known as: Xalatan      Place 1 drop into both eyes nightly.   Refills: 0     LiquaCel Liqd      Take 30 mL by mouth.   Refills: 0     multivitamin Tabs      1 tab daily   Refills: 0     NON FORMULARY      Velcade once monthly injection   Refills: 0     NON FORMULARY      Liquid protein fortifier oral   Refills: 0     One-Daily Multi Vitamins Tabs      Take 1 tablet by mouth daily.   Refills: 0     OneTouch Ultra Strp  Generic drug: Glucose Blood      TEST TWICE DAILY   Quantity: 200 strip  Refills: 3     Vitamin D 25 MCG (1000 UT) Tabs      Take 2 tablets by mouth daily.   Refills: 0            STOP taking these medications      ARANESP (ALBUMIN FREE)  IJ        carvedilol 25 MG Tabs  Commonly known as: Coreg                  Where to Get Your Medications        These medications were sent to [id number] [place] [place] - [address] [phone], [phone]  [address], [facility] [address]      Phone: [phone]   metoprolol succinate ER 50 MG Tb24         Follow up Visits  No follow-up provider specified.  Geovanni Garza MD    Consultants         Provider   Role Specialty     Ej Tyler MD      Consulting Physician Cardiac Electrophysiology     Erick Hamilton MD      Consulting Physician NEPHROLOGY              Other Discharge Instructions:       ----------------------------------------------------  35 MIN SPENT ON THIS DC   Rg Mclean MD    3/9/2025      Electronically signed by Rg Mclean MD on 3/10/2025  9:23 AM

## (undated) NOTE — Clinical Note
Kwan Bustos, Mr Valentín Para seen in [facility] today. Renal fx worsened but I think may be related to abx and steroids. His legs (per pt) looked worse today. He says he typically wears compression stockings but arrived w/o them.  Instructed to continue jairon

## (undated) NOTE — MR AVS SNAPSHOT
[address] [facility] [address] [facility] [id number] [phone]               Thank you for choosing us for your health care visit with Joni Cox MD.  We are glad to serve you and happy to provide you with [facility] Losartan Potassium 25 MG Tabs   Take 25 mg by mouth daily.    Commonly known as:  [address] [facility] w/Device Kit   Test twice daily Dx Diabetes E11.9           * ONETOUCH ULTRASOFT LANCETS Misc           * ONETOUCH LANCETS Misc   Test